# Patient Record
Sex: FEMALE | Race: BLACK OR AFRICAN AMERICAN | NOT HISPANIC OR LATINO | Employment: PART TIME | URBAN - METROPOLITAN AREA
[De-identification: names, ages, dates, MRNs, and addresses within clinical notes are randomized per-mention and may not be internally consistent; named-entity substitution may affect disease eponyms.]

---

## 2017-01-26 ENCOUNTER — ALLSCRIPTS OFFICE VISIT (OUTPATIENT)
Dept: OTHER | Facility: OTHER | Age: 27
End: 2017-01-26

## 2017-02-15 ENCOUNTER — ALLSCRIPTS OFFICE VISIT (OUTPATIENT)
Dept: OTHER | Facility: OTHER | Age: 27
End: 2017-02-15

## 2017-03-28 ENCOUNTER — LAB REQUISITION (OUTPATIENT)
Dept: LAB | Facility: HOSPITAL | Age: 27
End: 2017-03-28
Payer: COMMERCIAL

## 2017-03-28 ENCOUNTER — ALLSCRIPTS OFFICE VISIT (OUTPATIENT)
Dept: OTHER | Facility: OTHER | Age: 27
End: 2017-03-28

## 2017-03-28 DIAGNOSIS — Z11.3 ENCOUNTER FOR SCREENING FOR INFECTIONS WITH PREDOMINANTLY SEXUAL MODE OF TRANSMISSION: ICD-10-CM

## 2017-03-28 PROCEDURE — 87491 CHLMYD TRACH DNA AMP PROBE: CPT | Performed by: PHYSICIAN ASSISTANT

## 2017-03-28 PROCEDURE — 87591 N.GONORRHOEAE DNA AMP PROB: CPT | Performed by: PHYSICIAN ASSISTANT

## 2017-03-29 LAB
CHLAMYDIA DNA CVX QL NAA+PROBE: NORMAL
N GONORRHOEA DNA GENITAL QL NAA+PROBE: NORMAL

## 2017-04-07 ENCOUNTER — ALLSCRIPTS OFFICE VISIT (OUTPATIENT)
Dept: OTHER | Facility: OTHER | Age: 27
End: 2017-04-07

## 2017-04-11 ENCOUNTER — ALLSCRIPTS OFFICE VISIT (OUTPATIENT)
Dept: OTHER | Facility: OTHER | Age: 27
End: 2017-04-11

## 2017-06-07 ENCOUNTER — HOSPITAL ENCOUNTER (EMERGENCY)
Facility: HOSPITAL | Age: 27
Discharge: HOME | End: 2017-06-07
Admitting: EMERGENCY MEDICINE
Payer: COMMERCIAL

## 2017-06-07 VITALS
TEMPERATURE: 98.7 F | BODY MASS INDEX: 29.52 KG/M2 | SYSTOLIC BLOOD PRESSURE: 133 MMHG | RESPIRATION RATE: 16 BRPM | HEART RATE: 67 BPM | WEIGHT: 172 LBS | OXYGEN SATURATION: 98 % | DIASTOLIC BLOOD PRESSURE: 72 MMHG

## 2017-06-07 DIAGNOSIS — L73.2 HIDRADENITIS SUPPURATIVA: Primary | ICD-10-CM

## 2017-06-07 PROCEDURE — 99283 EMERGENCY DEPT VISIT LOW MDM: CPT

## 2017-06-07 RX ORDER — CEPHALEXIN 250 MG/1
250 CAPSULE ORAL 4 TIMES DAILY
Qty: 28 CAPSULE | Refills: 0 | Status: SHIPPED | OUTPATIENT
Start: 2017-06-07 | End: 2017-06-14

## 2017-06-07 RX ORDER — OXYCODONE HYDROCHLORIDE AND ACETAMINOPHEN 5; 325 MG/1; MG/1
1 TABLET ORAL EVERY 4 HOURS PRN
Qty: 8 TABLET | Refills: 0 | Status: SHIPPED | OUTPATIENT
Start: 2017-06-07 | End: 2017-07-25

## 2017-06-07 RX ORDER — OXYCODONE HYDROCHLORIDE AND ACETAMINOPHEN 5; 325 MG/1; MG/1
1 TABLET ORAL ONCE
Status: COMPLETED | OUTPATIENT
Start: 2017-06-07 | End: 2017-06-07

## 2017-06-07 RX ADMIN — OXYCODONE HYDROCHLORIDE AND ACETAMINOPHEN 1 TABLET: 5; 325 TABLET ORAL at 13:05

## 2017-07-25 ENCOUNTER — HOSPITAL ENCOUNTER (EMERGENCY)
Facility: HOSPITAL | Age: 27
Discharge: HOME | End: 2017-07-25
Attending: EMERGENCY MEDICINE | Admitting: EMERGENCY MEDICINE
Payer: COMMERCIAL

## 2017-07-25 ENCOUNTER — APPOINTMENT (EMERGENCY)
Dept: RADIOLOGY | Facility: HOSPITAL | Age: 27
End: 2017-07-25
Payer: COMMERCIAL

## 2017-07-25 VITALS
DIASTOLIC BLOOD PRESSURE: 82 MMHG | HEART RATE: 86 BPM | SYSTOLIC BLOOD PRESSURE: 129 MMHG | BODY MASS INDEX: 26.29 KG/M2 | TEMPERATURE: 98.2 F | HEIGHT: 64 IN | RESPIRATION RATE: 16 BRPM | OXYGEN SATURATION: 100 % | WEIGHT: 154 LBS

## 2017-07-25 DIAGNOSIS — R10.84 ABDOMINAL PAIN, DIFFUSE: Primary | ICD-10-CM

## 2017-07-25 LAB
ALBUMIN SERPL BCP-MCNC: 3.5 G/DL (ref 3.5–5)
ALP SERPL-CCNC: 83 U/L (ref 46–116)
ALT SERPL W P-5'-P-CCNC: 16 U/L (ref 12–78)
ANION GAP SERPL CALCULATED.3IONS-SCNC: 8 MMOL/L (ref 4–13)
AST SERPL W P-5'-P-CCNC: 12 U/L (ref 5–45)
BACTERIA UR QL AUTO: ABNORMAL /HPF
BASOPHILS # BLD AUTO: 0.03 THOUSANDS/ΜL (ref 0–0.1)
BASOPHILS NFR BLD AUTO: 1 % (ref 0–1)
BILIRUB SERPL-MCNC: 0.43 MG/DL (ref 0.2–1)
BILIRUB UR QL STRIP: NEGATIVE
BILIRUB UR QL STRIP: NEGATIVE
BUN SERPL-MCNC: 6 MG/DL (ref 5–25)
CALCIUM SERPL-MCNC: 9 MG/DL (ref 8.3–10.1)
CHLORIDE SERPL-SCNC: 108 MMOL/L (ref 100–108)
CLARITY UR: CLEAR
CLARITY UR: NORMAL
CO2 SERPL-SCNC: 26 MMOL/L (ref 21–32)
COLOR UR: YELLOW
COLOR UR: YELLOW
CREAT SERPL-MCNC: 0.65 MG/DL (ref 0.6–1.3)
EOSINOPHIL # BLD AUTO: 0.03 THOUSAND/ΜL (ref 0–0.61)
EOSINOPHIL NFR BLD AUTO: 1 % (ref 0–6)
ERYTHROCYTE [DISTWIDTH] IN BLOOD BY AUTOMATED COUNT: 12.8 % (ref 11.6–15.1)
GFR SERPL CREATININE-BSD FRML MDRD: 144 ML/MIN/1.73SQ M
GLUCOSE SERPL-MCNC: 98 MG/DL (ref 65–140)
GLUCOSE UR STRIP-MCNC: NEGATIVE MG/DL
GLUCOSE UR STRIP-MCNC: NEGATIVE MG/DL
HCG UR QL: NORMAL
HCT VFR BLD AUTO: 38.5 % (ref 34.8–46.1)
HGB BLD-MCNC: 13.3 G/DL (ref 11.5–15.4)
HGB UR QL STRIP.AUTO: NEGATIVE
HGB UR QL STRIP.AUTO: NEGATIVE
HYALINE CASTS #/AREA URNS LPF: ABNORMAL /LPF
KETONES UR STRIP-MCNC: NEGATIVE MG/DL
KETONES UR STRIP-MCNC: NEGATIVE MG/DL
LEUKOCYTE ESTERASE UR QL STRIP: NEGATIVE
LEUKOCYTE ESTERASE UR QL STRIP: NEGATIVE
LIPASE SERPL-CCNC: 173 U/L (ref 73–393)
LYMPHOCYTES # BLD AUTO: 1.62 THOUSANDS/ΜL (ref 0.6–4.47)
LYMPHOCYTES NFR BLD AUTO: 25 % (ref 14–44)
MCH RBC QN AUTO: 29.3 PG (ref 26.8–34.3)
MCHC RBC AUTO-ENTMCNC: 34.5 G/DL (ref 31.4–37.4)
MCV RBC AUTO: 85 FL (ref 82–98)
MONOCYTES # BLD AUTO: 0.36 THOUSAND/ΜL (ref 0.17–1.22)
MONOCYTES NFR BLD AUTO: 6 % (ref 4–12)
NEUTROPHILS # BLD AUTO: 4.33 THOUSANDS/ΜL (ref 1.85–7.62)
NEUTS SEG NFR BLD AUTO: 67 % (ref 43–75)
NITRITE UR QL STRIP: NEGATIVE
NITRITE UR QL STRIP: NEGATIVE
NON-SQ EPI CELLS URNS QL MICRO: ABNORMAL /HPF
NRBC BLD AUTO-RTO: 0 /100 WBCS
PH UR STRIP.AUTO: 6 [PH] (ref 4.5–8)
PH UR STRIP.AUTO: 6 [PH] (ref 4.5–8)
PLATELET # BLD AUTO: 207 THOUSANDS/UL (ref 149–390)
PMV BLD AUTO: 9.6 FL (ref 8.9–12.7)
POTASSIUM SERPL-SCNC: 3.5 MMOL/L (ref 3.5–5.3)
PROT SERPL-MCNC: 7.2 G/DL (ref 6.4–8.2)
PROT UR STRIP-MCNC: ABNORMAL MG/DL
PROT UR STRIP-MCNC: NEGATIVE MG/DL
RBC # BLD AUTO: 4.54 MILLION/UL (ref 3.81–5.12)
RBC #/AREA URNS AUTO: ABNORMAL /HPF
SODIUM SERPL-SCNC: 142 MMOL/L (ref 136–145)
SP GR UR STRIP.AUTO: 1.02 (ref 1–1.03)
SP GR UR STRIP.AUTO: 1.02 (ref 1–1.03)
UROBILINOGEN UR QL STRIP.AUTO: 0.2 E.U./DL
UROBILINOGEN UR QL STRIP.AUTO: 0.2 E.U./DL
WBC # BLD AUTO: 6.38 THOUSAND/UL (ref 4.31–10.16)
WBC #/AREA URNS AUTO: ABNORMAL /HPF

## 2017-07-25 PROCEDURE — 96374 THER/PROPH/DIAG INJ IV PUSH: CPT

## 2017-07-25 PROCEDURE — 80053 COMPREHEN METABOLIC PANEL: CPT | Performed by: STUDENT IN AN ORGANIZED HEALTH CARE EDUCATION/TRAINING PROGRAM

## 2017-07-25 PROCEDURE — 96375 TX/PRO/DX INJ NEW DRUG ADDON: CPT

## 2017-07-25 PROCEDURE — 83690 ASSAY OF LIPASE: CPT | Performed by: STUDENT IN AN ORGANIZED HEALTH CARE EDUCATION/TRAINING PROGRAM

## 2017-07-25 PROCEDURE — 74177 CT ABD & PELVIS W/CONTRAST: CPT

## 2017-07-25 PROCEDURE — 81003 URINALYSIS AUTO W/O SCOPE: CPT | Performed by: STUDENT IN AN ORGANIZED HEALTH CARE EDUCATION/TRAINING PROGRAM

## 2017-07-25 PROCEDURE — 87591 N.GONORRHOEAE DNA AMP PROB: CPT | Performed by: STUDENT IN AN ORGANIZED HEALTH CARE EDUCATION/TRAINING PROGRAM

## 2017-07-25 PROCEDURE — 99284 EMERGENCY DEPT VISIT MOD MDM: CPT

## 2017-07-25 PROCEDURE — 87491 CHLMYD TRACH DNA AMP PROBE: CPT | Performed by: STUDENT IN AN ORGANIZED HEALTH CARE EDUCATION/TRAINING PROGRAM

## 2017-07-25 PROCEDURE — 36415 COLL VENOUS BLD VENIPUNCTURE: CPT | Performed by: STUDENT IN AN ORGANIZED HEALTH CARE EDUCATION/TRAINING PROGRAM

## 2017-07-25 PROCEDURE — 96361 HYDRATE IV INFUSION ADD-ON: CPT

## 2017-07-25 PROCEDURE — 85025 COMPLETE CBC W/AUTO DIFF WBC: CPT | Performed by: STUDENT IN AN ORGANIZED HEALTH CARE EDUCATION/TRAINING PROGRAM

## 2017-07-25 PROCEDURE — 81001 URINALYSIS AUTO W/SCOPE: CPT

## 2017-07-25 PROCEDURE — 81025 URINE PREGNANCY TEST: CPT | Performed by: STUDENT IN AN ORGANIZED HEALTH CARE EDUCATION/TRAINING PROGRAM

## 2017-07-25 RX ORDER — DICYCLOMINE HYDROCHLORIDE 10 MG/1
10 CAPSULE ORAL
Qty: 20 CAPSULE | Refills: 0 | Status: SHIPPED | OUTPATIENT
Start: 2017-07-25 | End: 2017-12-26 | Stop reason: ALTCHOICE

## 2017-07-25 RX ORDER — MORPHINE SULFATE 4 MG/ML
4 INJECTION, SOLUTION INTRAMUSCULAR; INTRAVENOUS ONCE
Status: COMPLETED | OUTPATIENT
Start: 2017-07-25 | End: 2017-07-25

## 2017-07-25 RX ORDER — MORPHINE SULFATE 4 MG/ML
4 INJECTION, SOLUTION INTRAMUSCULAR; INTRAVENOUS EVERY 4 HOURS PRN
Status: DISCONTINUED | OUTPATIENT
Start: 2017-07-25 | End: 2017-07-25

## 2017-07-25 RX ORDER — KETOROLAC TROMETHAMINE 30 MG/ML
15 INJECTION, SOLUTION INTRAMUSCULAR; INTRAVENOUS ONCE
Status: COMPLETED | OUTPATIENT
Start: 2017-07-25 | End: 2017-07-25

## 2017-07-25 RX ADMIN — KETOROLAC TROMETHAMINE 15 MG: 30 INJECTION, SOLUTION INTRAMUSCULAR at 14:39

## 2017-07-25 RX ADMIN — HYDROMORPHONE HYDROCHLORIDE 0.5 MG: 1 INJECTION, SOLUTION INTRAMUSCULAR; INTRAVENOUS; SUBCUTANEOUS at 12:20

## 2017-07-25 RX ADMIN — MORPHINE SULFATE 4 MG: 4 INJECTION, SOLUTION INTRAMUSCULAR; INTRAVENOUS at 10:45

## 2017-07-25 RX ADMIN — SODIUM CHLORIDE 1000 ML: 0.9 INJECTION, SOLUTION INTRAVENOUS at 10:42

## 2017-07-25 RX ADMIN — IOHEXOL 100 ML: 350 INJECTION, SOLUTION INTRAVENOUS at 14:56

## 2017-07-26 LAB
CHLAMYDIA DNA CVX QL NAA+PROBE: NORMAL
N GONORRHOEA DNA GENITAL QL NAA+PROBE: NORMAL

## 2017-07-28 ENCOUNTER — TRANSCRIBE ORDERS (OUTPATIENT)
Dept: LAB | Facility: HOSPITAL | Age: 27
End: 2017-07-28

## 2017-08-26 ENCOUNTER — HOSPITAL ENCOUNTER (EMERGENCY)
Facility: HOSPITAL | Age: 27
Discharge: HOME | End: 2017-08-26
Attending: EMERGENCY MEDICINE
Payer: COMMERCIAL

## 2017-08-26 VITALS
WEIGHT: 154 LBS | OXYGEN SATURATION: 99 % | DIASTOLIC BLOOD PRESSURE: 73 MMHG | RESPIRATION RATE: 17 BRPM | BODY MASS INDEX: 26.43 KG/M2 | SYSTOLIC BLOOD PRESSURE: 111 MMHG | TEMPERATURE: 98.4 F | HEART RATE: 75 BPM

## 2017-08-26 DIAGNOSIS — L02.214 GROIN ABSCESS: ICD-10-CM

## 2017-08-26 DIAGNOSIS — L73.2 HIDRADENITIS SUPPURATIVA: Primary | ICD-10-CM

## 2017-08-26 PROCEDURE — 99282 EMERGENCY DEPT VISIT SF MDM: CPT

## 2017-08-26 RX ORDER — CEPHALEXIN 250 MG/1
500 CAPSULE ORAL 4 TIMES DAILY
Qty: 80 CAPSULE | Refills: 0 | Status: SHIPPED | OUTPATIENT
Start: 2017-08-26 | End: 2017-09-05

## 2017-08-26 RX ORDER — OXYCODONE HYDROCHLORIDE AND ACETAMINOPHEN 5; 325 MG/1; MG/1
1 TABLET ORAL EVERY 8 HOURS PRN
Qty: 15 TABLET | Refills: 0 | Status: SHIPPED | OUTPATIENT
Start: 2017-08-26 | End: 2017-08-31

## 2017-08-26 RX ORDER — HYDROCODONE BITARTRATE AND ACETAMINOPHEN 5; 325 MG/1; MG/1
1 TABLET ORAL ONCE
Status: COMPLETED | OUTPATIENT
Start: 2017-08-26 | End: 2017-08-26

## 2017-08-26 RX ADMIN — HYDROCODONE BITARTRATE AND ACETAMINOPHEN 1 TABLET: 5; 325 TABLET ORAL at 13:37

## 2017-09-27 ENCOUNTER — ALLSCRIPTS OFFICE VISIT (OUTPATIENT)
Dept: OTHER | Facility: OTHER | Age: 27
End: 2017-09-27

## 2017-09-27 DIAGNOSIS — R19.7 DIARRHEA: ICD-10-CM

## 2017-09-27 DIAGNOSIS — E05.90 THYROTOXICOSIS WITHOUT THYROID STORM: ICD-10-CM

## 2017-09-27 DIAGNOSIS — R63.4 ABNORMAL WEIGHT LOSS: ICD-10-CM

## 2017-10-09 ENCOUNTER — GENERIC CONVERSION - ENCOUNTER (OUTPATIENT)
Dept: OTHER | Facility: OTHER | Age: 27
End: 2017-10-09

## 2017-10-09 ENCOUNTER — APPOINTMENT (OUTPATIENT)
Dept: LAB | Facility: CLINIC | Age: 27
End: 2017-10-09
Payer: COMMERCIAL

## 2017-10-09 DIAGNOSIS — R19.7 DIARRHEA: ICD-10-CM

## 2017-10-09 DIAGNOSIS — R63.4 ABNORMAL WEIGHT LOSS: ICD-10-CM

## 2017-10-09 LAB
ALBUMIN SERPL BCP-MCNC: 3.7 G/DL (ref 3.5–5)
ALP SERPL-CCNC: 72 U/L (ref 46–116)
ALT SERPL W P-5'-P-CCNC: 16 U/L (ref 12–78)
ANION GAP SERPL CALCULATED.3IONS-SCNC: 5 MMOL/L (ref 4–13)
AST SERPL W P-5'-P-CCNC: 12 U/L (ref 5–45)
BASOPHILS # BLD AUTO: 0.01 THOUSANDS/ΜL (ref 0–0.1)
BASOPHILS NFR BLD AUTO: 0 % (ref 0–1)
BILIRUB SERPL-MCNC: 0.65 MG/DL (ref 0.2–1)
BUN SERPL-MCNC: 5 MG/DL (ref 5–25)
CALCIUM SERPL-MCNC: 9 MG/DL (ref 8.3–10.1)
CHLORIDE SERPL-SCNC: 107 MMOL/L (ref 100–108)
CO2 SERPL-SCNC: 28 MMOL/L (ref 21–32)
CREAT SERPL-MCNC: 0.74 MG/DL (ref 0.6–1.3)
EOSINOPHIL # BLD AUTO: 0.01 THOUSAND/ΜL (ref 0–0.61)
EOSINOPHIL NFR BLD AUTO: 0 % (ref 0–6)
ERYTHROCYTE [DISTWIDTH] IN BLOOD BY AUTOMATED COUNT: 13.8 % (ref 11.6–15.1)
GFR SERPL CREATININE-BSD FRML MDRD: 130 ML/MIN/1.73SQ M
GLUCOSE SERPL-MCNC: 87 MG/DL (ref 65–140)
HCT VFR BLD AUTO: 40.1 % (ref 34.8–46.1)
HGB BLD-MCNC: 13.5 G/DL (ref 11.5–15.4)
LYMPHOCYTES # BLD AUTO: 1.48 THOUSANDS/ΜL (ref 0.6–4.47)
LYMPHOCYTES NFR BLD AUTO: 36 % (ref 14–44)
MCH RBC QN AUTO: 29 PG (ref 26.8–34.3)
MCHC RBC AUTO-ENTMCNC: 33.7 G/DL (ref 31.4–37.4)
MCV RBC AUTO: 86 FL (ref 82–98)
MONOCYTES # BLD AUTO: 0.32 THOUSAND/ΜL (ref 0.17–1.22)
MONOCYTES NFR BLD AUTO: 8 % (ref 4–12)
NEUTROPHILS # BLD AUTO: 2.31 THOUSANDS/ΜL (ref 1.85–7.62)
NEUTS SEG NFR BLD AUTO: 56 % (ref 43–75)
NRBC BLD AUTO-RTO: 0 /100 WBCS
PLATELET # BLD AUTO: 197 THOUSANDS/UL (ref 149–390)
PMV BLD AUTO: 10 FL (ref 8.9–12.7)
POTASSIUM SERPL-SCNC: 4.2 MMOL/L (ref 3.5–5.3)
PROT SERPL-MCNC: 7.7 G/DL (ref 6.4–8.2)
RBC # BLD AUTO: 4.65 MILLION/UL (ref 3.81–5.12)
SODIUM SERPL-SCNC: 140 MMOL/L (ref 136–145)
T4 FREE SERPL-MCNC: 0.99 NG/DL (ref 0.76–1.46)
TSH SERPL DL<=0.05 MIU/L-ACNC: 0.33 UIU/ML (ref 0.36–3.74)
WBC # BLD AUTO: 4.14 THOUSAND/UL (ref 4.31–10.16)

## 2017-10-09 PROCEDURE — 36415 COLL VENOUS BLD VENIPUNCTURE: CPT

## 2017-10-09 PROCEDURE — 80053 COMPREHEN METABOLIC PANEL: CPT

## 2017-10-09 PROCEDURE — 85025 COMPLETE CBC W/AUTO DIFF WBC: CPT

## 2017-10-09 PROCEDURE — 84439 ASSAY OF FREE THYROXINE: CPT

## 2017-10-09 PROCEDURE — 84443 ASSAY THYROID STIM HORMONE: CPT

## 2017-10-30 ENCOUNTER — ALLSCRIPTS OFFICE VISIT (OUTPATIENT)
Dept: OTHER | Facility: OTHER | Age: 27
End: 2017-10-30

## 2017-10-30 DIAGNOSIS — R10.2 PELVIC AND PERINEAL PAIN: ICD-10-CM

## 2017-10-30 DIAGNOSIS — E05.90 THYROTOXICOSIS WITHOUT THYROID STORM: ICD-10-CM

## 2017-10-30 LAB — HCG, QUALITATIVE (HISTORICAL): NEGATIVE

## 2017-10-30 PROCEDURE — 87491 CHLMYD TRACH DNA AMP PROBE: CPT | Performed by: STUDENT IN AN ORGANIZED HEALTH CARE EDUCATION/TRAINING PROGRAM

## 2017-10-30 PROCEDURE — 87591 N.GONORRHOEAE DNA AMP PROB: CPT | Performed by: STUDENT IN AN ORGANIZED HEALTH CARE EDUCATION/TRAINING PROGRAM

## 2017-10-31 ENCOUNTER — LAB REQUISITION (OUTPATIENT)
Dept: LAB | Facility: HOSPITAL | Age: 27
End: 2017-10-31
Payer: COMMERCIAL

## 2017-10-31 ENCOUNTER — GENERIC CONVERSION - ENCOUNTER (OUTPATIENT)
Dept: OTHER | Facility: OTHER | Age: 27
End: 2017-10-31

## 2017-10-31 DIAGNOSIS — R10.2 PELVIC AND PERINEAL PAIN: ICD-10-CM

## 2017-10-31 LAB
BILIRUB UR QL STRIP: NEGATIVE
CLARITY UR: CLEAR
COLOR UR: YELLOW
GLUCOSE UR STRIP-MCNC: NEGATIVE MG/DL
HGB UR QL STRIP.AUTO: NEGATIVE
KETONES UR STRIP-MCNC: NEGATIVE MG/DL
LEUKOCYTE ESTERASE UR QL STRIP: NEGATIVE
NITRITE UR QL STRIP: NEGATIVE
PH UR STRIP.AUTO: 6 [PH] (ref 4.5–8)
PROT UR STRIP-MCNC: NEGATIVE MG/DL
SP GR UR STRIP.AUTO: 1.01 (ref 1–1.03)
UROBILINOGEN UR QL STRIP.AUTO: 0.2 E.U./DL

## 2017-10-31 PROCEDURE — 81003 URINALYSIS AUTO W/O SCOPE: CPT | Performed by: STUDENT IN AN ORGANIZED HEALTH CARE EDUCATION/TRAINING PROGRAM

## 2017-11-01 LAB
CHLAMYDIA DNA CVX QL NAA+PROBE: NORMAL
N GONORRHOEA DNA GENITAL QL NAA+PROBE: NORMAL

## 2017-11-13 ENCOUNTER — GENERIC CONVERSION - ENCOUNTER (OUTPATIENT)
Dept: OTHER | Facility: OTHER | Age: 27
End: 2017-11-13

## 2017-11-16 ENCOUNTER — APPOINTMENT (EMERGENCY)
Dept: RADIOLOGY | Facility: HOSPITAL | Age: 27
End: 2017-11-16
Payer: COMMERCIAL

## 2017-11-16 ENCOUNTER — HOSPITAL ENCOUNTER (EMERGENCY)
Facility: HOSPITAL | Age: 27
Discharge: HOME | End: 2017-11-16
Attending: EMERGENCY MEDICINE | Admitting: EMERGENCY MEDICINE
Payer: COMMERCIAL

## 2017-11-16 VITALS
DIASTOLIC BLOOD PRESSURE: 73 MMHG | BODY MASS INDEX: 24.75 KG/M2 | HEIGHT: 64 IN | HEART RATE: 53 BPM | WEIGHT: 145 LBS | TEMPERATURE: 97.5 F | RESPIRATION RATE: 16 BRPM | OXYGEN SATURATION: 98 % | SYSTOLIC BLOOD PRESSURE: 113 MMHG

## 2017-11-16 DIAGNOSIS — G43.909 MIGRAINE: Primary | ICD-10-CM

## 2017-11-16 PROCEDURE — 99284 EMERGENCY DEPT VISIT MOD MDM: CPT

## 2017-11-16 PROCEDURE — 96374 THER/PROPH/DIAG INJ IV PUSH: CPT

## 2017-11-16 PROCEDURE — 96361 HYDRATE IV INFUSION ADD-ON: CPT

## 2017-11-16 PROCEDURE — 70450 CT HEAD/BRAIN W/O DYE: CPT

## 2017-11-16 PROCEDURE — 96375 TX/PRO/DX INJ NEW DRUG ADDON: CPT

## 2017-11-16 RX ORDER — DEXAMETHASONE SODIUM PHOSPHATE 10 MG/ML
10 INJECTION, SOLUTION INTRAMUSCULAR; INTRAVENOUS ONCE
Status: DISCONTINUED | OUTPATIENT
Start: 2017-11-16 | End: 2017-11-16 | Stop reason: HOSPADM

## 2017-11-16 RX ORDER — KETOROLAC TROMETHAMINE 30 MG/ML
15 INJECTION, SOLUTION INTRAMUSCULAR; INTRAVENOUS ONCE
Status: COMPLETED | OUTPATIENT
Start: 2017-11-16 | End: 2017-11-16

## 2017-11-16 RX ORDER — ACETAMINOPHEN, ASPIRIN AND CAFFEINE 250; 250; 65 MG/1; MG/1; MG/1
2 TABLET, FILM COATED ORAL 2 TIMES DAILY
COMMUNITY
End: 2017-12-26 | Stop reason: ALTCHOICE

## 2017-11-16 RX ORDER — METOCLOPRAMIDE HYDROCHLORIDE 5 MG/ML
10 INJECTION INTRAMUSCULAR; INTRAVENOUS ONCE
Status: COMPLETED | OUTPATIENT
Start: 2017-11-16 | End: 2017-11-16

## 2017-11-16 RX ORDER — UREA 10 %
500 LOTION (ML) TOPICAL DAILY
COMMUNITY
End: 2017-12-26 | Stop reason: ALTCHOICE

## 2017-11-16 RX ORDER — ACETAMINOPHEN 500 MG
1000 TABLET ORAL 2 TIMES DAILY
COMMUNITY
End: 2017-12-26 | Stop reason: ALTCHOICE

## 2017-11-16 RX ORDER — TOPIRAMATE 100 MG/1
150 TABLET, FILM COATED ORAL
COMMUNITY
End: 2017-12-26 | Stop reason: ALTCHOICE

## 2017-11-16 RX ADMIN — KETOROLAC TROMETHAMINE 15 MG: 30 INJECTION, SOLUTION INTRAMUSCULAR at 15:27

## 2017-11-16 RX ADMIN — SODIUM CHLORIDE 1000 ML: 0.9 INJECTION, SOLUTION INTRAVENOUS at 15:31

## 2017-11-16 RX ADMIN — METOCLOPRAMIDE 10 MG: 5 INJECTION, SOLUTION INTRAMUSCULAR; INTRAVENOUS at 15:32

## 2017-11-16 NOTE — ED PROVIDER NOTES
History  Chief Complaint   Patient presents with    Headache - Recurrent or Known Dx Migraines     Pt reports migraines(every day for the past 6 months)- was put on tramadol with no relief- yesterday something dropped and hit her head and her migraine has been worse since     44-year-old female with a past medical history of chronic migraine since the birth of her child 6 months ago presents for evaluation of acute exacerbation of her headaches  Patient states that daily she has a headache which ranges from 6 to 8/10 however, yesterday she struck her head on a wheelchair foot rest which caused her to have a 10/10 headache  She typically takes Topamax at night in addition to magnesium  Despite these medications as well as addition of Percocet her headache continues  Patient states that the headache is pulsatile nature, generalized associated with photophobia and photophobia  Patient has nausea but denies vomiting  Patient denies history of fever, chills, abdominal pain, back pain chest pain, shortness of breath, history of arrhythmias  History provided by:  Patient      Prior to Admission Medications   Prescriptions Last Dose Informant Patient Reported?  Taking?   acetaminophen (TYLENOL) 500 mg tablet 11/16/2017 at Unknown time  Yes Yes   Sig: Take 1,000 mg by mouth 2 (two) times a day   aspirin-acetaminophen-caffeine (EXCEDRIN MIGRAINE) 250-250-65 MG per tablet 11/16/2017 at Unknown time  Yes Yes   Sig: Take 2 tablets by mouth 2 (two) times a day   dicyclomine (BENTYL) 10 mg capsule   No No   Sig: Take 1 capsule by mouth 4 (four) times a day (before meals and at bedtime)   magnesium gluconate (MAGONATE) 500 mg tablet 11/16/2017 at Unknown time  Yes Yes   Sig: Take 500 mg by mouth daily   topiramate (TOPAMAX) 100 mg tablet 11/15/2017 at Unknown time  Yes Yes   Sig: Take 150 mg by mouth daily at bedtime      Facility-Administered Medications: None       Past Medical History:   Diagnosis Date    Disease of thyroid gland        Past Surgical History:   Procedure Laterality Date    AR EXC SWEAT GLAND LESN INGUIN,SIMPL Right 2/23/2016    Procedure: EXCISION GROIN CYST HIDRADENITIS;  Surgeon: Toribio Curling, DO;  Location: [facility];  Service: General    VULVA BIOPSY N/A 5/13/2016    Procedure: INCISION AND DRAINAGE, EXCISION OF LABIAL CYST ;  Surgeon: Jhoana Jones DO;  Location[facility];  Service:        History reviewed  No pertinent family history  I have reviewed and agree with the history as documented  Social History   Substance Use Topics    Smoking status: Never Smoker    Smokeless tobacco: Never Used    Alcohol use No        Review of Systems   Constitutional: Negative for chills, diaphoresis, fatigue and fever  HENT: Negative for congestion, ear discharge, facial swelling, hearing loss, rhinorrhea, sinus pain, sinus pressure, sneezing, sore throat, tinnitus and trouble swallowing  Respiratory: Negative for cough, choking, chest tightness, shortness of breath, wheezing and stridor  Cardiovascular: Negative for chest pain, palpitations and leg swelling  Gastrointestinal: Negative for abdominal distention, abdominal pain, blood in stool, constipation, diarrhea, nausea and vomiting  Genitourinary: Negative for difficulty urinating, dysuria, enuresis, flank pain, frequency and hematuria  Musculoskeletal: Negative for arthralgias, back pain, gait problem and neck stiffness  Skin: Negative for rash and wound  Neurological: Positive for headaches  Negative for dizziness, seizures, syncope, weakness and numbness  Psychiatric/Behavioral: Negative for agitation, confusion, hallucinations, sleep disturbance and suicidal ideas         Physical Exam  ED Triage Vitals [11/16/17 1159]   Temperature Pulse Respirations Blood Pressure SpO2   97 5 °F (36 4 °C) 77 18 146/68 98 %      Temp Source Heart Rate Source Patient Position - Orthostatic VS BP Location FiO2 (%)   Tympanic Monitor Sitting Left arm --      Pain Score       Worst Possible Pain           Orthostatic Vital Signs  Vitals:    11/16/17 1159 11/16/17 1403 11/16/17 1526 11/16/17 1637   BP: 146/68 116/58 118/69 113/73   Pulse: 77 61 63 (!) 53   Patient Position - Orthostatic VS: Sitting Lying Lying Lying       Physical Exam   Constitutional: She is oriented to person, place, and time  She appears well-developed and well-nourished  HENT:   Head: Normocephalic and atraumatic  Nose: No sinus tenderness  No epistaxis  Eyes: Conjunctivae are normal    Cardiovascular: Normal rate, regular rhythm and normal heart sounds  Exam reveals no gallop and no friction rub  No murmur heard  Pulmonary/Chest: Effort normal and breath sounds normal  No respiratory distress  She has no wheezes  She has no rales  Abdominal: Soft  Bowel sounds are normal  She exhibits no distension  There is no tenderness  There is no rebound and no guarding  Neurological: She is alert and oriented to person, place, and time  She is not disoriented  GCS eye subscore is 4  GCS verbal subscore is 5  GCS motor subscore is 6  Skin: Skin is warm, dry and intact  Psychiatric: She has a normal mood and affect  Her speech is normal and behavior is normal  Judgment and thought content normal    Nursing note and vitals reviewed  ED Medications  Medications   dexamethasone (PF) (DECADRON) injection 10 mg (10 mg Intravenous Not Given 11/16/17 1616)   sodium chloride 0 9 % bolus 1,000 mL (0 mL Intravenous Stopped 11/16/17 1630)   ketorolac (TORADOL) 30 mg/mL injection 15 mg (15 mg Intravenous Given 11/16/17 1527)   metoclopramide (REGLAN) injection 10 mg (10 mg Intravenous Given 11/16/17 1532)       Diagnostic Studies  Results Reviewed     None                 CT head without contrast   ED Interpretation by Ayah Pollard DO (11/16 1658)      No acute intracranial abnormality           Workstation performed: [id number]         Final Result by Jarod Reed MD (24/11 1646)      No acute intracranial abnormality  Workstation performed: [id number]               Procedures  Procedures      Phone Consults  ED Phone Contact    ED Course  ED Course                                MDM  Number of Diagnoses or Management Options  Migraine: new and requires workup  Diagnosis management comments: 61-year-old female with a six-month history of migraine headaches following the birth of her child and has had no significant workup for her migraines presents for evaluation of an acute exacerbation  Patient given a CT head without contrast to rule out intracranial mass, which showed no no mass acute bleed  The patient's headache completely resolved following receiving Reglan and Toradol  Patient decided to leave the emergency department at this time      1  Migraine  -patient given 10 of Reglan, 15 Toradol, 1 L normal saline  -patient will follow with PCP within a week      CritCare Time    Disposition  Final diagnoses:   Migraine     Time reflects when diagnosis was documented in both MDM as applicable and the Disposition within this note     Time User Action Codes Description Comment    11/16/2017  4:59 PM Josie Estes Add [[id number]] Migraine       ED Disposition     ED Disposition Condition Comment    Discharge  Rosalia Velarde discharge to home/self care      Condition at discharge: Good        Follow-up Information     Follow up With Specialties Details Why Contact Info    Cesia Case MD Family Medicine Call in 1 day  [address]  [address] [place] [address]  [facility]          Discharge Medication List as of 11/16/2017  5:00 PM      CONTINUE these medications which have NOT CHANGED    Details   acetaminophen (TYLENOL) 500 mg tablet Take 1,000 mg by mouth 2 (two) times a day, Historical Med      aspirin-acetaminophen-caffeine (EXCEDRIN MIGRAINE) 250-250-65 MG per tablet Take 2 tablets by mouth 2 (two) times a day, Historical Med      magnesium gluconate (MAGONATE) 500 mg tablet Take 500 mg by mouth daily, Historical Med      topiramate (TOPAMAX) 100 mg tablet Take 150 mg by mouth daily at bedtime, Historical Med      dicyclomine (BENTYL) 10 mg capsule Take 1 capsule by mouth 4 (four) times a day (before meals and at bedtime), Starting Tue 7/25/2017, Print           No discharge procedures on file  ED Provider  Attending physically available and evaluated Durward Ards  I managed the patient along with the ED Attending      Electronically Signed by         Trudy Flannery DO  Resident  11/16/17 6015

## 2017-11-16 NOTE — DISCHARGE INSTRUCTIONS
Migraine Headache   WHAT YOU SHOULD KNOW:   A migraine is a severe headache  The pain can be so severe that it interferes with your daily activities  A migraine can last a few hours up to several days  The exact cause of migraines is not known  It may be caused by changes in your body chemicals and extra sensitive nerves in your brain  AFTER YOU LEAVE:   Medicines:  Take medicine as soon as you feel a migraine begin  · Pain medicine: You may need medicine to take away or decrease pain  You may need a doctor's order for this medicine  Do not wait until the pain is severe before you take your medicine  · Migraine medicines: These are used to help prevent a migraine or stop it once it starts  · Antinausea medicine: This medicine may be given to calm your stomach and to help prevent vomiting  They can also help relieve pain  · Take your medicine as directed  Call your healthcare provider if you think your medicine is not helping or if you have side effects  Tell him if you are allergic to any medicine  Keep a list of the medicines, vitamins, and herbs you take  Include the amounts, and when and why you take them  Bring the list or the pill bottles to follow-up visits  Carry your medicine list with you in case of an emergency  Manage your symptoms:   · Rest:  Rest in a dark, quiet room  This will help decrease your pain  · Ice:  Ice helps decrease pain  Use an ice pack or put crushed ice in a plastic bag  Cover the ice pack with a towel and place it on your head where it hurts for 15 to 20 minutes every hour  · Heat:  Heat helps decrease pain and muscle spasms  Use a small towel dampened with warm water or a heating pad, or sit in a warm bath  Apply heat on the area for 20 to 30 minutes every 2 hours  You may alternate heat and ice  Keep a headache diary:  Write down when your migraines start and stop  Include your symptoms and what you were doing when a migraine began   Record what you ate or drank for 24 hours before the migraine started  Describe the pain and where it hurts  Keep track of what you did to treat your migraine and whether it worked  Follow up with your primary healthcare provider or neurologist as directed:  Bring your headache diary with you when you see your primary healthcare provider  Write down your questions so you remember to ask them during your visits  Prevent another migraine:   · Do not smoke: If you smoke, it is never too late to quit  Tobacco smoke can trigger a migraine  It can also cause heart disease, lung disease, cancer, and other health problems  Quitting smoking will improve your health and the health of those around you  If you smoke, ask for information about how to stop  · Do not drink alcohol:  Alcohol can trigger a migraine  It can also interfere with the medicines used to treat your migraine  · Get regular exercise:  Exercise may help prevent migraines  Talk to your primary healthcare provider about the best exercise plan for you  · Manage stress:  Stress may trigger a migraine  Learn new ways to relax, such as deep breathing  · Stick to a sleep schedule:  Go to bed and get up at the same time each day  · Eat regular meals:  Include healthy foods such as include fruit, vegetables, whole-grain breads, low-fat dairy products, beans, lean meat, and fish  Avoid trigger foods like chocolate, hard cheese, and red wine  Foods that contain gluten, nitrates, MSG, or artificial sweeteners may also trigger migraines  Caffeine, which is often used to treat migraines, can also trigger them  Contact your primary healthcare provider or neurologist if:   · You have a fever  · Your migraines interfere with your daily activities  · Your medicines or treatments stop working  · You have questions about your condition or care    Seek care immediately or call 911 if:   · You have a headache that seems different or much worse than your usual migraine headache  · You have a severe headache with a fever or a stiff neck  · You have new problems with speech, vision, balance, or movement  · You feel like you are going to faint, you become confused, or you have a seizure  © 2014 [address] is for End User's use only and may not be sold, redistributed or otherwise used for commercial purposes  All illustrations and images included in CareNotes® are the copyrighted property of [employer] D [employer] M [employer]  or Jae Ortiz  The above information is an  only  It is not intended as medical advice for individual conditions or treatments  Talk to your doctor, nurse or pharmacist before following any medical regimen to see if it is safe and effective for you

## 2017-11-16 NOTE — ED NOTES
Patient requesting IV to be stopped due to resolution of migraine/pain  Dr Mendoza Feeling aware and in agreement  IV to be taken out at this time  Patient still awaiting CT scan        Claire Hodgkins, RN  11/16/17 3852

## 2017-11-16 NOTE — ED ATTENDING ATTESTATION
Lawrence Gutierrez MD, saw and evaluated the patient  I have discussed the patient with the resident/non-physician practitioner and agree with the resident's/non-physician practitioner's findings, Plan of Care, and MDM as documented in the resident's/non-physician practitioner's note, except where noted  All available labs and Radiology studies were reviewed  At this point I agree with the current assessment done in the Emergency Department  I have conducted an independent evaluation of this patient a history and physical is as follows:      Critical Care Time  CritCare Time    25 yo female with chronic headaches for the last six months presents with worsening headache after getting hit in head with wheelchair leg piece while at work yesterday  Pt with nausea, photophobia, phonophobia, no numbness, tingling, no weakness  Pt takes magnesium and topamax daily, trying percocet last few days  Vss, afebrile, lungs cta, rrr, abdomen soft nontender, no neuro deficits  Ct head, migraine meds

## 2017-11-17 ENCOUNTER — ALLSCRIPTS OFFICE VISIT (OUTPATIENT)
Dept: OTHER | Facility: OTHER | Age: 27
End: 2017-11-17

## 2017-11-18 NOTE — PROGRESS NOTES
Assessment    1  Common migraine without aura (346 10) (G43 009)    Plan  Common migraine without aura    · PredniSONE 20 MG Oral Tablet; take 2 tablet daily   · 1 - Robert Mcarthur MD, [facility]  *  23 yo female with frequentmigraines, worsening in recent months  Thanks  Dagoberto Mars, DO  Status: Active Requested for: [id number]  Care Summary provided  : Yes   · Follow-up PRN Evaluation and Treatment  Follow-up  Status: Complete  Done:17Nov2017    Discussion/Summary    Todd Matson is uncomfortable, but stable on exam  She is to f/u PRN no improvement  Precautions given - worsening headaches, slurred speech, fevers, etc, she is to be taken back to the ER this time, we will place Darcie on a burst of Prednisone  is to hydrate well and rest can still take her daily Topamax, Magnesium, and Tylenol or Percocet PRN  The patient was counseled regarding instructions for management,-- impressions,-- importance of compliance with treatment  Possible side effects of new medications were reviewed with the patient/guardian today  The treatment plan was reviewed with the patient/guardian  The patient/guardian understands and agrees with the treatment plan      Chief Complaint  PT is being seen today due to having a migraines for the last 3 days  PT has been taking her medication with Excedrin and Tylenol with no help in site  fevers  No runny stuffy nose; +Bitemporal headaches - +photophobia  No nausea  No CP/SOB  of the head at the ER was normal yesterday; records reviewed  She is not pregnant - pregnancy test was negative in the ER  Note: Pt also late for her acute appt today, but was still seen  Inquired about a Percocet refill as well - pt is seeing another physician (Dr Lilian Zambrano) who has prescribed regularly for her, and I told Todd Matson that she would need to leave a message for her regarding this  History of Present Illness  HPI: As above  Review of Systems   Constitutional: as noted in HPI  Cardiovascular: as noted in HPI  Respiratory: as noted in HPI  Gastrointestinal: as noted in HPI  Neurological: as noted in HPI  Active Problems  1  Abnormal weight loss (783 21) (R63 4)   2  Common migraine without aura (346 10) (G43 009)   3  Encounter for routine gynecological examination (V72 31) (Z01 419)   4  Epidermal inclusion cyst (706 2) (L72 0)   5  Female pelvic pain (625 9) (R10 2)   6  Hyperthyroidism (242 90) (E05 90)   7  Screen for STD (sexually transmitted disease) (V74 5) (Z11 3)   8  Suppurative hidradenitis (705 83) (L73 2)    Past Medical History  1  History of  delivery delivered (669 71) (O82)   2  History of acute bronchitis (V12 69) (Z87 09)   3  History of candidal vulvovaginitis (V13 29) (Z86 19)   4  History of candidiasis (V12 09) (Z86 19)   5  History of folliculitis (H74 1) (N64 2)   6  History of hidradenitis suppurativa (V13 3) (Z87 2)   7  History of painful menstruation (V13 29) (Z87 42)   8  History of Hyperemesis gravidarum (643 00) (O21 0)   9  History of Hypertension in pregnancy, preeclampsia (642 40) (O14 90)   10  History of Labial abscess (616 4) (N76 4)   11  History of Onset of menses (V21 8)   12  History of UTI in pregnancy (646 60,599 0) (O23 40)  Active Problems And Past Medical History Reviewed: The active problems and past medical history were reviewed and updated today  Family History  Mother    1  Family history of Pure Hypercholesterolemia  Brother    2  Family history of Family Health Status Of Brother - Good    Social History   · Denied: History of Alcohol Use (History)   · Denied: History of Drug Use   · Never A Smoker    Surgical History    1  History of Cholecystectomy   2  History of Incision And Drainage Of Skin Abscess   3  History of Tonsillectomy    Current Meds   1  Magnesium Oxide 400 MG Oral Tablet; TAKE 1 TABLET Every twelve hours PRN;  Therapy: [id number] to [phone])  Requested for: [phone]; Last [phone] Ordered   2  Oxycodone-Acetaminophen 5-325 MG Oral Tablet; TAKE 1 TABLET EVERY 12 HOURS AS NEEDED FOR PAIN; Therapy: [id number] to (Evaluate:10Nov2017); Last Rx:31Oct2017 Ordered   3  Topiramate 50 MG Oral Tablet; 1/2 tab every night for 1 week then 1 tab QHS; Therapy: 10ZOQ8005 to ([phone])  Requested for: 69CIW1897; Last Rx:09Oct2017 Ordered    The medication list was reviewed and updated today  Allergies  1  No Known Drug Allergies  2  No Known Environmental Allergies   3  No Known Food Allergies    Vitals   Recorded: 04SUZ0004 11:58AM   Heart Rate 84   Respiration 16   Systolic 672   Diastolic 58   Height 5 ft 4 in   Weight 149 lb 6 oz   BMI Calculated 25 64   BSA Calculated 1 73       Physical Exam   Constitutional  General appearance: Abnormal   well developed,-- uncomfortable,-- well nourished,-- appears tired,-- well hydrated-- and-- Pt is uncomfortable due to her migraine, but is NON-toxic appearing; VSS  Eyes  Conjunctiva and lids: No swelling, erythema or discharge  Pupils and irises: Equal, round and reactive to light  -- EOMI  Ears, Nose, Mouth, and Throat  External inspection of ears and nose: Normal    Otoscopic examination: Tympanic membranes translucent with normal light reflex  Canals patent without erythema  Oropharynx: Normal with no erythema, edema, exudate or lesions  Pulmonary  Respiratory effort: No increased work of breathing or signs of respiratory distress  Auscultation of lungs: Clear to auscultation  Cardiovascular  Auscultation of heart: Normal rate and rhythm, normal S1 and S2, without murmurs  Lymphatic  Palpation of lymph nodes in neck: No lymphadenopathy  Musculoskeletal  Gait and station: Normal    Neurologic  Cranial nerves: Cranial nerves 2-12 intact     Psychiatric  Orientation to person, place, and time: Normal    Mood and affect: Normal          Signatures   Electronically signed by : Abdirizak Garcia DO; Nov 17 2017 12:20PM EST (Author)

## 2017-12-19 ENCOUNTER — ALLSCRIPTS OFFICE VISIT (OUTPATIENT)
Dept: OTHER | Facility: OTHER | Age: 27
End: 2017-12-19

## 2017-12-19 DIAGNOSIS — E05.90 THYROTOXICOSIS WITHOUT THYROID STORM: ICD-10-CM

## 2017-12-19 DIAGNOSIS — G43.009 MIGRAINE WITHOUT AURA AND WITHOUT STATUS MIGRAINOSUS, NOT INTRACTABLE: ICD-10-CM

## 2017-12-20 NOTE — PROGRESS NOTES
Assessment  1  Common migraine without aura (346 10) (G43 009)   2  Hyperthyroidism (242 90) (E05 90)    Plan  Common migraine without aura    · Topiramate 50 MG Oral Tablet   · Propranolol HCl - 20 MG Oral Tablet; TAKE 1 TABLET TWICE DAILY   · Oxycodone-Acetaminophen 5-325 MG Oral Tablet (Percocet); TAKE 1 TABLETEVERY 12 HOURS AS NEEDED FOR PAIN   · Magnesium Oxide 400 MG Oral Tablet; TAKE 1 TABLET Every twelve hoursPRN   · * MRI BRAIN W WO CONTRAST; Status:Need Information - Financial Authorization; Requested for:[id number];   Common migraine without aura, Hyperthyroidism    · (1) ANTITHYROGLOBULIN ANTIBODY; Status:Active; Requested for:94Jpe2374;    · (1) CBC/PLT/DIFF; Status:Active; Requested for:08Zos7797;    · (1) COMPREHENSIVE METABOLIC PANEL; Status:Active; Requested for:81Orm4045;    · (1) FREE T3; Status:Active; Requested for:29Bpq6647;    · (1) T4, FREE; Status:Active; Requested for:88Ksw0677;    · (1) THYROGLOBULIN/QUANT W/ANTIBODY PANEL; Status:Active; Requestedfor:46Fnc2800;    · (1) THYROID STIMULATING IMMUNOGLOBULIN; Status:Active; Requestedfor:14Kra3934;    · (1) TSH WITH FT4 REFLEX; Status:Active; Requested for:26Crj1372;     Discussion/Summary  Possible side effects of new medications were reviewed with the patient/guardian today  The treatment plan was reviewed with the patient/guardian  The patient/guardian understands and agrees with the treatment plan      Chief Complaint  Patient presents to office today with ongoing migraines for the past 6 months  Patient is seeing Neurology in February  History of Present Illness  HPI: worsening headaches for the last few weeksgets them almost dailyhotflahes on and offstopped driving now and quit her job since she cant drive anymore   Hyperthyroidism (Brief): The patient is being seen for an initial evaluation of an existing diagnosis of hyperthyroidism   Symptoms:  weight loss,-- palpitations,-- anxiety-- and-- nervousness, but-- no heat intolerance,-- no increased perspiration,-- no tremor,-- no hyperactivity,-- no insomnia,-- no fatigue,-- no weakness,-- no exophthalmos,-- no vision changes,-- no menstrual irregularity,-- no hair changes,-- no skin changes,-- no nail changes,-- no pretibial edema-- and-- no goiter  The patient is currently experiencing symptoms  No associated symptoms are reported  The patient is not currently being treated for this problem  Headache, Migraine (Brief): The patient is being seen for worsening symptoms of migraine headaches  Symptoms:  aura,-- headache,-- nausea,-- photophobia,-- phonophobia-- and-- vertigo, but-- no vomiting  The patient is currently experiencing symptoms  Current treatment includes aspirin and nonsteroidal anti-inflammatory drugs  By report there is fair symptom control  No known opioid side effects  Review of Systems   Constitutional: No fever, no chills, feels well, no tiredness, no recent weight gain or loss  ENT: no ear ache, no loss of hearing, no nosebleeds or nasal discharge, no sore throat or hoarseness  Cardiovascular: no complaints of slow or fast heart rate, no chest pain, no palpitations, no leg claudication or lower extremity edema  Respiratory: no complaints of shortness of breath, no wheezing, no dyspnea on exertion, no orthopnea or PND  Gastrointestinal: no complaints of abdominal pain, no constipation, no nausea or diarrhea, no vomiting, no bloody stools  Genitourinary: no complaints of dysuria, no incontinence, no pelvic pain, no dysmenorrhea, no vaginal discharge or abnormal vaginal bleeding  Musculoskeletal: no complaints of arthralgia, no myalgia, no joint swelling or stiffness, no limb pain or swelling  Integumentary: no complaints of skin rash or lesion, no itching or dry skin, no skin wounds  Neurological: headache, but-- no numbness,-- no tingling,-- no confusion,-- no dizziness-- and-- no fainting  Active Problems  1  Abnormal weight loss (3 21) (R63 4)   2   Common migraine without aura (346 10) (G43 009)   3  Encounter for routine gynecological examination (V72 31) (Z01 419)   4  Epidermal inclusion cyst (706 2) (L72 0)   5  Hyperthyroidism (242 90) (E05 90)   6  Screen for STD (sexually transmitted disease) (V74 5) (Z11 3)   7  Suppurative hidradenitis (705 83) (L73 2)    Past Medical History  1  History of  delivery delivered (669 71) (O82)   2  History of acute bronchitis (V12 69) (Z87 09)   3  History of candidal vulvovaginitis (V13 29) (Z86 19)   4  History of candidiasis (V12 09) (Z86 19)   5  History of folliculitis (C43 8) (G32 2)   6  History of hidradenitis suppurativa (V13 3) (Z87 2)   7  History of painful menstruation (V13 29) (Z87 42)   8  History of Hyperemesis gravidarum (643 00) (O21 0)   9  History of Hypertension in pregnancy, preeclampsia (642 40) (O14 90)   10  History of Labial abscess (616 4) (N76 4)   11  History of Onset of menses (V21 8)   12  History of UTI in pregnancy (646 60,599 0) (O23 40)  Active Problems And Past Medical History Reviewed: The active problems and past medical history were reviewed and updated today  Family History  Mother    1  Family history of Pure Hypercholesterolemia  Brother    2  Family history of Family Health Status Of Brother - Good  Family History Reviewed: The family history was reviewed and updated today  Social History   · Denied: History of Alcohol Use (History)   · Denied: History of Drug Use   · Never A Smoker  The social history was reviewed and updated today  Surgical History  1  History of Cholecystectomy   2  History of Incision And Drainage Of Skin Abscess   3  History of Tonsillectomy  Surgical History Reviewed: The surgical history was reviewed and updated today  Current Meds   1  Magnesium Oxide 400 MG Oral Tablet; TAKE 1 TABLET Every twelve hours PRN; Therapy: [id number] to )  Requested for: 2017; Last Rx:2017 Ordered   2  Oxycodone-Acetaminophen 5-325 MG Oral Tablet; TAKE 1 TABLET EVERY 12 HOURS AS NEEDED FOR PAIN; Therapy: 89Ysq9059 to (Evaluate:27Nov2017); Last Rx:17Nov2017 Ordered   3  Topiramate 50 MG Oral Tablet; 1/2 tab every night for 1 week then 1 tab QHS; Therapy: 66LUV0559 to (Rosendo Mays)  Requested for: 62LJC7212; Last Rx:09Oct2017 Ordered    The medication list was reviewed and updated today  Allergies  1  No Known Drug Allergies  2  No Known Environmental Allergies   3  No Known Food Allergies    Vitals   Recorded: 63QYC2239 09:14AM Recorded: 72PCZ8946 09:11AM   Temperature 98 6 F, Temporal    Heart Rate  84   Systolic  570   Diastolic  62   Height  5 ft 4 in   Weight  149 lb    BMI Calculated  25 58   BSA Calculated  1 73   O2 Saturation  98     Physical Exam   Constitutional  General appearance: No acute distress, well appearing and well nourished  Eyes  Conjunctiva and lids: No swelling, erythema or discharge  Pupils and irises: Equal, round and reactive to light  Pulmonary  Respiratory effort: No increased work of breathing or signs of respiratory distress  Auscultation of lungs: Clear to auscultation  Cardiovascular  Palpation of heart: Normal PMI, no thrills  Auscultation of heart: Normal rate and rhythm, normal S1 and S2, without murmurs  Examination of extremities for edema and/or varicosities: Normal    Abdomen  Abdomen: Non-tender, no masses  Liver and spleen: No hepatomegaly or splenomegaly  Musculoskeletal  Gait and station: Normal    Neurologic  Cranial nerves: Cranial nerves 2-12 intact  Reflexes: 2+ and symmetric  Sensation: No sensory loss     Psychiatric  Orientation to person, place, and time: Normal    Mood and affect: Normal          Future Appointments    Date/Time Provider Specialty Site   02/06/2018 10:00 AM Brooke Rodriguez MD Neurology [address]     Signatures   Electronically signed by : Cesia Case MD; Dec 19 2017  9:32AM EST (Author)

## 2017-12-26 ENCOUNTER — APPOINTMENT (EMERGENCY)
Dept: RADIOLOGY | Facility: HOSPITAL | Age: 27
End: 2017-12-26
Payer: COMMERCIAL

## 2017-12-26 ENCOUNTER — HOSPITAL ENCOUNTER (EMERGENCY)
Facility: HOSPITAL | Age: 27
Discharge: HOME | End: 2017-12-26
Attending: EMERGENCY MEDICINE | Admitting: EMERGENCY MEDICINE
Payer: COMMERCIAL

## 2017-12-26 VITALS
HEART RATE: 80 BPM | RESPIRATION RATE: 18 BRPM | OXYGEN SATURATION: 99 % | BODY MASS INDEX: 25.27 KG/M2 | SYSTOLIC BLOOD PRESSURE: 118 MMHG | TEMPERATURE: 98.2 F | DIASTOLIC BLOOD PRESSURE: 57 MMHG | WEIGHT: 148 LBS | HEIGHT: 64 IN

## 2017-12-26 DIAGNOSIS — K61.0 PERIANAL ABSCESS: ICD-10-CM

## 2017-12-26 DIAGNOSIS — K61.1 PERI-RECTAL ABSCESS: Primary | ICD-10-CM

## 2017-12-26 LAB
ANION GAP SERPL CALCULATED.3IONS-SCNC: 5 MMOL/L (ref 4–13)
BASOPHILS # BLD AUTO: 0.01 THOUSANDS/ΜL (ref 0–0.1)
BASOPHILS NFR BLD AUTO: 0 % (ref 0–1)
BUN SERPL-MCNC: 5 MG/DL (ref 5–25)
CALCIUM SERPL-MCNC: 9.3 MG/DL (ref 8.3–10.1)
CHLORIDE SERPL-SCNC: 104 MMOL/L (ref 100–108)
CO2 SERPL-SCNC: 27 MMOL/L (ref 21–32)
CREAT SERPL-MCNC: 0.7 MG/DL (ref 0.6–1.3)
EOSINOPHIL # BLD AUTO: 0.03 THOUSAND/ΜL (ref 0–0.61)
EOSINOPHIL NFR BLD AUTO: 1 % (ref 0–6)
ERYTHROCYTE [DISTWIDTH] IN BLOOD BY AUTOMATED COUNT: 13.9 % (ref 11.6–15.1)
EXT PREG TEST URINE: NEGATIVE
GFR SERPL CREATININE-BSD FRML MDRD: 139 ML/MIN/1.73SQ M
GLUCOSE SERPL-MCNC: 102 MG/DL (ref 65–140)
HCT VFR BLD AUTO: 40 % (ref 34.8–46.1)
HGB BLD-MCNC: 14.3 G/DL (ref 11.5–15.4)
LYMPHOCYTES # BLD AUTO: 1.37 THOUSANDS/ΜL (ref 0.6–4.47)
LYMPHOCYTES NFR BLD AUTO: 29 % (ref 14–44)
MCH RBC QN AUTO: 30.5 PG (ref 26.8–34.3)
MCHC RBC AUTO-ENTMCNC: 35.8 G/DL (ref 31.4–37.4)
MCV RBC AUTO: 85 FL (ref 82–98)
MONOCYTES # BLD AUTO: 0.29 THOUSAND/ΜL (ref 0.17–1.22)
MONOCYTES NFR BLD AUTO: 6 % (ref 4–12)
NEUTROPHILS # BLD AUTO: 2.97 THOUSANDS/ΜL (ref 1.85–7.62)
NEUTS SEG NFR BLD AUTO: 64 % (ref 43–75)
NRBC BLD AUTO-RTO: 0 /100 WBCS
PLATELET # BLD AUTO: 192 THOUSANDS/UL (ref 149–390)
PMV BLD AUTO: 9.7 FL (ref 8.9–12.7)
POTASSIUM SERPL-SCNC: 4.5 MMOL/L (ref 3.5–5.3)
RBC # BLD AUTO: 4.69 MILLION/UL (ref 3.81–5.12)
SODIUM SERPL-SCNC: 136 MMOL/L (ref 136–145)
WBC # BLD AUTO: 4.68 THOUSAND/UL (ref 4.31–10.16)

## 2017-12-26 PROCEDURE — 99284 EMERGENCY DEPT VISIT MOD MDM: CPT

## 2017-12-26 PROCEDURE — 81025 URINE PREGNANCY TEST: CPT | Performed by: PHYSICIAN ASSISTANT

## 2017-12-26 PROCEDURE — 96376 TX/PRO/DX INJ SAME DRUG ADON: CPT

## 2017-12-26 PROCEDURE — 72193 CT PELVIS W/DYE: CPT

## 2017-12-26 PROCEDURE — 85025 COMPLETE CBC W/AUTO DIFF WBC: CPT | Performed by: PHYSICIAN ASSISTANT

## 2017-12-26 PROCEDURE — 80048 BASIC METABOLIC PNL TOTAL CA: CPT | Performed by: PHYSICIAN ASSISTANT

## 2017-12-26 PROCEDURE — 96374 THER/PROPH/DIAG INJ IV PUSH: CPT

## 2017-12-26 PROCEDURE — 96375 TX/PRO/DX INJ NEW DRUG ADDON: CPT

## 2017-12-26 PROCEDURE — 36415 COLL VENOUS BLD VENIPUNCTURE: CPT | Performed by: PHYSICIAN ASSISTANT

## 2017-12-26 RX ORDER — MORPHINE SULFATE 2 MG/ML
2 INJECTION, SOLUTION INTRAMUSCULAR; INTRAVENOUS ONCE
Status: COMPLETED | OUTPATIENT
Start: 2017-12-26 | End: 2017-12-26

## 2017-12-26 RX ORDER — OXYCODONE HYDROCHLORIDE AND ACETAMINOPHEN 5; 325 MG/1; MG/1
1 TABLET ORAL EVERY 6 HOURS PRN
Qty: 4 TABLET | Refills: 0 | Status: SHIPPED | OUTPATIENT
Start: 2017-12-26 | End: 2017-12-27

## 2017-12-26 RX ORDER — LIDOCAINE HYDROCHLORIDE AND EPINEPHRINE 10; 10 MG/ML; UG/ML
10 INJECTION, SOLUTION INFILTRATION; PERINEURAL ONCE
Status: COMPLETED | OUTPATIENT
Start: 2017-12-26 | End: 2017-12-26

## 2017-12-26 RX ORDER — CIPROFLOXACIN 500 MG/1
500 TABLET, FILM COATED ORAL 2 TIMES DAILY
Qty: 20 TABLET | Refills: 0 | Status: SHIPPED | OUTPATIENT
Start: 2017-12-26 | End: 2018-01-05

## 2017-12-26 RX ORDER — KETOROLAC TROMETHAMINE 30 MG/ML
15 INJECTION, SOLUTION INTRAMUSCULAR; INTRAVENOUS ONCE
Status: COMPLETED | OUTPATIENT
Start: 2017-12-26 | End: 2017-12-26

## 2017-12-26 RX ORDER — ONDANSETRON 2 MG/ML
4 INJECTION INTRAMUSCULAR; INTRAVENOUS ONCE
Status: COMPLETED | OUTPATIENT
Start: 2017-12-26 | End: 2017-12-26

## 2017-12-26 RX ADMIN — IOHEXOL 100 ML: 350 INJECTION, SOLUTION INTRAVENOUS at 16:10

## 2017-12-26 RX ADMIN — HYDROMORPHONE HYDROCHLORIDE 1 MG: 1 INJECTION, SOLUTION INTRAMUSCULAR; INTRAVENOUS; SUBCUTANEOUS at 17:57

## 2017-12-26 RX ADMIN — KETOROLAC TROMETHAMINE 15 MG: 30 INJECTION, SOLUTION INTRAMUSCULAR at 15:11

## 2017-12-26 RX ADMIN — LIDOCAINE HYDROCHLORIDE,EPINEPHRINE BITARTRATE 10 ML: 10; .01 INJECTION, SOLUTION INFILTRATION; PERINEURAL at 17:57

## 2017-12-26 RX ADMIN — MORPHINE SULFATE 2 MG: 2 INJECTION, SOLUTION INTRAMUSCULAR; INTRAVENOUS at 16:28

## 2017-12-26 RX ADMIN — ONDANSETRON 4 MG: 2 INJECTION INTRAMUSCULAR; INTRAVENOUS at 15:11

## 2017-12-26 RX ADMIN — MORPHINE SULFATE 2 MG: 2 INJECTION, SOLUTION INTRAMUSCULAR; INTRAVENOUS at 15:12

## 2017-12-26 NOTE — DISCHARGE INSTRUCTIONS
Abscess   WHAT YOU NEED TO KNOW:   A warm compress may help your abscess drain  Your healthcare provider may make a cut in the abscess so it can drain  You may need surgery to remove an abscess that is on your hands or buttocks  DISCHARGE INSTRUCTIONS:   Return to the emergency department if:   · The area around your abscess becomes very painful, warm, or has red streaks  · You have a fever and chills  · Your heart is beating faster than usual      · You feel faint or confused  Contact your healthcare provider if:   · Your abscess gets bigger or does not get better  · Your abscess returns  · You have questions or concerns about your condition or care  Medicines: You may  need any of the following:  · Antibiotics  help treat a bacterial infection  · Acetaminophen  decreases pain and fever  It is available without a doctor's order  Ask how much to take and how often to take it  Follow directions  Acetaminophen can cause liver damage if not taken correctly  · NSAIDs , such as ibuprofen, help decrease swelling, pain, and fever  This medicine is available with or without a doctor's order  NSAIDs can cause stomach bleeding or kidney problems in certain people  If you take blood thinner medicine, always ask your healthcare provider if NSAIDs are safe for you  Always read the medicine label and follow directions  · Take your medicine as directed  Contact your healthcare provider if you think your medicine is not helping or if you have side effects  Tell him or her if you are allergic to any medicine  Keep a list of the medicines, vitamins, and herbs you take  Include the amounts, and when and why you take them  Bring the list or the pill bottles to follow-up visits  Carry your medicine list with you in case of an emergency  Self-care:   · Apply a warm compress to your abscess  This will help it open and drain  Wet a washcloth in warm, but not hot, water  Apply the compress for 10 minutes  Repeat this 4 times each day  Do not  press on an abscess or try to open it with a needle  You may push the bacteria deeper or into your blood  · Do not share your clothes, towels, or sheets with anyone  This can spread the infection to others  · Wash your hands often  This can help prevent the spread of germs  Use soap and water or an alcohol-based hand rub  Care for your wound after it is drained:   · Care for your wound as directed  If your healthcare provider says it is okay, carefully remove the bandage and gauze packing  You may need to soak the gauze to get it out of your wound  Clean your wound and the area around it as directed  Dry the area and put on new, clean bandages  Change your bandages when they get wet or dirty  · Ask your healthcare provider how to change the gauze in your wound  Keep track of how many pieces of gauze are placed inside the wound  Do not put too much packing in the wound  Do not pack the gauze too tightly in your wound  Follow up with your healthcare provider in 1 to 3 days: You may need to have your packing removed or your bandage changed  Write down your questions so you remember to ask them during your visits  © 2017 [employer] [employer]  Information is for End User's use only and may not be sold, redistributed or otherwise used for commercial purposes  All illustrations and images included in CareNotes® are the copyrighted property of [employer] , [employer]  or Jae Ortiz  The above information is an  only  It is not intended as medical advice for individual conditions or treatments  Talk to your doctor, nurse or pharmacist before following any medical regimen to see if it is safe and effective for you

## 2017-12-26 NOTE — CONSULTS
Consultation - General Surgery   Stevan Yin 24 y o  female MRN: [id number]  Unit/Bed#: [facility] Encounter: [id number]    Assessment/Plan     Assessment:  25 yo F with perianal abscess    Plan:  - underwent bedside incision and drainage of left perianal abscess in ED today  - patient advised to d/c packing tomorrow in shower  - sitz baths  - PO cipro x7 days  - PO analgesia per ED  - f/u with Dr Jeanne Lentz in office next week for education regarding recurrent abscess, worsening abscess, abscesses associated with IBD    History of Present Illness     HPI:  Stevan Yin is a 24 y o  female who presents with perianal pain x3 days  Consults    Review of Systems   Constitutional: Negative  HENT: Negative  Eyes: Negative  Respiratory: Negative  Cardiovascular: Negative  Gastrointestinal: Positive for constipation  Endocrine: Negative  Genitourinary: Negative  Musculoskeletal: Negative  Allergic/Immunologic: Negative  Neurological: Negative  Hematological: Negative  Psychiatric/Behavioral: Negative  Historical Information   Past Medical History:   Diagnosis Date    Disease of thyroid gland      Past Surgical History:   Procedure Laterality Date    NM EXC SWEAT GLAND LESN INGUIN,SIMPL Right 2/23/2016    Procedure: EXCISION GROIN CYST HIDRADENITIS;  Surgeon: Iftikhar Donovan DO;  Location: [facility];  Service: General    VULVA BIOPSY N/A 5/13/2016    Procedure: INCISION AND DRAINAGE, EXCISION OF LABIAL CYST ;  Surgeon: Leamon Barthel, DO;  Location: [facility];  Service:      Social History   History   Alcohol Use No     History   Drug Use No     History   Smoking Status    Never Smoker   Smokeless Tobacco    Never Used     Family History: History reviewed  No pertinent family history  Meds/Allergies   all current active meds have been reviewed, current meds:   No current facility-administered medications for this encounter       and PTA meds:   None     No Known Allergies    Objective   First Vitals:   Blood Pressure: 118/57 (12/26/17 1425)  Pulse: 80 (12/26/17 1425)  Temperature: 98 2 °F (36 8 °C) (12/26/17 1425)  Temp Source: Tympanic (12/26/17 1425)  Respirations: 18 (12/26/17 1425)  Height: 5' 4" (162 6 cm) (12/26/17 1425)  Weight - Scale: 67 1 kg (148 lb) (12/26/17 1425)  SpO2: 99 % (12/26/17 1425)    Current Vitals:   Blood Pressure: 118/57 (12/26/17 1425)  Pulse: 80 (12/26/17 1425)  Temperature: 98 2 °F (36 8 °C) (12/26/17 1425)  Temp Source: Tympanic (12/26/17 1425)  Respirations: 18 (12/26/17 1425)  Height: 5' 4" (162 6 cm) (12/26/17 1425)  Weight - Scale: 67 1 kg (148 lb) (12/26/17 1425)  SpO2: 99 % (12/26/17 1425)    No intake or output data in the 24 hours ending 12/26/17 1833    Invasive Devices     Peripheral Intravenous Line            Peripheral IV 12/26/17 Right Antecubital less than 1 day                Physical Exam   Constitutional: She is oriented to person, place, and time  She appears well-developed and well-nourished  HENT:   Head: Normocephalic and atraumatic  Eyes: Pupils are equal, round, and reactive to light  Neck: Normal range of motion  Cardiovascular: Normal rate and regular rhythm  Pulmonary/Chest: Effort normal    Abdominal: Soft  She exhibits no distension  There is no tenderness  There is no rebound  Genitourinary:         Musculoskeletal: Normal range of motion  Neurological: She is alert and oriented to person, place, and time  Skin: Skin is warm and dry         Lab Results:   CBC:   Lab Results   Component Value Date    WBC 4 68 12/26/2017    HGB 14 3 12/26/2017    HCT 40 0 12/26/2017    MCV 85 12/26/2017     12/26/2017    MCH 30 5 12/26/2017    MCHC 35 8 12/26/2017    RDW 13 9 12/26/2017    MPV 9 7 12/26/2017    NRBC 0 12/26/2017   , CMP:   Lab Results   Component Value Date     12/26/2017    K 4 5 12/26/2017     12/26/2017    CO2 27 12/26/2017    ANIONGAP 5 12/26/2017    BUN 5 12/26/2017 CREATININE 0 70 12/26/2017    GLUCOSE 102 12/26/2017    CALCIUM 9 3 12/26/2017    EGFR 139 12/26/2017   , Coagulation: No results found for: PT, INR, APTT  Imaging: I have personally reviewed pertinent films in PACS   Ct Pelvis W Contrast    Result Date: 12/26/2017  Impression: 1  Small left perianal abscess measuring 2 4 cm  Total volume of approximately 2 mL  2   5 cm left ovarian cystic structure almost certainly represents a benign hemorrhagic cyst   Based on size criteria from [employer] of Radiology consensus guidelines, follow-up ultrasound in 6-12 weeks is recommended to document resolution  Workstation performed: [id number]     EKG, Pathology, and Other Studies: I have personally reviewed pertinent reports  and I have personally reviewed pertinent films in PACS    Counseling / Coordination of Care  Total floor / unit time spent today 30 minutes  Greater than 50% of total time was spent with the patient and / or family counseling and / or coordination of care

## 2017-12-26 NOTE — ED PROVIDER NOTES
History  Chief Complaint   Patient presents with    Cyst     Patient states she was diagnosed with Hidradenitis Suppurativa and has a cyst on buttocks for the past 3 days; pt  reports pain sitting and walking; pt  denies drainage      19-year-old female presents emergency room for evaluation of worsening left buttock cyst   Patient states it is very painful and she cannot sit on her butt  She has pain with attempting a bowel movement  She denies any bleeding or drainage  She has been doing warm soaks without relief  She has a history of abscesses in her groin not usually on this area of the buttock  In the past she was diagnosed with hidradenitis suppurativa then had to have surgery  Patient denies fever, nausea, vomiting, abdominal pain  She denies anal trauma or penetration  She denies history of constipation, hemorrhoids, straining  She has tried taking Tylenol and ibuprofen without relief  History provided by:  Patient      None       Past Medical History:   Diagnosis Date    Disease of thyroid gland        Past Surgical History:   Procedure Laterality Date    ID EXC SWEAT GLAND LESN INGUIN,SIMPL Right 2/23/2016    Procedure: EXCISION GROIN CYST HIDRADENITIS;  Surgeon: Nidhi Conley DO;  Location: [facility];  Service: General    VULVA BIOPSY N/A 5/13/2016    Procedure: INCISION AND DRAINAGE, EXCISION OF LABIAL CYST ;  Surgeon: Nakul Lala DO;  Location[facility];  Service:        History reviewed  No pertinent family history  I have reviewed and agree with the history as documented  Social History   Substance Use Topics    Smoking status: Never Smoker    Smokeless tobacco: Never Used    Alcohol use No        Review of Systems   Constitutional: Negative for chills and fever  Gastrointestinal: Negative for abdominal pain, constipation, diarrhea, nausea and vomiting  Genitourinary: Negative for dysuria, menstrual problem, vaginal discharge and vaginal pain     Musculoskeletal: Negative for back pain  Skin: Positive for rash  Negative for wound  Physical Exam  ED Triage Vitals [12/26/17 1425]   Temperature Pulse Respirations Blood Pressure SpO2   98 2 °F (36 8 °C) 80 18 118/57 99 %      Temp Source Heart Rate Source Patient Position - Orthostatic VS BP Location FiO2 (%)   Tympanic Monitor Sitting Right arm --      Pain Score       8           Orthostatic Vital Signs  Vitals:    12/26/17 1425   BP: 118/57   Pulse: 80   Patient Position - Orthostatic VS: Sitting       Physical Exam   Constitutional: She appears well-developed and well-nourished  Skin: Skin is warm and dry  Capillary refill takes less than 2 seconds  Local Left medial buttock inflammation with severe tenderness adjacent to anus and possibly connecting to  No induration or fluctuance, no drainage  Psychiatric: She has a normal mood and affect  Nursing note and vitals reviewed        ED Medications  Medications   HYDROmorphone (DILAUDID) 1 mg/mL injection 1 mg (1 mg Intravenous Given 12/26/17 1757)   HYDROmorphone (DILAUDID) 1 mg/mL injection 1 mg (not administered)   morphine injection 2 mg (2 mg Intravenous Given 12/26/17 1512)   ondansetron (ZOFRAN) injection 4 mg (4 mg Intravenous Given 12/26/17 1511)   ketorolac (TORADOL) injection 15 mg (15 mg Intravenous Given 12/26/17 1511)   morphine injection 2 mg (2 mg Intravenous Given 12/26/17 1628)   iohexol (OMNIPAQUE) 350 MG/ML injection (MULTI-DOSE) 100 mL (100 mL Intravenous Given 12/26/17 1610)   lidocaine-epinephrine (XYLOCAINE/EPINEPHRINE) 1 %-1:100,000 injection 10 mL (10 mL Infiltration Given 12/26/17 1757)       Diagnostic Studies  Results Reviewed     Procedure Component Value Units Date/Time    Basic metabolic panel [[id number]] Collected:  12/26/17 1451    Lab Status:  Final result Specimen:  Blood from Arm, Right Updated:  12/26/17 1514     Sodium 136 mmol/L      Potassium 4 5 mmol/L      Chloride 104 mmol/L      CO2 27 mmol/L      Anion Gap 5 mmol/L BUN 5 mg/dL      Creatinine 0 70 mg/dL      Glucose 102 mg/dL      Calcium 9 3 mg/dL      eGFR 139 ml/min/1 73sq m     Narrative:         National Kidney Disease Education Program recommendations are as follows:  GFR calculation is accurate only with a steady state creatinine  Chronic Kidney disease less than 60 ml/min/1 73 sq  meters  Kidney failure less than 15 ml/min/1 73 sq  meters  POCT pregnancy, urine [[id number]]  (Normal) Resulted:  12/26/17 1502    Lab Status:  Final result Updated:  12/26/17 1502     EXT PREG TEST UR (Ref: Negative) negative    CBC and differential [[id number]]  (Normal) Collected:  12/26/17 1451    Lab Status:  Final result Specimen:  Blood from Arm, Right Updated:  12/26/17 1500     WBC 4 68 Thousand/uL      RBC 4 69 Million/uL      Hemoglobin 14 3 g/dL      Hematocrit 40 0 %      MCV 85 fL      MCH 30 5 pg      MCHC 35 8 g/dL      RDW 13 9 %      MPV 9 7 fL      Platelets 761 Thousands/uL      nRBC 0 /100 WBCs      Neutrophils Relative 64 %      Lymphocytes Relative 29 %      Monocytes Relative 6 %      Eosinophils Relative 1 %      Basophils Relative 0 %      Neutrophils Absolute 2 97 Thousands/µL      Lymphocytes Absolute 1 37 Thousands/µL      Monocytes Absolute 0 29 Thousand/µL      Eosinophils Absolute 0 03 Thousand/µL      Basophils Absolute 0 01 Thousands/µL                  CT pelvis w contrast   Final Result by Lm Yung MD (12/26 1634)      1  Small left perianal abscess measuring 2 4 cm  Total volume of approximately 2 mL  2   5 cm left ovarian cystic structure almost certainly represents a benign hemorrhagic cyst   Based on size criteria from [employer] of Radiology consensus guidelines, follow-up ultrasound in 6-12 weeks is recommended to document resolution           Workstation performed: [id number]                    Procedures  Procedures       Phone Contacts  ED Phone Contact    ED Course  ED Course as of Dec 26 1819   Tue Dec 26, 2017   [phone] General surgery paged    [phone] Spoke with Surgical Resident Viviane Mcmahan and discussed patients exam, labs, and CT findings  She will be down to see patient in ER    [address] [facility] surgical resident has evaluated patient and will talk with her previous general surgeon Radu/Naveen for plan on treatment  MDM  Number of Diagnoses or Management Options  Maren-rectal abscess: new and requires workup     Amount and/or Complexity of Data Reviewed  Clinical lab tests: ordered and reviewed  Tests in the radiology section of CPT®: ordered and reviewed    Risk of Complications, Morbidity, and/or Mortality  General comments: Differential diagnosis includes but is not limited to: buttock abscess, perirectal abscess, rectal abscess    I or my delegate reviewed this patient through the [facility] portal and did not find any evidence of doctor shopping however does have percocet filled regularly by her PCP, given acute situation and procedure being performed by surgery today in ER will give 1 day supply of percocet  I discussed this with patient, she is understanding  Please refer to surgical consult note by surgical resident Isai Loving, she had successful drainage of perirectal abscess bedside in emergency department and patient had instant relief of pain  Patient Progress  Patient progress: stable    CritCare Time    Disposition  Final diagnoses:   Maren-rectal abscess     Time reflects when diagnosis was documented in both MDM as applicable and the Disposition within this note     Time User Action Codes Description Comment    12/26/2017  6:04 PM Maxwell Cardona Add [K61 1] Maren-rectal abscess       ED Disposition     ED Disposition Condition Comment    Discharge  Anuj Morgan discharge to home/self care      Condition at discharge: Good        Follow-up Information     Follow up With Specialties Details Why Contact Info Additional [address] Emergency Department Emergency Medicine  If symptoms worsen [address] [place] [address]  [phone]  ED, [address] [place] [address], [phone]    Rosana Negron MD General Surgery In 2 days  [address] [id number] [phone]           Patient's Medications   Discharge Prescriptions    CIPROFLOXACIN (CIPRO) 500 MG TABLET    Take 1 tablet by mouth 2 (two) times a day for 10 days       Start Date: 12/26/2017End Date: 1/5/2018       Order Dose: 500 mg       Quantity: 20 tablet    Refills: 0    OXYCODONE-ACETAMINOPHEN (PERCOCET) 5-325 MG PER TABLET    Take 1 tablet by mouth every 6 (six) hours as needed for moderate pain for up to 1 day Max Daily Amount: 4 tablets       Start Date: 12/26/2017End Date: 12/27/2017       Order Dose: 1 tablet       Quantity: 4 tablet    Refills: 0     No discharge procedures on file      ED Provider  Electronically Signed by           David Ramirez PA-C  12/26/17 6605

## 2017-12-27 NOTE — PROGRESS NOTES
Incision and drain  Date/Time: 12/26/2017 10:18 PM  Performed by: Danisha Paez by: Leanna Belcher     Patient location:  ED  Consent:     Consent obtained:  Verbal and written    Consent given by:  Patient    Risks discussed:  Bleeding, incomplete drainage and pain    Alternatives discussed:  No treatment and delayed treatment  Universal protocol:     Procedure explained and questions answered to patient or proxy's satisfaction: yes      Relevant documents present and verified: yes      Test results available and properly labeled: yes      Imaging studies available: yes      Site/side marked: yes      Immediately prior to procedure a time out was called: yes      Patient identity confirmed:  Verbally with patient and hospital-assigned identification number  Location:     Type:  Abscess    Size:  2x2 cm    Location:  Anogenital    Anogenital location:  Perianal  Pre-procedure details:     Skin preparation:  Betadine  Anesthesia (see MAR for exact dosages): Anesthesia method:  Local infiltration    Local anesthetic:  Lidocaine 1% WITH epi  Procedure details:     Complexity:  Simple    Needle aspiration: no      Incision type: 4 cm punch biopsy, extended with 11 blade medially  Scalpel blade:  11    Approach:  Open    Incision depth:  Skin    Wound management:  Probed and deloculated    Drainage:  Bloody and purulent    Drainage amount: Moderate    Wound treatment:  Packing placed    Packing materials:  1/4 in gauze  Post-procedure details:     Patient tolerance of procedure:   Tolerated well, no immediate complications

## 2018-01-05 ENCOUNTER — GENERIC CONVERSION - ENCOUNTER (OUTPATIENT)
Dept: OTHER | Facility: OTHER | Age: 28
End: 2018-01-05

## 2018-01-08 ENCOUNTER — APPOINTMENT (OUTPATIENT)
Dept: LAB | Facility: HOSPITAL | Age: 28
End: 2018-01-08
Payer: COMMERCIAL

## 2018-01-08 ENCOUNTER — TRANSCRIBE ORDERS (OUTPATIENT)
Dept: LAB | Facility: HOSPITAL | Age: 28
End: 2018-01-08

## 2018-01-08 ENCOUNTER — HOSPITAL ENCOUNTER (OUTPATIENT)
Dept: RADIOLOGY | Facility: HOSPITAL | Age: 28
Discharge: HOME | End: 2018-01-08
Payer: COMMERCIAL

## 2018-01-08 DIAGNOSIS — E05.90 THYROTOXICOSIS WITHOUT THYROID STORM: ICD-10-CM

## 2018-01-08 DIAGNOSIS — G43.009 MIGRAINE WITHOUT AURA AND WITHOUT STATUS MIGRAINOSUS, NOT INTRACTABLE: ICD-10-CM

## 2018-01-08 LAB
ALBUMIN SERPL BCP-MCNC: 3.5 G/DL (ref 3.5–5)
ALP SERPL-CCNC: 69 U/L (ref 46–116)
ALT SERPL W P-5'-P-CCNC: 20 U/L (ref 12–78)
ANION GAP SERPL CALCULATED.3IONS-SCNC: 6 MMOL/L (ref 4–13)
AST SERPL W P-5'-P-CCNC: 8 U/L (ref 5–45)
BASOPHILS # BLD AUTO: 0.02 THOUSANDS/ΜL (ref 0–0.1)
BASOPHILS NFR BLD AUTO: 0 % (ref 0–1)
BILIRUB SERPL-MCNC: 0.33 MG/DL (ref 0.2–1)
BUN SERPL-MCNC: 7 MG/DL (ref 5–25)
CALCIUM SERPL-MCNC: 8.9 MG/DL (ref 8.3–10.1)
CHLORIDE SERPL-SCNC: 104 MMOL/L (ref 100–108)
CO2 SERPL-SCNC: 27 MMOL/L (ref 21–32)
CREAT SERPL-MCNC: 0.58 MG/DL (ref 0.6–1.3)
EOSINOPHIL # BLD AUTO: 0.02 THOUSAND/ΜL (ref 0–0.61)
EOSINOPHIL NFR BLD AUTO: 0 % (ref 0–6)
ERYTHROCYTE [DISTWIDTH] IN BLOOD BY AUTOMATED COUNT: 13.1 % (ref 11.6–15.1)
GFR SERPL CREATININE-BSD FRML MDRD: 148 ML/MIN/1.73SQ M
GLUCOSE SERPL-MCNC: 85 MG/DL (ref 65–140)
HCT VFR BLD AUTO: 37 % (ref 34.8–46.1)
HGB BLD-MCNC: 13 G/DL (ref 11.5–15.4)
LYMPHOCYTES # BLD AUTO: 2.35 THOUSANDS/ΜL (ref 0.6–4.47)
LYMPHOCYTES NFR BLD AUTO: 32 % (ref 14–44)
MCH RBC QN AUTO: 29.9 PG (ref 26.8–34.3)
MCHC RBC AUTO-ENTMCNC: 35.1 G/DL (ref 31.4–37.4)
MCV RBC AUTO: 85 FL (ref 82–98)
MONOCYTES # BLD AUTO: 0.52 THOUSAND/ΜL (ref 0.17–1.22)
MONOCYTES NFR BLD AUTO: 7 % (ref 4–12)
NEUTROPHILS # BLD AUTO: 4.45 THOUSANDS/ΜL (ref 1.85–7.62)
NEUTS SEG NFR BLD AUTO: 61 % (ref 43–75)
NRBC BLD AUTO-RTO: 0 /100 WBCS
PLATELET # BLD AUTO: 267 THOUSANDS/UL (ref 149–390)
PMV BLD AUTO: 9 FL (ref 8.9–12.7)
POTASSIUM SERPL-SCNC: 3.8 MMOL/L (ref 3.5–5.3)
PROT SERPL-MCNC: 7.6 G/DL (ref 6.4–8.2)
RBC # BLD AUTO: 4.35 MILLION/UL (ref 3.81–5.12)
SODIUM SERPL-SCNC: 137 MMOL/L (ref 136–145)
T3FREE SERPL-MCNC: 2.77 PG/ML (ref 2.3–4.2)
T4 FREE SERPL-MCNC: 1.12 NG/DL (ref 0.76–1.46)
TSH SERPL DL<=0.05 MIU/L-ACNC: 0.18 UIU/ML (ref 0.36–3.74)
WBC # BLD AUTO: 7.38 THOUSAND/UL (ref 4.31–10.16)

## 2018-01-08 PROCEDURE — 36415 COLL VENOUS BLD VENIPUNCTURE: CPT

## 2018-01-08 PROCEDURE — 84443 ASSAY THYROID STIM HORMONE: CPT

## 2018-01-08 PROCEDURE — 85025 COMPLETE CBC W/AUTO DIFF WBC: CPT

## 2018-01-08 PROCEDURE — 80053 COMPREHEN METABOLIC PANEL: CPT

## 2018-01-08 PROCEDURE — 76536 US EXAM OF HEAD AND NECK: CPT

## 2018-01-08 PROCEDURE — 84445 ASSAY OF TSI GLOBULIN: CPT

## 2018-01-08 PROCEDURE — 86800 THYROGLOBULIN ANTIBODY: CPT

## 2018-01-08 PROCEDURE — 84432 ASSAY OF THYROGLOBULIN: CPT

## 2018-01-08 PROCEDURE — 84439 ASSAY OF FREE THYROXINE: CPT

## 2018-01-08 PROCEDURE — 84481 FREE ASSAY (FT-3): CPT

## 2018-01-09 ENCOUNTER — GENERIC CONVERSION - ENCOUNTER (OUTPATIENT)
Dept: OTHER | Facility: OTHER | Age: 28
End: 2018-01-09

## 2018-01-09 LAB
THYROGLOB AB SERPL-ACNC: <1 IU/ML (ref 0–0.9)
THYROGLOB AB SERPL-ACNC: <1 IU/ML (ref 0–0.9)
THYROGLOB SERPL-MCNC: 12.7 NG/ML (ref 1.5–38.5)

## 2018-01-10 ENCOUNTER — GENERIC CONVERSION - ENCOUNTER (OUTPATIENT)
Dept: FAMILY MEDICINE CLINIC | Facility: CLINIC | Age: 28
End: 2018-01-10

## 2018-01-10 NOTE — MISCELLANEOUS
Provider Comments  Provider Comments:   CALLED PATIENT TO RESCHEDULE MISSED APPT  UNABLE TO SPEAK TOPATIENT LEFT MESSAGE ON VOICEMAIL        Signatures   Electronically signed by : Earnestine Brewer, ; Nov 13 2017 11:47AM EST                       (Author)

## 2018-01-11 ENCOUNTER — GENERIC CONVERSION - ENCOUNTER (OUTPATIENT)
Dept: OTHER | Facility: OTHER | Age: 28
End: 2018-01-11

## 2018-01-11 LAB — TSI SER-ACNC: 47 % (ref 0–139)

## 2018-01-13 VITALS
HEIGHT: 64 IN | SYSTOLIC BLOOD PRESSURE: 120 MMHG | BODY MASS INDEX: 25.5 KG/M2 | HEART RATE: 84 BPM | RESPIRATION RATE: 16 BRPM | DIASTOLIC BLOOD PRESSURE: 58 MMHG | WEIGHT: 149.38 LBS

## 2018-01-13 VITALS
DIASTOLIC BLOOD PRESSURE: 62 MMHG | SYSTOLIC BLOOD PRESSURE: 112 MMHG | BODY MASS INDEX: 28.92 KG/M2 | WEIGHT: 171.5 LBS | HEART RATE: 76 BPM | RESPIRATION RATE: 18 BRPM

## 2018-01-13 VITALS
BODY MASS INDEX: 25.23 KG/M2 | HEART RATE: 78 BPM | WEIGHT: 147.8 LBS | DIASTOLIC BLOOD PRESSURE: 77 MMHG | TEMPERATURE: 99.1 F | HEIGHT: 64 IN | SYSTOLIC BLOOD PRESSURE: 115 MMHG

## 2018-01-13 VITALS
HEIGHT: 65 IN | WEIGHT: 176 LBS | DIASTOLIC BLOOD PRESSURE: 73 MMHG | BODY MASS INDEX: 29.32 KG/M2 | HEART RATE: 63 BPM | SYSTOLIC BLOOD PRESSURE: 111 MMHG

## 2018-01-13 NOTE — RESULT NOTES
Discussion/Summary   her thyroid level came back high, I want her to see an endocrinologist and get a U/S of her thyroid  referral and orders in chart    - Dr Elroy Causey     Verified Results  (1) TSH WITH FT4 REFLEX 09Oct2017 11:54AM Ariadna Sherman    Order Number: [id number]     Test Name Result Flag Reference   TSH 0 326 uIU/mL L 0 358-3 740   Patients undergoing fluorescein dye angiography may retain small amounts of fluorescein in the body for 48-72 hours post procedure  Samples containing fluorescein can produce falsely depressed TSH values  If the patient had this procedure,a specimen should be resubmitted post fluorescein clearance  The recommended reference ranges for TSH during pregnancy are as follows:  First trimester 0 1 to 2 5 uIU/mL  Second trimester  0 2 to 3 0 uIU/mL  Third trimester 0 3 to 3 0 uIU/m     (1) CBC/PLT/DIFF [id number] 11:54AM Ariadna Sherman    Order Number: [id number]     Test Name Result Flag Reference   WBC COUNT 4 14 Thousand/uL L 4 31-10 16   RBC COUNT 4 65 Million/uL  3 81-5 12   HEMOGLOBIN 13 5 g/dL  11 5-15 4   HEMATOCRIT 40 1 %  34 8-46  1   MCV 86 fL  82-98   MCH 29 0 pg  26 8-34 3   MCHC 33 7 g/dL  31 4-37 4   RDW 13 8 %  11 6-15 1   MPV 10 0 fL  8 9-12 7   PLATELET COUNT 043 Thousands/uL  149-390   nRBC AUTOMATED 0 /100 WBCs     NEUTROPHILS RELATIVE PERCENT 56 %  43-75   LYMPHOCYTES RELATIVE PERCENT 36 %  14-44   MONOCYTES RELATIVE PERCENT 8 %  4-12   EOSINOPHILS RELATIVE PERCENT 0 %  0-6   BASOPHILS RELATIVE PERCENT 0 %  0-1   NEUTROPHILS ABSOLUTE COUNT 2 31 Thousands/? ??L  1 85-7 62   LYMPHOCYTES ABSOLUTE COUNT 1 48 Thousands/? ??L  0 60-4 47   MONOCYTES ABSOLUTE COUNT 0 32 Thousand/? ??L  0 17-1 22   EOSINOPHILS ABSOLUTE COUNT 0 01 Thousand/? ??L  0 00-0 61   BASOPHILS ABSOLUTE COUNT 0 01 Thousands/? ??L  0 00-0 10     (1) COMPREHENSIVE METABOLIC PANEL [id number] [address], [place] Order Number: [id number]     Test Name Result Flag Reference   GLUCOSE,[facility] 87 mg/dL     If the patient is fasting, the ADA then defines impaired fasting glucose as > 100 mg/dL and diabetes as > or equal to 123 mg/dL  Specimen collection should occur prior to Sulfasalazine administration due to the potential for falsely depressed results  Specimen collection should occur prior to Sulfapyridine administration due to the potential for falsely elevated results  SODIUM 140 mmol/L  136-145   POTASSIUM 4 2 mmol/L  3 5-5 3   CHLORIDE 107 mmol/L  100-108   CARBON DIOXIDE 28 mmol/L  21-32   ANION GAP (CALC) 5 mmol/L  4-13   BLOOD UREA NITROGEN 5 mg/dL  5-25   CREATININE 0 74 mg/dL  0 60-1 30   Standardized to IDMS reference method   CALCIUM 9 0 mg/dL  8 3-10 1   BILI, TOTAL 0 65 mg/dL  0 20-1 00   ALK PHOSPHATAS 72 U/L     ALT (SGPT) 16 U/L  12-78   Specimen collection should occur prior to Sulfasalazine and/or Sulfapyridine administration due to the potential for falsely depressed results  AST(SGOT) 12 U/L  5-45   Specimen collection should occur prior to Sulfasalazine administration due to the potential for falsely depressed results  ALBUMIN 3 7 g/dL  3 5-5 0   TOTAL PROTEIN 7 7 g/dL  6 4-8 2   eGFR 130 ml/min/1 73sq m     [facility] Disease Education Program recommendations are as follows:  GFR calculation is accurate only with a steady state creatinine  Chronic Kidney disease less than 60 ml/min/1 73 sq  meters  Kidney failure less than 15 ml/min/1 73 sq  meters         Plan  Hyperthyroidism    · *1 - [facility]  *  Status: Active  Requested for: [id number]  Care Summary provided  : Yes   · US THYROID; Status:Hold For - Scheduling; Requested [id number];     Signatures   Electronically signed by : Wade Saavedra MD; Oct  9 2017  5:08PM EST                       (Author)

## 2018-01-13 NOTE — MISCELLANEOUS
Provider Comments  Provider Comments:   pt no show for her depo appt        Signatures   Electronically signed by : Bebe Malloy, ; Apr 5 2017 12:53PM EST                       (Author)

## 2018-01-14 VITALS
HEART RATE: 80 BPM | RESPIRATION RATE: 16 BRPM | WEIGHT: 145 LBS | BODY MASS INDEX: 24.89 KG/M2 | SYSTOLIC BLOOD PRESSURE: 120 MMHG | DIASTOLIC BLOOD PRESSURE: 82 MMHG

## 2018-01-14 VITALS
SYSTOLIC BLOOD PRESSURE: 129 MMHG | HEIGHT: 64 IN | BODY MASS INDEX: 29.37 KG/M2 | WEIGHT: 172 LBS | DIASTOLIC BLOOD PRESSURE: 80 MMHG

## 2018-01-15 NOTE — RESULT NOTES
Verified Results  (Q) CULTURE, THROAT, SPECIAL W/GRP A STREP SUSCEPT  [id number] 12:00AM Gilberto Boyer     Test Name Result Flag Reference   CULTURE, THROAT, SPECIAL$W/GRP A STREP SUSCEPT  CULTURE, THROAT, SPECIAL W/GRP A STREP SUSCEPT  MICRO NUMBER:      [id number]    TEST STATUS:       FINAL    SPECIMEN SOURCE:   NOT GIVEN    SPECIMEN QUALITY:  ADEQUATE    RESULT:            No oropharyngeal pathogens recovered

## 2018-01-15 NOTE — MISCELLANEOUS
Message   Recorded as Task   Date: 05/19/2016 01:31 PM, Created By: Abigail Rangel   Task Name: Medical Complaint Callback   Assigned To: Dina White   Regarding Patient: Afshan Diaz, Status: Active   Comment:    Ruben Coleman - 19 May 2016 1:31 PM     TASK CREATED  Caller: Self; Medical Complaint; [phone] (Home); [phone] (Work)  FYI pt called stating there was something white on incision site that wont come off,denies fever, chills,redness, swelling discharge  Told to let this fall off on its own and continue instructions from surgery  Call if any problems  agree with MA note        Signatures   Electronically signed by : Adalid Neri DO; May 19 5640  2:03PM EST                       (Author)

## 2018-01-17 NOTE — PROGRESS NOTES
Assessment    1  Encounter for preventive health examination (V70 0) (Z00 00)    Plan  Health Maintenance    · *VB-Depression Screening; Status:Complete;   Done: [id number] 01:22PM    Discussion/Summary  health maintenance visit Currently, she eats an adequate diet and has an adequate exercise regimen  the risks and benefits of cervical cancer screening were discussed cervical cancer screening is current cervical cancer screening is needed every three years Breast cancer screening: the risks and benefits of breast cancer screening were discussed and breast cancer screening is not indicated  Colorectal cancer screening: the risks and benefits of colorectal cancer screening were discussed and colorectal cancer screening is not indicated  Osteoporosis screening: the risks and benefits of osteoporosis screening were discussed and bone mineral density testing is not indicated  The immunizations are up to date  She was advised to be evaluated by an ophthalmologist and a dentist  Advice and education were given regarding calcium supplements, vitamin D supplements, reproductive health, sunscreen use, self skin examination, helmet use and fall risk reduction  Patient discussion: discussed with the patient  Chief Complaint  Patient here for annual wellness exam      History of Present Illness  HM, Adult Female: The patient is being seen for a health maintenance evaluation  The last health maintenance visit was 2 year(s) ago  General Health: The patient's health since the last visit is described as good  She has regular dental visits  She denies vision problems  She denies hearing loss  Immunizations status: up to date  Lifestyle:  She consumes a diverse and healthy diet  She does not have any weight concerns  She does not exercise regularly  She does not use tobacco  She denies alcohol use  She denies drug use  Reproductive health:  she reports normal menses  she uses no contraception  she is sexually active  pregnancy history: G 1P 1, 1  Screening: cancer screening reviewed and updated  metabolic screening reviewed and updated  risk screening reviewed and updated  Review of Systems    Constitutional: No fever, no chills, feels well, no tiredness, no recent weight gain or weight loss  Eyes: No complaints of eye pain, no red eyes, no eyesight problems, no discharge, no dry eyes, no itching of eyes  ENT: no complaints of earache, no loss of hearing, no nose bleeds, no nasal discharge, no sore throat, no hoarseness  Cardiovascular: No complaints of slow heart rate, no fast heart rate, no chest pain, no palpitations, no leg claudication, no lower extremity edema  Respiratory: No complaints of shortness of breath, no wheezing, no cough, no SOB on exertion, no orthopnea, no PND  Gastrointestinal: No complaints of abdominal pain, no constipation, no nausea or vomiting, no diarrhea, no bloody stools  Genitourinary: No complaints of dysuria, no incontinence, no pelvic pain, no dysmenorrhea, no vaginal discharge or bleeding  Musculoskeletal: No complaints of arthralgias, no myalgias, no joint swelling or stiffness, no limb pain or swelling  Integumentary: No complaints of skin rash or lesions, no itching, no skin wounds, no breast pain or lump  Neurological: No complaints of headache, no confusion, no convulsions, no numbness, no dizziness or fainting, no tingling, no limb weakness, no difficulty walking  Psychiatric: Not suicidal, no sleep disturbance, no anxiety or depression, no change in personality, no emotional problems  Endocrine: No complaints of proptosis, no hot flashes, no muscle weakness, no deepening of the voice, no feelings of weakness  Hematologic/Lymphatic: No complaints of swollen glands, no swollen glands in the neck, does not bleed easily, does not bruise easily  Active Problems    1  Contraception (V25 9) (Z30 9)   2   Encounter for Depo-Provera contraception (V25 49) (Z30 42)   3  Encounter for routine gynecological examination (V72 31) (Z01 419)   4  Epidermal inclusion cyst (706 2) (L72 0)   5  Influenza vaccine needed (V04 81) (Z23)   6  Low back pain (724 2) (M54 5)   7  Postoperative visit (V58 49) (Z48 89)   8  Screen for STD (sexually transmitted disease) (V74 5) (Z11 3)    Past Medical History    · History of  delivery delivered (669 71) (O82)   · History of acute bronchitis (V12 69) (Z87 09)   · History of candidal vulvovaginitis (V13 29) (Z86 19)   · History of candidiasis (V12 09) (Z86 19)   · History of folliculitis (G78 1) (L67 8)   · History of hidradenitis suppurativa (V13 3) (Z87 2)   · History of painful menstruation (V13 29) (Z87 42)   · History of Hyperemesis gravidarum (643 00) (O21 0)   · History of Hypertension in pregnancy, preeclampsia (642 40) (O14 90)   · History of Labial abscess (616 4) (N76 4)   · History of Onset of menses (V21 8)   · History of UTI in pregnancy (646 60,599 0) (O23 40)    Surgical History    · History of Cholecystectomy   · History of Incision And Drainage Of Skin Abscess   · History of Tonsillectomy    Family History  Mother    · Family history of Pure Hypercholesterolemia  Brother    · Family history of Family Health Status Of Brother - Good    Social History    · Denied: History of Alcohol Use (History)   · Denied: History of Drug Use   · Never A Smoker    Current Meds   1  Acetaminophen-Codeine #3 300-30 MG Oral Tablet; TAKE 1 TABLET Daily PRN; Therapy: 80YMH1980 to (Evaluate:2016); Last Rx:2016 Ordered   2  Doxycycline Hyclate 100 MG Oral Tablet; TAKE 1 TABLET TWICE DAILY UNTIL GONE;   Therapy: 32ARX7066 to (Evaluate:2016)  Requested for: 50PEJ5907; Last   Rx:2016 Ordered   3  MedroxyPROGESTERone Acetate 150 MG/ML Intramuscular Suspension; INJECT 150    MG Intramuscular every 11 weeks; Therapy: 33MYX9825 to (Last Rx:60Hnd0961)  Requested for: 27RRN1812 Ordered    Allergies    1   No Known Drug Allergies    2  No Known Environmental Allergies   3  No Known Food Allergies    Vitals   Recorded: 43ZIJ6030 63:82CE   Systolic 990 mm Hg   Diastolic 62 mm Hg   Heart Rate 80   Respiration 16   Height 5 ft 4 57 in   Weight 168 lb 4 oz   BMI Calculated 28 38 kg/m2   BSA Calculated 1 83 m2     Physical Exam    Constitutional   General appearance: No acute distress, well appearing and well nourished  Head and Face   Head and face: Normal     Palpation of the face and sinuses: No sinus tenderness  Eyes   Conjunctiva and lids: No swelling, erythema or discharge  Pupils and irises: Equal, round, reactive to light  Ophthalmoscopic examination: Normal fundi and optic discs  Ears, Nose, Mouth, and Throat   External inspection of ears and nose: Normal     Otoscopic examination: Tympanic membranes translucent with normal light reflex  Canals patent without erythema  Hearing: Normal     Nasal mucosa, septum, and turbinates: Normal without edema or erythema  Lips, teeth, and gums: Normal, good dentition  Oropharynx: Normal with no erythema, edema, exudate or lesions  Neck   Neck: Supple, symmetric, trachea midline, no masses  Thyroid: Normal, no thyromegaly  Pulmonary   Respiratory effort: No increased work of breathing or signs of respiratory distress  Percussion of chest: Normal     Auscultation of lungs: Clear to auscultation  Cardiovascular   Palpation of heart: Normal PMI, no thrills  Auscultation of heart: Normal rate and rhythm, normal S1 and S2, no murmurs  Examination of extremities for edema and/or varicosities: Normal     Chest   Chest: Normal     Abdomen   Abdomen: Non-tender, no masses  Liver and spleen: No hepatomegaly or splenomegaly  Examination for hernias: No hernia appreciated  Lymphatic   Palpation of lymph nodes in neck: No lymphadenopathy  Palpation of lymph nodes in axillae: No lymphadenopathy      Palpation of lymph nodes in groin: No lymphadenopathy  Musculoskeletal   Gait and station: Normal     Digits and nails: Normal without clubbing or cyanosis  Joints, bones, and muscles: Normal     Range of motion: Normal     Stability: Normal     Muscle strength/tone: Normal     Skin   Skin and subcutaneous tissue: Normal without rashes or lesions  Palpation of skin and subcutaneous tissue: Normal turgor  Neurologic   Cranial nerves: Cranial nerves II-XII intact  Cortical function: Normal mental status  Reflexes: 2+ and symmetric  Sensation: No sensory loss  Coordination: Normal finger to nose and heel to shin  Psychiatric   Judgment and insight: Normal     Orientation to person, place, and time: Normal     Recent and remote memory: Intact      Mood and affect: Normal        Results/Data  *VB-Depression Screening [id number] 01:22PM LaneAriadna     Test Name Result Flag Reference   Depression Scale Result      Depression Screen - Negative For Symptoms       Future Appointments    Date/Time Provider Specialty Site   11/30/2016 10:55 AM [address] [facility], Injection Schedule  [facility] [place]   12/27/2016 01:30 PM ABEBA Dunn [facility] Office Chief, Schedule   [facility]     Signatures   Electronically signed by : Nicolas Castillo MD; Oct 21 2016  1:23PM EST                       (Author)

## 2018-01-17 NOTE — MISCELLANEOUS
Message  Return to work or school:   Isabel Chung is under my professional care  She was seen in my office on 08/08/2016   She is able to return to work on  08/10/2016       Dr Dniah Sherman/LENCHO        Signatures   Electronically signed by : Nicolas Castillo MD; Aug  8 2016 10:32AM EST                       (Author)

## 2018-01-18 NOTE — MISCELLANEOUS
Provider Comments  Provider Comments:   Left message for patient to reschedule missed appointment today        Signatures   Electronically signed by : Kaleb Vitale, ; Apr 7 2017 10:17AM EST                       (Author)

## 2018-01-22 VITALS
HEART RATE: 72 BPM | BODY MASS INDEX: 25.47 KG/M2 | DIASTOLIC BLOOD PRESSURE: 82 MMHG | WEIGHT: 148.38 LBS | SYSTOLIC BLOOD PRESSURE: 132 MMHG | RESPIRATION RATE: 16 BRPM

## 2018-01-22 VITALS
HEART RATE: 72 BPM | SYSTOLIC BLOOD PRESSURE: 98 MMHG | WEIGHT: 148.13 LBS | BODY MASS INDEX: 25.43 KG/M2 | RESPIRATION RATE: 16 BRPM | DIASTOLIC BLOOD PRESSURE: 56 MMHG

## 2018-01-23 VITALS
HEART RATE: 84 BPM | SYSTOLIC BLOOD PRESSURE: 122 MMHG | OXYGEN SATURATION: 98 % | HEIGHT: 64 IN | TEMPERATURE: 98.6 F | BODY MASS INDEX: 25.44 KG/M2 | DIASTOLIC BLOOD PRESSURE: 62 MMHG | WEIGHT: 149 LBS

## 2018-01-24 VITALS
DIASTOLIC BLOOD PRESSURE: 66 MMHG | HEIGHT: 64 IN | SYSTOLIC BLOOD PRESSURE: 142 MMHG | HEART RATE: 100 BPM | WEIGHT: 143 LBS | RESPIRATION RATE: 16 BRPM | BODY MASS INDEX: 24.41 KG/M2

## 2018-01-24 VITALS
HEART RATE: 80 BPM | TEMPERATURE: 98 F | RESPIRATION RATE: 16 BRPM | BODY MASS INDEX: 24.99 KG/M2 | HEIGHT: 64 IN | DIASTOLIC BLOOD PRESSURE: 70 MMHG | SYSTOLIC BLOOD PRESSURE: 128 MMHG | WEIGHT: 146.38 LBS

## 2018-02-22 ENCOUNTER — INITIAL PRENATAL (OUTPATIENT)
Dept: OBGYN CLINIC | Facility: HOSPITAL | Age: 28
End: 2018-02-22
Payer: COMMERCIAL

## 2018-02-22 VITALS
RESPIRATION RATE: 18 BRPM | DIASTOLIC BLOOD PRESSURE: 80 MMHG | WEIGHT: 145.6 LBS | HEIGHT: 64 IN | SYSTOLIC BLOOD PRESSURE: 130 MMHG | HEART RATE: 66 BPM | BODY MASS INDEX: 24.86 KG/M2

## 2018-02-22 DIAGNOSIS — Z34.91 PRENATAL CARE IN FIRST TRIMESTER: Primary | ICD-10-CM

## 2018-02-22 PROCEDURE — T1001 NURSING ASSESSMENT/EVALUATN: HCPCS

## 2018-02-22 NOTE — PATIENT INSTRUCTIONS
Pregnancy   WHAT YOU NEED TO KNOW:   A normal pregnancy lasts about 40 weeks  The first trimester lasts from your last period through the 12th week of pregnancy  The second trimester lasts from the 13th week of your pregnancy through the 23rd week  The third trimester lasts from your 24th week of pregnancy until your baby is born  If you know the date of your last period, your healthcare provider can estimate your due date  You may give birth to your baby any time from 37 weeks to 2 weeks after your due date  DISCHARGE INSTRUCTIONS:   Return to the emergency department if:   · You develop a severe headache that does not go away  · You have new or increased vision changes, such as blurred or spotted vision  · You have new or increased swelling in your face or hands  · You have pain or cramping in your abdomen or low back  · You have vaginal bleeding  Contact your healthcare provider or obstetrician if:   · You have abdominal cramps, pressure, or tightening  · You have a change in vaginal discharge  · You cannot keep food or drinks down, and you are losing weight  · You have chills or a fever  · You have vaginal itching, burning, or pain  · You have yellow, green, white, or foul-smelling vaginal discharge  · You have pain or burning when you urinate, less urine than usual, or pink or bloody urine  · You have questions or concerns about your condition or care  Medicines:   · Prenatal vitamins  provide some of the extra vitamins and minerals you need during pregnancy  Prenatal vitamins may also help to decrease the risk of certain birth defects  · Take your medicine as directed  Contact your healthcare provider if you think your medicine is not helping or if you have side effects  Tell him or her if you are allergic to any medicine  Keep a list of the medicines, vitamins, and herbs you take  Include the amounts, and when and why you take them   Bring the list or the pill bottles to follow-up visits  Carry your medicine list with you in case of an emergency  Follow up with your healthcare provider or obstetrician as directed:  Go to all of your prenatal visits during your pregnancy  Write down your questions so you remember to ask them during your visits  Body changes that may occur during your pregnancy:   · Breast changes  you will experience include tenderness and tingling during the early part of your pregnancy  Your breasts will become larger  You may need to use a support bra  You may see a thin, yellow fluid, called colostrum, leak from your nipples during the second trimester  Colostrum is a liquid that changes to milk about 3 days after you give birth  · Skin changes and stretch marks  may occur during your pregnancy  You may have red marks, called stretch marks, on your skin  Stretch marks will usually fade after pregnancy  Use lotion if your skin is dry and itchy  The skin on your face, around your nipples, and below your belly button may darken  Most of the time, your skin will return to its normal color after your baby is born  · Morning sickness  is nausea and vomiting that can happen at any time of day  Avoid fatty and spicy foods  Eat small meals throughout the day instead of large meals  Violeta may help to decrease nausea  Ask your healthcare provider about other ways of decreasing nausea and vomiting  · Heartburn  may be caused by changes in your hormones during pregnancy  Your growing uterus may also push your stomach upward and force stomach acid to back up into your esophagus  Eat 4 or 5 small meals each day instead of large meals  Avoid spicy foods  Avoid eating right before bedtime  · Constipation  may develop during your pregnancy  To treat constipation, eat foods high in fiber such as fiber cereals, beans, fruits, vegetables, whole-grain breads, and prune juice  Get regular exercise and drink plenty of water   Your healthcare provider may also suggest a fiber supplement to soften your bowel movements  Talk to your healthcare provider before you use any medicines to decrease constipation  · Hemorrhoids  are enlarged veins in the rectal area  They may cause pain, itching, and bright red bleeding from your rectum  To decrease your risk of hemorrhoids, prevent constipation and do not strain to have a bowel movement  If you have hemorrhoids, soak in a tub of warm water to ease discomfort  Ask your healthcare provider how you can treat hemorrhoids  · Leg cramps and swelling  may be caused by low calcium levels or the added weight of pregnancy  Raise your legs above the level of your heart to decrease swelling  During a leg cramp, stretch or massage the muscle that has the cramp  Heat may help decrease pain and muscle spasms  Apply heat on your muscle for 20 to 30 minutes every 2 hours for as many days as directed  · Back pain  may occur as your baby grows  Do not stand for long periods of time or lift heavy items  Use good posture while you stand, squat, or bend  Wear low-heeled shoes with good support  Rest may also help to relieve back pain  Ask your healthcare provider about exercises you can do to strengthen your back muscles  Stay healthy during your pregnancy:   · Eat a variety of healthy foods  Healthy foods include fruits, vegetables, whole-grain breads, low-fat dairy foods, beans, lean meats, and fish  Drink liquids as directed  Ask how much liquid to drink each day and which liquids are best for you  Limit caffeine to less than 200 milligrams each day  Limit your intake of fish to 2 servings each week  Choose fish low in mercury such as canned light tuna, shrimp, crab, salmon, cod, or tilapia  Do not  eat fish high in mercury such as swordfish, tilefish, douglas mackerel, and shark  · Take prenatal vitamins as directed  Your need for certain vitamins and minerals, such as folic acid, increases during pregnancy   Prenatal vitamins provide some of the extra vitamins and minerals you need  Prenatal vitamins may also help to decrease the risk of certain birth defects  · Ask how much weight you should gain during your pregnancy  Too much or too little weight gain can be unhealthy for you and your baby  · Talk to your healthcare provider about exercise  Moderate exercise can help you stay fit  Your healthcare provider will help you plan an exercise program that is safe for you during pregnancy  · Do not smoke  If you smoke, it is never too late to quit  Smoking increases your risk of a miscarriage and other health problems during your pregnancy  Smoking can cause your baby to be born too early or weigh less at birth  Ask your healthcare provider for information if you need help quitting  · Do not drink alcohol  Alcohol passes from your body to your baby through the placenta  It can affect your baby's brain development and cause fetal alcohol syndrome (FAS)  FAS is a group of conditions that causes mental, behavior, and growth problems  · Talk to your healthcare provider before you take any medicines  Many medicines may harm your baby if you take them when you are pregnant  Do not take any medicines, vitamins, herbs, or supplements without first talking to your healthcare provider  Never use illegal or street drugs (such as marijuana or cocaine) while you are pregnant  Safety tips:   · Avoid hot tubs and saunas  Do not use a hot tub or sauna while you are pregnant, especially during your first trimester  Hot tubs and saunas may raise your baby's temperature and increase the risk of birth defects  · Avoid toxoplasmosis  This is an infection caused by eating raw meat or being around infected cat feces  It can cause birth defects, miscarriages, and other problems  Wash your hands after you touch raw meat  Make sure any meat is well-cooked before you eat it  Avoid raw eggs and unpasteurized milk   Use gloves or ask someone else to clean your cat's litter box while you are pregnant  · Ask your healthcare provider about travel  The most comfortable time to travel is during the second trimester  Ask your healthcare provider if you can travel after 36 weeks  You may not be able to travel in an airplane after 36 weeks  He may also recommend that you avoid long road trips  © 2017 [employer] Information is for End User's use only and may not be sold, redistributed or otherwise used for commercial purposes  All illustrations and images included in CareNotes® are the copyrighted property of [employer]  or [employer]  The above information is an  only  It is not intended as medical advice for individual conditions or treatments  Talk to your doctor, nurse or pharmacist before following any medical regimen to see if it is safe and effective for you

## 2018-02-23 ENCOUNTER — APPOINTMENT (OUTPATIENT)
Dept: LAB | Facility: HOSPITAL | Age: 28
End: 2018-02-23
Payer: COMMERCIAL

## 2018-02-23 DIAGNOSIS — Z34.91 PRENATAL CARE IN FIRST TRIMESTER: ICD-10-CM

## 2018-02-23 LAB
ABO GROUP BLD: NORMAL
BASOPHILS # BLD AUTO: 0.01 THOUSANDS/ΜL (ref 0–0.1)
BASOPHILS NFR BLD AUTO: 0 % (ref 0–1)
BILIRUB UR QL STRIP: NEGATIVE
BLD GP AB SCN SERPL QL: NEGATIVE
CLARITY UR: CLEAR
COLOR UR: YELLOW
EOSINOPHIL # BLD AUTO: 0.03 THOUSAND/ΜL (ref 0–0.61)
EOSINOPHIL NFR BLD AUTO: 0 % (ref 0–6)
ERYTHROCYTE [DISTWIDTH] IN BLOOD BY AUTOMATED COUNT: 13.8 % (ref 11.6–15.1)
GLUCOSE UR STRIP-MCNC: NEGATIVE MG/DL
HBV SURFACE AG SER QL: NORMAL
HCT VFR BLD AUTO: 34 % (ref 34.8–46.1)
HGB BLD-MCNC: 11.9 G/DL (ref 11.5–15.4)
HGB UR QL STRIP.AUTO: NEGATIVE
KETONES UR STRIP-MCNC: NEGATIVE MG/DL
LEUKOCYTE ESTERASE UR QL STRIP: NEGATIVE
LYMPHOCYTES # BLD AUTO: 1.83 THOUSANDS/ΜL (ref 0.6–4.47)
LYMPHOCYTES NFR BLD AUTO: 25 % (ref 14–44)
MCH RBC QN AUTO: 30.1 PG (ref 26.8–34.3)
MCHC RBC AUTO-ENTMCNC: 35 G/DL (ref 31.4–37.4)
MCV RBC AUTO: 86 FL (ref 82–98)
MONOCYTES # BLD AUTO: 0.47 THOUSAND/ΜL (ref 0.17–1.22)
MONOCYTES NFR BLD AUTO: 6 % (ref 4–12)
NEUTROPHILS # BLD AUTO: 4.97 THOUSANDS/ΜL (ref 1.85–7.62)
NEUTS SEG NFR BLD AUTO: 69 % (ref 43–75)
NITRITE UR QL STRIP: NEGATIVE
NRBC BLD AUTO-RTO: 0 /100 WBCS
PH UR STRIP.AUTO: 7.5 [PH] (ref 4.5–8)
PLATELET # BLD AUTO: 214 THOUSANDS/UL (ref 149–390)
PMV BLD AUTO: 9.5 FL (ref 8.9–12.7)
PROT UR STRIP-MCNC: NEGATIVE MG/DL
RBC # BLD AUTO: 3.96 MILLION/UL (ref 3.81–5.12)
RH BLD: POSITIVE
RUBV IGG SERPL IA-ACNC: >175 IU/ML
SP GR UR STRIP.AUTO: 1.01 (ref 1–1.03)
SPECIMEN EXPIRATION DATE: NORMAL
UROBILINOGEN UR QL STRIP.AUTO: 0.2 E.U./DL
WBC # BLD AUTO: 7.33 THOUSAND/UL (ref 4.31–10.16)

## 2018-02-23 PROCEDURE — 81003 URINALYSIS AUTO W/O SCOPE: CPT

## 2018-02-23 PROCEDURE — 36415 COLL VENOUS BLD VENIPUNCTURE: CPT

## 2018-02-23 PROCEDURE — 87086 URINE CULTURE/COLONY COUNT: CPT

## 2018-02-23 PROCEDURE — 80081 OBSTETRIC PANEL INC HIV TSTG: CPT

## 2018-02-24 ENCOUNTER — HOSPITAL ENCOUNTER (EMERGENCY)
Facility: HOSPITAL | Age: 28
Discharge: HOME | End: 2018-02-24
Attending: EMERGENCY MEDICINE | Admitting: EMERGENCY MEDICINE
Payer: COMMERCIAL

## 2018-02-24 VITALS
HEART RATE: 90 BPM | SYSTOLIC BLOOD PRESSURE: 131 MMHG | RESPIRATION RATE: 22 BRPM | DIASTOLIC BLOOD PRESSURE: 62 MMHG | WEIGHT: 145 LBS | BODY MASS INDEX: 24.89 KG/M2 | OXYGEN SATURATION: 100 % | TEMPERATURE: 97.6 F

## 2018-02-24 DIAGNOSIS — Z34.91 FIRST TRIMESTER PREGNANCY: Primary | ICD-10-CM

## 2018-02-24 DIAGNOSIS — N93.9 VAGINAL SPOTTING: ICD-10-CM

## 2018-02-24 LAB
BACTERIA UR CULT: NORMAL
BACTERIA UR QL AUTO: ABNORMAL /HPF
BILIRUB UR QL STRIP: NEGATIVE
CLARITY UR: CLEAR
COLOR UR: YELLOW
COLOR, POC: NORMAL
GLUCOSE UR STRIP-MCNC: NEGATIVE MG/DL
HGB UR QL STRIP.AUTO: ABNORMAL
HYALINE CASTS #/AREA URNS LPF: ABNORMAL /LPF
KETONES UR STRIP-MCNC: NEGATIVE MG/DL
LEUKOCYTE ESTERASE UR QL STRIP: NEGATIVE
NITRITE UR QL STRIP: NEGATIVE
NON-SQ EPI CELLS URNS QL MICRO: ABNORMAL /HPF
PH UR STRIP.AUTO: 7 [PH] (ref 4.5–8)
PROT UR STRIP-MCNC: NEGATIVE MG/DL
RBC #/AREA URNS AUTO: ABNORMAL /HPF
SP GR UR STRIP.AUTO: 1.02 (ref 1–1.03)
UROBILINOGEN UR QL STRIP.AUTO: 0.2 E.U./DL
WBC #/AREA URNS AUTO: ABNORMAL /HPF

## 2018-02-24 PROCEDURE — 81001 URINALYSIS AUTO W/SCOPE: CPT

## 2018-02-24 PROCEDURE — 81002 URINALYSIS NONAUTO W/O SCOPE: CPT | Performed by: EMERGENCY MEDICINE

## 2018-02-24 PROCEDURE — 99284 EMERGENCY DEPT VISIT MOD MDM: CPT

## 2018-02-24 NOTE — ED ATTENDING ATTESTATION
Batsheva Gabriel MD, saw and evaluated the patient  I have discussed the patient with the resident/non-physician practitioner and agree with the resident's/non-physician practitioner's findings, Plan of Care, and MDM as documented in the resident's/non-physician practitioner's note, except where noted  All available labs and Radiology studies were reviewed  At this point I agree with the current assessment done in the Emergency Department  I have conducted an independent evaluation of this patient a history and physical is as follows:      Critical Care Time  CritCare Time    Procedures     23 yo female , about 8 weeks pregnant, had vaginal spotting after sex  No abdominal pain  No ultrasound done this pregnancy  No n/v, no fever  No problems with prior pregnancy  Vss, afebrile, lungs cta, rrr, abdomen soft nontender  Bedside u/s shows fhr 160, iup  Urine, abo

## 2018-02-24 NOTE — DISCHARGE INSTRUCTIONS
Return if he develops any worsening bleeding, any return of bleeding any clotting any abdominal pain or any fevers or any pain with urination  If you bleed through more than 2 pads per hour return for further evaluation  Continue with pelvic rest for the next week  Follow-up with your OBGYN for your prenatal care, continue to take a prenatal vitamin  First Trimester Pregnancy   WHAT YOU NEED TO KNOW:   The first trimester of pregnancy lasts from your last period through the 13th week of pregnancy  Follow up with your healthcare provider for prenatal care  Regular prenatal care can help to keep you and your baby healthy  DISCHARGE INSTRUCTIONS:   Return to the emergency department if:   · You have pain or cramping in your abdomen or low back      · You have severe vaginal bleeding or clotting  Contact your healthcare provider or obstetrician if:   · You have light bleeding       · You have chills or a fever      · You have vaginal itching, burning, or pain       · You have yellow, green, white, or foul-smelling vaginal discharge      · You have pain or burning when you urinate, less urine than usual, or pink or bloody urine      · You have questions or concerns about your condition or care  Follow up with your healthcare provider or obstetrician as directed: Go to all of your prenatal visits during your pregnancy  Write down your questions so you remember to ask them during your visits  Stay healthy during pregnancy:   · Take prenatal vitamins as directed  Prenatal vitamins can help you get the amount of vitamins and minerals you need during pregnancy  Prenatal vitamins may also decrease the risk of certain birth defects       · Eat a variety of healthy foods  Healthy foods include fruits, vegetables, whole-grain breads, low-fat dairy products, beans, turkey and chicken, and lean red meat   Ask your healthcare provider for more information about foods that are healthy and safe to eat during pregnancy      · Drink liquids as directed  Ask how much liquid to drink each day and which liquids are best for you  Some healthy liquids include milk, water, and juice  It is not clear how caffeine affects pregnancy  Limit your intake of caffeine to less than 200 mg each day to avoid possible health problems  Caffeine may be found in coffee, tea, cola, sports drinks, and chocolate  Do not drink alcohol       · Talk to your healthcare provider before you take medicines  Many medicines can harm your baby, especially during early pregnancy  Ask your healthcare provider before you take any medicines, including over-the-counter medicines, vitamins, herbs, or food supplements  Never use illegal or street drugs while you are pregnant  Talk to your healthcare provider if you are having trouble quitting street drugs      · Exercise  Ask your healthcare provider about the best exercise plan for you  Exercise may help you feel better and make your labor and delivery easier       · Do not smoke  Smoking can cause problems during pregnancy  It can also cause your baby to weigh less at birth  If you smoke, it is never too late to quit  Ask for information if you need help quitting  Safety tips:   · Do not use a hot tub or sauna while you are pregnant, especially during your first trimester  Hot tubs and saunas may raise your baby's temperature and increase the risk of birth defects  It also increases your risk of miscarriage      · Protect yourself from illness  Toxoplasmosis is a disease that can cause birth defects  To protect yourself from this disease, do not clean your cat's litter box yourself  Ask someone else to clean your cat's litter box while you are pregnant  Also, do not eat raw meat or unwashed fruits and vegetables  Wash your hands after you touch raw meat, and eat only well-cooked meat  Wash fruits and vegetables well before you eat them    © 2017 [employer] Information is for End User's use only and may not be sold, redistributed or otherwise used for commercial purposes  All illustrations and images included in CareNotes® are the copyrighted property of [employer] Inc  or [employer]ae [employer]iz  The above information is an  only  It is not intended as medical advice for individual conditions or treatments   Talk to your doctor, nurse or pharmacist before following any medical regimen to see if it is safe and effective for you

## 2018-02-24 NOTE — ED PROVIDER NOTES
History  Chief Complaint   Patient presents with    Pregnancy Problem     pt is 8 weeks pregnant, started with back pain and spotting approx 1/2 hr ago     61-year-old  8 weeks by LMP pregnant female presents for vaginal spotting  Patient states 30 minutes prior to arrival she had intercourse with her boyfriend and subsequently developed spotting when she went to the bathroom on the tissue paper  She had no clots, no persistent blood and no abdominal pain  She had no symptoms prior to having intercourse  She had an uncomplicated 1st pregnancy  She has not had a documented intrauterine pregnancy to this point  She currently denies any continued spotting or vaginal bleeding  She put on a pad prior to leaving that has no blood on it  On exam she has no abdominal tenderness, bedside ultrasound reveals an intrauterine pregnancy multiple views with a heart rate of 160  None       Past Medical History:   Diagnosis Date    Disease of thyroid gland     Hidradenitis     Migraine     Sickle cell trait ([address] )        Past Surgical History:   Procedure Laterality Date     SECTION      CHOLECYSTECTOMY      [place] EXC SWEAT GLAND LESN INGUIN,SIMPL Right 2016    Procedure: EXCISION GROIN CYST HIDRADENITIS;  Surgeon: Suzi Riley DO;  [facility];  Service: General    TONSILLECTOMY      VULVA BIOPSY N/A 2016    Procedure: INCISION AND DRAINAGE, EXCISION OF LABIAL CYST ;  Surgeon: Johnnie Sen DO;  [facility];  Service:        Family History   Problem Relation Age of Onset    Hyperlipidemia Mother     Lung cancer Father     Diabetes Maternal Grandmother      I have reviewed and agree with the history as documented  Social History   Substance Use Topics    Smoking status: Never Smoker    Smokeless tobacco: Never Used    Alcohol use No        Review of Systems   Constitutional: Negative for chills, fatigue and fever  HENT: Negative for congestion  Eyes: Negative for visual disturbance  Respiratory: Negative for chest tightness and shortness of breath  Cardiovascular: Negative for chest pain and palpitations  Gastrointestinal: Negative for abdominal pain, anal bleeding, blood in stool, nausea and vomiting  Genitourinary: Positive for vaginal bleeding  Negative for decreased urine volume, dysuria, flank pain, frequency, urgency and vaginal discharge  Musculoskeletal: Negative for arthralgias, back pain and gait problem  Skin: Negative for rash  Neurological: Negative for dizziness, syncope, weakness, light-headedness, numbness and headaches  Physical Exam  ED Triage Vitals [02/24/18 1710]   Temperature Pulse Respirations Blood Pressure SpO2   97 6 °F (36 4 °C) 90 22 131/62 100 %      Temp Source Heart Rate Source Patient Position - Orthostatic VS BP Location FiO2 (%)   Tympanic Monitor Sitting Left arm --      Pain Score       3           Orthostatic Vital Signs  Vitals:    02/24/18 1710   BP: 131/62   Pulse: 90   Patient Position - Orthostatic VS: Sitting       Physical Exam   Constitutional: She is oriented to person, place, and time  Vital signs are normal  She appears well-developed and well-nourished  She does not appear ill  No distress  HENT:   Head: Normocephalic and atraumatic  Head is without abrasion and without contusion  Right Ear: Tympanic membrane normal    Left Ear: Tympanic membrane normal    Nose: Nose normal    Mouth/Throat: Uvula is midline, oropharynx is clear and moist and mucous membranes are normal    Eyes: Conjunctivae and EOM are normal  Pupils are equal, round, and reactive to light  Neck: Trachea normal, normal range of motion, full passive range of motion without pain and phonation normal  Neck supple  No JVD present  No spinous process tenderness and no muscular tenderness present  Carotid bruit is not present  Normal range of motion present  No thyromegaly present     Cardiovascular: Normal rate, regular rhythm and intact distal pulses  Exam reveals no friction rub  No murmur heard  Pulmonary/Chest: Effort normal and breath sounds normal  No accessory muscle usage or stridor  No tachypnea  No respiratory distress  She has no decreased breath sounds  She has no wheezes  She has no rhonchi  She has no rales  She exhibits no tenderness, no crepitus, no edema and no retraction  Abdominal: Soft  Normal appearance and bowel sounds are normal  She exhibits no distension  There is no tenderness  There is no rigidity, no rebound, no guarding and no CVA tenderness  Musculoskeletal: Normal range of motion  Lymphadenopathy:     She has no cervical adenopathy  Neurological: She is alert and oriented to person, place, and time  She has normal strength  No sensory deficit  GCS eye subscore is 4  GCS verbal subscore is 5  GCS motor subscore is 6  Skin: Skin is warm, dry and intact  No rash noted  She is not diaphoretic  Psychiatric: She has a normal mood and affect  Nursing note and vitals reviewed        ED Medications  Medications - No data to display    Diagnostic Studies  Results Reviewed     Procedure Component Value Units Date/Time    Urine Microscopic [[id number]]  (Abnormal) Collected:  02/24/18 1819    Lab Status:  Final result Specimen:  Urine from Urine, Clean Catch Updated:  02/24/18 1834     RBC, UA 2-4 (A) /hpf      WBC, UA None Seen /hpf      Epithelial Cells None Seen /hpf      Bacteria, UA None Seen /hpf      Hyaline Casts, UA None Seen /lpf     POCT urinalysis dipstick [[id number]]  (Normal) Resulted:  02/24/18 1816    Lab Status:  Final result Updated:  02/24/18 1816     Color, UA see chart    ED Urine Macroscopic [[id number]]  (Abnormal) Collected:  02/24/18 1819    Lab Status:  Final result Specimen:  Urine Updated:  02/24/18 1813     Color, UA Yellow     Clarity, UA Clear     pH, UA 7 0     Leukocytes, UA Negative     Nitrite, UA Negative     Protein, UA Negative mg/dl      Glucose, UA Negative mg/dl      Ketones, UA Negative mg/dl      Urobilinogen, UA 0 2 E U /dl      Bilirubin, UA Negative     Blood, UA Moderate (A)     Specific [place], UA 1 020    Narrative:       CLINITEK RESULT                 No orders to display         Procedures  Procedures      Phone Consults  ED Phone Contact    ED Course  ED Course as of Feb 24 2120   Sat Feb 24, 2018   1751 Bedside ultrasound reveals intrauterine pregnancy in two views with heart rate of 160  Positive fetal motion identified  1758 Results for Ricky Page (MRN [id number]) as of 2/24/2018 17:55    2/23/2018 12:10  ABO Grouping: O  Rh Factor: Positive  Antibody Screen: Negative  Specimen Expiration Date: [id number] Discussed pelvic rest, return precautions and follow-up with OBGYN     1823 Leukocytes, UA: Negative   1823 Protein, UA: Negative   1823 Nitrite, UA: Negative                               MDM  CritCare Time    Disposition  Final diagnoses:   First trimester pregnancy   Vaginal spotting     Time reflects when diagnosis was documented in both MDM as applicable and the Disposition within this note     Time User Action Codes Description Comment    2/24/2018  6:24 PM Dennis Davison Add [Z34 90] First trimester pregnancy     2/24/2018  6:24 PM Yumiko Cee Add [N92 0] Vaginal spotting       ED Disposition     ED Disposition Condition Comment    Discharge  Brigido Radha discharge to home/self care      Condition at discharge: Good        Follow-up Information     Follow up With Specialties Details Why Contact Info Additional [address] Emergency Department Emergency Medicine Go to If symptoms worsen [address]  [phone]  ED, [address] [place] [address], MD [facility] Schedule an appointment as soon as possible for a visit in 2 days As needed [address] [facility] Dionte Tatianna  [address] There are no discharge medications for this patient  No discharge procedures on file  ED Provider  Attending physically available and evaluated Kenneth Fraga I managed the patient along with the ED Attending      Electronically Signed by         Dory Stout DO  02/24/18 2120

## 2018-02-26 LAB
HIV 1+2 AB+HIV1 P24 AG SERPL QL IA: NORMAL
RPR SER QL: NORMAL

## 2018-02-27 ENCOUNTER — ROUTINE PRENATAL (OUTPATIENT)
Dept: OBGYN CLINIC | Facility: HOSPITAL | Age: 28
End: 2018-02-27
Payer: COMMERCIAL

## 2018-02-27 ENCOUNTER — PATIENT OUTREACH (OUTPATIENT)
Dept: OBGYN CLINIC | Facility: HOSPITAL | Age: 28
End: 2018-02-27

## 2018-02-27 VITALS
HEIGHT: 64 IN | HEART RATE: 80 BPM | SYSTOLIC BLOOD PRESSURE: 126 MMHG | WEIGHT: 145.8 LBS | DIASTOLIC BLOOD PRESSURE: 69 MMHG | BODY MASS INDEX: 24.89 KG/M2

## 2018-02-27 DIAGNOSIS — O21.9 NAUSEA AND VOMITING DURING PREGNANCY: ICD-10-CM

## 2018-02-27 DIAGNOSIS — Z34.90 PRENATAL CARE, ANTEPARTUM: Primary | ICD-10-CM

## 2018-02-27 LAB
PROT UR QL STRIP: NEGATIVE
SL AMB  POCT GLUCOSE, UA: NEGATIVE

## 2018-02-27 PROCEDURE — G0145 SCR C/V CYTO,THINLAYER,RESCR: HCPCS | Performed by: OBSTETRICS & GYNECOLOGY

## 2018-02-27 PROCEDURE — 81003 URINALYSIS AUTO W/O SCOPE: CPT | Performed by: OBSTETRICS & GYNECOLOGY

## 2018-02-27 PROCEDURE — 99213 OFFICE O/P EST LOW 20 MIN: CPT | Performed by: OBSTETRICS & GYNECOLOGY

## 2018-02-27 PROCEDURE — 87491 CHLMYD TRACH DNA AMP PROBE: CPT | Performed by: FAMILY MEDICINE

## 2018-02-27 PROCEDURE — 87591 N.GONORRHOEAE DNA AMP PROB: CPT | Performed by: FAMILY MEDICINE

## 2018-02-27 PROCEDURE — 81002 URINALYSIS NONAUTO W/O SCOPE: CPT | Performed by: OBSTETRICS & GYNECOLOGY

## 2018-02-27 RX ORDER — SWAB
1 SWAB, NON-MEDICATED MISCELLANEOUS DAILY
Qty: 30 EACH | Refills: 0 | Status: SHIPPED | OUTPATIENT
Start: 2018-02-27 | End: 2018-09-20

## 2018-02-27 RX ORDER — DIPHENHYDRAMINE HYDROCHLORIDE 25 MG/1
25 CAPSULE ORAL 3 TIMES DAILY PRN
Qty: 90 TABLET | Refills: 0 | Status: SHIPPED | OUTPATIENT
Start: 2018-02-27 | End: 2018-03-27

## 2018-02-27 NOTE — PATIENT INSTRUCTIONS
Pregnancy at 11 to 14 Weeks   AMBULATORY CARE:   What changes are happening to your body: You are now at the end of your first trimester and entering your second trimester  Morning sickness usually goes away by this time  You may have other symptoms such as fatigue, frequent urination, and headaches  You may have gained between 2 to 4 pounds by now  Seek care immediately if:   · You have pain or cramping in your abdomen or low back  · You have heavy vaginal bleeding or clotting  · You pass material that looks like tissue or large clots  Collect the material and bring it with you  Contact your healthcare provider if:   · You cannot keep food or drinks down, and you are losing weight  · You have light bleeding  · You have chills or a fever  · You have vaginal itching, burning, or pain  · You have yellow, green, white, or foul-smelling vaginal discharge  · You have pain or burning when you urinate, less urine than usual, or pink or bloody urine  · You have questions or concerns about your condition or care  How to care for yourself at this stage of your pregnancy:   · Get plenty of rest   You may feel more tired than normal  You may need to take naps or go to bed earlier  · Manage nausea and vomiting  Avoid fatty and spicy foods  Eat small meals throughout the day instead of large meals  Violeta may help to decrease nausea  Ask your healthcare provider about other ways of decreasing nausea and vomiting  · Eat a variety of healthy foods  Healthy foods include fruits, vegetables, whole-grain breads, low-fat dairy foods, beans, lean meats, and fish  Drink liquids as directed  Ask how much liquid to drink each day and which liquids are best for you  Limit caffeine to less than 200 milligrams each day  Limit your intake of fish to 2 servings each week  Choose fish low in mercury such as canned light tuna, shrimp, salmon, cod, or tilapia   Do not  eat fish high in mercury such as swordfish, tilefish, douglas mackerel, and shark  · Take prenatal vitamins as directed  Your need for certain vitamins and minerals, such as folic acid, increases during pregnancy  Prenatal vitamins provide some of the extra vitamins and minerals you need  Prenatal vitamins may also help to decrease the risk of certain birth defects  · Do not smoke  If you smoke, it is never too late to quit  Smoking increases your risk of a miscarriage and other health problems during your pregnancy  Smoking can cause your baby to be born too early or weigh less at birth  Ask your healthcare provider for information if you need help quitting  · Do not drink alcohol  Alcohol passes from your body to your baby through the placenta  It can affect your baby's brain development and cause fetal alcohol syndrome (FAS)  FAS is a group of conditions that causes mental, behavior, and growth problems  · Talk to your healthcare provider before you take any medicines  Many medicines may harm your baby if you take them when you are pregnant  Do not take any medicines, vitamins, herbs, or supplements without first talking to your healthcare provider  Never use illegal or street drugs (such as marijuana or cocaine) while you are pregnant  Safety tips during pregnancy:   · Avoid hot tubs and saunas  Do not use a hot tub or sauna while you are pregnant, especially during your first trimester  Hot tubs and saunas may raise your baby's temperature and increase the risk of birth defects  · Avoid toxoplasmosis  This is an infection caused by eating raw meat or being around infected cat feces  It can cause birth defects, miscarriages, and other problems  Wash your hands after you touch raw meat  Make sure any meat is well-cooked before you eat it  Avoid raw eggs and unpasteurized milk  Use gloves or ask someone else to clean your cat's litter box while you are pregnant  Changes that are happening with your baby:   Your baby has fully formed fingernails and toenails  Your baby's heartbeat can now be heard  Ask your healthcare provider if you can listen to your baby's heartbeat  By week 14, your baby is over 4 inches long from the top of the head to the rump (baby's bottom)  Your baby weighs over 3 ounces  What you need to know about prenatal care:  During the first 28 weeks of your pregnancy, you will see your healthcare provider once a month  Prenatal care can help prevent problems during pregnancy and childbirth  Your healthcare provider will check your blood pressure and weight  You may also need any of the following:  · A urine test  may also be done to check for sugar and protein  These can be signs of gestational diabetes or infection  · Genetic disorders screening tests  may be offered to you  This screening test checks your baby's risk of genetic disorders such as Down syndrome  The screening test includes a blood test and ultrasound  · Your baby's heart rate  will be checked  © 2017 [employer] Information is for End User's use only and may not be sold, redistributed or otherwise used for commercial purposes  All illustrations and images included in CareNotes® are the copyrighted property of [employer]  or [employer]  The above information is an  only  It is not intended as medical advice for individual conditions or treatments  Talk to your doctor, nurse or pharmacist before following any medical regimen to see if it is safe and effective for you

## 2018-02-27 NOTE — PROGRESS NOTES
OB/GYN  PRENATAL H&P VISIT  Otilia Harris  2018  9:03 AM  Dr Ernestina Ritchie  Patient is here for initial prenatal H&P  This is an unintended pregnancy but happy about it  Patient reports that her LMP was 17  Not on prenatal vitamins yet  Patient is currently doing well with some nausea with vomiting about 5 times per day mostly in morning  She is here with father of baby, boyfriend Mark Graham  Her previous pregnancies have been  due to fetal distress @ 41 weeks 9lb 5 ounces  She currently unemployed  She has a great support system at home  She hx of STD/STI, denies a hx of TB or close contacts with persons with TB  Patient has sickle cell trait  She has no other blood disorders or family history of inheritable conditions such as physical or intellectual disabilities, birth defects,, heart or neural tube defects  Father of baby with uncle with down's syndrome  She never had MRSA  She no recent travel or travel planned in the near future  Patient has history of hydradenitis which has been drained 3 times prior, last time was in 2018          She denies vaginal bleeding, cramping, leakage, abnormal discharge  Review of Systems   Constitutional: Negative for chills and fever  HENT: Negative for sneezing and sore throat  Eyes: Negative for visual disturbance  Respiratory: Negative for shortness of breath  Cardiovascular: Negative for chest pain  Gastrointestinal: Positive for nausea and vomiting  Negative for abdominal pain and diarrhea  Genitourinary: Negative for vaginal bleeding, vaginal discharge and vaginal pain  Skin:        Painful hydradenitis of right inner thigh   Neurological: Negative for dizziness and light-headedness     All other systems reviewed and are negative         Medical History        Past Medical History:   Diagnosis Date    Disease of thyroid gland      Hidradenitis      Migraine      Sickle cell trait (HCC)         Surgical History         Past Surgical History:   Procedure Laterality Date     SECTION        CHOLECYSTECTOMY   2009    [place] EXC SWEAT GLAND LESN INGUIN,SIMPL Right 2016     Procedure: EXCISION GROIN CYST HIDRADENITIS;  Surgeon: Carmel Parra DO;  [facility];  Service: General    TONSILLECTOMY        VULVA BIOPSY N/A 2016     Procedure: INCISION AND DRAINAGE, EXCISION OF LABIAL CYST ;  Surgeon: Sandhya Vyas DO;  [facility];  Service:             Social History   Social History            Social History    Marital status: Single       Spouse name: N/A    Number of children: N/A    Years of education: 15           Occupational History    unemployed              Social History Main Topics    Smoking status: Never Smoker    Smokeless tobacco: Never Used    Alcohol use No    Drug use: No    Sexual activity: Yes       Partners: Male       Birth control/ protection: None           Other Topics Concern    Not on file          Social History Narrative    No narrative on file            OBJECTIVE      Vitals:     18 0851   BP: 126/69   Pulse: 80      Physical Exam   Constitutional: She is oriented to person, place, and time  She appears well-developed and well-nourished  Genitourinary: Vagina normal and uterus normal  No vaginal discharge found  HENT:   Head: Normocephalic and atraumatic  Mouth/Throat: Oropharynx is clear and moist    Eyes: Conjunctivae and EOM are normal  Right eye exhibits no discharge  No scleral icterus  Neck: Normal range of motion  Neck supple  Cardiovascular: Normal rate and regular rhythm  Pulmonary/Chest: Effort normal and breath sounds normal    Abdominal: Soft  Bowel sounds are normal  There is no tenderness  There is no guarding  gravid   Lymphadenopathy:     She has no cervical adenopathy  Neurological: She is alert and oriented to person, place, and time     Skin: There is erythema (  right inner thigh hydradenitis [id number] painful)  Psychiatric: She has a normal mood and affect       Vulva: normal, single 1cm x 1 cm tender hydradenitis of right side of inner thigh  Vagina: normal mucosa, normal discharge  Cervix: no bleeding following Pap, no cervical motion tenderness, no lesions and normal appearing  Adnexa: normal adnexa and no mass, fullness, tenderness  Uterus: normal size     IMAGING:              TVUS (18): betancourt IUP, CRL correlating to gestational age (per estimated LMP) of 12 weeks 2-4 days              FHR: 167                   LABS:  Prenatal Labs (18)              Blood Type: O+              Ab: negative              HIV: nonreactive              Urine Cx: negative              Hep B sAg: nonreactive              RPR: nonreactive                            CF (last pregnancy): negative        ASSESSMENT AND PLAN     24 y o , , with /69 (BP Location: Left arm)   Pulse 80   Ht 5' 4" (1 626 m)   Wt 66 1 kg (145 lb 12 8 oz)   LMP 2017 (Approximate) , at Gestational Age estimated approximately 12 weeks and 2 days, here for her prenatal H&P         1  Pregnancy: H&P completed today  PN Labs reviewed today  As patient is unsure of LMP and CRL on U/S not completely clear CRL  Requisition given for [facility] for final dating  TVUS showed betancourt IUP,  with estimated CRL correlating to GA from estimated LMP of 12 weeks 2-4 days  Labor expectations discussed with patient, including appointment schedule, nutrition, weight gain, exercise, medications, sexual intercourse, and nausea/vomiting  2  Screening: pap smear collected today  GC/CT collected  Sequential screening given to patient during ED visit, will review again at next visit   referral given  3  Consents: Delivery process including potential TOLAC and repeat  reviewed  Patient has intention of scheduled repeat  section    Patient had prior  of daughter at 36 weeks due to fetal distress  4  Postpartum: patient plans on breastfeeding  Started discussion of contraception and will continue discussion in subsequent visit with patient, including progesterone only oral pills, depo provera, nexplanon, mirena, and paragard  5  Follow up: RTC in 4 weeks  Precautions regarding labor, leakage, bleeding, and fetal movement reviewed  6  Nausea/Vomiting of Pregnancy: Prescribed vit B6 and Doxylamine TID PRN  7  Right inner thigh hydradenitis: Currently not fluctuant, mildly indurated; Recommended sitz bath, warm compress  Return to office if increases in size more indurated or has consitutional symptoms of fevers, chills  Consider repeat incision and drainage if becomes larger and fluctuant      D/w Dr Vincent Ayala and under the supervision of Dr Matheus Young  2/27/2018  9:03 AM      Review of Systems   Constitutional: Negative  All other systems reviewed and are negative  OBJECTIVE  There were no vitals filed for this visit  Physical Exam   Constitutional: She appears well-developed and well-nourished  Nursing note and vitals reviewed  Vivi Cornejo

## 2018-02-27 NOTE — PROGRESS NOTES
OB/GYN  PRENATAL H&P VISIT  Colette Correa  2018  9:03 AM  Dr Nadege Burgess  Patient is here for initial prenatal H&P  This is an unintended pregnancy but happy about it  Patient reports that her LMP was 17  Not on prenatal vitamins yet  Patient is currently doing well with some nausea with vomiting about 5 times per day mostly in morning  She is here with father of baby, boyfriend Tiesha Pinzon  Her previous pregnancies have been  due to fetal distress @ 41 weeks 9lb 5 ounces  She currently unemployed  She has a great support system at home  She hx of STD/STI, denies a hx of TB or close contacts with persons with TB  Patient has sickle cell trait  She has no other blood disorders or family history of inheritable conditions such as physical or intellectual disabilities, birth defects,, heart or neural tube defects  Father of baby with uncle with down's syndrome  She never had MRSA  She no recent travel or travel planned in the near future  Patient has history of hydradenitis which has been drained 3 times prior, last time was in 2018  She denies vaginal bleeding, cramping, leakage, abnormal discharge  Review of Systems   Constitutional: Negative for chills and fever  HENT: Negative for sneezing and sore throat  Eyes: Negative for visual disturbance  Respiratory: Negative for shortness of breath  Cardiovascular: Negative for chest pain  Gastrointestinal: Positive for nausea and vomiting  Negative for abdominal pain and diarrhea  Genitourinary: Negative for vaginal bleeding, vaginal discharge and vaginal pain  Skin:        Painful hydradenitis of right inner thigh   Neurological: Negative for dizziness and light-headedness  All other systems reviewed and are negative        Past Medical History:   Diagnosis Date    Disease of thyroid gland     Hidradenitis     Migraine     Sickle cell trait ([facility] [address] )        Past Surgical History:   Procedure Laterality Date     SECTION      CHOLECYSTECTOMY  2009    NM EXC SWEAT GLAND LESN INGUIN,SIMPL Right 2016    Procedure: EXCISION GROIN CYST HIDRADENITIS;  Surgeon: Davin Heaton DO;  [facility];  Service: General    TONSILLECTOMY      VULVA BIOPSY N/A 2016    Procedure: INCISION AND DRAINAGE, EXCISION OF LABIAL CYST ;  Surgeon: David Martinez DO;  [facility];  Service:        Social History     Social History    Marital status: Single     Spouse name: N/A    Number of children: N/A    Years of education: 15     Occupational History    unemployed      Social History Main Topics    Smoking status: Never Smoker    Smokeless tobacco: Never Used    Alcohol use No    Drug use: No    Sexual activity: Yes     Partners: Male     Birth control/ protection: None     Other Topics Concern    Not on file     Social History Narrative    No narrative on file       OBJECTIVE  Vitals:    18 0851   BP: 126/69   Pulse: 80     Physical Exam   Constitutional: She is oriented to person, place, and time  She appears well-developed and well-nourished  Genitourinary: Vagina normal and uterus normal  No vaginal discharge found  HENT:   Head: Normocephalic and atraumatic  Mouth/Throat: Oropharynx is clear and moist    Eyes: Conjunctivae and EOM are normal  Right eye exhibits no discharge  No scleral icterus  Neck: Normal range of motion  Neck supple  Cardiovascular: Normal rate and regular rhythm  Pulmonary/Chest: Effort normal and breath sounds normal    Abdominal: Soft  Bowel sounds are normal  There is no tenderness  There is no guarding  gravid   Lymphadenopathy:     She has no cervical adenopathy  Neurological: She is alert and oriented to person, place, and time  Skin: There is erythema (  right inner thigh hydradenitis [id number] painful)  Psychiatric: She has a normal mood and affect       Vulva: normal, single 1cm x 1 cm tender hydradenitis of right side of inner thigh  Vagina: normal mucosa, normal discharge  Cervix: no bleeding following Pap, no cervical motion tenderness, no lesions and normal appearing  Adnexa: normal adnexa and no mass, fullness, tenderness  Uterus: normal size    IMAGING:   TVUS (18): betancourt IUP, CRL correlating to gestational age (per estimated LMP) of 12 weeks 2-4 days   FHR: 167       LABS: Prenatal Labs (18)   Blood Type: O+   Ab: negative   HIV: nonreactive   Urine Cx: negative   Hep B sAg: nonreactive   RPR: nonreactive      CF (last pregnancy): negative      ASSESSMENT AND PLAN    24 y o , , with /69 (BP Location: Left arm)   Pulse 80   Ht 5' 4" (1 626 m)   Wt 66 1 kg (145 lb 12 8 oz)   LMP 2017 (Approximate) , at Gestational Age estimated approximately 12 weeks and 2 days, here for her prenatal H&P       1  Pregnancy: H&P completed today  PN Labs reviewed today  As patient is unsure of LMP and CRL on U/S not completely clear CRL  Requisition given for [facility] for final dating  TVUS showed betancourt IUP,  with estimated CRL correlating to GA from estimated LMP of 12 weeks 2-4 days  Labor expectations discussed with patient, including appointment schedule, nutrition, weight gain, exercise, medications, sexual intercourse, and nausea/vomiting  2  Screening: pap smear collected today  GC/CT collected  Sequential screening given to patient during ED visit, will review again at next visit   referral given  3  Consents: Delivery process including potential TOLAC and repeat  reviewed  Patient has intention of scheduled repeat  section  Patient had prior  of daughter at 41 weeks due to fetal distress  4  Postpartum: patient plans on breastfeeding  Started discussion of contraception and will continue discussion in subsequent visit with patient, including progesterone only oral pills, depo provera, nexplanon, mirena, and paragard  5  Follow up: RTC in 4 weeks   Precautions regarding labor, leakage, bleeding, and fetal movement reviewed  6  Nausea/Vomiting of Pregnancy: Prescribed vit B6 and Doxylamine TID PRN  7  Right inner thigh hydradenitis: Currently not fluctuant, mildly indurated; Recommended sitz bath, warm compress  Return to office if increases in size more indurated or has consitutional symptoms of fevers, chills  Consider repeat incision and drainage if becomes larger and fluctuant      D/w Dr Mamadou Tong and under the supervision of Dr Eugenio Morris  2/27/2018  9:03 AM

## 2018-03-01 LAB
CHLAMYDIA DNA CVX QL NAA+PROBE: NORMAL
N GONORRHOEA DNA GENITAL QL NAA+PROBE: NORMAL

## 2018-03-05 LAB
LAB AP GYN PRIMARY INTERPRETATION: NORMAL
Lab: NORMAL

## 2018-03-08 ENCOUNTER — TRANSCRIBE ORDERS (OUTPATIENT)
Dept: INTERNAL MEDICINE CLINIC | Facility: CLINIC | Age: 28
End: 2018-03-08

## 2018-03-08 ENCOUNTER — HOSPITAL ENCOUNTER (EMERGENCY)
Facility: HOSPITAL | Age: 28
Discharge: HOME | End: 2018-03-08
Admitting: EMERGENCY MEDICINE
Payer: COMMERCIAL

## 2018-03-08 VITALS
HEIGHT: 64 IN | RESPIRATION RATE: 20 BRPM | DIASTOLIC BLOOD PRESSURE: 77 MMHG | SYSTOLIC BLOOD PRESSURE: 129 MMHG | OXYGEN SATURATION: 99 % | WEIGHT: 145 LBS | HEART RATE: 89 BPM | TEMPERATURE: 98.2 F | BODY MASS INDEX: 24.75 KG/M2

## 2018-03-08 DIAGNOSIS — E05.90 PRETIBIAL MYXEDEMA: Primary | ICD-10-CM

## 2018-03-08 DIAGNOSIS — L73.2 HIDRADENITIS SUPPURATIVA: Primary | ICD-10-CM

## 2018-03-08 DIAGNOSIS — L02.214 ABSCESS OF RIGHT GROIN: ICD-10-CM

## 2018-03-08 PROCEDURE — 99282 EMERGENCY DEPT VISIT SF MDM: CPT

## 2018-03-08 RX ORDER — CEPHALEXIN 500 MG/1
500 CAPSULE ORAL EVERY 6 HOURS SCHEDULED
Qty: 28 CAPSULE | Refills: 0 | Status: SHIPPED | OUTPATIENT
Start: 2018-03-08 | End: 2018-03-15

## 2018-03-08 NOTE — ED PROVIDER NOTES
History  Chief Complaint   Patient presents with    Abscess     hAS HYDROADENITIS AD HAS ABCESSES ON UPPER THIGHS THAT ARE PAINFUL       23 yo F  currently 13 weeks pregnant with history of hidradenitis suppurativa who presents today for evaluation of abscess in right groin  She states lesion has been there for approximately 6 months  She denies any purulent drainage from the area, any redness or increase in size  She states recently has become more tender and irritated to the touch  Pain is 9/10  She has not attempted any alleviating factors  She is unsure what to do because she is currently pregnant  She has had a cyst in the groin in the past and has had multiple ED visits for which she has refused I&D  She does have a prior history of excision of sweat glands by Dr Nanci Cazares but has not followed up with him recently  She has no pregnancy complaints at this time  No abd pain, vaginal spotting or bleeding  Has appt with  center next week  JOY: 10/4/18  Prior to Admission Medications   Prescriptions Last Dose Informant Patient Reported? Taking?    Prenatal Vit-Fe Fumarate-FA (PRENATAL MULTIVITAMIN) 28-0 8 MG TABS   No No   Sig: Take 1 tablet by mouth daily   Pyridoxine HCl (VITAMIN B-6) 25 MG tablet   No No   Sig: Take 1 tablet (25 mg total) by mouth 3 (three) times a day as needed (nausea)   doxylamine (UNISON) 25 MG tablet   No No   Sig: Take 0 5 tablets (12 5 mg total) by mouth 3 (three) times a day as needed for sleep      Facility-Administered Medications: None       Past Medical History:   Diagnosis Date    Candidal vulvovaginitis     LAST ASSESSED: [id number]    Candidiasis     LAST ASSESSED: [id number]     delivery delivered     RESOLVED: [id number]    Disease of thyroid gland     Folliculitis     RESOLVED: [id number]    Hidradenitis     Hidradenitis suppurativa     LAST ASSESSED: [id number]    History of early menarche     FIRST PERIOD AT 8YEARS OLD    Hyperemesis gravidarum     RESOLVED: [id number]    Hypertension in pregnancy, pre-eclampsia     Labial abscess     LAST ASSESSED: [id number]    Migraine     Painful menstruation     Sickle cell trait [facility])        Past Surgical History:   Procedure Laterality Date    ABSCESS DRAINAGE  2016    INCISION AND DRAINAGE OF SKIN ABSCESS; LABIAL CYST REMOVAL - [address]     SECTION      CHOLECYSTECTOMY  2009    MI EXC SWEAT GLAND LESN INGUIN,SIMPL Right 2016    Procedure: EXCISION GROIN CYST HIDRADENITIS;  Surgeon: Charlene Arnett DO;  [facility];  Service: General    TONSILLECTOMY      VULVA BIOPSY N/A 2016    Procedure: INCISION AND DRAINAGE, EXCISION OF LABIAL CYST ;  Surgeon: Bacilio Phoenix DO;  [facility];  Service:        Family History   Problem Relation Age of Onset    Hyperlipidemia Mother      PURE    Lung cancer Father     Diabetes Maternal Grandmother     No Known Problems Brother      I have reviewed and agree with the history as documented  Social History   Substance Use Topics    Smoking status: Never Smoker    Smokeless tobacco: Never Used    Alcohol use No      Comment: (HISTORY)        Review of Systems   Constitutional: Negative for chills and fever  Respiratory: Negative for shortness of breath  Cardiovascular: Negative for chest pain  Gastrointestinal: Negative for abdominal pain  Genitourinary: Negative for pelvic pain, vaginal bleeding, vaginal discharge and vaginal pain  Skin: Positive for wound         Physical Exam  ED Triage Vitals [18 0910]   Temperature Pulse Respirations Blood Pressure SpO2   98 2 °F (36 8 °C) 89 20 129/77 99 %      Temp Source Heart Rate Source Patient Position - Orthostatic VS BP Location FiO2 (%)   Oral Monitor Sitting Right arm --      Pain Score       9           Orthostatic Vital Signs  Vitals:    18 0910   BP: 129/77   Pulse: 89   Patient Position - Orthostatic VS: Sitting Physical Exam   Constitutional: She is oriented to person, place, and time  She appears well-developed and well-nourished  No distress  HENT:   Head: Normocephalic and atraumatic  Right Ear: External ear normal    Left Ear: External ear normal    Nose: Nose normal    Mouth/Throat: Oropharynx is clear and moist    Eyes: Conjunctivae and EOM are normal  Pupils are equal, round, and reactive to light  Neck: Normal range of motion  Neck supple  Cardiovascular: Normal rate, regular rhythm and normal heart sounds  Exam reveals no gallop and no friction rub  No murmur heard  Pulmonary/Chest: Effort normal and breath sounds normal  No respiratory distress  She has no decreased breath sounds  She has no wheezes  She has no rhonchi  She has no rales  Genitourinary:         Musculoskeletal: Normal range of motion  Neurological: She is alert and oriented to person, place, and time  Skin: Skin is warm and dry  Capillary refill takes less than 2 seconds  She is not diaphoretic  Psychiatric: She has a normal mood and affect  Nursing note and vitals reviewed  ED Medications  Medications - No data to display    Diagnostic Studies  Results Reviewed     None                 No orders to display              Procedures  Procedures       Phone Contacts  ED Phone Contact    ED Course  ED Course                                MDM  Number of Diagnoses or Management Options  Abscess of right groin: established and worsening  Hidradenitis suppurativa: established and worsening  Diagnosis management comments:   25 yo F with history of HS, abscess in right groin for 6 months, currently pregnant but has no pregnancy complaints  Patient is refusing I&D  She continues to say phrases such as - 'I don't know why I came here  I don't want it opened ' Patient has had multiple visits in the past for the same complaint in the same affected area and has repeatedly refused I&D   Will tx with keflex, encouraged warm compresses and tylenol for pain  She was instructed to follow up with Dr Milton Homans, surgical clinic, or RT[facility] for worsening or if she is agreeable to I&D  Patient verbalizes understanding and agrees with plan  Amount and/or Complexity of Data Reviewed  Decide to obtain previous medical records or to obtain history from someone other than the patient: yes  Review and summarize past medical records: yes    Patient Progress  Patient progress: stable    CritCare Time    Disposition  Final diagnoses:   Hidradenitis suppurativa   Abscess of right groin     Time reflects when diagnosis was documented in both MDM as applicable and the Disposition within this note     Time User Action Codes Description Comment    3/8/2018  9:53 AM Xavi Dobson Add [L73 2] Hidradenitis suppurativa     3/8/2018  9:53 AM Xavi Dobson Add [L02 214] Abscess of right groin       ED Disposition     ED Disposition Condition Comment    Discharge  Enrique Fischer discharge to home/self care      Condition at discharge: Good        Follow-up Information     Follow up With Specialties Details Why Contact Info Additional [address],  General Surgery Schedule an appointment as soon as possible for a visit HIDRADENITIS FOLLOW UP [address] [place] [address] Emergency Department Emergency Medicine  If symptoms worsen - REDNESS, FEVERS, PREGNANCY COMPLAINTS [address] [place] [address], [address] [place] [address], [facility]        Discharge Medication List as of 3/8/2018  9:56 AM      START taking these medications    Details   cephalexin (KEFLEX) 500 mg capsule Take 1 capsule (500 mg total) by mouth every 6 (six) hours for 7 days, Starting Thu 3/8/2018, Until Thu 3/15/2018, Normal         CONTINUE these medications which have NOT CHANGED    Details   doxylamine (UNISON) 25 MG tablet Take 0 5 tablets (12 5 mg total) by mouth 3 (three) times a day as needed for sleep, Starting Tue 2/27/2018, Normal      Prenatal Vit-Fe Fumarate-FA (PRENATAL MULTIVITAMIN) 28-0 8 MG TABS Take 1 tablet by mouth daily, Starting Tue 2/27/2018, Normal      Pyridoxine HCl (VITAMIN B-6) 25 MG tablet Take 1 tablet (25 mg total) by mouth 3 (three) times a day as needed (nausea), Starting Tue 2/27/2018, Normal           No discharge procedures on file      ED Provider  Electronically Signed by           Michael Gautam PA-C  03/08/18 [id number]

## 2018-03-08 NOTE — DISCHARGE INSTRUCTIONS
Hidradenitis Suppurativa   WHAT YOU NEED TO KNOW:   Hidradenitis suppurativa (HS) is a chronic (long-term) skin disease that causes your sweat glands or hair follicles to get clogged and inflamed  HS causes red bumps that look like pimples or small boils to develop on your skin  The cause of HS is unknown  It may run in families  Being overweight and smoking worsens signs and symptoms of HS  DISCHARGE INSTRUCTIONS:   Contact your healthcare provider if:   · Your symptoms get worse, even with treatment  · You have questions or concerns about your condition or care  Medicines: You may need any of the following:  · Antibiotics  are used to treat or prevent a bacterial infection  Antibiotics may be used long-term  Antibiotics may be given as a cream or pill  · NSAIDs , such as ibuprofen, help decrease swelling, pain, and fever  This medicine is available with or without a doctor's order  NSAIDs can cause stomach bleeding or kidney problems in certain people  If you take blood thinner medicine, always ask your healthcare provider if NSAIDs are safe for you  Always read the medicine label and follow directions  · Acetaminophen  decreases pain and fever  It is available without a doctor's order  Ask how much to take and how often to take it  Follow directions  Acetaminophen can cause liver damage if not taken correctly  · Other medicines  may be used to treat HS  These may include hormones, acne medicines, steroids, biologic therapy, and medicines that slow your immune system  · Take your medicine as directed  Contact your healthcare provider if you think your medicine is not helping or if you have side effects  Tell him of her if you are allergic to any medicine  Keep a list of the medicines, vitamins, and herbs you take  Include the amounts, and when and why you take them  Bring the list or the pill bottles to follow-up visits  Carry your medicine list with you in case of an emergency    Manage HS symptoms and decrease flare-ups:   · Apply warm, moist compresses  This may help to decrease pain  Keep the compress on your skin for 10 minutes  Sitz baths may be recommended if your genital or anal area is affected by HS  To do a sitz bath, fill a bathtub with 4 to 6 inches of warm water  You may also use a sitz bath pan that fits inside a toilet bowl  Sit in the sitz bath for 15 minutes  Do this 3 times a day, and after each bowel movement  The warm water can help decrease pain and swelling  · Wash your skin gently  Use cleansers recommended by your healthcare provider  Antibacterial soap may be helpful  · Lose weight if you are overweight  Weight loss may help to improve signs and symptoms of HS  · Do not smoke  Smoking can make it more difficult to treat HS and worsen symptoms  Ask your healthcare provider for information if you currently smoke and need help to quit  E-cigarettes or smokeless tobacco still contain nicotine  Talk to your healthcare provider before you use these products  · Do not wear tight clothing  Tight clothing rubs against your skin and causes irritation that can worsen HS  · Do not shave or use deodorant in areas of skin affected by HS  Ask your healthcare provider about safe deodorant or hair removal options  · Keep your skin cool  Overheating and sweating can cause an HS flare-up  · Ask your healthcare provider if you should make any changes to the foods you eat  Your healthcare provider may recommend that you avoid dairy foods  A dairy-free diet may help decrease your symptoms  Dairy foods include milk, cheese, yogurt, and ice cream      · Tell your healthcare provider if HS is causing you to feel depressed  Your healthcare provider may recommend counseling to help you cope with HS  Follow up with your healthcare provider as directed:  Write down your questions so you remember to ask them during your visits     © 2017 [employer] Information is for End User's use only and may not be sold, redistributed or otherwise used for commercial purposes  All illustrations and images included in CareNotes® are the copyrighted property of [employer]  or [employer]  The above information is an  only  It is not intended as medical advice for individual conditions or treatments  Talk to your doctor, nurse or pharmacist before following any medical regimen to see if it is safe and effective for you

## 2018-03-12 ENCOUNTER — ULTRASOUND (OUTPATIENT)
Dept: PERINATAL CARE | Facility: CLINIC | Age: 28
End: 2018-03-12
Payer: COMMERCIAL

## 2018-03-12 VITALS
DIASTOLIC BLOOD PRESSURE: 62 MMHG | SYSTOLIC BLOOD PRESSURE: 116 MMHG | BODY MASS INDEX: 24.86 KG/M2 | WEIGHT: 149.2 LBS | HEIGHT: 65 IN | HEART RATE: 90 BPM

## 2018-03-12 DIAGNOSIS — Z3A.10 10 WEEKS GESTATION OF PREGNANCY: ICD-10-CM

## 2018-03-12 DIAGNOSIS — O09.299 HX OF PREECLAMPSIA, PRIOR PREGNANCY, CURRENTLY PREGNANT: ICD-10-CM

## 2018-03-12 PROBLEM — E05.90 SUBCLINICAL HYPERTHYROIDISM: Status: ACTIVE | Noted: 2018-03-12

## 2018-03-12 PROCEDURE — 76805 OB US >/= 14 WKS SNGL FETUS: CPT | Performed by: OBSTETRICS & GYNECOLOGY

## 2018-03-13 PROBLEM — Z86.2 HX OF SICKLE CELL TRAIT: Status: ACTIVE | Noted: 2018-03-13

## 2018-03-27 ENCOUNTER — APPOINTMENT (OUTPATIENT)
Dept: LAB | Facility: HOSPITAL | Age: 28
End: 2018-03-27
Payer: COMMERCIAL

## 2018-03-27 ENCOUNTER — TRANSCRIBE ORDERS (OUTPATIENT)
Dept: LAB | Facility: HOSPITAL | Age: 28
End: 2018-03-27

## 2018-03-27 ENCOUNTER — ROUTINE PRENATAL (OUTPATIENT)
Dept: OBGYN CLINIC | Facility: HOSPITAL | Age: 28
End: 2018-03-27
Payer: COMMERCIAL

## 2018-03-27 VITALS — SYSTOLIC BLOOD PRESSURE: 119 MMHG | BODY MASS INDEX: 25.79 KG/M2 | WEIGHT: 154 LBS | DIASTOLIC BLOOD PRESSURE: 76 MMHG

## 2018-03-27 DIAGNOSIS — Z13.89 ENCOUNTER FOR ROUTINE SCREENING FOR MALFORMATION USING ULTRASOUND: Primary | ICD-10-CM

## 2018-03-27 DIAGNOSIS — Z86.2 HX OF SICKLE CELL TRAIT: ICD-10-CM

## 2018-03-27 DIAGNOSIS — O09.299 HX OF PREECLAMPSIA, PRIOR PREGNANCY, CURRENTLY PREGNANT: ICD-10-CM

## 2018-03-27 DIAGNOSIS — Z3A.16 16 WEEKS GESTATION OF PREGNANCY: Primary | ICD-10-CM

## 2018-03-27 DIAGNOSIS — E05.90 SUBCLINICAL HYPERTHYROIDISM: ICD-10-CM

## 2018-03-27 LAB
PROT UR QL STRIP: NORMAL
SL AMB  POCT GLUCOSE, UA: NORMAL
SL AMB LEUKOCYTE ESTERASE,UA: NORMAL
SL AMB POCT BLOOD,UA: NORMAL
SL AMB POCT KETONES,UA: NORMAL
SL AMB POCT NITRITE,UA: NORMAL

## 2018-03-27 PROCEDURE — 84702 CHORIONIC GONADOTROPIN TEST: CPT

## 2018-03-27 PROCEDURE — 81000 URINALYSIS NONAUTO W/SCOPE: CPT | Performed by: OBSTETRICS & GYNECOLOGY

## 2018-03-27 PROCEDURE — 82105 ALPHA-FETOPROTEIN SERUM: CPT

## 2018-03-27 PROCEDURE — 82677 ASSAY OF ESTRIOL: CPT

## 2018-03-27 PROCEDURE — 86336 INHIBIN A: CPT

## 2018-03-27 PROCEDURE — 99213 OFFICE O/P EST LOW 20 MIN: CPT | Performed by: OBSTETRICS & GYNECOLOGY

## 2018-03-27 RX ORDER — ASPIRIN 81 MG/1
81 TABLET ORAL DAILY
Qty: 30 TABLET | Refills: 4 | Status: SHIPPED | OUTPATIENT
Start: 2018-03-27 | End: 2018-04-24

## 2018-03-27 NOTE — PATIENT INSTRUCTIONS
Pregnancy at 12 to 24 [address]:   Now that you are in your second trimester, you have more energy  You may also be feeling hungrier than usual  You may be gaining about ½ to 1 pound a week, and your pregnancy is beginning to show  You may need to start wearing maternity clothes  As your baby gets larger, you may have other symptoms  These may include body aches or stretch marks on your abdomen, breasts, thighs, or buttocks  DISCHARGE INSTRUCTIONS:   Seek care immediately if:   · You develop a severe headache that does not go away  · You have new or increased vision changes, such as blurred or spotted vision  · You have new or increased swelling in your face or hands  · You have vaginal spotting or bleeding  · Your water broke or you feel warm water gushing or trickling from your vagina  Contact your healthcare provider if:   · You have abdominal cramps, pressure, or tightening  · You have a change in vaginal discharge  · You cannot keep food or drinks down, and you are losing weight  · You have chills or a fever  · You have vaginal itching, burning, or pain  · You have yellow, green, white, or foul-smelling vaginal discharge  · You have pain or burning when you urinate, less urine than usual, or pink or bloody urine  · You have questions or concerns about your condition or care  How to care for yourself at this stage of your pregnancy:   · Eat a variety of healthy foods  Healthy foods include fruits, vegetables, whole-grain breads, low-fat dairy foods, beans, lean meats, and fish  Drink liquids as directed  Ask how much liquid to drink each day and which liquids are best for you  Limit caffeine to less than 200 milligrams each day  Limit your intake of fish to 2 servings each week  Choose fish low in mercury such as canned light tuna, shrimp, salmon, cod, or tilapia  Do not  eat fish high in mercury such as swordfish, tilefish, douglas mackerel, and shark  · Take prenatal vitamins as directed  Your need for certain vitamins and minerals, such as folic acid, increases during pregnancy  Prenatal vitamins provide some of the extra vitamins and minerals you need  Prenatal vitamins may also help to decrease the risk of certain birth defects  · Talk to your healthcare provider about exercise  Moderate exercise can help you stay fit  Your healthcare provider will help you plan an exercise program that is safe for you during pregnancy  · Do not smoke  If you smoke, it is never too late to quit  Smoking increases your risk of a miscarriage and other health problems during your pregnancy  Smoking can cause your baby to be born too early or weigh less at birth  Ask your healthcare provider for information if you need help quitting  · Do not drink alcohol  Alcohol passes from your body to your baby through the placenta  It can affect your baby's brain development and cause fetal alcohol syndrome (FAS)  FAS is a group of conditions that causes mental, behavior, and growth problems  · Talk to your healthcare provider before you take any medicines  Many medicines may harm your baby if you take them when you are pregnant  Do not take any medicines, vitamins, herbs, or supplements without first talking to your healthcare provider  Never use illegal or street drugs (such as marijuana or cocaine) while you are pregnant  Safety tips during pregnancy:   · Avoid hot tubs and saunas  Do not use a hot tub or sauna while you are pregnant, especially during your first trimester  Hot tubs and saunas may raise your baby's temperature and increase the risk of birth defects  · Avoid toxoplasmosis  This is an infection caused by eating raw meat or being around infected cat feces  It can cause birth defects, miscarriages, and other problems  Wash your hands after you touch raw meat  Make sure any meat is well-cooked before you eat it   Avoid raw eggs and unpasteurized milk  Use gloves or ask someone else to clean your cat's litter box while you are pregnant  Changes that are happening with your baby:  By 22 weeks, your baby is about 8 inches long from the top of the head to the rump (baby's bottom)  Your baby also weighs about 1 pound  Your baby is becoming much more active  You may be able to feel the baby move inside you now  The first movements may not be that noticeable  They may feel like a fluttering sensation  As time goes on, your baby's movements will become stronger and more noticeable  What you need to know about prenatal care:  During the first 28 weeks of your pregnancy, you will see your healthcare provider once a month  Your healthcare provider will check your blood pressure and weight  You may also need the following:  · A urine test  may also be done to check for sugar and protein  These can be signs of gestational diabetes or infection  Protein in your urine may also be a sign of preeclampsia  Preeclampsia is a condition that can develop during week 20 or later of your pregnancy  It causes high blood pressure, and it can cause problems with your kidneys and other organs  · Fundal height  is a measurement of your uterus to check your baby's growth  This number is usually the same as the number of weeks that you have been pregnant  · A fetal ultrasound  shows pictures of your baby inside your uterus  It shows your baby's development  The movement and position of your baby can also be seen  Your healthcare provider may be able to tell you what your baby's gender is during the ultrasound  · Your baby's heart rate  will be checked  © 2017 [address] [employer] [employer] Information is for End User's use only and may not be sold, redistributed or otherwise used for commercial purposes  All illustrations and images included in CareNotes® are the copyrighted property of [employer]  or Jae Ortiz    The above information is an  only  It is not intended as medical advice for individual conditions or treatments  Talk to your doctor, nurse or pharmacist before following any medical regimen to see if it is safe and effective for you

## 2018-03-27 NOTE — PROGRESS NOTES
Ronal Sutherland is a 24 y o  Dre Garcia at 16w5d     Chief complaint today: none    ROS: VB: denies LOF: denies CXN: denies FM: present    1) Sequential screen was not completed  Quad screen provided to patient    - FOB has uncle with down's syndrome  2) Level II US set up, pt has appt in April  3) Hx of pre-eclampsia without severe features   - Pt provided with 81mg ASA    - BP normotensive 119/76  4) Nausea; improved but present sporadically throughout the day  No vomiting    - Pt states she is not taking her B6 or Unisom   - The Osker Brunner gives her GRANADO and she feels that both may be unsafe in this pregnancy   - Reassured patient and explained these medications are safe in pregnancy   - Recommended she take the B6 TID to help her nausea  5) Contraception   - Rediscussed birth control methods post-partum   - Pt may be interested in [address], reviewed vs  ParaGard, Nexplanon, [address] and ParaGard handout provided to patient  6) Hx of C/S for NRFHT 2 years ago ( was 9lb 5oz at 41w)   - Reviewed risks vs  Benefits of TOLAC and the <1% risk of uterine rupture   - Reviewed increased risks of morbidity with C/S  7) Left thigh hydradenitis   - Stable, pt reports it has not worsened, pt encouraged to take Sitz baths, use warm compress as needed   - Last incised 2017  8) Sickle cell trait  9) RTC in 4 weeks  D/w Dr Dayanara Webster      Future Appointments  Date Time Provider Chey Abbott   2018 9:30 AM   [facility]   2018 7:45 AM [facility]

## 2018-03-29 LAB
2ND TRIMESTER 4 SCREEN SERPL-IMP: NORMAL
2ND TRIMESTER 4 SCREEN SERPL-IMP: NORMAL
AFP ADJ MOM SERPL: 1.19
AFP SERPL-MCNC: 45.9 NG/ML
AGE AT DELIVERY: 26 YEARS
FET TS 18 RISK FROM MAT AGE: NORMAL
FET TS 21 RISK FROM MAT AGE: 982
GA METHOD: NORMAL
GA: 16.6 WEEKS
HCG ADJ MOM SERPL: 0.72
HCG SERPL-ACNC: NORMAL MIU/ML
IDDM PATIENT QL: NO
INHIBIN A ADJ MOM SERPL: 0.73
INHIBIN A SERPL-MCNC: 119.04 PG/ML
KARYOTYP BLD/T: NORMAL
MULTIPLE PREGNANCY: NO
NEURAL TUBE DEFECT RISK FETUS: NORMAL %
SERVICE CMNT-IMP: NORMAL
TS 18 RISK FETUS: NORMAL
TS 21 RISK FETUS: NORMAL
U ESTRIOL ADJ MOM SERPL: 1.12
U ESTRIOL SERPL-MCNC: 1 NG/ML

## 2018-04-04 ENCOUNTER — OFFICE VISIT (OUTPATIENT)
Dept: FAMILY MEDICINE CLINIC | Facility: CLINIC | Age: 28
End: 2018-04-04
Payer: COMMERCIAL

## 2018-04-04 VITALS
DIASTOLIC BLOOD PRESSURE: 78 MMHG | RESPIRATION RATE: 18 BRPM | TEMPERATURE: 97.7 F | BODY MASS INDEX: 25.48 KG/M2 | SYSTOLIC BLOOD PRESSURE: 128 MMHG | WEIGHT: 152.2 LBS

## 2018-04-04 DIAGNOSIS — J01.00 ACUTE MAXILLARY SINUSITIS, RECURRENCE NOT SPECIFIED: Primary | ICD-10-CM

## 2018-04-04 PROCEDURE — 3725F SCREEN DEPRESSION PERFORMED: CPT | Performed by: FAMILY MEDICINE

## 2018-04-04 PROCEDURE — 99213 OFFICE O/P EST LOW 20 MIN: CPT | Performed by: FAMILY MEDICINE

## 2018-04-04 RX ORDER — AMOXICILLIN 500 MG/1
500 TABLET, FILM COATED ORAL 3 TIMES DAILY
Qty: 21 TABLET | Refills: 0 | Status: SHIPPED | OUTPATIENT
Start: 2018-04-04 | End: 2018-04-11

## 2018-04-04 NOTE — PROGRESS NOTES
Assessment/Plan:       Diagnoses and all orders for this visit:    Acute maxillary sinusitis, recurrence not specified  -     amoxicillin (AMOXIL) 500 MG tablet; Take 1 tablet (500 mg total) by mouth 3 (three) times a day for 7 days  -     Cancel: Influenza A/B and RSV by PCR; Future  -     Influenza A/B and RSV by PCR        May take Delsym    Subjective:      Patient ID: Renetta Sepulveda is a 24 y o  female  URI    This is a new problem  The current episode started in the past 7 days  Associated symptoms include congestion, coughing, diarrhea, rhinorrhea, sinus pain, sneezing and a sore throat  Pertinent negatives include no abdominal pain, chest pain, dysuria, ear pain, headaches, joint pain, joint swelling, nausea, neck pain, plugged ear sensation, rash, swollen glands, vomiting or wheezing  The treatment provided mild relief  The following portions of the patient's history were reviewed and updated as appropriate: allergies, current medications, past family history, past medical history, past social history, past surgical history and problem list     Review of Systems   HENT: Positive for congestion, rhinorrhea, sinus pain, sneezing and sore throat  Negative for ear pain  Respiratory: Positive for cough  Negative for wheezing  Cardiovascular: Negative for chest pain  Gastrointestinal: Positive for diarrhea  Negative for abdominal pain, nausea and vomiting  Genitourinary: Negative for dysuria  Musculoskeletal: Negative for joint pain and neck pain  Skin: Negative for rash  Neurological: Negative for headaches               Past Medical History:   Diagnosis Date     delivery delivered     RESOLVED: [id number]    Disease of thyroid gland     Hidradenitis suppurativa     LAST ASSESSED: [id number]    History of early menarche     [de-identified] PERIOD AT 8YEARS OLD    Hyperemesis gravidarum     RESOLVED: [id number]    Hypertension in pregnancy, pre-eclampsia     Migraine     Sickle cell trait [facility])      Past Surgical History:   Procedure Laterality Date    ABSCESS DRAINAGE  2016    INCISION AND DRAINAGE OF SKIN ABSCESS; LABIAL CYST REMOVAL - [address]     SECTION      CHOLECYSTECTOMY      W[place] EXC SWEAT GLAND LESN INGUIN,SIMPL Right 2016    Procedure: EXCISION GROIN CYST HIDRADENITIS;  Surgeon: Mehrdad Morgan DO;  Location: [facility];  Service: General    TONSILLECTOMY      VULVA BIOPSY N/A 2016    Procedure: INCISION AND DRAINAGE, EXCISION OF LABIAL CYST ;  Surgeon: Sherri Kirkpatrick DO;  Location[facility];  Service:      Social History     Social History    Marital status: Single     Spouse name: N/A    Number of children: N/A    Years of education: 15     Occupational History    unemployed      Social History Main Topics    Smoking status: Never Smoker    Smokeless tobacco: Never Used    Alcohol use No      Comment: (HISTORY)    Drug use: No    Sexual activity: Yes     Partners: Male     Birth control/ protection: None     Other Topics Concern    Not on file     Social History Narrative    No narrative on file     No Known Allergies      Family History   Problem Relation Age of Onset    Hyperlipidemia Mother      PURE    Lung cancer Father     Diabetes Maternal Grandmother     No Known Problems Brother            Current Outpatient Prescriptions:     amoxicillin (AMOXIL) 500 MG tablet, Take 1 tablet (500 mg total) by mouth 3 (three) times a day for 7 days, Disp: 21 tablet, Rfl: 0    aspirin (ECOTRIN LOW STRENGTH) 81 mg EC tablet, Take 1 tablet (81 mg total) by mouth daily, Disp: 30 tablet, Rfl: 4    Prenatal Vit-Fe Fumarate-FA (PRENATAL MULTIVITAMIN) 28-0 8 MG TABS, Take 1 tablet by mouth daily, Disp: 30 each, Rfl: 0      Objective:      /78   Temp 97 7 °F (36 5 °C)   Resp 18   Wt 69 kg (152 lb 3 2 oz)   LMP 2017 (Approximate)   BMI 25 48 kg/m²        Physical Exam   Constitutional: She appears well-developed and well-nourished  HENT:   Head: Normocephalic and atraumatic  Mouth/Throat: No oropharyngeal exudate  Eyes: Conjunctivae are normal  Pupils are equal, round, and reactive to light  Neck: Normal range of motion  Neck supple  Cardiovascular: Normal rate and regular rhythm      Pulmonary/Chest: Effort normal and breath sounds normal

## 2018-04-06 LAB
FLUAV RNA SPEC QL NAA+PROBE: NOT DETECTED
FLUBV RNA SPEC QL NAA+PROBE: NOT DETECTED
RSV RNA SPEC QL NAA+PROBE: NOT DETECTED

## 2018-04-22 ENCOUNTER — HOSPITAL ENCOUNTER (OUTPATIENT)
Facility: HOSPITAL | Age: 28
Discharge: HOME | End: 2018-04-22
Attending: OBSTETRICS & GYNECOLOGY | Admitting: OBSTETRICS & GYNECOLOGY
Payer: COMMERCIAL

## 2018-04-22 VITALS
DIASTOLIC BLOOD PRESSURE: 67 MMHG | OXYGEN SATURATION: 100 % | RESPIRATION RATE: 16 BRPM | SYSTOLIC BLOOD PRESSURE: 127 MMHG | TEMPERATURE: 97 F | HEART RATE: 74 BPM

## 2018-04-22 DIAGNOSIS — R10.13 EPIGASTRIC PAIN: Primary | ICD-10-CM

## 2018-04-22 PROBLEM — Z3A.20 20 WEEKS GESTATION OF PREGNANCY: Status: ACTIVE | Noted: 2018-03-12

## 2018-04-22 PROBLEM — R10.9 ABDOMINAL PAIN: Status: ACTIVE | Noted: 2018-04-22

## 2018-04-22 LAB
ALBUMIN SERPL BCP-MCNC: 2.9 G/DL (ref 3.5–5)
ALP SERPL-CCNC: 63 U/L (ref 46–116)
ALT SERPL W P-5'-P-CCNC: 11 U/L (ref 12–78)
ANION GAP SERPL CALCULATED.3IONS-SCNC: 7 MMOL/L (ref 4–13)
AST SERPL W P-5'-P-CCNC: 8 U/L (ref 5–45)
BASOPHILS # BLD AUTO: 0.01 THOUSANDS/ΜL (ref 0–0.1)
BASOPHILS NFR BLD AUTO: 0 % (ref 0–1)
BILIRUB SERPL-MCNC: 0.57 MG/DL (ref 0.2–1)
BUN SERPL-MCNC: 4 MG/DL (ref 5–25)
CALCIUM SERPL-MCNC: 8.5 MG/DL (ref 8.3–10.1)
CHLORIDE SERPL-SCNC: 105 MMOL/L (ref 100–108)
CO2 SERPL-SCNC: 24 MMOL/L (ref 21–32)
CREAT SERPL-MCNC: 0.53 MG/DL (ref 0.6–1.3)
EOSINOPHIL # BLD AUTO: 0.02 THOUSAND/ΜL (ref 0–0.61)
EOSINOPHIL NFR BLD AUTO: 0 % (ref 0–6)
ERYTHROCYTE [DISTWIDTH] IN BLOOD BY AUTOMATED COUNT: 13.4 % (ref 11.6–15.1)
GFR SERPL CREATININE-BSD FRML MDRD: 153 ML/MIN/1.73SQ M
GLUCOSE SERPL-MCNC: 86 MG/DL (ref 65–140)
HCT VFR BLD AUTO: 33.5 % (ref 34.8–46.1)
HGB BLD-MCNC: 12 G/DL (ref 11.5–15.4)
LIPASE SERPL-CCNC: 88 U/L (ref 73–393)
LYMPHOCYTES # BLD AUTO: 1.49 THOUSANDS/ΜL (ref 0.6–4.47)
LYMPHOCYTES NFR BLD AUTO: 13 % (ref 14–44)
MCH RBC QN AUTO: 31.6 PG (ref 26.8–34.3)
MCHC RBC AUTO-ENTMCNC: 35.8 G/DL (ref 31.4–37.4)
MCV RBC AUTO: 88 FL (ref 82–98)
MONOCYTES # BLD AUTO: 0.55 THOUSAND/ΜL (ref 0.17–1.22)
MONOCYTES NFR BLD AUTO: 5 % (ref 4–12)
NEUTROPHILS # BLD AUTO: 9.13 THOUSANDS/ΜL (ref 1.85–7.62)
NEUTS SEG NFR BLD AUTO: 82 % (ref 43–75)
NRBC BLD AUTO-RTO: 0 /100 WBCS
PLATELET # BLD AUTO: 221 THOUSANDS/UL (ref 149–390)
PMV BLD AUTO: 9.4 FL (ref 8.9–12.7)
POTASSIUM SERPL-SCNC: 3.8 MMOL/L (ref 3.5–5.3)
PROT SERPL-MCNC: 7 G/DL (ref 6.4–8.2)
RBC # BLD AUTO: 3.8 MILLION/UL (ref 3.81–5.12)
SODIUM SERPL-SCNC: 136 MMOL/L (ref 136–145)
WBC # BLD AUTO: 11.29 THOUSAND/UL (ref 4.31–10.16)

## 2018-04-22 PROCEDURE — 80053 COMPREHEN METABOLIC PANEL: CPT | Performed by: STUDENT IN AN ORGANIZED HEALTH CARE EDUCATION/TRAINING PROGRAM

## 2018-04-22 PROCEDURE — C9113 INJ PANTOPRAZOLE SODIUM, VIA: HCPCS | Performed by: STUDENT IN AN ORGANIZED HEALTH CARE EDUCATION/TRAINING PROGRAM

## 2018-04-22 PROCEDURE — 99214 OFFICE O/P EST MOD 30 MIN: CPT

## 2018-04-22 PROCEDURE — 83690 ASSAY OF LIPASE: CPT | Performed by: STUDENT IN AN ORGANIZED HEALTH CARE EDUCATION/TRAINING PROGRAM

## 2018-04-22 PROCEDURE — 85025 COMPLETE CBC W/AUTO DIFF WBC: CPT | Performed by: STUDENT IN AN ORGANIZED HEALTH CARE EDUCATION/TRAINING PROGRAM

## 2018-04-22 RX ORDER — ONDANSETRON 2 MG/ML
4 INJECTION INTRAMUSCULAR; INTRAVENOUS ONCE
Status: COMPLETED | OUTPATIENT
Start: 2018-04-22 | End: 2018-04-22

## 2018-04-22 RX ORDER — PANTOPRAZOLE SODIUM 40 MG/1
40 INJECTION, POWDER, FOR SOLUTION INTRAVENOUS ONCE
Status: COMPLETED | OUTPATIENT
Start: 2018-04-22 | End: 2018-04-22

## 2018-04-22 RX ORDER — RANITIDINE HCL 75 MG
75 TABLET ORAL DAILY PRN
COMMUNITY
End: 2018-04-24

## 2018-04-22 RX ORDER — PANTOPRAZOLE SODIUM 40 MG/1
40 TABLET, DELAYED RELEASE ORAL DAILY
Qty: 30 TABLET | Refills: 0 | Status: SHIPPED | OUTPATIENT
Start: 2018-04-22 | End: 2018-05-08

## 2018-04-22 RX ADMIN — PANTOPRAZOLE SODIUM 40 MG: 40 INJECTION, POWDER, FOR SOLUTION INTRAVENOUS at 15:42

## 2018-04-22 RX ADMIN — ONDANSETRON 4 MG: 2 INJECTION INTRAMUSCULAR; INTRAVENOUS at 14:24

## 2018-04-22 RX ADMIN — SODIUM CHLORIDE, SODIUM LACTATE, POTASSIUM CHLORIDE, AND CALCIUM CHLORIDE 1000 ML: .6; .31; .03; .02 INJECTION, SOLUTION INTRAVENOUS at 14:20

## 2018-04-22 NOTE — PROGRESS NOTES
Triage Note - [facility]  [facility] 24 [facility]  [facility] MRN: [id number]  Unit/Bed#: LD Triage 2 Encounter: [id number]    Chief Complaint: Abdominal pain and N/V  JOY: Estimated Date of Delivery: 18    HPI: Patient is a  at [address]  presenting with complaints of abdominal pain  Patient states this morning around 9AM she began to have abdominal pain she localizes to the epigastric area that is sharp in quality and intermittently "comes in waves"  Nothing has made it better and laying down makes it feel slightly worse  Patient called with these complaints and was directed to try an H2 inhibitor (zantac) and to proceed to ED with persistent or worsening pain  She says following trying zantac she began to have vomiting and diarrhea  She had had 4-5 bouts of non-bloody bilious emesis and non-bloody diarrhea twice  She denies any fevers or chills, sick contacts or eating new foods  Patient is on a daily ASA for history of preeclampsia in previous pregnancy  She denies any contractions, loss of fluid or vaginal bleeding  She reports fetal movement  This pregnancy is complicated by h/o  section for failure to progress, h/o preeclampsia and subclinical hyperthyroidism  She has a history of laparoscopic cholecystectomy  Vitals:   /67   Pulse 74   Temp (!) 97 °F (36 1 °C) (Tympanic)   Resp 16   LMP 2017 (Approximate)   SpO2 100%   There is no height or weight on file to calculate BMI      Physical Exam  GEN: Appears mildly uncomfortable, no acute distress  ABD: Soft, mildly tender to epigastric area, non tender throughout other areas of abdomen, no rebound, no guarding, gravid uterus  SSE: Cervix appears closed, no blood, minimal white discharge  Wet mount: Negative for clue cells or trichomonads  KOH: Negative for hyphae     FHT:  Baseline Rate: 155 bpm  Variability: Minimal < 6 bpm  TOCO:   Contraction Frequency (minutes): none    Labs:   Recent Results (from the past 24 hour(s))   CBC and differential    Collection Time: 18  2:11 PM   Result Value Ref Range    WBC 11 29 (H) 4 31 - 10 16 Thousand/uL    RBC 3 80 (L) 3 81 - 5 12 Million/uL    Hemoglobin 12 0 11 5 - 15 4 g/dL    Hematocrit 33 5 (L) 34 8 - 46 1 %    MCV 88 82 - 98 fL    MCH 31 6 26 8 - 34 3 pg    MCHC 35 8 31 4 - 37 4 g/dL    RDW 13 4 11 6 - 15 1 %    MPV 9 4 8 9 - 12 7 fL    Platelets 236 984 - 894 Thousands/uL    nRBC 0 /100 WBCs    Neutrophils Relative 82 (H) 43 - 75 %    Lymphocytes Relative 13 (L) 14 - 44 %    Monocytes Relative 5 4 - 12 %    Eosinophils Relative 0 0 - 6 %    Basophils Relative 0 0 - 1 %    Neutrophils Absolute 9 13 (H) 1 85 - 7 62 Thousands/µL    Lymphocytes Absolute 1 49 0 60 - 4 47 Thousands/µL    Monocytes Absolute 0 55 0 17 - 1 22 Thousand/µL    Eosinophils Absolute 0 02 0 00 - 0 61 Thousand/µL    Basophils Absolute 0 01 0 00 - 0 10 Thousands/µL   Comprehensive metabolic panel    Collection Time: 18  2:11 PM   Result Value Ref Range    Sodium 136 136 - 145 mmol/L    Potassium 3 8 3 5 - 5 3 mmol/L    Chloride 105 100 - 108 mmol/L    CO2 24 21 - 32 mmol/L    Anion Gap 7 4 - 13 mmol/L    BUN 4 (L) 5 - 25 mg/dL    Creatinine 0 53 (L) 0 60 - 1 30 mg/dL    Glucose 86 65 - 140 mg/dL    Calcium 8 5 8 3 - 10 1 mg/dL    AST 8 5 - 45 U/L    ALT 11 (L) 12 - 78 U/L    Alkaline Phosphatase 63 46 - 116 U/L    Total Protein 7 0 6 4 - 8 2 g/dL    Albumin 2 9 (L) 3 5 - 5 0 g/dL    Total Bilirubin 0 57 0 20 - 1 00 mg/dL    eGFR 153 ml/min/1 73sq m   Lipase    Collection Time: 18  2:11 PM   Result Value Ref Range    Lipase 88 73 - 393 u/L       Imaging:  TVUS  Vertex presentation  CL 3 5-3 54 cm  No funnel or dynamic change  No placenta previa noted    Lab, Imaging and other studies: I have personally reviewed pertinent reports      A/P:  51-year-old  001 at 20 weeks 3 days presenting with epigastric pain, N/V and diarrhea  Vital signs stable, patient is afebrile  Presenting symptoms most likely consistent with gastritis or gastroenteritis    1) Rule out  labor:  Ruled out with normal cervical length and negative infectious workup  2) CBC, CMP, lipase  3) 1 L LR IV fluid bolus  4) Protonix 40 mg IV  5) Zofran 4 mg IV    D/w Dr Jazlyn Sandoval and Dr Mcarthur Began  Reassessment at 1630:  Patient feeling much better now and is no longer in pain or having nausea  No contractions witnessed on tocometer during stay here  She is stating she is hungry  Labs wnl and blood pressures wnl during stay  Will start on Protonix 40mg PO daily for 2 weeks  Will DC home with  labor precautions and to follow up in clinic on Tuesday for her routine appointment      D/W Dr Natalya Sarabia MD  2018  4:37 PM

## 2018-04-23 ENCOUNTER — ROUTINE PRENATAL (OUTPATIENT)
Dept: PERINATAL CARE | Facility: CLINIC | Age: 28
End: 2018-04-23
Payer: COMMERCIAL

## 2018-04-23 VITALS
DIASTOLIC BLOOD PRESSURE: 71 MMHG | WEIGHT: 158.4 LBS | BODY MASS INDEX: 27.04 KG/M2 | HEART RATE: 81 BPM | HEIGHT: 64 IN | SYSTOLIC BLOOD PRESSURE: 101 MMHG

## 2018-04-23 DIAGNOSIS — Z36.86 ENCOUNTER FOR ANTENATAL SCREENING FOR CERVICAL LENGTH: ICD-10-CM

## 2018-04-23 DIAGNOSIS — E05.90 SUBCLINICAL HYPERTHYROIDISM: ICD-10-CM

## 2018-04-23 DIAGNOSIS — Z86.2 HX OF SICKLE CELL TRAIT: ICD-10-CM

## 2018-04-23 DIAGNOSIS — R10.13 DYSPEPSIA: ICD-10-CM

## 2018-04-23 DIAGNOSIS — O09.299 HX OF PREECLAMPSIA, PRIOR PREGNANCY, CURRENTLY PREGNANT: ICD-10-CM

## 2018-04-23 DIAGNOSIS — Z36.3 ENCOUNTER FOR ANTENATAL SCREENING FOR MALFORMATIONS: Primary | ICD-10-CM

## 2018-04-23 DIAGNOSIS — Z3A.20 20 WEEKS GESTATION OF PREGNANCY: ICD-10-CM

## 2018-04-23 PROCEDURE — 76811 OB US DETAILED SNGL FETUS: CPT | Performed by: OBSTETRICS & GYNECOLOGY

## 2018-04-23 PROCEDURE — 76817 TRANSVAGINAL US OBSTETRIC: CPT | Performed by: OBSTETRICS & GYNECOLOGY

## 2018-04-23 PROCEDURE — 99212 OFFICE O/P EST SF 10 MIN: CPT | Performed by: OBSTETRICS & GYNECOLOGY

## 2018-04-23 NOTE — PROGRESS NOTES
[address]: Ms Hyacinth Castro was seen today at 20w4d for anatomic survey and cervical length screening ultrasound  See ultrasound report under "OB Procedures" tab  Please don't hesitate to contact our office with any concerns or questions    Brenda Marie MD

## 2018-04-23 NOTE — PATIENT INSTRUCTIONS
Thank you for choosing [facility] for your  care today  If you have any questions about your ultrasound or care, please do not hesitate to contact us or your primary obstetrician  Please return in 8 weeks for your next ultrasound to check on the fetal growth  Please start taking the proton pump inhibitor that was prescribed to you yesterday  Please also do not resume taking aspirin until we have a better assessment of what's going on with your dyspepsia and burning  I have given you a referral for Gastroenterology for you to schedule in the event that your discomfort gets worse or does not resolve with our conservative methods

## 2018-04-24 ENCOUNTER — ROUTINE PRENATAL (OUTPATIENT)
Dept: OBGYN CLINIC | Facility: HOSPITAL | Age: 28
End: 2018-04-24
Payer: COMMERCIAL

## 2018-04-24 VITALS
HEIGHT: 65 IN | BODY MASS INDEX: 26.46 KG/M2 | WEIGHT: 158.8 LBS | DIASTOLIC BLOOD PRESSURE: 58 MMHG | HEART RATE: 90 BPM | SYSTOLIC BLOOD PRESSURE: 103 MMHG

## 2018-04-24 DIAGNOSIS — O09.299 HX OF PREECLAMPSIA, PRIOR PREGNANCY, CURRENTLY PREGNANT: ICD-10-CM

## 2018-04-24 DIAGNOSIS — Z86.2 HX OF SICKLE CELL TRAIT: ICD-10-CM

## 2018-04-24 DIAGNOSIS — Z3A.20 20 WEEKS GESTATION OF PREGNANCY: ICD-10-CM

## 2018-04-24 DIAGNOSIS — E05.90 SUBCLINICAL HYPERTHYROIDISM: ICD-10-CM

## 2018-04-24 DIAGNOSIS — Z34.92 PRENATAL CARE IN SECOND TRIMESTER: Primary | ICD-10-CM

## 2018-04-24 LAB
SL AMB  POCT GLUCOSE, UA: NEGATIVE
SL AMB POCT URINE PROTEIN: NEGATIVE

## 2018-04-24 PROCEDURE — 99213 OFFICE O/P EST LOW 20 MIN: CPT | Performed by: OBSTETRICS & GYNECOLOGY

## 2018-04-24 PROCEDURE — 81002 URINALYSIS NONAUTO W/O SCOPE: CPT | Performed by: OBSTETRICS & GYNECOLOGY

## 2018-04-24 NOTE — PROGRESS NOTES
Pt familiar to me from office yesterday, symptoms improved on PPI just started yesterday, for return in 2 weeks to reevaluate symtoms    Devang Castro MD

## 2018-04-24 NOTE — PROGRESS NOTES
Pt reports mild epigastric burning kind of pain for which she was seen in OB Triage 2 days ago and prescribed protonix which she has started  No LOF, no vb, no contractions, positive fetal movement    FHR: 148 bpm  FH: 21cm    25 yo  here for routine 20w5d PN care  Hx of c/s, wants to go for repeat c/s  - pt to continue with protonix for epigastric pain, also stopped aspirin - symptoms improving, re-eval in 2 weeks  - Hx of sublinical hyperthyroidism: to get TSH with 28 week labs  - was seen at [facility] yesterday, next apt is 5/2 for growth re-eval 2/2 risk factors of preeclampsia in prior pregnancy  - next PN visit in 2 weeks

## 2018-05-08 ENCOUNTER — ROUTINE PRENATAL (OUTPATIENT)
Dept: OBGYN CLINIC | Facility: HOSPITAL | Age: 28
End: 2018-05-08
Payer: COMMERCIAL

## 2018-05-08 VITALS
SYSTOLIC BLOOD PRESSURE: 114 MMHG | WEIGHT: 164.6 LBS | BODY MASS INDEX: 28.1 KG/M2 | HEART RATE: 82 BPM | HEIGHT: 64 IN | DIASTOLIC BLOOD PRESSURE: 76 MMHG

## 2018-05-08 DIAGNOSIS — Z34.92 PRENATAL CARE IN SECOND TRIMESTER: ICD-10-CM

## 2018-05-08 DIAGNOSIS — Z86.2 HX OF SICKLE CELL TRAIT: ICD-10-CM

## 2018-05-08 DIAGNOSIS — E05.90 SUBCLINICAL HYPERTHYROIDISM: ICD-10-CM

## 2018-05-08 DIAGNOSIS — Z3A.22 22 WEEKS GESTATION OF PREGNANCY: Primary | ICD-10-CM

## 2018-05-08 DIAGNOSIS — O09.299 HX OF PREECLAMPSIA, PRIOR PREGNANCY, CURRENTLY PREGNANT: ICD-10-CM

## 2018-05-08 PROBLEM — R10.9 ABDOMINAL PAIN: Status: RESOLVED | Noted: 2018-04-22 | Resolved: 2018-05-08

## 2018-05-08 LAB
SL AMB  POCT GLUCOSE, UA: NEGATIVE
SL AMB POCT URINE PROTEIN: NEGATIVE

## 2018-05-08 PROCEDURE — 81002 URINALYSIS NONAUTO W/O SCOPE: CPT | Performed by: OBSTETRICS & GYNECOLOGY

## 2018-05-08 PROCEDURE — 99213 OFFICE O/P EST LOW 20 MIN: CPT | Performed by: OBSTETRICS & GYNECOLOGY

## 2018-05-08 NOTE — PROGRESS NOTES
Assessment & Plan  24 y o  Lonia Henry at 22w5d presenting for routine prenatal visit  1  IUP at 22w5d   -continue prenatal vitamins   -continue routine prenatal care   -will provided 28 week lab slip at next visit  2  Acid reflux   -epigastric discomfort resolved   -no longer taking protonix  3  Subclinical hyperthyroidism   -Will check TSH at next visit with 28 week lab  4  Hx of prior Csection   -for nonreassuring fetal heart tones   -prior baby was macrosomic   -desires repeat csection  5  History of Preeclampsia   -Bp is 114/76   -asymptomatic  6  History of sickle cell trait   -stable  7  RTO in four week     Problem List Items Addressed This Visit        Endocrine    Subclinical hyperthyroidism       Other     delivery delivered    Hx of preeclampsia, prior pregnancy, currently pregnant    22 weeks gestation of pregnancy    Hx of sickle cell trait      Other Visit Diagnoses     Prenatal care in second trimester    -  Primary    Relevant Orders    POCT urine dip (Completed)        ____________________________________________________________  Subjective  She is without complaint  She denies contractions, loss of fluid, or vaginal bleeding  She feels regular fetal movements       Objective  /76 (BP Location: Right arm, Patient Position: Sitting, Cuff Size: Standard)   Pulse 82   Ht 5' 4" (1 626 m)   Wt 74 7 kg (164 lb 9 6 oz)   LMP 2017 (Approximate)   BMI 28 25 kg/m²   FHR: 150bpm    Patient's Active Problem List  Patient Active Problem List   Diagnosis    Labial cyst     delivery delivered    Hx of preeclampsia, prior pregnancy, currently pregnant    Subclinical hyperthyroidism    22 weeks gestation of pregnancy    Hx of sickle cell trait    Abdominal pain

## 2018-06-05 ENCOUNTER — ROUTINE PRENATAL (OUTPATIENT)
Dept: OBGYN CLINIC | Facility: HOSPITAL | Age: 28
End: 2018-06-05
Payer: COMMERCIAL

## 2018-06-05 VITALS
BODY MASS INDEX: 30.05 KG/M2 | SYSTOLIC BLOOD PRESSURE: 112 MMHG | WEIGHT: 176 LBS | HEIGHT: 64 IN | HEART RATE: 82 BPM | DIASTOLIC BLOOD PRESSURE: 69 MMHG

## 2018-06-05 DIAGNOSIS — Z3A.26 26 WEEKS GESTATION OF PREGNANCY: ICD-10-CM

## 2018-06-05 DIAGNOSIS — Z34.92 PRENATAL CARE IN SECOND TRIMESTER: Primary | ICD-10-CM

## 2018-06-05 PROCEDURE — 99213 OFFICE O/P EST LOW 20 MIN: CPT | Performed by: NURSE PRACTITIONER

## 2018-06-05 NOTE — PATIENT INSTRUCTIONS
Fetal Movement   WHAT YOU NEED TO KNOW:   Fetal movements are the kicks, rolls, and hiccups of your unborn baby  You may start to feel these movements when you are 20 weeks pregnant  The movements grow stronger and more frequent as your baby grows  Fetal movements show that your unborn baby is getting the oxygen and nutrients he needs before birth  Fewer fetal movements may signal a problem with your baby's health  DISCHARGE INSTRUCTIONS:   Follow up with your healthcare provider or obstetrician as directed:  Write down your questions so you remember to ask them during your visits  Normal fetal movement:  Fetal activity can be described by 4 states, from least to most active  During quiet sleep, your unborn baby may be still for up to 2 hours  During active sleep, he kicks, rolls, and moves often  During the quiet awake state, he may only move his eyes  The active awake state includes strong kicks and rolls  What affects fetal movement:  You may feel your baby move more after you eat, or after you drink caffeine  You may feel your baby move less while you are more active, such as when you exercise  You may also feel fewer movements if you are obese  Certain medicines can change your baby's movements  Tell your healthcare provider about the medicines you are taking  Track fetal movements at home:  Fetal movement is most often felt when you lie quietly on your side  Your healthcare provider may ask you to count movements for 2 hours  He may ask you to track how long it takes for your baby to move 10 times  Keep a log of your baby's movements  Contact your healthcare provider or obstetrician if:   · It takes longer than usual to feel 10 of your unborn baby's movements  · You do not feel your unborn baby move at least 10 times in 2 hours  · The skin on your hands, feet, and around your eyes is more swollen than usual      · You have a headache for at least 24 hours  · Tiny red dots appear on your skin  · Your belly is tender when you press on it  · You have questions or concerns about your condition or care  Return to the emergency department if:   · You do not feel your unborn baby move for 12 hours  · You feel cramping or constant pain in your abdomen  · You have heavy bleeding from your vagina  · You have a severe headache and cannot see clearly  · You are having trouble breathing or are vomiting  · You have a seizure  © 2017 [employer]  Information is for End User's use only and may not be sold, redistributed or otherwise used for commercial purposes  All illustrations and images included in CareNotes® are the copyrighted property of A D [employer] M [employer]  or Jae Ortiz  The above information is an  only  It is not intended as medical advice for individual conditions or treatments  Talk to your doctor, nurse or pharmacist before following any medical regimen to see if it is safe and effective for you  Pregnancy at 32 to 30 [address]:   What changes are happening in my body? You may notice new symptoms such as shortness of breath, heartburn, or swelling of your ankles and feet  You may also have trouble sleeping or contractions  How do I care for myself at this stage of my pregnancy? · Eat a variety of healthy foods  Healthy foods include fruits, vegetables, whole-grain breads, low-fat dairy foods, beans, lean meats, and fish  Drink liquids as directed  Ask how much liquid to drink each day and which liquids are best for you  Limit caffeine to less than 200 milligrams each day  Limit your intake of fish to 2 servings each week  Choose fish low in mercury such as canned light tuna, shrimp, salmon, cod, or tilapia  Do not  eat fish high in mercury such as swordfish, tilefish, douglas mackerel, and shark  · Manage heartburn  by eating 4 or 5 small meals each day instead of large meals  Avoid spicy food       · Manage swelling  by lying down and putting your feet up  · Take prenatal vitamins as directed  Your need for certain vitamins and minerals, such as folic acid, increases during pregnancy  Prenatal vitamins provide some of the extra vitamins and minerals you need  Prenatal vitamins may also help to decrease the risk of certain birth defects  · Talk to your healthcare provider about exercise  Moderate exercise can help you stay fit  Your healthcare provider will help you plan an exercise program that is safe for you during pregnancy  · Do not smoke  If you smoke, it is never too late to quit  Smoking increases your risk of a miscarriage and other health problems during your pregnancy  Smoking can cause your baby to be born too early or weigh less at birth  Ask your healthcare provider for information if you need help quitting  · Do not drink alcohol  Alcohol passes from your body to your baby through the placenta  It can affect your baby's brain development and cause fetal alcohol syndrome (FAS)  FAS is a group of conditions that causes mental, behavior, and growth problems  · Talk to your healthcare provider before you take any medicines  Many medicines may harm your baby if you take them when you are pregnant  Do not take any medicines, vitamins, herbs, or supplements without first talking to your healthcare provider  Never use illegal or street drugs (such as marijuana or cocaine) while you are pregnant  What are some safety tips during pregnancy? · Avoid hot tubs and saunas  Do not use a hot tub or sauna while you are pregnant, especially during your first trimester  Hot tubs and saunas may raise your baby's temperature and increase the risk of birth defects  · Avoid toxoplasmosis  This is an infection caused by eating raw meat or being around infected cat feces  It can cause birth defects, miscarriages, and other problems  Wash your hands after you touch raw meat   Make sure any meat is well-cooked before you eat it  Avoid raw eggs and unpasteurized milk  Use gloves or ask someone else to clean your cat's litter box while you are pregnant  What changes are happening with my baby? By 30 weeks, your baby may weigh more than 3 pounds  Your baby may be about 11 inches long from the top of the head to the rump (baby's bottom)  Your baby's eyes open and close now  Your baby's kicks and movements are more forceful at this time  What do I need to know about prenatal care? Your healthcare provider will check your blood pressure and weight  You may also need the following:  · Blood tests  may be done to check for anemia or blood type  · A urine test  may also be done to check for sugar and protein  These can be signs of gestational diabetes or infection  Protein in your urine may also be a sign of preeclampsia  Preeclampsia is a condition that can develop during week 20 or later of your pregnancy  It causes high blood pressure, and it can cause problems with your kidneys and other organs  · A Tdap vaccine and flu vaccine  may be recommended by your healthcare provider  · A gestational diabetes screen  will be done using an oral glucose tolerance test (OGTT)  An OGTT starts with a blood sugar level check after you have not eaten for 8 hours  You are then given a glucose drink  Your blood sugar level is checked after 1 hour, 2 hours, and sometimes 3 hours  Healthcare providers look at how much your blood sugar level increases from the first check  · Fundal height  is a measurement of your uterus to check your baby's growth  This number is usually the same as the number of weeks that you have been pregnant  Your healthcare provider may also check your baby's position  · Your baby's heart rate  will be checked  When should I seek immediate care? · You develop a severe headache that does not go away      · You have new or increased vision changes, such as blurred or spotted vision  · You have new or increased swelling in your face or hands  · You have vaginal spotting or bleeding  · Your water broke or you feel warm water gushing or trickling from your vagina  When should I contact my healthcare provider? · You have more than 5 contractions in 1 hour  · You notice any changes in your baby's movements  · You have abdominal cramps, pressure, or tightening  · You have a change in vaginal discharge  · You have chills or a fever  · You have vaginal itching, burning, or pain  · You have yellow, green, white, or foul-smelling vaginal discharge  · You have pain or burning when you urinate, less urine than usual, or pink or bloody urine  · You have questions or concerns about your condition or care  CARE AGREEMENT:   You have the right to help plan your care  Learn about your health condition and how it may be treated  Discuss treatment options with your caregivers to decide what care you want to receive  You always have the right to refuse treatment  The above information is an  only  It is not intended as medical advice for individual conditions or treatments  Talk to your doctor, nurse or pharmacist before following any medical regimen to see if it is safe and effective for you  © 2017 [employer] Information is for End User's use only and may not be sold, redistributed or otherwise used for commercial purposes  All illustrations and images included in CareNotes® are the copyrighted property of A D A M , Inc  or Jae Ortiz

## 2018-06-05 NOTE — PROGRESS NOTES
Denies loss of fluid, vaginal bleeding and abdominal pain  Confirms frequent fetal movement  Tolerating prenatal vitamin well  Has heartburn today  States she believes this is related to orange juice intake last night and this morning  Patient with a history of preeclampsia will order baseline labs today  Patient with a history of hyper thyroid will add TSH with reflex T4 today  Patient sickle cell trait carrier:  Reviewed with patient partner should be tested offered lab screening  Patient believes partner has been tested and will review with him prior to having test ordered  Will follow up at next visit  No concerns today   patient plans include repeat , circumcision for infant, formula feeding and oral  Contraceptive pills  Reviewed benefits of breast feeding with patient  plan  1  Continue  Prenatal vitamin daily  2  28 week labs provided with addition of TSH, CMP and protein creatinine ratio  Patient blood type is O-positive, does not need RhoGAM  3  Acid reflux- reviewed with patient to avoid trigger foods Tums as needed  4  Follow-up regarding  FOB sickle cell status  5  Has  Center growth scan scheduled for 30-32 weeks  6   Common discomforts of pregnancy and precautions including  labor reviewed

## 2018-06-14 ENCOUNTER — APPOINTMENT (OUTPATIENT)
Dept: LAB | Facility: HOSPITAL | Age: 28
End: 2018-06-14
Payer: COMMERCIAL

## 2018-06-14 DIAGNOSIS — R73.09 ABNORMAL GTT (GLUCOSE TOLERANCE TEST): Primary | ICD-10-CM

## 2018-06-14 DIAGNOSIS — Z34.92 PRENATAL CARE IN SECOND TRIMESTER: ICD-10-CM

## 2018-06-14 LAB
ALBUMIN SERPL BCP-MCNC: 2.7 G/DL (ref 3.5–5)
ALP SERPL-CCNC: 87 U/L (ref 46–116)
ALT SERPL W P-5'-P-CCNC: 12 U/L (ref 12–78)
ANION GAP SERPL CALCULATED.3IONS-SCNC: 12 MMOL/L (ref 4–13)
AST SERPL W P-5'-P-CCNC: 7 U/L (ref 5–45)
BASOPHILS # BLD AUTO: 0.01 THOUSANDS/ΜL (ref 0–0.1)
BASOPHILS NFR BLD AUTO: 0 % (ref 0–1)
BILIRUB SERPL-MCNC: 0.4 MG/DL (ref 0.2–1)
BUN SERPL-MCNC: 5 MG/DL (ref 5–25)
CALCIUM SERPL-MCNC: 8.2 MG/DL (ref 8.3–10.1)
CHLORIDE SERPL-SCNC: 103 MMOL/L (ref 100–108)
CO2 SERPL-SCNC: 22 MMOL/L (ref 21–32)
CREAT SERPL-MCNC: 0.63 MG/DL (ref 0.6–1.3)
CREAT UR-MCNC: 79.2 MG/DL
EOSINOPHIL # BLD AUTO: 0.04 THOUSAND/ΜL (ref 0–0.61)
EOSINOPHIL NFR BLD AUTO: 0 % (ref 0–6)
ERYTHROCYTE [DISTWIDTH] IN BLOOD BY AUTOMATED COUNT: 11.8 % (ref 11.6–15.1)
GFR SERPL CREATININE-BSD FRML MDRD: 144 ML/MIN/1.73SQ M
GLUCOSE 1H P 50 G GLC PO SERPL-MCNC: 162 MG/DL
GLUCOSE SERPL-MCNC: 156 MG/DL (ref 65–140)
HCT VFR BLD AUTO: 35.3 % (ref 34.8–46.1)
HGB BLD-MCNC: 12.4 G/DL (ref 11.5–15.4)
IMM GRANULOCYTES # BLD AUTO: 0.12 THOUSAND/UL (ref 0–0.2)
IMM GRANULOCYTES NFR BLD AUTO: 1 % (ref 0–2)
LYMPHOCYTES # BLD AUTO: 1.66 THOUSANDS/ΜL (ref 0.6–4.47)
LYMPHOCYTES NFR BLD AUTO: 17 % (ref 14–44)
MCH RBC QN AUTO: 31.2 PG (ref 26.8–34.3)
MCHC RBC AUTO-ENTMCNC: 35.1 G/DL (ref 31.4–37.4)
MCV RBC AUTO: 89 FL (ref 82–98)
MONOCYTES # BLD AUTO: 0.59 THOUSAND/ΜL (ref 0.17–1.22)
MONOCYTES NFR BLD AUTO: 6 % (ref 4–12)
NEUTROPHILS # BLD AUTO: 7.34 THOUSANDS/ΜL (ref 1.85–7.62)
NEUTS SEG NFR BLD AUTO: 76 % (ref 43–75)
NRBC BLD AUTO-RTO: 0 /100 WBCS
PLATELET # BLD AUTO: 221 THOUSANDS/UL (ref 149–390)
PMV BLD AUTO: 9.8 FL (ref 8.9–12.7)
POTASSIUM SERPL-SCNC: 3.5 MMOL/L (ref 3.5–5.3)
PROT SERPL-MCNC: 7 G/DL (ref 6.4–8.2)
PROT UR-MCNC: 9 MG/DL
PROT/CREAT UR: 0.11 MG/G{CREAT} (ref 0–0.1)
RBC # BLD AUTO: 3.97 MILLION/UL (ref 3.81–5.12)
SODIUM SERPL-SCNC: 137 MMOL/L (ref 136–145)
TSH SERPL DL<=0.05 MIU/L-ACNC: 0.87 UIU/ML (ref 0.36–3.74)
WBC # BLD AUTO: 9.76 THOUSAND/UL (ref 4.31–10.16)

## 2018-06-14 PROCEDURE — 80053 COMPREHEN METABOLIC PANEL: CPT

## 2018-06-14 PROCEDURE — 82950 GLUCOSE TEST: CPT

## 2018-06-14 PROCEDURE — 84443 ASSAY THYROID STIM HORMONE: CPT

## 2018-06-14 PROCEDURE — 86592 SYPHILIS TEST NON-TREP QUAL: CPT

## 2018-06-14 PROCEDURE — 82570 ASSAY OF URINE CREATININE: CPT

## 2018-06-14 PROCEDURE — 85025 COMPLETE CBC W/AUTO DIFF WBC: CPT

## 2018-06-14 PROCEDURE — 84156 ASSAY OF PROTEIN URINE: CPT

## 2018-06-15 ENCOUNTER — TELEPHONE (OUTPATIENT)
Dept: OBGYN CLINIC | Facility: HOSPITAL | Age: 28
End: 2018-06-15

## 2018-06-15 LAB — RPR SER QL: NORMAL

## 2018-06-15 NOTE — TELEPHONE ENCOUNTER
One hour glucose results reviewed with patient  Patient instructed on need for 3 hour glucose test   Patient verbalizes understanding

## 2018-06-18 ENCOUNTER — ULTRASOUND (OUTPATIENT)
Dept: PERINATAL CARE | Facility: CLINIC | Age: 28
End: 2018-06-18
Payer: COMMERCIAL

## 2018-06-18 ENCOUNTER — APPOINTMENT (OUTPATIENT)
Dept: LAB | Facility: HOSPITAL | Age: 28
End: 2018-06-18
Payer: COMMERCIAL

## 2018-06-18 VITALS
HEART RATE: 86 BPM | WEIGHT: 180.4 LBS | HEIGHT: 64 IN | DIASTOLIC BLOOD PRESSURE: 71 MMHG | SYSTOLIC BLOOD PRESSURE: 112 MMHG | BODY MASS INDEX: 30.8 KG/M2

## 2018-06-18 DIAGNOSIS — O09.299 HX OF PREECLAMPSIA, PRIOR PREGNANCY, CURRENTLY PREGNANT: Primary | ICD-10-CM

## 2018-06-18 DIAGNOSIS — Z3A.28 28 WEEKS GESTATION OF PREGNANCY: ICD-10-CM

## 2018-06-18 DIAGNOSIS — R73.09 ABNORMAL GTT (GLUCOSE TOLERANCE TEST): ICD-10-CM

## 2018-06-18 LAB
GLUCOSE 1H P 100 G GLC PO SERPL-MCNC: 106 MG/DL (ref 65–179)
GLUCOSE 2H P 100 G GLC PO SERPL-MCNC: 87 MG/DL (ref 65–154)
GLUCOSE 3H P 100 G GLC PO SERPL-MCNC: 56 MG/DL (ref 40–500)
GLUCOSE P FAST SERPL-MCNC: 91 MG/DL (ref 65–99)

## 2018-06-18 PROCEDURE — 82951 GLUCOSE TOLERANCE TEST (GTT): CPT

## 2018-06-18 PROCEDURE — 76816 OB US FOLLOW-UP PER FETUS: CPT | Performed by: OBSTETRICS & GYNECOLOGY

## 2018-06-18 PROCEDURE — 82952 GTT-ADDED SAMPLES: CPT

## 2018-06-18 PROCEDURE — 99212 OFFICE O/P EST SF 10 MIN: CPT | Performed by: OBSTETRICS & GYNECOLOGY

## 2018-06-18 PROCEDURE — 36415 COLL VENOUS BLD VENIPUNCTURE: CPT

## 2018-06-18 NOTE — PROGRESS NOTES
Please refer to the [facility] ultrasound report in Ob Procedures for additional information regarding the visit to the [facility]  today

## 2018-06-18 NOTE — LETTER
June 18, 2018     Renan Velazquez PROVIDER    Patient: Olu Silverio   YOB: 1990   Date of Visit: 6/18/2018       Dear   Provider: Thank you for referring Olu Silverio to me for evaluation  Below are my notes for this consultation  If you have questions, please do not hesitate to call me  I look forward to following your patient along with you  Sincerely,        Tasia Case MD        CC: No Recipients  Tasia Case MD  6/18/2018 10:15 AM  Sign at close encounter  Please refer to the [facility] ultrasound report in Ob Procedures for additional information regarding the [facility] the [facility]  today

## 2018-06-27 ENCOUNTER — ROUTINE PRENATAL (OUTPATIENT)
Dept: OBGYN CLINIC | Facility: HOSPITAL | Age: 28
End: 2018-06-27
Payer: COMMERCIAL

## 2018-06-27 VITALS
BODY MASS INDEX: 30.97 KG/M2 | HEART RATE: 96 BPM | SYSTOLIC BLOOD PRESSURE: 120 MMHG | HEIGHT: 64 IN | WEIGHT: 181.4 LBS | DIASTOLIC BLOOD PRESSURE: 88 MMHG

## 2018-06-27 DIAGNOSIS — Z3A.29 29 WEEKS GESTATION OF PREGNANCY: ICD-10-CM

## 2018-06-27 DIAGNOSIS — Z23 NEED FOR TDAP VACCINATION: Primary | ICD-10-CM

## 2018-06-27 PROBLEM — L73.2 SUPPURATIVE HIDRADENITIS: Status: ACTIVE | Noted: 2017-10-31

## 2018-06-27 PROBLEM — G43.009 COMMON MIGRAINE WITHOUT AURA: Status: ACTIVE | Noted: 2017-09-27

## 2018-06-27 PROBLEM — G43.009 COMMON MIGRAINE WITHOUT AURA: Status: RESOLVED | Noted: 2017-09-27 | Resolved: 2018-06-27

## 2018-06-27 PROCEDURE — 90471 IMMUNIZATION ADMIN: CPT | Performed by: OBSTETRICS & GYNECOLOGY

## 2018-06-27 PROCEDURE — 90715 TDAP VACCINE 7 YRS/> IM: CPT | Performed by: OBSTETRICS & GYNECOLOGY

## 2018-06-27 PROCEDURE — 99213 OFFICE O/P EST LOW 20 MIN: CPT | Performed by: OBSTETRICS & GYNECOLOGY

## 2018-06-27 NOTE — PATIENT INSTRUCTIONS
If you are experiencing painful contractions, loss of fluids, vaginal bleeding, or decreased fetal movement, please call our office at [phone] during business hours  If our office is closed, call [phone] and ask to speak to [facility] doctor on call  Pregnancy at 32 to 30 Weeks   AMBULATORY CARE:   What changes are happening to your body: You may notice new symptoms such as shortness of breath, heartburn, or swelling of your ankles and feet  You may also have trouble sleeping or contractions  Seek care immediately if:   · You develop a severe headache that does not go away  · You have new or increased vision changes, such as blurred or spotted vision  · You have new or increased swelling in your face or hands  · You have vaginal spotting or bleeding  · Your water broke or you feel warm water gushing or trickling from your vagina  Contact your healthcare provider if:   · You have more than 5 contractions in 1 hour  · You notice any changes in your baby's movements  · You have abdominal cramps, pressure, or tightening  · You have a change in vaginal discharge  · You have chills or a fever  · You have vaginal itching, burning, or pain  · You have yellow, green, white, or foul-smelling vaginal discharge  · You have pain or burning when you urinate, less urine than usual, or pink or bloody urine  · You have questions or concerns about your condition or care  How to care for yourself at this stage of your pregnancy:   · Eat a variety of healthy foods  Healthy foods include fruits, vegetables, whole-grain breads, low-fat dairy foods, beans, lean meats, and fish  Drink liquids as directed  Ask how much liquid to drink each day and which liquids are best for you  Limit caffeine to less than 200 milligrams each day  Limit your intake of fish to 2 servings each week  Choose fish low in mercury such as canned light tuna, shrimp, salmon, cod, or tilapia   Do not  eat fish high in mercury such as swordfish, tilefish, douglas mackerel, and shark  · Manage heartburn  by eating 4 or 5 small meals each day instead of large meals  Avoid spicy food  · Manage swelling  by lying down and putting your feet up  · Take prenatal vitamins as directed  Your need for certain vitamins and minerals, such as folic acid, increases during pregnancy  Prenatal vitamins provide some of the extra vitamins and minerals you need  Prenatal vitamins may also help to decrease the risk of certain birth defects  · Talk to your healthcare provider about exercise  Moderate exercise can help you stay fit  Your healthcare provider will help you plan an exercise program that is safe for you during pregnancy  · Do not smoke  If you smoke, it is never too late to quit  Smoking increases your risk of a miscarriage and other health problems during your pregnancy  Smoking can cause your baby to be born too early or weigh less at birth  Ask your healthcare provider for information if you need help quitting  · Do not drink alcohol  Alcohol passes from your body to your baby through the placenta  It can affect your baby's brain development and cause fetal alcohol syndrome (FAS)  FAS is a group of conditions that causes mental, behavior, and growth problems  · Talk to your healthcare provider before you take any medicines  Many medicines may harm your baby if you take them when you are pregnant  Do not take any medicines, vitamins, herbs, or supplements without first talking to your healthcare provider  Never use illegal or street drugs (such as marijuana or cocaine) while you are pregnant  Safety tips during pregnancy:   · Avoid hot tubs and saunas  Do not use a hot tub or sauna while you are pregnant, especially during your first trimester  Hot tubs and saunas may raise your baby's temperature and increase the risk of birth defects  · Avoid toxoplasmosis    This is an infection caused by eating raw meat or being around infected cat feces  It can cause birth defects, miscarriages, and other problems  Wash your hands after you touch raw meat  Make sure any meat is well-cooked before you eat it  Avoid raw eggs and unpasteurized milk  Use gloves or ask someone else to clean your cat's litter box while you are pregnant  Changes that are happening with your baby:  By 30 weeks, your baby may weigh more than 3 pounds  Your baby may be about 11 inches long from the top of the head to the rump (baby's bottom)  Your baby's eyes open and close now  Your baby's kicks and movements are more forceful at this time  What you need to know about prenatal care: Your healthcare provider will check your blood pressure and weight  You may also need the following:  · Blood tests  may be done to check for anemia or blood type  · A urine test  may also be done to check for sugar and protein  These can be signs of gestational diabetes or infection  Protein in your urine may also be a sign of preeclampsia  Preeclampsia is a condition that can develop during week 20 or later of your pregnancy  It causes high blood pressure, and it can cause problems with your kidneys and other organs  · A Tdap vaccine and flu vaccine  may be recommended by your healthcare provider  · A gestational diabetes screen  will be done using an oral glucose tolerance test (OGTT)  An OGTT starts with a blood sugar level check after you have not eaten for 8 hours  You are then given a glucose drink  Your blood sugar level is checked after 1 hour, 2 hours, and sometimes 3 hours  Healthcare providers look at how much your blood sugar level increases from the first check  · Fundal height  is a measurement of your uterus to check your baby's growth  This number is usually the same as the number of weeks that you have been pregnant  Your healthcare provider may also check your baby's position  · Your baby's heart rate  will be checked    © 2017 [address] [facility] Information is for End User's use only and may not be sold, redistributed or otherwise used for commercial purposes  All illustrations and images included in CareNotes® are the copyrighted property of A D A M , Inc  or Jae Ortiz  The above information is an  only  It is not intended as medical advice for individual conditions or treatments  Talk to your doctor, nurse or pharmacist before following any medical regimen to see if it is safe and effective for you

## 2018-06-27 NOTE — PROGRESS NOTES
OB/GYN  PN Visit  Gaurav Travis  [id number]  6/27/2018  4:31 PM  Dr Russ Peguero MD    S: 24 y o  [id number] 29w6d here for PN visit  OB complaints:  Ctxns: Absent  Leakage: Absent  Bleeding: Absent  Fetal movement: Present    She is doing well, no complaints  O:  Vitals:    06/27/18 1602   BP: 120/88   Pulse: 96       Gen: no acute distress, nonlabored breathing  OB exam completed: fundal height 29cm, FHTs 150s    A/P:  #1  29w6d GESTATION  Tdap given today  Blood type O+, no need for rhogam  CBC, CMP, Pr/Cr, TSH WNL  3hr GTT WNL  Next [facility] at 7/30  Labor precautions reviewed  Fetal kick counts reviewed  RTC in 2 weeks    #2  Birth plan  Would like repeat section  Schedule for 39 weeks  Plans on bottlefeeding  Would like micronor for contraception, followed by [facility]  OK with circumcision      Russ Peguero MD  6/27/2018  4:31 PM

## 2018-06-27 NOTE — PROGRESS NOTES
1  Prenatal routine visit questions  a  N/V/D: no  b  Constipation: no  c  Headaches: no  d  Vaginal bleeding: no  e  Vaginal discharge: no  f  Leaking of fluid: no  g  Contractions: yes, irregular  h  Fetal movement: Kick counts reviewed     i  Edema: no

## 2018-06-27 NOTE — LETTER
Linette Ibarra is a patient and under our care in our office  Brynn Maciel Estimated Date of Delivery: 9/6/18  Any questions or concerns feel free to contact our office  Thank you,    [facility]  [address] [facility], [address]  [phone]      [address] [place] [address]/[address]  [place]  [phone]     [facility]   [phone]      [facility]  [phone]     [place]   [address] [facility]

## 2018-07-11 ENCOUNTER — ROUTINE PRENATAL (OUTPATIENT)
Dept: OBGYN CLINIC | Facility: HOSPITAL | Age: 28
End: 2018-07-11
Payer: COMMERCIAL

## 2018-07-11 VITALS — DIASTOLIC BLOOD PRESSURE: 80 MMHG | SYSTOLIC BLOOD PRESSURE: 108 MMHG | WEIGHT: 185 LBS | BODY MASS INDEX: 31.76 KG/M2

## 2018-07-11 DIAGNOSIS — O09.299 HX OF PREECLAMPSIA, PRIOR PREGNANCY, CURRENTLY PREGNANT: ICD-10-CM

## 2018-07-11 DIAGNOSIS — E05.90 SUBCLINICAL HYPERTHYROIDISM: ICD-10-CM

## 2018-07-11 DIAGNOSIS — Z3A.31 31 WEEKS GESTATION OF PREGNANCY: ICD-10-CM

## 2018-07-11 DIAGNOSIS — Z86.2 HX OF SICKLE CELL TRAIT: ICD-10-CM

## 2018-07-11 DIAGNOSIS — Z34.92 PRENATAL CARE IN SECOND TRIMESTER: ICD-10-CM

## 2018-07-11 PROCEDURE — 99213 OFFICE O/P EST LOW 20 MIN: CPT | Performed by: OBSTETRICS & GYNECOLOGY

## 2018-07-11 PROCEDURE — 81002 URINALYSIS NONAUTO W/O SCOPE: CPT | Performed by: OBSTETRICS & GYNECOLOGY

## 2018-07-11 NOTE — PATIENT INSTRUCTIONS
Pregnancy at 31 to 34 Weeks   AMBULATORY CARE:   What changes are happening to your body: You may continue to have symptoms such as shortness of breath, heartburn, contractions, or swelling of your ankles and feet  You may be gaining about 1 pound a week now  Seek care immediately if:   · You develop a severe headache that does not go away  · You have new or increased vision changes, such as blurred or spotted vision  · You have new or increased swelling in your face or hands  · You have vaginal spotting or bleeding  · Your water broke or you feel warm water gushing or trickling from your vagina  Contact your healthcare provider if:   · You have more than 5 contractions in 1 hour  · You notice any changes in your baby's movements  · You have abdominal cramps, pressure, or tightening  · You have a change in vaginal discharge  · You have chills or a fever  · You have vaginal itching, burning, or pain  · You have yellow, green, white, or foul-smelling vaginal discharge  · You have pain or burning when you urinate, less urine than usual, or pink or bloody urine  · You have questions or concerns about your condition or care  How to care for yourself at this stage of your pregnancy:   · Eat a variety of healthy foods  Healthy foods include fruits, vegetables, whole-grain breads, low-fat dairy foods, beans, lean meats, and fish  Drink liquids as directed  Ask how much liquid to drink each day and which liquids are best for you  Limit caffeine to less than 200 milligrams each day  Limit your intake of fish to 2 servings each week  Choose fish low in mercury such as canned light tuna, shrimp, salmon, cod, or tilapia  Do not  eat fish high in mercury such as swordfish, tilefish, douglas mackerel, and shark  · Manage heartburn  by eating 4 or 5 small meals each day instead of large meals  Avoid spicy food  · Manage swelling  by lying down and putting your feet up       · Take prenatal vitamins as directed  Your need for certain vitamins and minerals, such as folic acid, increases during pregnancy  Prenatal vitamins provide some of the extra vitamins and minerals you need  Prenatal vitamins may also help to decrease the risk of certain birth defects  · Talk to your healthcare provider about exercise  Moderate exercise can help you stay fit  Your healthcare provider will help you plan an exercise program that is safe for you during pregnancy  · Do not smoke  If you smoke, it is never too late to quit  Smoking increases your risk of a miscarriage and other health problems during your pregnancy  Smoking can cause your baby to be born too early or weigh less at birth  Ask your healthcare provider for information if you need help quitting  · Do not drink alcohol  Alcohol passes from your body to your baby through the placenta  It can affect your baby's brain development and cause fetal alcohol syndrome (FAS)  FAS is a group of conditions that causes mental, behavior, and growth problems  · Talk to your healthcare provider before you take any medicines  Many medicines may harm your baby if you take them when you are pregnant  Do not take any medicines, vitamins, herbs, or supplements without first talking to your healthcare provider  Never use illegal or street drugs (such as marijuana or cocaine) while you are pregnant  Safety tips during pregnancy:   · Avoid hot tubs and saunas  Do not use a hot tub or sauna while you are pregnant, especially during your first trimester  Hot tubs and saunas may raise your baby's temperature and increase the risk of birth defects  · Avoid toxoplasmosis  This is an infection caused by eating raw meat or being around infected cat feces  It can cause birth defects, miscarriages, and other problems  Wash your hands after you touch raw meat  Make sure any meat is well-cooked before you eat it  Avoid raw eggs and unpasteurized milk   Use gloves or ask someone else to clean your cat's litter box while you are pregnant  Changes that are happening with your baby:  By 34 weeks, your baby may weigh more than 5 pounds  Your baby will be about 12 ½ inches long from the top of the head to the rump (baby's bottom)  Your baby is gaining about ½ pound a week  Your baby's eyes open and close now  Your baby's kicks and movements are more forceful at this time  What you need to know about prenatal care: Your healthcare provider will check your blood pressure and weight  You may also need the following:  · A urine test  may also be done to check for sugar and protein  These can be signs of gestational diabetes or infection  Protein in your urine may also be a sign of preeclampsia  Preeclampsia is a condition that can develop during week 20 or later of your pregnancy  It causes high blood pressure, and it can cause problems with your kidneys and other organs  · A Tdap vaccine  may be recommended by your healthcare provider  · Fundal height  is a measurement of your uterus to check your baby's growth  This number is usually the same as the number of weeks that you have been pregnant  Your healthcare provider may also check your baby's position  · Your baby's heart rate  will be checked  © 2017 [employer]  Information is for End User's use only and may not be sold, redistributed or otherwise used for commercial purposes  All illustrations and images included in CareNotes® are the copyrighted property of [employer]  or Jae Ortiz  The above information is an  only  It is not intended as medical advice for individual conditions or treatments  Talk to your doctor, nurse or pharmacist before following any medical regimen to see if it is safe and effective for you

## 2018-07-11 NOTE — PROGRESS NOTES
OB/GYN  PN Visit  Ayaka Ag  [id number]  7/11/2018  5:26 PM  Dr Jai Nix MD    S: 24 y o  Ina Danielle here for PN visit  OB complaints:  Ctxns: Experiences intermittent pelvic cramping and [place] Turner contractions, especially at night  Leakage: Absent  Bleeding: Absent  Fetal movement: Present    She reports that she is doing well       O:  Vitals:    07/11/18 1701   BP: 108/80       Gen: no acute distress, nonlabored breathing  OB exam completed: fundal height 32cm, FHTs 150s    A/P:  #1  31w6d GESTATION  Next PNC growth scan 7/30 - h/o 9lb5oz baby  No changes to birth plan  Labor precautions reviewed  Fetal kick counts reviewed  RTC in 2 weeks    Jai Nix MD  7/11/2018  5:26 PM

## 2018-07-16 ENCOUNTER — DOCUMENTATION (OUTPATIENT)
Dept: OBGYN CLINIC | Facility: HOSPITAL | Age: 28
End: 2018-07-16

## 2018-07-16 NOTE — PROGRESS NOTES
Pt requesting Tdap vaccine records to be faxed to her work  I called pt back, left VM saying that she needs to come to the office to fill out a records release form for us to fax the records

## 2018-07-25 ENCOUNTER — ROUTINE PRENATAL (OUTPATIENT)
Dept: OBGYN CLINIC | Facility: HOSPITAL | Age: 28
End: 2018-07-25
Payer: COMMERCIAL

## 2018-07-25 ENCOUNTER — APPOINTMENT (OUTPATIENT)
Dept: LAB | Facility: HOSPITAL | Age: 28
End: 2018-07-25
Payer: COMMERCIAL

## 2018-07-25 VITALS — BODY MASS INDEX: 32.27 KG/M2 | DIASTOLIC BLOOD PRESSURE: 75 MMHG | SYSTOLIC BLOOD PRESSURE: 114 MMHG | WEIGHT: 188 LBS

## 2018-07-25 DIAGNOSIS — Z13.29 SCREENING FOR HYPOTHYROIDISM: ICD-10-CM

## 2018-07-25 DIAGNOSIS — Z13.29 SCREENING FOR HYPOTHYROIDISM: Primary | ICD-10-CM

## 2018-07-25 LAB — TSH SERPL DL<=0.05 MIU/L-ACNC: 0.47 UIU/ML (ref 0.36–3.74)

## 2018-07-25 PROCEDURE — 99213 OFFICE O/P EST LOW 20 MIN: CPT | Performed by: OBSTETRICS & GYNECOLOGY

## 2018-07-25 PROCEDURE — 84443 ASSAY THYROID STIM HORMONE: CPT

## 2018-07-25 PROCEDURE — 36415 COLL VENOUS BLD VENIPUNCTURE: CPT

## 2018-07-25 NOTE — PROGRESS NOTES
MHR= 96  Wants to know if can get  and when date will be set  1  Prenatal routine visit questions  a  N/V/D: no  b  Constipation: no  c  Headaches: no  d  Vaginal bleeding: no  e  Vaginal discharge: no  f  Leaking of fluid: no  g  Contractions: yes- occasional  h  Fetal movement: Reports good fetal movement, already instructed on fetal kick counts    i  Edema: no

## 2018-07-25 NOTE — PROGRESS NOTES
ASSESSMENT  Danuta Luna is a 24 y o  [place] at 33w6d presenting for routine prenatal visit  PLAN  H/o 1LTCS for spontaneous labor w/arrest of dilation at 7 5 cm w/intrapartum diagnosis of pre-eclampsia w/o SF: schedule RLTCS at next visit, provide pre-op showering + NPO instructions   vaccines: TDaP: administered 6/27/18  Check-in card: previously provided  H/o subclinical hypothyroidism: 0 874 on 6/14/18; lab slip provided for repeat TSH  Migraines: has not had any this pregnancy    ____________________________________________________________    SUBJECTIVE  No complaints ;   Denies domestic violence; She denies contractions, loss of fluid, or vaginal bleeding  She feels regular fetal movements  OBJECTIVE      Vitals:    07/25/18 1419   BP: 114/75   Weight: 85 3 kg (188 lb)       most recent [address]  [facility] ultrasound reviewed  ;   most recent PNV reviewed; Body mass index is 32 27 kg/m²      Prepregnancy Wt BMI Gain Gain/wk   Underweight <18 5 28-40 1   Normal <25 25-31 1   Overweight <30 15-25 0 6   Obese 30+ 11-20 0 5

## 2018-08-02 ENCOUNTER — ULTRASOUND (OUTPATIENT)
Dept: PERINATAL CARE | Facility: CLINIC | Age: 28
End: 2018-08-02
Payer: COMMERCIAL

## 2018-08-02 VITALS
BODY MASS INDEX: 33.36 KG/M2 | SYSTOLIC BLOOD PRESSURE: 115 MMHG | HEART RATE: 90 BPM | DIASTOLIC BLOOD PRESSURE: 75 MMHG | WEIGHT: 195.4 LBS | HEIGHT: 64 IN

## 2018-08-02 DIAGNOSIS — E05.90 SUBCLINICAL HYPERTHYROIDISM: ICD-10-CM

## 2018-08-02 DIAGNOSIS — O99.210 OBESITY AFFECTING PREGNANCY, ANTEPARTUM: Primary | ICD-10-CM

## 2018-08-02 DIAGNOSIS — Z86.2 HX OF SICKLE CELL TRAIT: ICD-10-CM

## 2018-08-02 DIAGNOSIS — Z3A.35 35 WEEKS GESTATION OF PREGNANCY: ICD-10-CM

## 2018-08-02 DIAGNOSIS — O09.299 HX OF PREECLAMPSIA, PRIOR PREGNANCY, CURRENTLY PREGNANT: ICD-10-CM

## 2018-08-02 DIAGNOSIS — Z34.92 PRENATAL CARE IN SECOND TRIMESTER: ICD-10-CM

## 2018-08-02 DIAGNOSIS — Z3A.31 31 WEEKS GESTATION OF PREGNANCY: ICD-10-CM

## 2018-08-02 PROCEDURE — 76816 OB US FOLLOW-UP PER FETUS: CPT | Performed by: OBSTETRICS & GYNECOLOGY

## 2018-08-09 ENCOUNTER — ROUTINE PRENATAL (OUTPATIENT)
Dept: OBGYN CLINIC | Facility: HOSPITAL | Age: 28
End: 2018-08-09
Payer: COMMERCIAL

## 2018-08-09 VITALS
DIASTOLIC BLOOD PRESSURE: 82 MMHG | BODY MASS INDEX: 33.29 KG/M2 | WEIGHT: 195 LBS | HEART RATE: 92 BPM | SYSTOLIC BLOOD PRESSURE: 125 MMHG | HEIGHT: 64 IN

## 2018-08-09 DIAGNOSIS — Z3A.36 36 WEEKS GESTATION OF PREGNANCY: Primary | ICD-10-CM

## 2018-08-09 DIAGNOSIS — O09.299 HX OF PREECLAMPSIA, PRIOR PREGNANCY, CURRENTLY PREGNANT: ICD-10-CM

## 2018-08-09 DIAGNOSIS — Z34.93 PRENATAL CARE IN THIRD TRIMESTER: ICD-10-CM

## 2018-08-09 PROCEDURE — 87491 CHLMYD TRACH DNA AMP PROBE: CPT | Performed by: NURSE PRACTITIONER

## 2018-08-09 PROCEDURE — 87591 N.GONORRHOEAE DNA AMP PROB: CPT | Performed by: NURSE PRACTITIONER

## 2018-08-09 PROCEDURE — 99213 OFFICE O/P EST LOW 20 MIN: CPT | Performed by: NURSE PRACTITIONER

## 2018-08-09 PROCEDURE — 87653 STREP B DNA AMP PROBE: CPT | Performed by: NURSE PRACTITIONER

## 2018-08-09 NOTE — PATIENT INSTRUCTIONS
Group B Streptococcus screen, rapid   GENERAL INFORMATION:  What is this test?  This test rapidly detects the presence of bacteria called group B streptococcus (GBS)  It is used when GBS carrier status (to be infected with this bacteria and not show any symptoms) is suspected in women  What are other names for this test?  · Streptococcus agalactiae latex screen  · Streptococcus Group B latex screen  Why do I need this test?  Laboratory tests may be done for many reasons  Tests are performed for routine health screenings or if a disease or toxicity is suspected  Lab tests may be used to determine if a medical condition is improving or worsening  Lab tests may also be used to measure the success or failure of a medication or treatment plan  Lab tests may be ordered for professional or legal reasons  You may need this test if you have:   · Group B Streptococcus carrier  When and how often should I have this test?  When and how often laboratory tests are done may depend on many factors  The timing of laboratory tests may rely on the results or completion of other tests, procedures, or treatments  Lab tests may be performed immediately in an emergency, or tests may be delayed as a condition is treated or monitored  A test may be suggested or become necessary when certain signs or symptoms appear  Due to changes in the way your body naturally functions through the course of a day, lab tests may need to be performed at a certain time of day  If you have prepared for a test by changing your food or fluid intake, lab tests may be timed in accordance with those changes  Timing of tests may be based on increased and decreased levels of medications, drugs or other substances in the body  The age or gender of the person being tested may affect when and how often a lab test is required  Chronic or progressive conditions may need ongoing monitoring through the use of lab tests   Conditions that worsen and improve may also need frequent monitoring  Certain tests may be repeated to obtain a series of results, or tests may need to be repeated to confirm or disprove results  Timing and frequency of lab tests may vary if they are performed for professional or legal reasons  How should I get ready for the test?  Ask the healthcare worker for information about how to prepare for this test    How is the test done? A sample of vaginal cells and discharge, or anal cells may be collected for this test  Both of these samples may be needed for this test   Vaginal cells/discharge:   A vaginal swab is done to collect a sample from the lower part of your vagina  You will be asked to lie on your back with your legs spread and feet placed on stirrups  A special kind of swab will be inserted into your lower vagina or just near the entrance of the vagina  The swab will be rotated gently and then remain still for a few seconds before it is removed  This is done make sure enough secretions have been collected for the test  The sample is then sent for testing  Anal cells: An anal swab is done to collect a sample from your rear end  You may be asked to lie on your back with your legs spread and feet placed in stirrups  A special kind of swab will be inserted an inch into your rear end  The swab will be rotated gently before it is removed  The sample is then sent for testing  How will the test feel? The amount of discomfort you feel will depend on many factors, including your sensitivity to pain  Communicate how you are feeling with the person doing the test  Inform the person doing the test if you feel that you cannot continue with the test   Vaginal cells/discharge:   During a vaginal swab, you may feel discomfort when the swab moves in your vagina  Anal cells:   During an anal swab, you may feel discomfort when the swab moves in your rear end    What should I do after the test?  There are no special instructions to follow after this test    What are the risks? Vaginal cells/discharge: Ask the healthcare worker to explain any risks of this test to you before it is performed  Anal cells: Ask the healthcare worker to explain any risks of this test to you before it is performed  What are normal results for this test?  Laboratory test results may vary depending on your age, gender, health history, the method used for the test, and many other factors  If your results are different from the results suggested below, this may not mean that you have a disease  Contact your healthcare worker if you have any questions  The following is considered to be a normal result for this test:  What follow up should I do after this test?  Ask your healthcare worker how you will be informed of the test results  You may be asked to call for results, schedule an appointment to discuss results, or notified of results by mail  Follow up care varies depending on many factors related to your test  Sometimes there is no follow up after you have been notified of test results  At other times follow up may be suggested or necessary  Some examples of follow up care include changes to medication or treatment plans, referral to a specialist, more or less frequent monitoring, and additional tests or procedures  Talk with your healthcare worker about any concerns or questions you have regarding follow up care or instructions  Where can I get more information? Related [employer] for Disease Control and Prevention (CDC) - Devon Yung [employer] - http://www  aafp org    CARE AGREEMENT:   You have the right to help plan your care  To help with this plan, you must learn about your health condition and how it may be treated  You can then discuss treatment options with your caregivers  Work with them to decide what care may be used to treat you  You always have the right to refuse treatment       © 2018 [employer]  Information is for End User's use only and may not be sold, redistributed or otherwise used for commercial purposes  The above information is an  only  It is not intended as a substitute for medical advice for your individual conditions or treatments  Talk to your doctor, nurse or pharmacist before following any medical regimen to see if it is safe and effective for you  Vaginal Delivery   WHAT YOU NEED TO KNOW:   What do I need to know about vaginal delivery? A vaginal delivery occurs when your baby is born through your vagina (birth canal)  How do I prepare for vaginal delivery? You will not be able to eat while you are in active labor  Your healthcare provider can give you medicines for pain relief if you chose to have them  You may need medicine to induce (start) your labor if your labor is not moving forward  You may need to move in bed, stand, or walk to help your baby move into position for birth  What will happen during vaginal delivery? · You can move into several positions during delivery  You can lie on your back, have your feet up in stirrups, or squat  You may feel pressure on your rectum  This pressure is caused by the movement of your baby's head down the birth canal  You may feel the urge to push  Your healthcare provider will tell you when to push, and guide your baby out of the birth canal  Healthcare providers may use forceps or suction to help deliver your baby  You may also need an episiotomy (incision) to make the vaginal opening larger  This will make more room for your baby  · After your baby is born, your healthcare provider will put clamps on the cord that connects your baby to the placenta  The cord is then cut  Your uterus continues to contract after delivery to push out the placenta  Your healthcare provider may close your episiotomy incision or any tears with stitches  What will happen after vaginal delivery? · Healthcare providers will examine your baby    You may be able to hold your baby soon after he or she is born  After healthcare providers have checked that you and your baby are okay, you may be taken to another room  · A healthcare provider may massage your abdomen several times to make your uterus firm  This can be uncomfortable  You may have abdominal pains for up to 3 days after you give birth because your uterus is still carlita  The contractions help release blood from inside your uterus so it shrinks back to its normal size  These contractions may hurt more while you breastfeed your baby  · Your healthcare provider may suggest you get out of bed to sit in a chair or walk  Activity can help prevent blood clots  · You may be able to go home within 24 to 48 hours after delivery  If you need support at home, ask your healthcare provider about home visits by another healthcare provider  This healthcare provider can help you learn about breastfeeding, bottle feeding, baby care, and perineum care  CARE AGREEMENT:   You have the right to help plan your care  Learn about your health condition and how it may be treated  Discuss treatment options with your caregivers to decide what care you want to receive  You always have the right to refuse treatment  The above information is an  only  It is not intended as medical advice for individual conditions or treatments  Talk to your doctor, nurse or pharmacist before following any medical regimen to see if it is safe and effective for you  © 2017 [address] [facility] Information is for End User's use only and may not be sold, redistributed or otherwise used for commercial purposes  All illustrations and images included in CareNotes® are the copyrighted property of [employer]  or Jae Ortiz  Kick Counts in Pregnancy   AMBULATORY CARE:   Kick counts  measure how much your baby is moving in your womb  A kick from your baby can be felt as a twist, turn, swish, roll, or jab   It is common to feel your baby kicking at 26 to 28 weeks of pregnancy  You may feel your baby kick as early as 20 weeks of pregnancy  Seek care immediately if:   · You feel your baby kick less as the day goes on      · You do not feel any kicks in a day  Contact your healthcare provider if:   · You feel a change in the number of kicks or movements of your baby  · You feel fewer than 10 kicks within 2 hours after counting twice  · You have questions or concerns about your baby's movements  Why measure kick counts:  Your baby's movement may provide information about your baby's health  He may move less, or not at all, if there are problems  He may move less if he does not have enough room to grow in your uterus (womb)  He may also move less if he is not getting enough oxygen or nutrition from the placenta  Tell your healthcare provider as soon as you feel a change in your baby's movements  Problems that are found earlier are easier to treat  When to measure kick counts:   · Measure kick counts at the same time every day  · Measure kick counts when your baby is awake and most active  Your baby may be most active in the evening  · Measure kick counts after a meal or snack  Your baby may be more active after you eat  Wait 2 hours after you drink liquids that contain caffeine  Caffeine can make your baby more active than usual     · You should not smoke while you are pregnant  Smoking increases the risk of health problems for you and for your baby during your pregnancy  If you do smoke, wait 2 hours to measure kick counts  Nicotine can make your baby more active than usual   How to measure kick counts:  Check that your baby is awake before you measure kick counts  You can wake up your baby by lightly pushing on your belly, walking, or drinking something cold  Your healthcare provider may tell you different ways to measure kick counts   He may tell you to do the following:  · Use a chart or clock to keep track of the time you start and finish counting  · Sit in a chair or lie on your left side  · Place your hands on the largest part of your belly  · Count until you reach 10 kicks  Write down how much time it takes to count 10 kicks  · It may take 30 minutes to 2 hours to count 10 kicks  It should not take more than 2 hours to count 10 kicks  · If you do not feel 10 kicks within 2 hours, wait 1 hour and count again  Your baby can sleep for up to 40 minutes at one time  Follow up with your healthcare provider as directed:  Write down your questions so you remember to ask them during your visits  © 2017 [address] Information is for End User's use only and may not be sold, redistributed or otherwise used for commercial purposes  All illustrations and images included in CareNotes® are the copyrighted property of [employer]  or Jae Ortiz  The above information is an  only  It is not intended as medical advice for individual conditions or treatments  Talk to your doctor, nurse or pharmacist before following any medical regimen to see if it is safe and effective for you  Pregnancy at [address] to [address]:   What changes are happening in my body? You are considered full term at the beginning of 37 weeks  Your breathing may be easier if your baby has moved down into a head-down position  You may need to urinate more often because the baby may be pressing on your bladder  You may also feel more discomfort and get tired easily  How do I care for myself at this stage of my pregnancy? · Eat a variety of healthy foods  Healthy foods include fruits, vegetables, whole-grain breads, low-fat dairy foods, beans, lean meats, and fish  Drink liquids as directed  Ask how much liquid to drink each day and which liquids are best for you  Limit caffeine to less than 200 milligrams each day  Limit your intake of fish to 2 servings each week   Choose fish low in mercury such as canned light tuna, shrimp, salmon, cod, or tilapia  Do not  eat fish high in mercury such as swordfish, tilefish, douglas mackerel, and shark  · Take prenatal vitamins as directed  Your need for certain vitamins and minerals, such as folic acid, increases during pregnancy  Prenatal vitamins provide some of the extra vitamins and minerals you need  Prenatal vitamins may also help to decrease the risk of certain birth defects  · Rest as needed  Put your feet up if you have swelling in your ankles and feet  · Do not smoke  If you smoke, it is never too late to quit  Smoking increases your risk of a miscarriage and other health problems during your pregnancy  Smoking can cause your baby to be born too early or weigh less at birth  Ask your healthcare provider for information if you need help quitting  · Do not drink alcohol  Alcohol passes from your body to your baby through the placenta  It can affect your baby's brain development and cause fetal alcohol syndrome (FAS)  FAS is a group of conditions that causes mental, behavior, and growth problems  · Talk to your healthcare provider before you take any medicines  Many medicines may harm your baby if you take them when you are pregnant  Do not take any medicines, vitamins, herbs, or supplements without first talking to your healthcare provider  Never use illegal or street drugs (such as marijuana or cocaine) while you are pregnant  · Talk to your healthcare provider before you travel  You may not be able to travel in an airplane after 36 weeks  He may also recommend that you avoid long road trips  What are some safety tips during pregnancy? · Avoid hot tubs and saunas  Do not use a hot tub or sauna while you are pregnant, especially during your first trimester  Hot tubs and saunas may raise your baby's temperature and increase the risk of birth defects  · Avoid toxoplasmosis    This is an infection caused by eating raw meat or being around infected cat feces  It can cause birth defects, miscarriages, and other problems  Wash your hands after you touch raw meat  Make sure any meat is well-cooked before you eat it  Avoid raw eggs and unpasteurized milk  Use gloves or ask someone else to clean your cat's litter box while you are pregnant  · Ask your healthcare provider about travel  The most comfortable time to travel is during the second trimester  Ask your healthcare provider if you can travel after 36 weeks  You may not be able to travel in an airplane after 36 weeks  He may also recommend that you avoid long road trips  What changes are happening with my baby? By 38 weeks, your baby may weigh between 6 and 9 pounds  Your baby may be about 14 inches long from the top of the head to the rump (baby's bottom)  Your baby hears well enough to know your voice  As your baby gets larger, you may feel fewer kicks and more stretching and rolling  Your baby may move into a head-down position  Your baby will also rest lower in your abdomen  What do I need to know about prenatal care? Your healthcare provider will check your blood pressure and weight  You may also need the following:  · A urine test  may also be done to check for sugar and protein  These can be signs of gestational diabetes or infection  Protein in your urine may also be a sign of preeclampsia  Preeclampsia is a condition that can develop during week 20 or later of your pregnancy  It causes high blood pressure, and it can cause problems with your kidneys and other organs  · A blood test  may be done to check for anemia (low iron level)  · A Tdap vaccine  may be recommended by your healthcare provider  · A group B strep test  is a test that is done to check for group B strep infection  Group B strep is a type of bacteria that may be found in the vagina or rectum  It can be passed to your baby during delivery if you have it   Your healthcare provider will take swab your vagina or rectum and send the sample to the lab for tests  · Fundal height  is a measurement of your uterus to check your baby's growth  This number is usually the same as the number of weeks that you have been pregnant  Your healthcare provider may also check your baby's position  · Your baby's heart rate  will be checked  When should I seek immediate care? · You develop a severe headache that does not go away  · You have new or increased vision changes, such as blurred or spotted vision  · You have new or increased swelling in your face or hands  · You have vaginal spotting or bleeding  · Your water broke or you feel warm water gushing or trickling from your vagina  When should I contact my healthcare provider? · You have more than 5 contractions in 1 hour  · You notice any changes in your baby's movements  · You have abdominal cramps, pressure, or tightening  · You have a change in vaginal discharge  · You have chills or a fever  · You have vaginal itching, burning, or pain  · You have yellow, green, white, or foul-smelling vaginal discharge  · You have pain or burning when you urinate, less urine than usual, or pink or bloody urine  · You have questions or concerns about your condition or care  CARE AGREEMENT:   You have the right to help plan your care  Learn about your health condition and how it may be treated  Discuss treatment options with your caregivers to decide what care you want to receive  You always have the right to refuse treatment  The above information is an  only  It is not intended as medical advice for individual conditions or treatments  Talk to your doctor, nurse or pharmacist before following any medical regimen to see if it is safe and effective for you  © 2017 [employer] Information is for End User's use only and may not be sold, redistributed or otherwise used for commercial purposes   All illustrations and images included in CareNotes® are the copyrighted property of [employer]  or Jae Ortiz

## 2018-08-09 NOTE — PROGRESS NOTES
Denies LOF, VB and pain  Confirms Frequent fetal movement, doing daily fetal kick counts  Tolerating prenatal vitamin well  TSH WNL for this trimester pregnancy  No concerns today  Ultrasound reviewed vertex presentation, normal appearing fetal growth, anterior placenta, no placenta previa, SURJIT-WNL and EFW 2855g/ 6 lb 5 oz (68%)  Recommendations for follow-up as clinically indicated  Plan:  1  Continue prenatal vitamin daily  2  Continue fetal kick counts daily  3  GBS collected -no penicillin allergy/  GC collected  4  Repeat  section     -  Scheduled 18 @ 10:00 a m      -  Preoperative  soap and showering instructions provided  5  Common discomforts of pregnancy and labor precautions reviewed  Patient verbalizes understanding to reach [facility] on-call after office hours    RTO 1 week

## 2018-08-11 LAB — GP B STREP DNA SPEC QL NAA+PROBE: NORMAL

## 2018-08-12 LAB
CHLAMYDIA DNA CVX QL NAA+PROBE: NORMAL
N GONORRHOEA DNA GENITAL QL NAA+PROBE: NORMAL

## 2018-08-13 ENCOUNTER — TELEPHONE (OUTPATIENT)
Dept: LABOR AND DELIVERY | Facility: HOSPITAL | Age: 28
End: 2018-08-13

## 2018-08-13 ENCOUNTER — HOSPITAL ENCOUNTER (OUTPATIENT)
Facility: HOSPITAL | Age: 28
Discharge: HOME | End: 2018-08-13
Attending: OBSTETRICS & GYNECOLOGY | Admitting: OBSTETRICS & GYNECOLOGY
Payer: COMMERCIAL

## 2018-08-13 VITALS
BODY MASS INDEX: 33.29 KG/M2 | TEMPERATURE: 98.4 F | DIASTOLIC BLOOD PRESSURE: 70 MMHG | SYSTOLIC BLOOD PRESSURE: 117 MMHG | WEIGHT: 195 LBS | HEART RATE: 114 BPM | RESPIRATION RATE: 18 BRPM | HEIGHT: 64 IN

## 2018-08-13 PROCEDURE — 99214 OFFICE O/P EST MOD 30 MIN: CPT

## 2018-08-13 RX ORDER — BUTALBITAL, ACETAMINOPHEN AND CAFFEINE 50; 325; 40 MG/1; MG/1; MG/1
1 TABLET ORAL ONCE
Status: COMPLETED | OUTPATIENT
Start: 2018-08-13 | End: 2018-08-13

## 2018-08-13 RX ADMIN — BUTALBITAL, ACETAMINOPHEN, AND CAFFEINE 1 TABLET: 50; 325; 40 TABLET ORAL at 11:57

## 2018-08-13 NOTE — PROGRESS NOTES
Triage Note - OB  Lizzette Tipton 24 y o  female MRN: [id number]  Unit/Bed#: LD Triage 3-01 Encounter: [id number]    Chief Complaint: Headaches and vaginal pressure JOY: Estimated Date of Delivery: 18    HPI: Patient is a  at 36w4d here complaining of headaches that started 3 days ago  Pressure and throbbing like  8/10  Mildly controlled with tylenol 500 mg  She took 2 today the last at 7:00 am with no relief  She has previous hx of migraines  Patient also complains of vaginal pressure and cramping  That started today  PMH of LTCS for arrested dilation and preeclampsia on previous pregnancy  Vitals: Blood pressure 117/70, pulse (!) 114, temperature 98 4 °F (36 9 °C), temperature source Oral, resp  rate 18, height 5' 4" (1 626 m), weight 88 5 kg (195 lb), last menstrual period 2017, not currently breastfeeding  ,Body mass index is 33 47 kg/m²  Physical Exam  GEN: alert and oriented x 3, some signs of photophobia with room lights off on arrival to triage  CV: RRR, no murmurs, rubs or gallops  Abdomen: , non tender to palpation  Extremities: No LE edema, no tenderness  SVE: Dilation: 1cm, Effacement:  20%, Station:  -3, Consistency: hard and Position:  posterior    FHR: Baseline: 160 bpm and Variability: Moderate 6 - 25 bpm  [place]: irregular, 1-2 every 10 minutes      Imaging: none    Lab, Imaging and other studies: I have personally reviewed pertinent reports  A/P: This is a 24 y o  Aidee Done at 36w4d who presents with headaches and r/o labor    Plan:    HA in the setting of hx of preeclampsia on prior pregnancy: Pt is currently normotensive with BP in the 110s over 70s, no labs indicated at the moment  Patient has hx of migraines, s/p fioricet dose in triage with resolution of her symptoms  Patient encouraged to call and report any change in her status such as recurrent and/or more severe HA w/o relief with pain medication, visual disturbances or abdominal pain       She has RLTCS scheduled on Aug 30th at 1000 and new PN visit in 3 days  Contractions: patient comfortable on bed, cervical exam with 1 cm of dilation  Labor precautions reviewed  Dr Brooks Hamel aware Valente Sandifer, MD  Family Practice Resident PGY1  8/13/2018 11:52 AM

## 2018-08-16 ENCOUNTER — ROUTINE PRENATAL (OUTPATIENT)
Dept: OBGYN CLINIC | Facility: HOSPITAL | Age: 28
End: 2018-08-16
Payer: COMMERCIAL

## 2018-08-16 VITALS
BODY MASS INDEX: 33.39 KG/M2 | HEIGHT: 64 IN | WEIGHT: 195.6 LBS | HEART RATE: 101 BPM | DIASTOLIC BLOOD PRESSURE: 75 MMHG | SYSTOLIC BLOOD PRESSURE: 121 MMHG

## 2018-08-16 DIAGNOSIS — Z34.93 PRENATAL CARE IN THIRD TRIMESTER: ICD-10-CM

## 2018-08-16 DIAGNOSIS — Z3A.37 37 WEEKS GESTATION OF PREGNANCY: Primary | ICD-10-CM

## 2018-08-16 PROCEDURE — 99213 OFFICE O/P EST LOW 20 MIN: CPT | Performed by: NURSE PRACTITIONER

## 2018-08-16 NOTE — PATIENT INSTRUCTIONS
Kick Counts in Pregnancy   AMBULATORY CARE:   Kick counts  measure how much your baby is moving in your womb  A kick from your baby can be felt as a twist, turn, swish, roll, or jab  It is common to feel your baby kicking at 26 to 28 weeks of pregnancy  You may feel your baby kick as early as 20 weeks of pregnancy  Seek care immediately if:   · You feel your baby kick less as the day goes on      · You do not feel any kicks in a day  Contact your healthcare provider if:   · You feel a change in the number of kicks or movements of your baby  · You feel fewer than 10 kicks within 2 hours after counting twice  · You have questions or concerns about your baby's movements  Why measure kick counts:  Your baby's movement may provide information about your baby's health  He may move less, or not at all, if there are problems  He may move less if he does not have enough room to grow in your uterus (womb)  He may also move less if he is not getting enough oxygen or nutrition from the placenta  Tell your healthcare provider as soon as you feel a change in your baby's movements  Problems that are found earlier are easier to treat  When to measure kick counts:   · Measure kick counts at the same time every day  · Measure kick counts when your baby is awake and most active  Your baby may be most active in the evening  · Measure kick counts after a meal or snack  Your baby may be more active after you eat  Wait 2 hours after you drink liquids that contain caffeine  Caffeine can make your baby more active than usual     · You should not smoke while you are pregnant  Smoking increases the risk of health problems for you and for your baby during your pregnancy  If you do smoke, wait 2 hours to measure kick counts  Nicotine can make your baby more active than usual   How to measure kick counts:  Check that your baby is awake before you measure kick counts   You can wake up your baby by lightly pushing on your belly, walking, or drinking something cold  Your healthcare provider may tell you different ways to measure kick counts  He may tell you to do the following:  · Use a chart or clock to keep track of the time you start and finish counting  · Sit in a chair or lie on your left side  · Place your hands on the largest part of your belly  · Count until you reach 10 kicks  Write down how much time it takes to count 10 kicks  · It may take 30 minutes to 2 hours to count 10 kicks  It should not take more than 2 hours to count 10 kicks  · If you do not feel 10 kicks within 2 hours, wait 1 hour and count again  Your baby can sleep for up to 40 minutes at one time  Follow up with your healthcare provider as directed:  Write down your questions so you remember to ask them during your visits  © 2017 [address] Information is for End User's use only and may not be sold, redistributed or otherwise used for commercial purposes  All illustrations and images included in CareNotes® are the copyrighted property of [employer]  or Jae [employer]  The above information is an  only  It is not intended as medical advice for individual conditions or treatments  Talk to your doctor, nurse or pharmacist before following any medical regimen to see if it is safe and effective for you  Pregnancy at 28 to [address]:   What changes are happening in my body? You are considered full term at the beginning of 37 weeks  Your breathing may be easier if your baby has moved down into a head-down position  You may need to urinate more often because the baby may be pressing on your bladder  You may also feel more discomfort and get tired easily  How do I care for myself at this stage of my pregnancy? · Eat a variety of healthy foods  Healthy foods include fruits, vegetables, whole-grain breads, low-fat dairy foods, beans, lean meats, and fish   Drink liquids as directed  Ask how much liquid to drink each day and which liquids are best for you  Limit caffeine to less than 200 milligrams each day  Limit your intake of fish to 2 servings each week  Choose fish low in mercury such as canned light tuna, shrimp, salmon, cod, or tilapia  Do not  eat fish high in mercury such as swordfish, tilefish, douglas mackerel, and shark  · Take prenatal vitamins as directed  Your need for certain vitamins and minerals, such as folic acid, increases during pregnancy  Prenatal vitamins provide some of the extra vitamins and minerals you need  Prenatal vitamins may also help to decrease the risk of certain birth defects  · Rest as needed  Put your feet up if you have swelling in your ankles and feet  · Do not smoke  If you smoke, it is never too late to quit  Smoking increases your risk of a miscarriage and other health problems during your pregnancy  Smoking can cause your baby to be born too early or weigh less at birth  Ask your healthcare provider for information if you need help quitting  · Do not drink alcohol  Alcohol passes from your body to your baby through the placenta  It can affect your baby's brain development and cause fetal alcohol syndrome (FAS)  FAS is a group of conditions that causes mental, behavior, and growth problems  · Talk to your healthcare provider before you take any medicines  Many medicines may harm your baby if you take them when you are pregnant  Do not take any medicines, vitamins, herbs, or supplements without first talking to your healthcare provider  Never use illegal or street drugs (such as marijuana or cocaine) while you are pregnant  · Talk to your healthcare provider before you travel  You may not be able to travel in an airplane after 36 weeks  He may also recommend that you avoid long road trips  What are some safety tips during pregnancy? · Avoid hot tubs and saunas    Do not use a hot tub or sauna while you are pregnant, especially during your first trimester  Hot tubs and saunas may raise your baby's temperature and increase the risk of birth defects  · Avoid toxoplasmosis  This is an infection caused by eating raw meat or being around infected cat feces  It can cause birth defects, miscarriages, and other problems  Wash your hands after you touch raw meat  Make sure any meat is well-cooked before you eat it  Avoid raw eggs and unpasteurized milk  Use gloves or ask someone else to clean your cat's litter box while you are pregnant  · Ask your healthcare provider about travel  The most comfortable time to travel is during the second trimester  Ask your healthcare provider if you can travel after 36 weeks  You may not be able to travel in an airplane after 36 weeks  He may also recommend that you avoid long road trips  What changes are happening with my baby? By 38 weeks, your baby may weigh between 6 and 9 pounds  Your baby may be about 14 inches long from the top of the head to the rump (baby's bottom)  Your baby hears well enough to know your voice  As your baby gets larger, you may feel fewer kicks and more stretching and rolling  Your baby may move into a head-down position  Your baby will also rest lower in your abdomen  What do I need to know about prenatal care? Your healthcare provider will check your blood pressure and weight  You may also need the following:  · A urine test  may also be done to check for sugar and protein  These can be signs of gestational diabetes or infection  Protein in your urine may also be a sign of preeclampsia  Preeclampsia is a condition that can develop during week 20 or later of your pregnancy  It causes high blood pressure, and it can cause problems with your kidneys and other organs  · A blood test  may be done to check for anemia (low iron level)  · A Tdap vaccine  may be recommended by your healthcare provider       · A group B strep test  is a test that is done to check for group B strep infection  Group B strep is a type of bacteria that may be found in the vagina or rectum  It can be passed to your baby during delivery if you have it  Your healthcare provider will take swab your vagina or rectum and send the sample to the lab for tests  · Fundal height  is a measurement of your uterus to check your baby's growth  This number is usually the same as the number of weeks that you have been pregnant  Your healthcare provider may also check your baby's position  · Your baby's heart rate  will be checked  When should I seek immediate care? · You develop a severe headache that does not go away  · You have new or increased vision changes, such as blurred or spotted vision  · You have new or increased swelling in your face or hands  · You have vaginal spotting or bleeding  · Your water broke or you feel warm water gushing or trickling from your vagina  When should I contact my healthcare provider? · You have more than 5 contractions in 1 hour  · You notice any changes in your baby's movements  · You have abdominal cramps, pressure, or tightening  · You have a change in vaginal discharge  · You have chills or a fever  · You have vaginal itching, burning, or pain  · You have yellow, green, white, or foul-smelling vaginal discharge  · You have pain or burning when you urinate, less urine than usual, or pink or bloody urine  · You have questions or concerns about your condition or care  CARE AGREEMENT:   You have the right to help plan your care  Learn about your health condition and how it may be treated  Discuss treatment options with your caregivers to decide what care you want to receive  You always have the right to refuse treatment  The above information is an  only  It is not intended as medical advice for individual conditions or treatments   Talk to your doctor, nurse or pharmacist before following any medical regimen to see if it is safe and effective for you  © 2017 [address] Information is for End User's use only and may not be sold, redistributed or otherwise used for commercial purposes  All illustrations and images included in CareNotes® are the copyrighted property of [employer]  or Jae Ortiz

## 2018-08-16 NOTE — PROGRESS NOTES
Denies loss of fluid, vaginal bleeding and regular contractions  Confirms frequent fetal movement  Tolerating prenatal vitamin well   Currently being treated for pink  Eye   And URI ( viral)  States she was seen in triage 2-3 days ago  Continues with sore throat, headache, fatigue  Denies fevers, chills, productive cough  GBS -negative; gonorrhea /chlamydia -negative reviewed with patient  Plan:  1  Continue prenatal vitamin daily  2  Continue fetal kick counts daily  3  Pinkeye/upper respiratory infection     -  Supportive measures encouraged including stay well hydrated, rest, plain Robitussin,  Cepacol lozenges and plain Sudafed as needed     -  Continue eyedrops as prescribed  4  Repeat  scheduled  5  Common discomforts of pregnancy and labor precautions reviewed    RTO 1 week

## 2018-08-23 ENCOUNTER — ROUTINE PRENATAL (OUTPATIENT)
Dept: OBGYN CLINIC | Facility: HOSPITAL | Age: 28
End: 2018-08-23
Payer: COMMERCIAL

## 2018-08-23 VITALS
SYSTOLIC BLOOD PRESSURE: 131 MMHG | HEART RATE: 92 BPM | WEIGHT: 195.8 LBS | HEIGHT: 64 IN | BODY MASS INDEX: 33.43 KG/M2 | DIASTOLIC BLOOD PRESSURE: 74 MMHG

## 2018-08-23 DIAGNOSIS — Z3A.38 38 WEEKS GESTATION OF PREGNANCY: ICD-10-CM

## 2018-08-23 DIAGNOSIS — Z34.93 PRENATAL CARE IN THIRD TRIMESTER: Primary | ICD-10-CM

## 2018-08-23 PROCEDURE — 99213 OFFICE O/P EST LOW 20 MIN: CPT | Performed by: NURSE PRACTITIONER

## 2018-08-23 NOTE — PATIENT INSTRUCTIONS
Kick Counts in Pregnancy   AMBULATORY CARE:   Kick counts  measure how much your baby is moving in your womb  A kick from your baby can be felt as a twist, turn, swish, roll, or jab  It is common to feel your baby kicking at 26 to 28 weeks of pregnancy  You may feel your baby kick as early as 20 weeks of pregnancy  Seek care immediately if:   · You feel your baby kick less as the day goes on      · You do not feel any kicks in a day  Contact your healthcare provider if:   · You feel a change in the number of kicks or movements of your baby  · You feel fewer than 10 kicks within 2 hours after counting twice  · You have questions or concerns about your baby's movements  Why measure kick counts:  Your baby's movement may provide information about your baby's health  He may move less, or not at all, if there are problems  He may move less if he does not have enough room to grow in your uterus (womb)  He may also move less if he is not getting enough oxygen or nutrition from the placenta  Tell your healthcare provider as soon as you feel a change in your baby's movements  Problems that are found earlier are easier to treat  When to measure kick counts:   · Measure kick counts at the same time every day  · Measure kick counts when your baby is awake and most active  Your baby may be most active in the evening  · Measure kick counts after a meal or snack  Your baby may be more active after you eat  Wait 2 hours after you drink liquids that contain caffeine  Caffeine can make your baby more active than usual     · You should not smoke while you are pregnant  Smoking increases the risk of health problems for you and for your baby during your pregnancy  If you do smoke, wait 2 hours to measure kick counts  Nicotine can make your baby more active than usual   How to measure kick counts:  Check that your baby is awake before you measure kick counts   You can wake up your baby by lightly pushing on your belly, walking, or drinking something cold  Your healthcare provider may tell you different ways to measure kick counts  He may tell you to do the following:  · Use a chart or clock to keep track of the time you start and finish counting  · Sit in a chair or lie on your left side  · Place your hands on the largest part of your belly  · Count until you reach 10 kicks  Write down how much time it takes to count 10 kicks  · It may take 30 minutes to 2 hours to count 10 kicks  It should not take more than 2 hours to count 10 kicks  · If you do not feel 10 kicks within 2 hours, wait 1 hour and count again  Your baby can sleep for up to 40 minutes at one time  Follow up with your healthcare provider as directed:  Write down your questions so you remember to ask them during your visits  © 2017 [address] Information is for End User's use only and may not be sold, redistributed or otherwise used for commercial purposes  All illustrations and images included in CareNotes® are the copyrighted property of [employer]  or Jae Ortiz  The above information is an  only  It is not intended as medical advice for individual conditions or treatments  Talk to your doctor, nurse or pharmacist before following any medical regimen to see if it is safe and effective for you  Pregnancy at 44 to 40 Weeks   [address]:   What changes are happening in my body? You are now getting close to your due date  Your due date is just an estimate of when your baby will be born  Your baby may be born before or after your due date  Your breathing may be easier if your baby has moved down into a head-down position  You may need to urinate more often because the baby may be pressing on your bladder  You may also feel more discomfort and tire easily  You may also be having trouble sleeping  How do I care for myself at this stage of my pregnancy?    · Eat a variety of healthy foods   Healthy foods include fruits, vegetables, whole-grain breads, low-fat dairy foods, beans, lean meats, and fish  Drink liquids as directed  Ask how much liquid to drink each day and which liquids are best for you  Limit caffeine to less than 200 milligrams each day  Limit your intake of fish to 2 servings each week  Choose fish low in mercury such as canned light tuna, shrimp, crab, salmon, cod, or tilapia  Do not  eat fish high in mercury such as swordfish, tilefish, douglas mackerel, and shark  · Take prenatal vitamins as directed  Your need for certain vitamins and minerals, such as folic acid, increases during pregnancy  Prenatal vitamins provide some of the extra vitamins and minerals you need  Prenatal vitamins may also help to decrease the risk of certain birth defects  · Rest as needed  Put your feet up if you have swelling in your ankles and feet  · Do not smoke  If you smoke, it is never too late to quit  Smoking increases your risk of a miscarriage and other health problems during your pregnancy  Smoking can cause your baby to be born too early or weigh less at birth  Ask your healthcare provider for information if you need help quitting  · Do not drink alcohol  Alcohol passes from your body to your baby through the placenta  It can affect your baby's brain development and cause fetal alcohol syndrome (FAS)  FAS is a group of conditions that causes mental, behavior, and growth problems  · Talk to your healthcare provider before you take any medicines  Many medicines may harm your baby if you take them when you are pregnant  Do not take any medicines, vitamins, herbs, or supplements without first talking to your healthcare provider  Never use illegal or street drugs (such as marijuana or cocaine) while you are pregnant  · Talk to your healthcare provider before you travel  You may not be able to travel in an airplane after 36 weeks   He may also recommend that you avoid long road trips  What are some safety tips during pregnancy? · Avoid hot tubs and saunas  Do not use a hot tub or sauna while you are pregnant, especially during your first trimester  Hot tubs and saunas may raise your baby's temperature and increase the risk of birth defects  · Avoid toxoplasmosis  This is an infection caused by eating raw meat or being around infected cat feces  It can cause birth defects, miscarriages, and other problems  Wash your hands after you touch raw meat  Make sure any meat is well-cooked before you eat it  Avoid raw eggs and unpasteurized milk  Use gloves or ask someone else to clean your cat's litter box while you are pregnant  · Ask your healthcare provider about travel  The most comfortable time to travel is during the second trimester  Ask your healthcare provider if you can travel after 36 weeks  You may not be able to travel in an airplane after 36 weeks  He may also recommend that you avoid long road trips  What changes are happening with my baby? Your baby is ready to be born  At birth, your baby may weigh about 6 to 9 pounds and be about 19 to 21 inches long  Your baby may be in a head-down position  Your baby will also rest lower in your abdomen  What do I need to know about prenatal care? Your healthcare provider will check your blood pressure and weight  You may also need the following:  · A urine test  may also be done to check for sugar and protein  These can be signs of gestational diabetes or infection  Protein in your urine may also be a sign of preeclampsia  Preeclampsia is a condition that can develop during week 20 or later of your pregnancy  It causes high blood pressure, and it can cause problems with your kidneys and other organs  · Your baby's heart rate  will be checked  When should I seek immediate care? · You develop a severe headache that does not go away      · You have new or increased vision changes, such as blurred or spotted vision  · You have new or increased swelling in your face or hands  · You have vaginal spotting or bleeding  · Your water broke or you feel warm water gushing or trickling from your vagina  When should I contact my healthcare provider? · You have more than 5 contractions in 1 hour  · You notice any changes in your baby's movements  · You have abdominal cramps, pressure, or tightening  · You have a change in vaginal discharge  · You have chills or a fever  · You have vaginal itching, burning, or pain  · You have yellow, green, white, or foul-smelling vaginal discharge  · You have pain or burning when you urinate, less urine than usual, or pink or bloody urine  · You have questions or concerns about your condition or care  CARE AGREEMENT:   You have the right to help plan your care  Learn about your health condition and how it may be treated  Discuss treatment options with your caregivers to decide what care you want to receive  You always have the right to refuse treatment  The above information is an  only  It is not intended as medical advice for individual conditions or treatments  Talk to your doctor, nurse or pharmacist before following any medical regimen to see if it is safe and effective for you  © 2017 [address] [facility] Information is for End User's use only and may not be sold, redistributed or otherwise used for commercial purposes  All illustrations and images included in CareNotes® are the copyrighted property of [employer]  or Jae Ortiz  Pregnancy at [address] to [address]:   What changes are happening in my body? You are considered full term at the beginning of 37 weeks  Your breathing may be easier if your baby has moved down into a head-down position  You may need to urinate more often because the baby may be pressing on your bladder  You may also feel more discomfort and get tired easily     How do I care for myself at this stage of my pregnancy? · Eat a variety of healthy foods  Healthy foods include fruits, vegetables, whole-grain breads, low-fat dairy foods, beans, lean meats, and fish  Drink liquids as directed  Ask how much liquid to drink each day and which liquids are best for you  Limit caffeine to less than 200 milligrams each day  Limit your intake of fish to 2 servings each week  Choose fish low in mercury such as canned light tuna, shrimp, salmon, cod, or tilapia  Do not  eat fish high in mercury such as swordfish, tilefish, douglas mackerel, and shark  · Take prenatal vitamins as directed  Your need for certain vitamins and minerals, such as folic acid, increases during pregnancy  Prenatal vitamins provide some of the extra vitamins and minerals you need  Prenatal vitamins may also help to decrease the risk of certain birth defects  · Rest as needed  Put your feet up if you have swelling in your ankles and feet  · Do not smoke  If you smoke, it is never too late to quit  Smoking increases your risk of a miscarriage and other health problems during your pregnancy  Smoking can cause your baby to be born too early or weigh less at birth  Ask your healthcare provider for information if you need help quitting  · Do not drink alcohol  Alcohol passes from your body to your baby through the placenta  It can affect your baby's brain development and cause fetal alcohol syndrome (FAS)  FAS is a group of conditions that causes mental, behavior, and growth problems  · Talk to your healthcare provider before you take any medicines  Many medicines may harm your baby if you take them when you are pregnant  Do not take any medicines, vitamins, herbs, or supplements without first talking to your healthcare provider  Never use illegal or street drugs (such as marijuana or cocaine) while you are pregnant  · Talk to your healthcare provider before you travel    You may not be able to travel in an airplane after 36 weeks  He may also recommend that you avoid long road trips  What are some safety tips during pregnancy? · Avoid hot tubs and saunas  Do not use a hot tub or sauna while you are pregnant, especially during your first trimester  Hot tubs and saunas may raise your baby's temperature and increase the risk of birth defects  · Avoid toxoplasmosis  This is an infection caused by eating raw meat or being around infected cat feces  It can cause birth defects, miscarriages, and other problems  Wash your hands after you touch raw meat  Make sure any meat is well-cooked before you eat it  Avoid raw eggs and unpasteurized milk  Use gloves or ask someone else to clean your cat's litter box while you are pregnant  · Ask your healthcare provider about travel  The most comfortable time to travel is during the second trimester  Ask your healthcare provider if you can travel after 36 weeks  You may not be able to travel in an airplane after 36 weeks  He may also recommend that you avoid long road trips  What changes are happening with my baby? By 38 weeks, your baby may weigh between 6 and 9 pounds  Your baby may be about 14 inches long from the top of the head to the rump (baby's bottom)  Your baby hears well enough to know your voice  As your baby gets larger, you may feel fewer kicks and more stretching and rolling  Your baby may move into a head-down position  Your baby will also rest lower in your abdomen  What do I need to know about prenatal care? Your healthcare provider will check your blood pressure and weight  You may also need the following:  · A urine test  may also be done to check for sugar and protein  These can be signs of gestational diabetes or infection  Protein in your urine may also be a sign of preeclampsia  Preeclampsia is a condition that can develop during week 20 or later of your pregnancy   It causes high blood pressure, and it can cause problems with your kidneys and other organs  · A blood test  may be done to check for anemia (low iron level)  · A Tdap vaccine  may be recommended by your healthcare provider  · A group B strep test  is a test that is done to check for group B strep infection  Group B strep is a type of bacteria that may be found in the vagina or rectum  It can be passed to your baby during delivery if you have it  Your healthcare provider will take swab your vagina or rectum and send the sample to the lab for tests  · Fundal height  is a measurement of your uterus to check your baby's growth  This number is usually the same as the number of weeks that you have been pregnant  Your healthcare provider may also check your baby's position  · Your baby's heart rate  will be checked  When should I seek immediate care? · You develop a severe headache that does not go away  · You have new or increased vision changes, such as blurred or spotted vision  · You have new or increased swelling in your face or hands  · You have vaginal spotting or bleeding  · Your water broke or you feel warm water gushing or trickling from your vagina  When should I contact my healthcare provider? · You have more than 5 contractions in 1 hour  · You notice any changes in your baby's movements  · You have abdominal cramps, pressure, or tightening  · You have a change in vaginal discharge  · You have chills or a fever  · You have vaginal itching, burning, or pain  · You have yellow, green, white, or foul-smelling vaginal discharge  · You have pain or burning when you urinate, less urine than usual, or pink or bloody urine  · You have questions or concerns about your condition or care  CARE AGREEMENT:   You have the right to help plan your care  Learn about your health condition and how it may be treated  Discuss treatment options with your caregivers to decide what care you want to receive   You always have the right to refuse treatment  The above information is an  only  It is not intended as medical advice for individual conditions or treatments  Talk to your doctor, nurse or pharmacist before following any medical regimen to see if it is safe and effective for you  © 2017 [address] Information is for End User's use only and may not be sold, redistributed or otherwise used for commercial purposes  All illustrations and images included in CareNotes® are the copyrighted property of [employer]  or Jae Ortiz

## 2018-08-23 NOTE — PROGRESS NOTES
Denies loss of fluid, vaginal bleeding and regular contractions  Confirms frequent fetal movement  Tolerating prenatal vitamin well  Taking Tums occasionally with good relief of heartburn  Upper respiratory infection resolved  No concerns today  Plan:  1  Continue prenatal vitamin daily  2  Continue fetal kick counts daily  3  Repeat  scheduled 18 @ 10am;  Reviewed  preop showing instructions -patient has soap  4   Common discomforts of pregnancy and precautions reviewed  RTO 2  Weeks for incision check

## 2018-08-26 ENCOUNTER — HOSPITAL ENCOUNTER (EMERGENCY)
Facility: HOSPITAL | Age: 28
Discharge: HOME | End: 2018-08-26
Attending: EMERGENCY MEDICINE
Payer: COMMERCIAL

## 2018-08-26 VITALS
WEIGHT: 195 LBS | HEART RATE: 99 BPM | DIASTOLIC BLOOD PRESSURE: 70 MMHG | HEIGHT: 64 IN | BODY MASS INDEX: 33.29 KG/M2 | SYSTOLIC BLOOD PRESSURE: 142 MMHG | TEMPERATURE: 97.6 F | OXYGEN SATURATION: 97 % | RESPIRATION RATE: 18 BRPM

## 2018-08-26 DIAGNOSIS — K61.2 ABSCESS OF ANAL AND RECTAL REGIONS: ICD-10-CM

## 2018-08-26 DIAGNOSIS — K61.0 ANAL ABSCESS: Primary | ICD-10-CM

## 2018-08-26 PROCEDURE — 99282 EMERGENCY DEPT VISIT SF MDM: CPT

## 2018-08-26 RX ORDER — LIDOCAINE HYDROCHLORIDE 10 MG/ML
5 INJECTION, SOLUTION EPIDURAL; INFILTRATION; INTRACAUDAL; PERINEURAL ONCE
Status: COMPLETED | OUTPATIENT
Start: 2018-08-26 | End: 2018-08-26

## 2018-08-26 RX ORDER — LIDOCAINE HYDROCHLORIDE AND EPINEPHRINE 10; 10 MG/ML; UG/ML
10 INJECTION, SOLUTION INFILTRATION; PERINEURAL ONCE
Status: COMPLETED | OUTPATIENT
Start: 2018-08-26 | End: 2018-08-26

## 2018-08-26 RX ORDER — LIDOCAINE 40 MG/G
CREAM TOPICAL ONCE
Status: COMPLETED | OUTPATIENT
Start: 2018-08-26 | End: 2018-08-26

## 2018-08-26 RX ADMIN — LIDOCAINE 4% 1 APPLICATION: 4 CREAM TOPICAL at 03:11

## 2018-08-26 RX ADMIN — LIDOCAINE HYDROCHLORIDE 5 ML: 10 INJECTION, SOLUTION EPIDURAL; INFILTRATION; INTRACAUDAL; PERINEURAL at 03:11

## 2018-08-26 RX ADMIN — LIDOCAINE HYDROCHLORIDE,EPINEPHRINE BITARTRATE 10 ML: 10; .01 INJECTION, SOLUTION INFILTRATION; PERINEURAL at 04:54

## 2018-08-26 NOTE — DISCHARGE INSTRUCTIONS
Abscess   WHAT YOU NEED TO KNOW:   A warm compress may help your abscess drain  Your healthcare provider may make a cut in the abscess so it can drain  You may need surgery to remove an abscess that is on your hands or buttocks  DISCHARGE INSTRUCTIONS:   Return to the emergency department if:   · The area around your abscess becomes very painful, warm, or has red streaks  · You have a fever and chills  · Your heart is beating faster than usual      · You feel faint or confused  Contact your healthcare provider if:   · Your abscess gets bigger or does not get better  · Your abscess returns  · You have questions or concerns about your condition or care  Medicines: You may  need any of the following:  · Antibiotics  help treat a bacterial infection  · Acetaminophen  decreases pain and fever  It is available without a doctor's order  Ask how much to take and how often to take it  Follow directions  Acetaminophen can cause liver damage if not taken correctly  · NSAIDs , such as ibuprofen, help decrease swelling, pain, and fever  This medicine is available with or without a doctor's order  NSAIDs can cause stomach bleeding or kidney problems in certain people  If you take blood thinner medicine, always ask your healthcare provider if NSAIDs are safe for you  Always read the medicine label and follow directions  · Take your medicine as directed  Contact your healthcare provider if you think your medicine is not helping or if you have side effects  Tell him or her if you are allergic to any medicine  Keep a list of the medicines, vitamins, and herbs you take  Include the amounts, and when and why you take them  Bring the list or the pill bottles to follow-up visits  Carry your medicine list with you in case of an emergency  Self-care:   · Apply a warm compress to your abscess  This will help it open and drain  Wet a washcloth in warm, but not hot, water  Apply the compress for 10 minutes  Repeat this 4 times each day  Do not  press on an abscess or try to open it with a needle  You may push the bacteria deeper or into your blood  · Do not share your clothes, towels, or sheets with anyone  This can spread the infection to others  · Wash your hands often  This can help prevent the spread of germs  Use soap and water or an alcohol-based hand rub  Care for your wound after it is drained:   · Care for your wound as directed  If your healthcare provider says it is okay, carefully remove the bandage and gauze packing  You may need to soak the gauze to get it out of your wound  Clean your wound and the area around it as directed  Dry the area and put on new, clean bandages  Change your bandages when they get wet or dirty  · Ask your healthcare provider how to change the gauze in your wound  Keep track of how many pieces of gauze are placed inside the wound  Do not put too much packing in the wound  Do not pack the gauze too tightly in your wound  Follow up with your healthcare provider in 1 to 3 days: You may need to have your packing removed or your bandage changed  Write down your questions so you remember to ask them during your visits  © 2017 [address] [facility] Information is for End User's use only and may not be sold, redistributed or otherwise used for commercial purposes  All illustrations and images included in CareNotes® are the copyrighted property of [employer]  or Jae Ortiz  The above information is an  only  It is not intended as medical advice for individual conditions or treatments  Talk to your doctor, nurse or pharmacist before following any medical regimen to see if it is safe and effective for you  Anorectal Abscess and Anal Fistula   WHAT YOU NEED TO KNOW:   Anorectal abscess and anal fistula are conditions that often occur together   An anal fistula is an abnormal tunnel from the anus or rectum to the skin or another organ  It usually forms when there is an anorectal abscess  An anorectal abscess is a collection of pus from an infection in the anus or rectum  DISCHARGE INSTRUCTIONS:   Medicines:   · Antibiotics: This medicine is given to fight or prevent an infection caused by bacteria  Always take your antibiotics exactly as ordered by your healthcare provider  Do not stop taking your medicine unless directed by your healthcare provider  Never save antibiotics or take leftover antibiotics that were given to you for another illness  · Pain medicine: You may need medicine to take away or decrease pain  ¨ Learn how to take your medicine  Ask what medicine and how much you should take  Be sure you know how, when, and how often to take it  ¨ Do not wait until the pain is severe before you take your medicine  Tell caregivers if your pain does not decrease  ¨ Pain medicine can make you dizzy or sleepy  Prevent falls by calling someone when you get out of bed or if you need help  · Stool softeners: This medicine makes it easier for you to have a bowel movement  You may need this medicine to treat or prevent constipation  · Take your medicine as directed  Contact your healthcare provider if you think your medicine is not helping or if you have side effects  Tell him or her if you are allergic to any medicine  Keep a list of the medicines, vitamins, and herbs you take  Include the amounts, and when and why you take them  Bring the list or the pill bottles to follow-up visits  Carry your medicine list with you in case of an emergency  Follow up with your healthcare provider as directed: Your healthcare provider will need to make sure that your anorectal abscess or anal fistula heals  Write down your questions so you remember to ask them during your visits  Sitz bath: You may need to soak in a warm tub or take a sitz bath  A sitz bath may decrease your pain and relax your anal muscle   You may need to do this more than once a day  Ask your healthcare provider for information on how to use a sitz bath and how often you should use it  Prevent constipation:  High-fiber foods, extra liquids, and regular exercise can help you prevent constipation  Examples of high-fiber foods are fruit and bran  Prune juice and water are good liquids to drink  Regular exercise helps your digestive system work  You may also be told to take over-the-counter fiber and stool softener medicines  Take these items as directed  Contact your healthcare provider if:   · You have a fever  · You have increased pain, redness, swelling, drainage, or bleeding in the area  · You have questions or concerns about your condition or care  Seek care immediately or call 911 if:   · You have blood, pus, or a bad smelling discharge coming from your anus or vagina  · You have a very bad pain in your rectum or vagina that does not go away  · You have trouble breathing all of a sudden  · Your bowel movements are black or have blood on them  © 2017 [address] [facility] Information is for End User's use only and may not be sold, redistributed or otherwise used for commercial purposes  All illustrations and images included in CareNotes® are the copyrighted property of [employer]  or Jae Ortiz  The above information is an  only  It is not intended as medical advice for individual conditions or treatments  Talk to your doctor, nurse or pharmacist before following any medical regimen to see if it is safe and effective for you

## 2018-08-26 NOTE — ED PROVIDER NOTES
History  Chief Complaint   Patient presents with    Abscess     Pt hs abscess on her right buttock, first noticed yesterday, but worse now  Pt is also 38 weeks pregnant,  Pt scheduled to have a c section on thursday     HPI    This is a 70-year-old female  , at 37 weeks who is presenting to the emergency department for evaluation of  Left hip pain  Patient states that she has a history of perianal abscesses, which she has had to have drained in the past   She has felt this left buttock pain since yesterday, has been worsening for the past 24 hours  Pain is localized to her left buttock, She tried taking Tylenol with no relief  She denies any fevers or chills, but states that she does have  Discomfort when she is walking  She is able to pass stool without difficulty, urinates without difficulty  Otherwise on ROS, she denies any other symptoms  She states she feels normal fetal movement denies any abdominal pain  Prior to Admission Medications   Prescriptions Last Dose Informant Patient Reported? Taking?    Prenatal Vit-Fe Fumarate-FA (PRENATAL MULTIVITAMIN) 28-0 8 MG TABS 2018 at 0900 Self No Yes   Sig: Take 1 tablet by mouth daily      Facility-Administered Medications: None       Past Medical History:   Diagnosis Date     delivery delivered     RESOLVED: [id number]    Disease of thyroid gland     hyperthyroid    Hidradenitis suppurativa     LAST ASSESSED: [id number]    History of early menarche     [de-identified] PERIOD AT 8YEARS OLD    Hyperemesis gravidarum     RESOLVED: [id number]    Hypertension in pregnancy, pre-eclampsia     prior preg    Migraine     Sickle cell trait ([facility] [address] )        Past Surgical History:   Procedure Laterality Date    ABSCESS DRAINAGE  2016    INCISION AND DRAINAGE OF SKIN ABSCESS; LABIAL CYST REMOVAL - [facility]     SECTION      CHOLECYSTECTOMY  2009    WI EXC SWEAT GLAND LESN INGUIN,SIMPL Right 2016    Procedure: EXCISION GROIN CYST HIDRADENITIS;  Surgeon: Lydia Lacey DO;  [facility];  Service: General    TONSILLECTOMY      VULVA BIOPSY N/A 5/13/2016    Procedure: INCISION AND DRAINAGE, EXCISION OF LABIAL CYST ;  Surgeon: Sherri Martinez DO;  [facility];  Service:        Family History   Problem Relation Age of Onset    Hyperlipidemia Mother         PURE    Cancer Father         lung    Diabetes Maternal Grandmother     No Known Problems Brother      I have reviewed and agree with the history as documented  Social History   Substance Use Topics    Smoking status: Never Smoker    Smokeless tobacco: Never Used    Alcohol use No      Comment: (HISTORY)        Review of Systems   Constitutional: Negative for chills, fatigue and fever  HENT: Negative for sore throat  Eyes: Negative for redness and visual disturbance  Respiratory: Negative for shortness of breath  Cardiovascular: Negative for chest pain  Gastrointestinal: Positive for rectal pain  Negative for abdominal pain, diarrhea and nausea  Genitourinary: Negative for difficulty urinating, dysuria and pelvic pain  Musculoskeletal: Negative for back pain  Skin: Negative for rash  Neurological: Negative for syncope, weakness and headaches  All other systems reviewed and are negative        Physical Exam  ED Triage Vitals   Temperature Pulse Respirations Blood Pressure SpO2   08/26/18 0225 08/26/18 0225 08/26/18 0225 08/26/18 0225 08/26/18 0225   97 6 °F (36 4 °C) 87 20 159/76 100 %      Temp src Heart Rate Source Patient Position - Orthostatic VS BP Location FiO2 (%)   -- 08/26/18 0245 08/26/18 0245 08/26/18 0245 --    Monitor Lying Right arm       Pain Score       08/26/18 0225       Worst Possible Pain           Orthostatic Vital Signs  Vitals:    08/26/18 0225 08/26/18 0245 08/26/18 0415   BP: 159/76 133/69 142/70   Pulse: 87 86 99   Patient Position - Orthostatic VS:  Lying        Physical Exam   Constitutional: She is oriented to person, place, and time  She appears well-developed and well-nourished  No distress  HENT:   Head: Normocephalic and atraumatic  Eyes: Conjunctivae are normal  Pupils are equal, round, and reactive to light  Cardiovascular: Normal rate, regular rhythm and normal heart sounds  No murmur heard  Pulmonary/Chest: No respiratory distress  Abdominal: Soft  Bowel sounds are normal  There is no tenderness  Genitourinary:   Genitourinary Comments: When evaluating the anorectal region, there is no overlying erythema, however there is some fluctuance when feeling the left lateral perianal region, with clear fluid collection on ultrasound  Normal Anal rectal tone, with no pain of rectum  Musculoskeletal: Normal range of motion  Neurological: She is alert and oriented to person, place, and time  No cranial nerve deficit or sensory deficit  She exhibits normal muscle tone  Coordination normal    Skin: Skin is warm and dry  No rash noted  Psychiatric: She has a normal mood and affect  Nursing note and vitals reviewed  ED Medications  Medications   lidocaine (PF) (XYLOCAINE-MPF) 1 % injection 5 mL (5 mL Infiltration Given 8/26/18 0311)   lidocaine (LMX) 4 % cream (1 application Topical Given 8/26/18 0311)   lidocaine-epinephrine (XYLOCAINE/EPINEPHRINE) 1 %-1:100,000 injection 10 mL (10 mL Infiltration Given 8/26/18 4714)       Diagnostic Studies  Results Reviewed     None                 No orders to display         Procedures  Incision/Drainage  Date/Time: 8/26/2018 6:44 AM  Performed by: Ivis Luther by: Be Jang     Patient location:  Bedside  Procedure performed by consultant: Tricia Dykes MD     Consent:     Consent obtained:  Verbal    Consent given by:  Patient    Procedural risks discussed: yes  Alternatives discussed: yes    Universal protocol:     Procedure explained and questions answered to patient or proxy's satisfaction: yes      Patient identity confirmed: Verbally with patient  Location:     Type:  Abscess and fluid collection    Size:  2 cm    Location:  Anogenital    Anogenital location:  Perianal  Pre-procedure details:     Skin preparation:  Betadine  Anesthesia (see MAR for exact dosages): Anesthesia method:  Local infiltration    Local anesthetic:  Lidocaine 1% WITH epi  Procedure details:     Complexity:  Complex    Needle aspiration: yes      Needle size:  18 G    Incision types:  Stab incision    Scalpel blade:  11    Approach:  Puncture    Incision depth:  Subcutaneous    Wound management:  Probed and deloculated    Drainage:  Purulent, serosanguinous and bloody    Drainage amount: Moderate    Wound treatment:  Wound left open    Packing materials:  None  Post-procedure details:     Patient tolerance of procedure: Tolerated well, no immediate complications            Phone Consults  ED Phone Contact    ED Course           MDM  CritCare Time    72-year-old female who is been to the emergency department for evaluation of left buttock pain, likely secondary to abscess formation  Clear fluid collection on ultrasound  Will drain abscess, see procedure note above  Patient had abscess drained, wound was left open, patient informed to do Sitz baths, twice a day, 20 minutes  Patient was informed to let her OBGYN know about abscess drainage, prior to   Patient was also given follow-up as well as strict return precautions, to return if she develops fevers, increased drainage or increasing pain  Patient after procedure feels significantly better, states that there is less pressure  The patient was instructed to follow up as documented  Strict return precautions were discussed with the patient and the patient was instructed to return to the emergency department immediately if symptoms worsen  The patient/patient family member acknowledged and were in agreement with plan         Disposition  Final diagnoses:   Anal abscess   Abscess of anal and rectal regions     Time reflects when diagnosis was documented in both MDM as applicable and the Disposition within this note     Time User Action Codes Description Comment    8/26/2018  5:32 AM Daril Loretta Add [K61 2] Abscess of anal and rectal regions     8/26/2018  5:32 AM Daril Loretta Remove [K61 2] Abscess of anal and rectal regions     8/26/2018  5:32 AM Daril Loretta Add [K61 0] Anal abscess     8/26/2018  5:33 AM Daril Loretta Add [K61 2] Abscess of anal and rectal regions       ED Disposition     ED Disposition Condition Comment    Discharge  Estefanía Balderas discharge to home/self care  Condition at discharge: Good        Follow-up Information     Follow up With Specialties Details Why Contact Info    Ana Paula Giles MD Family Medicine Schedule an appointment as soon as possible for a visit in 2 days For follow up regarding your symptoms [address]  [id number]      [facility] Obstetrics and Gynecology Schedule an appointment as soon as possible for a visit in 2 days For follow up regarding your symptoms  Please let OBGYN know about your robyn anal abscess  [address] Dorian Luz MD General Surgery, Critical Care Medicine Schedule an appointment as soon as possible for a visit in 3 days For follow up regarding your anal abscess  [address] [facility]  [phone]            Discharge Medication List as of 8/26/2018  5:35 AM      CONTINUE these medications which have NOT CHANGED    Details   Prenatal Vit-Fe Fumarate-FA (PRENATAL MULTIVITAMIN) 28-0 8 MG TABS Take 1 tablet by mouth daily, Starting Tue 2/27/2018, Normal           No discharge procedures on file  ED Provider  Attending physically available and evaluated Estefanía Balderas  PAYAL managed the patient along with the ED Attending      Electronically Signed by         Cozette Nageotte, MD  08/26/18 2710

## 2018-08-28 NOTE — H&P
History & Physical - OB/GYN   Tyron Almeida 34 y o  female MRN: [id number]  Unit/Bed#: LD PACU-03 Encounter: [id number]    Tyron Almeida is a patient of [facility]    Chief complaint:  "I am here for my scheduled "    HPI:  Tyron Almeida is a 34 y o   female with an JOY of 2018, by Ultrasound at 38w5d weeks gestation who is being admitted for Elective  Section, Repeat  She has a prior  section x1 for NRFHT and she did not desire TOLAC  She also has a history of sickle cell trait, and pre-eclampsia in her prior pregnancy  Of note, she also has hidradenitis suppurativa and had an incision and drainage of the R gluteal buttock on 18 without any complications      Contractions:  none  Fetal movement:  present  Vaginal bleeding:   none  Leaking of fluid:  none    Pregnancy Complications:  Sickle cell trait  Hx of pre-eclampsia  Hidradenitis suppurativa    PMH:  Past Medical History:   Diagnosis Date     delivery delivered     RESOLVED: [id number]    Disease of thyroid gland     hyperthyroid    Hidradenitis suppurativa     LAST ASSESSED: [id number]    History of early menarche     FIRST PERIOD AT 8YEARS OLD    Hyperemesis gravidarum     RESOLVED: [id number]    Hypertension in pregnancy, pre-eclampsia     prior preg    Migraine     Sick[facility] cell trait ([facility] [address] )        PSH:  Past Surgical History:   Procedure Laterality Date    ABSCESS DRAINAGE  2016    INCISION AND DRAINAGE OF SKIN ABSCESS; LABIAL CYST REMOVAL - [facility]     SECTION      CHOLECYSTECTOMY      DC EXC SWEAT GLAND LESN INGUIN,SIMPL Right 2016    Procedure: EXCISION GROIN CYST HIDRADENITIS;  Surgeon: Mehrdad Morgan DO;  [facility];  Service: General    TONSILLECTOMY      VULVA BIOPSY N/A 2016    Procedure: INCISION AND DRAINAGE, EXCISION OF LABIAL CYST ;  Surgeon: Sherri Kirkpatrick DO;  [facility];  Service:        Social Hx:  Denies EtOH, cigarette, and recreational drug use in pregnancy    OB Hx:  Obstetric History       T1      L1     SAB0   TAB0   Ectopic0   Multiple0   Live Births1       # Outcome Date GA Lbr Brandon/2nd Weight Sex Delivery Anes PTL Lv   2 Current            1 Term 04/25/15   4309 g (9 lb 8 oz) F CS-LTranv EPI  SONNY      Name: Gabi Loaiza      Complications: Failure to Progress in Second Stage      Obstetric Comments   PRE-ECLAMPSIA       Meds:  No current facility-administered medications on file prior to encounter  Current Outpatient Prescriptions on File Prior to Encounter   Medication Sig Dispense Refill    Prenatal Vit-Fe Fumarate-FA (PRENATAL MULTIVITAMIN) 28-0 8 MG TABS Take 1 tablet by mouth daily 30 each 0       Allergies:  No Known Allergies    Review of Systems   Constitutional: Negative for fatigue and fever  Respiratory: Negative for cough, shortness of breath and wheezing  Gastrointestinal: Negative for diarrhea, nausea and vomiting  Genitourinary: Negative for flank pain, genital sores, hematuria, vaginal bleeding and vaginal discharge  Musculoskeletal: Positive for back pain  Left gluteal pain       Physical Exam   Constitutional: She is oriented to person, place, and time  She appears well-developed and well-nourished  HENT:   Head: Normocephalic and atraumatic  Nose: Nose normal    Eyes: EOM are normal  Pupils are equal, round, and reactive to light  Neck: Normal range of motion  No JVD present  Cardiovascular: Normal rate, regular rhythm and normal heart sounds  Exam reveals no gallop and no friction rub  No murmur heard  Pulmonary/Chest: Effort normal and breath sounds normal  No stridor  No respiratory distress  She has no wheezes  She has no rales  Abdominal: Soft  Bowel sounds are normal  She exhibits no distension  There is no tenderness  There is no rebound and no guarding  gravid   Musculoskeletal: Normal range of motion     Left gluteal abscess, acutely tender to palpation and draining purulent fluid, non edematous   Neurological: She is alert and oriented to person, place, and time  Skin: Skin is warm and dry  No rash noted  No pallor  Psychiatric: She has a normal mood and affect  Her behavior is normal  Judgment and thought content normal    Vitals reviewed  Cervix:   Deferred    Fetal heart rate:   Baseline Rate: 135 bpm  Variability: Moderate 6-25 bpm  Accelerations: 15 x 15 or greater  Decelerations: None  FHR Category: Category I    [place]:   Contraction Frequency (minutes): irregular  Contraction Quality: Mild    EFW: 8 lb    GBS: negative    Membranes: intact    Labs:  Hemoglobin: admit pending  Blood type: O+  Antibody: negative  Group B strep: negative  HIV: negative  Hepatitis B: negative  RPR: non-reactive   Rubella: Immune  Varicella Unknown  1 hour Glucose: normal    Assessment:   34 y o   at 38w5d admit for scheduled  section    Plan:   1  IUP    - FHR and tocometry  2     - Maternal request for repeat    - Risks and benefits reviewed with patient, pt agrees to proceed  3  Routine labs   - CBC, RPR, T&S  4  FEN   - NPO, IV LR for hydration  5  Analgesia   - Spinal per anesthesia  6   Sickle cell trait   - IV hydration    D/w Dr Scott Gillis, DO  PGY-2 OB/GYN   2018 8:52 AM

## 2018-08-29 ENCOUNTER — ANESTHESIA EVENT (INPATIENT)
Dept: LABOR AND DELIVERY | Facility: HOSPITAL | Age: 28
DRG: 540 | End: 2018-08-29
Payer: COMMERCIAL

## 2018-08-30 ENCOUNTER — HOSPITAL ENCOUNTER (INPATIENT)
Facility: HOSPITAL | Age: 28
LOS: 3 days | Discharge: HOME | DRG: 540 | End: 2018-09-02
Attending: OBSTETRICS & GYNECOLOGY | Admitting: OBSTETRICS & GYNECOLOGY
Payer: COMMERCIAL

## 2018-08-30 ENCOUNTER — ANESTHESIA (INPATIENT)
Dept: LABOR AND DELIVERY | Facility: HOSPITAL | Age: 28
DRG: 540 | End: 2018-08-30
Payer: COMMERCIAL

## 2018-08-30 DIAGNOSIS — Z3A.38 38 WEEKS GESTATION OF PREGNANCY: ICD-10-CM

## 2018-08-30 DIAGNOSIS — O34.219 PREVIOUS CESAREAN SECTION COMPLICATING PREGNANCY: ICD-10-CM

## 2018-08-30 DIAGNOSIS — Z98.891 S/P REPEAT LOW TRANSVERSE C-SECTION: Primary | ICD-10-CM

## 2018-08-30 LAB
ABO GROUP BLD: NORMAL
BASE EXCESS BLDCOA CALC-SCNC: -1.9 MMOL/L (ref 3–11)
BASE EXCESS BLDCOV CALC-SCNC: -1.6 MMOL/L (ref 1–9)
BLD GP AB SCN SERPL QL: NEGATIVE
ERYTHROCYTE [DISTWIDTH] IN BLOOD BY AUTOMATED COUNT: 13.2 % (ref 11.6–15.1)
HCO3 BLDCOA-SCNC: 25.7 MMOL/L (ref 17.3–27.3)
HCO3 BLDCOV-SCNC: 26.2 MMOL/L (ref 12.2–28.6)
HCT VFR BLD AUTO: 34.6 % (ref 34.8–46.1)
HGB BLD-MCNC: 11.2 G/DL (ref 11.5–15.4)
MCH RBC QN AUTO: 26.9 PG (ref 26.8–34.3)
MCHC RBC AUTO-ENTMCNC: 32.4 G/DL (ref 31.4–37.4)
MCV RBC AUTO: 83 FL (ref 82–98)
O2 CT VFR BLDCOA CALC: 9.8 ML/DL
OXYHGB MFR BLDCOA: 39.9 %
OXYHGB MFR BLDCOV: 39.9 %
PCO2 BLDCOA: 54.2 MM[HG] (ref 30–60)
PCO2 BLDCOV: 55.3 MM HG (ref 27–43)
PH BLDCOA: 7.29 [PH] (ref 7.23–7.43)
PH BLDCOV: 7.29 [PH] (ref 7.19–7.49)
PLATELET # BLD AUTO: 252 THOUSANDS/UL (ref 149–390)
PMV BLD AUTO: 9.3 FL (ref 8.9–12.7)
PO2 BLDCOA: 19.5 MM HG (ref 5–25)
PO2 BLDCOV: 19.5 MM HG (ref 15–45)
RBC # BLD AUTO: 4.17 MILLION/UL (ref 3.81–5.12)
RH BLD: POSITIVE
RPR SER QL: NORMAL
SAO2 % BLDCOV: 9.8 ML/DL
SPECIMEN EXPIRATION DATE: NORMAL
WBC # BLD AUTO: 11.24 THOUSAND/UL (ref 4.31–10.16)

## 2018-08-30 PROCEDURE — 86900 BLOOD TYPING SEROLOGIC ABO: CPT | Performed by: OBSTETRICS & GYNECOLOGY

## 2018-08-30 PROCEDURE — 82805 BLOOD GASES W/O2 SATURATION: CPT | Performed by: OBSTETRICS & GYNECOLOGY

## 2018-08-30 PROCEDURE — 86901 BLOOD TYPING SEROLOGIC RH(D): CPT | Performed by: OBSTETRICS & GYNECOLOGY

## 2018-08-30 PROCEDURE — 85027 COMPLETE CBC AUTOMATED: CPT | Performed by: OBSTETRICS & GYNECOLOGY

## 2018-08-30 PROCEDURE — 59514 CESAREAN DELIVERY ONLY: CPT | Performed by: OBSTETRICS & GYNECOLOGY

## 2018-08-30 PROCEDURE — 86592 SYPHILIS TEST NON-TREP QUAL: CPT | Performed by: OBSTETRICS & GYNECOLOGY

## 2018-08-30 PROCEDURE — 86850 RBC ANTIBODY SCREEN: CPT | Performed by: OBSTETRICS & GYNECOLOGY

## 2018-08-30 RX ORDER — FENTANYL CITRATE 50 UG/ML
INJECTION, SOLUTION INTRAMUSCULAR; INTRAVENOUS AS NEEDED
Status: DISCONTINUED | OUTPATIENT
Start: 2018-08-30 | End: 2018-08-30 | Stop reason: SURG

## 2018-08-30 RX ORDER — KETOROLAC TROMETHAMINE 30 MG/ML
30 INJECTION, SOLUTION INTRAMUSCULAR; INTRAVENOUS EVERY 6 HOURS PRN
Status: DISPENSED | OUTPATIENT
Start: 2018-08-30 | End: 2018-08-31

## 2018-08-30 RX ORDER — FENTANYL CITRATE/PF 50 MCG/ML
25 SYRINGE (ML) INJECTION
Status: COMPLETED | OUTPATIENT
Start: 2018-08-30 | End: 2018-08-30

## 2018-08-30 RX ORDER — DIPHENHYDRAMINE HYDROCHLORIDE 50 MG/ML
25 INJECTION INTRAMUSCULAR; INTRAVENOUS EVERY 6 HOURS PRN
Status: DISCONTINUED | OUTPATIENT
Start: 2018-08-30 | End: 2018-09-02 | Stop reason: HOSPADM

## 2018-08-30 RX ORDER — ONDANSETRON 2 MG/ML
4 INJECTION INTRAMUSCULAR; INTRAVENOUS EVERY 8 HOURS PRN
Status: DISCONTINUED | OUTPATIENT
Start: 2018-08-30 | End: 2018-09-02 | Stop reason: HOSPADM

## 2018-08-30 RX ORDER — DOCUSATE SODIUM 100 MG/1
100 CAPSULE, LIQUID FILLED ORAL DAILY PRN
Status: DISCONTINUED | OUTPATIENT
Start: 2018-08-30 | End: 2018-09-02 | Stop reason: HOSPADM

## 2018-08-30 RX ORDER — SODIUM CHLORIDE, SODIUM LACTATE, POTASSIUM CHLORIDE, CALCIUM CHLORIDE 600; 310; 30; 20 MG/100ML; MG/100ML; MG/100ML; MG/100ML
125 INJECTION, SOLUTION INTRAVENOUS CONTINUOUS
Status: DISCONTINUED | OUTPATIENT
Start: 2018-08-30 | End: 2018-09-02 | Stop reason: HOSPADM

## 2018-08-30 RX ORDER — OXYTOCIN/RINGER'S LACTATE 30/500 ML
62.5 PLASTIC BAG, INJECTION (ML) INTRAVENOUS ONCE
Status: COMPLETED | OUTPATIENT
Start: 2018-08-30 | End: 2018-08-31

## 2018-08-30 RX ORDER — MEPERIDINE HYDROCHLORIDE 25 MG/ML
25 INJECTION INTRAMUSCULAR; INTRAVENOUS; SUBCUTANEOUS AS NEEDED
Status: DISCONTINUED | OUTPATIENT
Start: 2018-08-30 | End: 2018-08-30

## 2018-08-30 RX ORDER — KETOROLAC TROMETHAMINE 30 MG/ML
INJECTION, SOLUTION INTRAMUSCULAR; INTRAVENOUS AS NEEDED
Status: DISCONTINUED | OUTPATIENT
Start: 2018-08-30 | End: 2018-08-30 | Stop reason: SURG

## 2018-08-30 RX ORDER — OXYCODONE HYDROCHLORIDE AND ACETAMINOPHEN 5; 325 MG/1; MG/1
1 TABLET ORAL ONCE
Status: COMPLETED | OUTPATIENT
Start: 2018-08-30 | End: 2018-08-30

## 2018-08-30 RX ORDER — ONDANSETRON 2 MG/ML
4 INJECTION INTRAMUSCULAR; INTRAVENOUS EVERY 4 HOURS PRN
Status: ACTIVE | OUTPATIENT
Start: 2018-08-30 | End: 2018-08-31

## 2018-08-30 RX ORDER — OXYTOCIN/RINGER'S LACTATE 30/500 ML
PLASTIC BAG, INJECTION (ML) INTRAVENOUS
Status: COMPLETED
Start: 2018-08-30 | End: 2018-08-30

## 2018-08-30 RX ORDER — OXYTOCIN 10 [USP'U]/ML
INJECTION, SOLUTION INTRAMUSCULAR; INTRAVENOUS AS NEEDED
Status: DISCONTINUED | OUTPATIENT
Start: 2018-08-30 | End: 2018-08-30 | Stop reason: SURG

## 2018-08-30 RX ORDER — ONDANSETRON 2 MG/ML
4 INJECTION INTRAMUSCULAR; INTRAVENOUS EVERY 8 HOURS PRN
Status: DISCONTINUED | OUTPATIENT
Start: 2018-08-30 | End: 2018-08-30

## 2018-08-30 RX ORDER — SIMETHICONE 80 MG
80 TABLET,CHEWABLE ORAL 4 TIMES DAILY PRN
Status: DISCONTINUED | OUTPATIENT
Start: 2018-08-30 | End: 2018-09-02 | Stop reason: HOSPADM

## 2018-08-30 RX ORDER — ACETAMINOPHEN 325 MG/1
650 TABLET ORAL EVERY 4 HOURS PRN
Status: DISCONTINUED | OUTPATIENT
Start: 2018-08-30 | End: 2018-09-02 | Stop reason: HOSPADM

## 2018-08-30 RX ORDER — CALCIUM CARBONATE 200(500)MG
1000 TABLET,CHEWABLE ORAL DAILY PRN
Status: DISCONTINUED | OUTPATIENT
Start: 2018-08-30 | End: 2018-09-02 | Stop reason: HOSPADM

## 2018-08-30 RX ORDER — BUPIVACAINE HYDROCHLORIDE 7.5 MG/ML
INJECTION, SOLUTION INTRASPINAL AS NEEDED
Status: DISCONTINUED | OUTPATIENT
Start: 2018-08-30 | End: 2018-08-30 | Stop reason: SURG

## 2018-08-30 RX ORDER — DEXAMETHASONE SODIUM PHOSPHATE 4 MG/ML
8 INJECTION, SOLUTION INTRA-ARTICULAR; INTRALESIONAL; INTRAMUSCULAR; INTRAVENOUS; SOFT TISSUE ONCE AS NEEDED
Status: ACTIVE | OUTPATIENT
Start: 2018-08-30 | End: 2018-08-31

## 2018-08-30 RX ORDER — NALBUPHINE HCL 10 MG/ML
5 AMPUL (ML) INJECTION
Status: DISPENSED | OUTPATIENT
Start: 2018-08-30 | End: 2018-08-31

## 2018-08-30 RX ORDER — MORPHINE SULFATE 0.5 MG/ML
INJECTION, SOLUTION EPIDURAL; INTRATHECAL; INTRAVENOUS AS NEEDED
Status: DISCONTINUED | OUTPATIENT
Start: 2018-08-30 | End: 2018-08-30 | Stop reason: SURG

## 2018-08-30 RX ORDER — OXYCODONE HYDROCHLORIDE AND ACETAMINOPHEN 5; 325 MG/1; MG/1
1 TABLET ORAL EVERY 4 HOURS PRN
Status: DISCONTINUED | OUTPATIENT
Start: 2018-08-31 | End: 2018-09-02 | Stop reason: HOSPADM

## 2018-08-30 RX ORDER — OXYCODONE HYDROCHLORIDE AND ACETAMINOPHEN 5; 325 MG/1; MG/1
2 TABLET ORAL EVERY 4 HOURS PRN
Status: DISCONTINUED | OUTPATIENT
Start: 2018-08-31 | End: 2018-09-02 | Stop reason: HOSPADM

## 2018-08-30 RX ORDER — NALOXONE HYDROCHLORIDE 0.4 MG/ML
0.1 INJECTION, SOLUTION INTRAMUSCULAR; INTRAVENOUS; SUBCUTANEOUS
Status: ACTIVE | OUTPATIENT
Start: 2018-08-30 | End: 2018-08-31

## 2018-08-30 RX ORDER — IBUPROFEN 600 MG/1
600 TABLET ORAL EVERY 4 HOURS PRN
Status: DISCONTINUED | OUTPATIENT
Start: 2018-08-31 | End: 2018-09-02 | Stop reason: HOSPADM

## 2018-08-30 RX ADMIN — NALBUPHINE HYDROCHLORIDE 5 MG: 10 INJECTION, SOLUTION INTRAMUSCULAR; INTRAVENOUS; SUBCUTANEOUS at 16:36

## 2018-08-30 RX ADMIN — FENTANYL CITRATE 25 MCG: 50 INJECTION, SOLUTION INTRAMUSCULAR; INTRAVENOUS at 16:19

## 2018-08-30 RX ADMIN — FENTANYL CITRATE 25 MCG: 50 INJECTION, SOLUTION INTRAMUSCULAR; INTRAVENOUS at 16:34

## 2018-08-30 RX ADMIN — FENTANYL CITRATE 25 MCG: 50 INJECTION, SOLUTION INTRAMUSCULAR; INTRAVENOUS at 16:44

## 2018-08-30 RX ADMIN — FENTANYL CITRATE 25 MCG: 50 INJECTION, SOLUTION INTRAMUSCULAR; INTRAVENOUS at 16:24

## 2018-08-30 RX ADMIN — HYDROMORPHONE HYDROCHLORIDE 0.5 MG: 1 INJECTION, SOLUTION INTRAMUSCULAR; INTRAVENOUS; SUBCUTANEOUS at 17:19

## 2018-08-30 RX ADMIN — SODIUM CHLORIDE, SODIUM LACTATE, POTASSIUM CHLORIDE, AND CALCIUM CHLORIDE 125 ML/HR: .6; .31; .03; .02 INJECTION, SOLUTION INTRAVENOUS at 17:39

## 2018-08-30 RX ADMIN — SODIUM CHLORIDE, SODIUM LACTATE, POTASSIUM CHLORIDE, AND CALCIUM CHLORIDE 125 ML/HR: .6; .31; .03; .02 INJECTION, SOLUTION INTRAVENOUS at 18:11

## 2018-08-30 RX ADMIN — MORPHINE SULFATE 0.2 MG: 0.5 INJECTION, SOLUTION EPIDURAL; INTRATHECAL; INTRAVENOUS at 14:31

## 2018-08-30 RX ADMIN — SODIUM CHLORIDE, SODIUM LACTATE, POTASSIUM CHLORIDE, AND CALCIUM CHLORIDE: .6; .31; .03; .02 INJECTION, SOLUTION INTRAVENOUS at 15:54

## 2018-08-30 RX ADMIN — Medication 62.5 MILLI-UNITS/MIN: at 16:40

## 2018-08-30 RX ADMIN — HYDROMORPHONE HYDROCHLORIDE 0.5 MG: 1 INJECTION, SOLUTION INTRAMUSCULAR; INTRAVENOUS; SUBCUTANEOUS at 17:39

## 2018-08-30 RX ADMIN — OXYCODONE HYDROCHLORIDE AND ACETAMINOPHEN 1 TABLET: 5; 325 TABLET ORAL at 18:14

## 2018-08-30 RX ADMIN — FENTANYL CITRATE 100 MCG: 50 INJECTION, SOLUTION INTRAMUSCULAR; INTRAVENOUS at 15:30

## 2018-08-30 RX ADMIN — HYDROMORPHONE HYDROCHLORIDE 0.5 MG: 1 INJECTION, SOLUTION INTRAMUSCULAR; INTRAVENOUS; SUBCUTANEOUS at 17:06

## 2018-08-30 RX ADMIN — BUPIVACAINE HYDROCHLORIDE IN DEXTROSE 1.6 ML: 7.5 INJECTION, SOLUTION SUBARACHNOID at 14:31

## 2018-08-30 RX ADMIN — KETOROLAC TROMETHAMINE 30 MG: 30 INJECTION, SOLUTION INTRAMUSCULAR at 22:38

## 2018-08-30 RX ADMIN — BENZOCAINE AND LEVOMENTHOL: 200; 5 SPRAY TOPICAL at 14:09

## 2018-08-30 RX ADMIN — OXYTOCIN 10 UNITS: 10 INJECTION, SOLUTION INTRAMUSCULAR; INTRAVENOUS at 14:59

## 2018-08-30 RX ADMIN — HYDROMORPHONE HYDROCHLORIDE 0.5 MG: 1 INJECTION, SOLUTION INTRAMUSCULAR; INTRAVENOUS; SUBCUTANEOUS at 18:14

## 2018-08-30 RX ADMIN — CEFAZOLIN SODIUM 2000 MG: 2 SOLUTION INTRAVENOUS at 14:33

## 2018-08-30 RX ADMIN — SODIUM CHLORIDE, SODIUM LACTATE, POTASSIUM CHLORIDE, AND CALCIUM CHLORIDE: .6; .31; .03; .02 INJECTION, SOLUTION INTRAVENOUS at 14:50

## 2018-08-30 RX ADMIN — MORPHINE SULFATE 4.8 MG: 0.5 INJECTION, SOLUTION EPIDURAL; INTRATHECAL; INTRAVENOUS at 15:13

## 2018-08-30 RX ADMIN — KETOROLAC TROMETHAMINE 30 MG: 30 INJECTION, SOLUTION INTRAMUSCULAR at 15:58

## 2018-08-30 RX ADMIN — SODIUM CHLORIDE, SODIUM LACTATE, POTASSIUM CHLORIDE, AND CALCIUM CHLORIDE 125 ML/HR: .6; .31; .03; .02 INJECTION, SOLUTION INTRAVENOUS at 08:51

## 2018-08-30 NOTE — ANESTHESIA PROCEDURE NOTES
Spinal Block    Patient location during procedure: OB  Start time: 8/30/2018 2:24 PM  End time: 8/30/2018 2:31 PM  Staffing  Anesthesiologist: Padmini Lozano  Preanesthetic Checklist  Completed: patient identified, surgical consent, pre-op evaluation, timeout performed, IV checked, risks and benefits discussed and monitors and equipment checked  Spinal Block  Patient position: sitting  Prep: Betadine  Patient monitoring: continuous pulse ox and frequent blood pressure checks  Approach: midline  Location: L3-4  Injection technique: single-shot  Needle  Needle type: pencil-tip   Needle gauge: 25 G  Assessment  Injection Assessment:  negative aspiration for heme, no paresthesia on injection and positive aspiration for clear CSF    Post-procedure:  site cleaned

## 2018-08-30 NOTE — OP NOTE
Section Procedure Note    Indications: Hx of prior C/S x1, maternal request for repeat  section    Pre-operative Diagnosis: 39w0d pregnancy, hidradenitis suppurativa, sickle cell trait     Post-operative Diagnosis: same, delivered via RLTCS    Resident: Onita Collet, DO     Attending: Blas Rain MD    Anesthesia: Spinal anesthesia    ASA Class: 2    Findings:  Viable male weighing 9lbs 13oz; Apgar scores of 9 at one minute and 9 at five minutes  Clear amniotic fluid  Normal uterus, tubes, and ovaries  Normal placenta with 3-vessel cord  Arterial and venous cord gases were collected but they were not sent within an adequate time frame to be considered accurate    Specimens: Arterial and venous cord gases, cord blood, segment of umbilical cord, placenta to storage     Estimated Blood Loss: 700 mL    Drains: Gomez catheter           Complications:  None; patient tolerated the procedure well  Disposition: PACU            Condition: stable    Procedure Details   The patient was seen in the preoperative area for preop evaluation  The risks, benefits, and alternatives were explained in detail  Patient expressed her desire to proceed with a repeat  section and gave verbal consent  The patient was taken to [facility] Operating Room  She received appropriate spinal anesthesia  Fetal heart tones had been assessed and a Gomez catheter and SCDs were in place  The abdomen was prepped with Chloraprep, the vagina was prepped with Betadine, and following appropriate drying time, the patient was draped in the usual sterile manner  A Time Out was held and the above information confirmed  The patient was identified as Shannon Turner and the procedure verified as  Delivery  A Pfannenstiel incision was made and carried down through the underlying subcutaneous tissue to the fascia using a scalpel   The rectus fascia was then nicked in the midline and dissected laterally using Fritz scissors  The superior edge of the  fascial incision was grasped with Kocher clamps bilaterally, tented upward and the underlying rectus muscles were dissected off sharply with Hu scissors  This was repeated on the inferior edge of the fascia and dissected down to the pubic rami  The rectus muscles were  and the peritoneum was identified, entered, and extended longitudinally with blunt dissection  A low transverse uterine incision was made with the scalpel and extended laterally with blunt dissection  The amnion was entered bluntly  The fetal head and body was delivered through the hysterotomy with fundal pressure  The umbilical cord was clamped and cut  The infant was noted to have spontaneous cry and good tone and was handed off to the  providers  Arterial and venous cord gases, cord blood, and a segment of umbilical cord were obtained for evaluation  The placenta delivered spontaneously with uterine fundal massage and appeared normal  The uterus was exteriorized and cleaned out with a moist lap sponge  The uterine incision was closed with a running locked suture of 0 Monocryl  A second layer of the same suture was used to imbricate the first   Hemostasis was noted to be excellent  Normal saline solution was used to irrigate the posterior culdesac  The paracolic gutters were inspected and cleared of all clots and debris with irrigation and moist lap sponges  The fascia was closed with a running suture of 0 Vicryl  The skin was closed with a subcuticular running suture of 4-0 Vicryl on a Eusebio needle  Benzoin and steri-strips were applied  The patient appeared to tolerate the procedure very well  Lap sponge, needle, and instrument counts were correct x2  The patient was transferred to her postpartum recovery room in stable condition and her infant went to the  nursery      Attending Attestation: Dr Gonzalo Woody MD was present for the entire procedure      Myer Spatz, DO  PGY-2 OB/GYN   8/30/2018 4:21 PM    Specimen(s):  ID Type Source Tests Collected by Time Destination   A :  Cord Blood Cord BLOOD GAS, VENOUS, CORD, BLOOD GAS, ARTERIAL, CORD Roman Turner MD 8/30/2018 1456    B :  Tissue (Placenta on Hold) OB Only Placenta PLACENTA IN STORAGE Roman Turner MD 8/30/2018 0362

## 2018-08-30 NOTE — PROGRESS NOTES
Pt  Complains of continuous severe pain in her abdomen ranging from an 8 to a 9 out of 10 on the pain scale  Pt  Was given all four doses of Fentanyl per anesthesia's order  Dr Cailin Corona called and stated the patient can have Diluadid 0 5mg [id number] PRN up to 4 doses, telephone order with readback entered  Will reassess patients pain after she receives medication  Will continue to monitor

## 2018-08-30 NOTE — PLAN OF CARE
BIRTH - VAGINAL/ SECTION     Fetal and maternal status remain reassuring during the birth process [de-identified]     Emotionally satisfying birthing experience for mother/fetus [address] Discharge to home or other facility with appropriate resources Progressing        INFECTION - ADULT     Absence or prevention of progression during hospitalization Progressing     Absence of fever/infection during neutropenic period Progressing        Knowledge Deficit     Patient/family/caregiver demonstrates understanding of disease process, treatment plan, medications, and discharge instructions Progressing        PAIN - ADULT     Verbalizes/displays adequate comfort level or baseline comfort level Progressing        SAFETY ADULT     Patient will remain free of falls Progressing     Maintain or return to baseline ADL function Progressing     Maintain or return mobility status to optimal level Progressing

## 2018-08-30 NOTE — DISCHARGE SUMMARY
CS Discharge Summary - Ayaka Ag 34 y o  female MRN: [id number]    Unit/Bed#: LD PACU-03 Encounter: [id number]    Admission Date: 2018     Discharge Date: 18    Admitting Diagnosis:   1  IUP at 39w0d  2  Hx of prior C/S x1  3  Maternal request for repeat  section  4  Hx of sickle cell trait  5  Hx of pre-eclampsia in prior pregnancy  6  Hidradenitis suppurativa    Discharge Diagnosis:   Same, delivered      Procedures:   repeat  section, low transverse incision    Admitting Attending: Dr Kiran Barber  Delivery Attending: Dr Kiran Barber  Discharge Attending: Dr Qiana Coleman service: none  Consult attending: none    Hospital Course:     Ayaka Ag is a 34 y o  [id number] who was admitted at 39w0d for repeat  section for maternal request  She had a prior primary  section for failure to progress and did not desire TOLAC in this pregnancy  She underwent an uncomplicated  section delivery on 2018 and delivered a viable male  at 12  APGARS were 8, 9 at 1 and 5 minutes, respectively   weighed 9lb 13oz  Placenta was delivered at 1500   was then transferred to  nursery  Patient tolerated the procedure well and was transferred to recovery in stable condition  The patient's post partum course was unremarkable    Preoperative hemaglobin was 11 2, postoperative was 9 5  Her postoperative pain was well controlled with oral analgesics  On day of discharge, she was ambulating and able to reasonably perform all ADLs  She was voiding and had appropriate bowel function  Pain was well controlled  She was discharged home on post-operative day #3 without complications  Patient was instructed to follow up with her OB as an outpatient and was given appropriate warnings to call provider if she develops signs of infection or uncontrolled pain      Complications:   None    Condition at discharge:   good     Provisions for Follow-Up Care:  See after visit summary for information related to follow-up care and any pertinent home health orders  Disposition:   Home    Planned Readmission:   No    Discharge Medications:   Prenatal vitamin daily for 6 months or the duration of nursing whichever is longer  Motrin 600 mg orally every 6 hours as needed for pain  Tylenol (over the counter) per bottle directions as needed for pain  Hydrocortisone cream 1% (over the counter) applied 1-2x daily to hemorrhoids as needed  Witch hazel pads for hemorrhoidal discomfort as needed    Discharge instructions :   -Do not place anything (no partner, tampons or douche) in your vagina for 6 weeks  -You may walk for exercise for the first 6 weeks then gradually return to your usual activities    -Please do not drive for 1 week if you have no stitches and for 2 weeks if you have stitches or underwent a  delivery     -You may take baths or shower per your preference    -Please look at your bust (breasts) in the mirror daily and call provider for redness or tenderness or increased warmth  - If you have had a  please look at your incision daily as well and call provider for increasing redness or steady drainage from the incision    -Please call your provider if temperature > 100 4*F or 38* C, worsening pain or a foul discharge      Steve Bautista MD, PGY-1  2018  2:20 PM

## 2018-08-30 NOTE — ANESTHESIA PREPROCEDURE EVALUATION
Review of Systems/Medical History  Patient summary reviewed  Chart reviewed  No history of anesthetic complications     Cardiovascular  Negative cardio ROS Hypertension pregnancy-induced hypertension,    Pulmonary  Negative pulmonary ROS        GI/Hepatic  Negative GI/hepatic ROS          Negative  ROS        Endo/Other  History of thyroid disease , hypothyroidism,   Comment: Subclinical hyperthyroid    GYN  Negative gynecology ROS Currently pregnant ,     Comment: Prev c/s     Hematology      Comment: sickle cell trait Musculoskeletal  Negative musculoskeletal ROS        Neurology  Negative neurology ROS   Headaches,    Psychology   Negative psychology ROS              Physical Exam    Airway    Mallampati score: II  TM Distance: >3 FB  Neck ROM: full     Dental   No notable dental hx     Cardiovascular  Comment: Negative ROS,     Pulmonary      Other Findings        Anesthesia Plan  ASA Score- 2     Anesthesia Type- spinal with ASA Monitors  Additional Monitors:   Airway Plan:         Plan Factors-  Patient did not smoke on day of surgery  Induction-     Postoperative Plan-     Informed Consent- Anesthetic plan and risks discussed with patient

## 2018-08-30 NOTE — ANESTHESIA POSTPROCEDURE EVALUATION
Post-Op Assessment Note      CV Status:  Stable    Mental Status:  Alert and awake    Hydration Status:  Euvolemic    PONV Controlled:  Controlled    Airway Patency:  Patent    Post Op Vitals Reviewed: Yes          Staff: CRNA           /75 (08/30/18 1634)    Temp      Pulse 89 (08/30/18 1630)   Resp 18 (08/30/18 1630)    SpO2 100 % (08/30/18 1630)

## 2018-08-31 LAB
BASOPHILS # BLD AUTO: 0.01 THOUSANDS/ΜL (ref 0–0.1)
BASOPHILS NFR BLD AUTO: 0 % (ref 0–1)
EOSINOPHIL # BLD AUTO: 0.02 THOUSAND/ΜL (ref 0–0.61)
EOSINOPHIL NFR BLD AUTO: 0 % (ref 0–6)
ERYTHROCYTE [DISTWIDTH] IN BLOOD BY AUTOMATED COUNT: 13.2 % (ref 11.6–15.1)
HCT VFR BLD AUTO: 28.4 % (ref 34.8–46.1)
HGB BLD-MCNC: 9.5 G/DL (ref 11.5–15.4)
IMM GRANULOCYTES # BLD AUTO: 0.06 THOUSAND/UL (ref 0–0.2)
IMM GRANULOCYTES NFR BLD AUTO: 1 % (ref 0–2)
LYMPHOCYTES # BLD AUTO: 1.65 THOUSANDS/ΜL (ref 0.6–4.47)
LYMPHOCYTES NFR BLD AUTO: 16 % (ref 14–44)
MCH RBC QN AUTO: 27.9 PG (ref 26.8–34.3)
MCHC RBC AUTO-ENTMCNC: 33.5 G/DL (ref 31.4–37.4)
MCV RBC AUTO: 83 FL (ref 82–98)
MONOCYTES # BLD AUTO: 0.71 THOUSAND/ΜL (ref 0.17–1.22)
MONOCYTES NFR BLD AUTO: 7 % (ref 4–12)
NEUTROPHILS # BLD AUTO: 7.98 THOUSANDS/ΜL (ref 1.85–7.62)
NEUTS SEG NFR BLD AUTO: 76 % (ref 43–75)
NRBC BLD AUTO-RTO: 0 /100 WBCS
PLATELET # BLD AUTO: 209 THOUSANDS/UL (ref 149–390)
PMV BLD AUTO: 8.9 FL (ref 8.9–12.7)
RBC # BLD AUTO: 3.41 MILLION/UL (ref 3.81–5.12)
WBC # BLD AUTO: 10.43 THOUSAND/UL (ref 4.31–10.16)

## 2018-08-31 PROCEDURE — 85025 COMPLETE CBC W/AUTO DIFF WBC: CPT | Performed by: OBSTETRICS & GYNECOLOGY

## 2018-08-31 PROCEDURE — 99024 POSTOP FOLLOW-UP VISIT: CPT | Performed by: OBSTETRICS & GYNECOLOGY

## 2018-08-31 RX ADMIN — KETOROLAC TROMETHAMINE 30 MG: 30 INJECTION, SOLUTION INTRAMUSCULAR at 04:14

## 2018-08-31 RX ADMIN — KETOROLAC TROMETHAMINE 30 MG: 30 INJECTION, SOLUTION INTRAMUSCULAR at 09:39

## 2018-08-31 RX ADMIN — DOCUSATE SODIUM 100 MG: 100 CAPSULE, LIQUID FILLED ORAL at 09:38

## 2018-08-31 RX ADMIN — Medication 1 TABLET: at 09:38

## 2018-08-31 RX ADMIN — HYDROMORPHONE HYDROCHLORIDE 0.5 MG: 1 INJECTION, SOLUTION INTRAMUSCULAR; INTRAVENOUS; SUBCUTANEOUS at 12:33

## 2018-08-31 RX ADMIN — HYDROMORPHONE HYDROCHLORIDE 0.5 MG: 1 INJECTION, SOLUTION INTRAMUSCULAR; INTRAVENOUS; SUBCUTANEOUS at 02:08

## 2018-08-31 RX ADMIN — OXYCODONE HYDROCHLORIDE AND ACETAMINOPHEN 1 TABLET: 5; 325 TABLET ORAL at 15:15

## 2018-08-31 RX ADMIN — DOCUSATE SODIUM 100 MG: 100 CAPSULE, LIQUID FILLED ORAL at 15:16

## 2018-08-31 RX ADMIN — OXYCODONE HYDROCHLORIDE AND ACETAMINOPHEN 2 TABLET: 5; 325 TABLET ORAL at 19:34

## 2018-08-31 NOTE — PROGRESS NOTES
Post-op Note - OB/GYN   Stevan Necessary 34 y o  female MRN: [id number]  Unit/Bed#: -01 Encounter: [id number]    Assessment:  34 y o  G2 now  s/p RLTCS, POD#0    Plan:  RLTCS:  - POD#0  - Continue routine postpartum care  - Encourage ambulation as tolerated  - Encourage increased PO water intake  - Encouraged incentive spirometry  - Gomez in place, to remain until tomorrow AM  UOP adequate overnight, ranging from 0 4-9 0 cc/kg/hr  - Pain control as needed    Subjective/Objective   Chief Complaint:     34 y o  G2 now  s/p RLTCS, POD#0    Subjective:     Pain: yes, well controlled with pain regimen  Tolerating PO: yes  Voiding: Gomez in place  Flatus: no  BM: no  Ambulating: no  Chest pain: no  Shortness of breath: no  Leg pain: no    Pt reports overall doing well and feeling well  She would like some pain medications, as she feels her incision is starting to hurt more  Otherwise, pt has no complaints  Objective:     Vitals: Blood pressure 119/72, pulse 80, temperature 98 2 °F (36 8 °C), temperature source Oral, resp  rate 18, height 5' 4" (1 626 m), weight 88 5 kg (195 lb), last menstrual period 12/01/2017, SpO2 99 %, not currently breastfeeding  Physical Exam:   Gen: AaOx3, NAD, pleasant  Card: RRR, no m/r/g  Pulm: CTAB  Abd: Soft, appropriately tender, moderately distended  +BS  Bandage is clean, dry, and intact  : Fundus firm, nontender, located at +0XJ above umbilicus  Extremities: No edema, nontender, Negative Jannette's bilaterally      Lab, Imaging and other studies: I have personally reviewed pertinent reports        Lab Results   Component Value Date    WBC 10 43 (H) 08/31/2018    HGB 9 5 (L) 08/31/2018    HCT 28 4 (L) 08/31/2018    MCV 83 08/31/2018     08/31/2018         Sadi Sanchez, DO  OB/GYN, PGY3  8/31/2018, 8:57 AM

## 2018-08-31 NOTE — PROGRESS NOTES
Progress Note - OB/GYN   Daryle Quest 34 y o  female MRN: [id number]  Unit/Bed#:  301-01 Encounter: [id number]      Daryle Quest is a patient of [facility]    Assessment:  Post operative day #1 s/p RLTCS, stable, and doing well today  Her left gluteal abscess is still draining purulent fluid  The pain is well controlled, she denies fevers or chills  Overall she just complains of being tired but had no other concerns this morning  Plan:  1  Hidradenitis suppuritva   - L small gluteal abscess, draining purulent fluid   - I&D in ED 8/28/18, not on antibiotics   - Keep area clean and soak with warm water in Sitz bath or in shower  2  Sickle cell trait  3  Hemodynamically stable;    - Pre-op Hb 11 2 --> post-op Hb in process   - Vitals WNL, currently asymptomatic  4  Espinoza catheter   - Removed this am   - Making adequate urine output, 200cc/hr; will ensure that patient voids in 4 hours otherwise consider straight cath with espinoza   - F/u voiding trial today  5  Continue routine post partum care   - Encourage ambulation   - Encourage breastfeeding  6  Continue current meds   - See list below   - Pain adequately controlled with PO analgesics  7  Future contraception    - Pt desires Micronor for birth control as the DepoProvera made her "put on too much weight"  8  Disposition   - Stable   - Anticipate discharge home POD#3    Subjective/Objective     Chief Complaint:     Post operative day #1 from a RLTCS with no complaints today    Subjective:     Pain: no  Tolerating Oral Intake: yes  Voiding: yes  Flatus: yes  Bowel Movement: no  Ambulating: yes  Breastfeeding: Bottle feeding  Chest Pain: no  Shortness of Breath: no  Leg Pain/Discomfort: no  Lochia: minimal    Objective:   Vitals: /76 (BP Location: Right arm)   Pulse 77   Temp 98 2 °F (36 8 °C) (Oral)   Resp 18   Ht 5' 4" (1 626 m)   Wt 88 5 kg (195 lb)   LMP 12/01/2017 (Approximate)   SpO2 99%   Breastfeeding?  No   BMI 33 47 kg/m² Intake/Output Summary (Last 24 hours) at 08/31/18 0643  Last data filed at 08/31/18 0414   Gross per 24 hour   Intake             2200 ml   Output             1950 ml   Net              250 ml       Lab Results   Component Value Date    WBC 11 24 (H) 08/30/2018    HGB 11 2 (L) 08/30/2018    HCT 34 6 (L) 08/30/2018    MCV 83 08/30/2018     08/30/2018       Meds/Allergies     Physical Exam:  General: in no apparent distress and well developed and well nourished  Cardiovascular: Cor RRR  Lungs: clear to auscultation bilaterally  Abdomen: abdomen is soft without significant tenderness, masses, organomegaly or guarding; incision c/d/i closed with running absorbable suture  Fundus: Firm and non-tender, 1 cm below the umbilicus  Lower extremeties: nontender, SCDs in place bilaterally    Labs/Tests:   Recent Results (from the past 24 hour(s))   RPR    Collection Time: 08/30/18  8:48 AM   Result Value Ref Range    RPR Non-Reactive Non-Reactive   Type and screen and continue to monitor patient    Collection Time: 08/30/18  8:48 AM   Result Value Ref Range    ABO Grouping O     Rh Factor Positive     Antibody Screen Negative     Specimen Expiration Date [id number]    CBC    Collection Time: 08/30/18  8:49 AM   Result Value Ref Range    WBC 11 24 (H) 4 31 - 10 16 Thousand/uL    RBC 4 17 3 81 - 5 12 Million/uL    Hemoglobin 11 2 (L) 11 5 - 15 4 g/dL    Hematocrit 34 6 (L) 34 8 - 46 1 %    MCV 83 82 - 98 fL    MCH 26 9 26 8 - 34 3 pg    MCHC 32 4 31 4 - 37 4 g/dL    RDW 13 2 11 6 - 15 1 %    Platelets 131 710 - 049 Thousands/uL    MPV 9 3 8 9 - 12 7 fL   Blood gas, venous, cord    Collection Time: 08/30/18  2:56 PM   Result Value Ref Range    pH, Cord Yury 7 293 7 190 - 7 490    pCO2, Cord Yury 55 3 (H) 27 0 - 43 0 mm HG    pO2, Cord Yury 19 5 15 0 - 45 0 mm HG    HCO3, Cord Yury 26 2 12 2 - 28 6 mmol/L    Base Exc, Cord Yury -1 6 (L) 1 0 - 9 0 mmol/L    O2 Cont, Cord Yury 9 8 mL/dL    O2 HGB,VENOUS CORD 39 9 %   Blood gas, arterial, cord    Collection Time: 08/30/18  2:56 PM   Result Value Ref Range    pH, Cord Art 7 294 7 230 - 7 430    pCO2, Cord Art 54 2 30 0 - 60 0    pO2, Cord Art 19 5 5 0 - 25 0 mm HG    HCO3, Cord Art 25 7 17 3 - 27 3 mmol/L    Base Exc, Cord Art -1 9 (L) 3 0 - 11 0 mmol/L    O2 Content, Cord Art 9 8 ml/dl    O2 Hgb, Arterial Cord 39 9 %       MEDS:   Current Facility-Administered Medications   Medication Dose Route Frequency    acetaminophen (TYLENOL) tablet 650 mg  650 mg Oral Q4H PRN    calcium carbonate (TUMS) chewable tablet 1,000 mg  1,000 mg Oral Daily PRN    dexamethasone (DECADRON) injection 8 mg  8 mg Intravenous Once PRN    diphenhydrAMINE (BENADRYL) injection 25 mg  25 mg Intravenous Q6H PRN    docusate sodium (COLACE) capsule 100 mg  100 mg Oral Daily PRN    HYDROmorphone (DILAUDID) injection 0 5 mg  0 5 mg Intravenous Q5 Min PRN    HYDROmorphone (DILAUDID) injection 0 5 mg  0 5 mg Intravenous Q5 Min PRN    HYDROmorphone (DILAUDID) injection 1 mg  1 mg Intravenous Q2H PRN    ibuprofen (MOTRIN) tablet 600 mg  600 mg Oral Q4H PRN    ketorolac (TORADOL) injection 30 mg  30 mg Intravenous Q6H PRN    lactated ringers infusion  125 mL/hr Intravenous Continuous    lactated ringers infusion  125 mL/hr Intravenous Continuous    nalbuphine (NUBAIN) injection 5 mg  5 mg Intravenous Q3H PRN    naloxone (NARCAN) injection 0 1 mg  0 1 mg Intravenous Q3 min PRN    ondansetron (ZOFRAN) injection 4 mg  4 mg Intravenous Q4H PRN    ondansetron (ZOFRAN) injection 4 mg  4 mg Intravenous Q8H PRN    oxyCODONE-acetaminophen (PERCOCET) 5-325 mg per tablet 1 tablet  1 tablet Oral Q4H PRN    oxyCODONE-acetaminophen (PERCOCET) 5-325 mg per tablet 2 tablet  2 tablet Oral Q4H PRN    prenatal multivitamin tablet 1 tablet  1 tablet Oral Daily    simethicone (MYLICON) chewable tablet 80 mg  80 mg Oral 4x Daily PRN     Invasive Devices     Peripheral Intravenous Line            Peripheral IV 08/30/18 Left Forearm less than 1 day          Drain            Urethral Catheter Double-lumen;Non-latex 16 Fr  less than 1 day                Zane Blancas DO  PGY-2 OB/GYN   8/31/2018 6:43 AM

## 2018-09-01 PROCEDURE — 99024 POSTOP FOLLOW-UP VISIT: CPT | Performed by: OBSTETRICS & GYNECOLOGY

## 2018-09-01 RX ADMIN — Medication 1 TABLET: at 08:15

## 2018-09-01 RX ADMIN — DOCUSATE SODIUM 100 MG: 100 CAPSULE, LIQUID FILLED ORAL at 08:15

## 2018-09-01 RX ADMIN — OXYCODONE HYDROCHLORIDE AND ACETAMINOPHEN 2 TABLET: 5; 325 TABLET ORAL at 08:16

## 2018-09-01 RX ADMIN — OXYCODONE HYDROCHLORIDE AND ACETAMINOPHEN 2 TABLET: 5; 325 TABLET ORAL at 18:18

## 2018-09-01 RX ADMIN — OXYCODONE HYDROCHLORIDE AND ACETAMINOPHEN 1 TABLET: 5; 325 TABLET ORAL at 14:09

## 2018-09-01 RX ADMIN — DOCUSATE SODIUM 100 MG: 100 CAPSULE, LIQUID FILLED ORAL at 18:16

## 2018-09-01 RX ADMIN — OXYCODONE HYDROCHLORIDE AND ACETAMINOPHEN 2 TABLET: 5; 325 TABLET ORAL at 00:33

## 2018-09-01 RX ADMIN — OXYCODONE HYDROCHLORIDE AND ACETAMINOPHEN 2 TABLET: 5; 325 TABLET ORAL at 22:51

## 2018-09-01 NOTE — PROGRESS NOTES
Progress Note - OB/GYN   Shannon Turner 34 y o  female MRN: [id number]  Unit/Bed#:  301-01 Encounter: [id number]    Assessment:  POD#2 s/p Repeat low transverse  section, stable    Plan:  Plan:  1  Post partum   - Encourage ambulation  - Encourage breastfeeding  - Anticipate discharge tomorrow  2  Hidradenitis suppuritva  - L small gluteal abscess, draining purulent fluid  - I&D in ED 18, not on antibiotics  - Keep area clean and soak with warm water in Sitz bath or in shower  2  Sickle cell trait  3  Future contraception               - Pt desires Micronor for birth control        Subjective/Objective   Chief Complaint:     PP/POD#2 s/p Repeat low transverse  section    Subjective:     Pain: Incisional pain, cramping  Tolerating PO: yes  Voiding: yes  Flatus: yes  BM: no  Ambulating: yes  Breastfeeding: Breastfeeding  Chest pain: no  Shortness of breath: no  Leg pain: no  Lochia: Minimal    Objective:     Vitals:  Vitals:    18 2021 18 2347 18 0810 18 1546   BP: 118/67 129/71 115/63 104/67   BP Location:  Right arm  Right arm   Pulse: 89 93 91 82   Resp:    Temp: (!) 97 4 °F (36 3 °C) 98 3 °F (36 8 °C) 98 4 °F (36 9 °C) 98 3 °F (36 8 °C)   TempSrc: Oral Oral Oral Oral   SpO2:       Weight:       Height:           Physical Exam:     Physical Exam   Constitutional: She is oriented to person, place, and time  She appears well-developed and well-nourished  No distress  Cardiovascular: Normal rate, regular rhythm and normal heart sounds  Exam reveals no gallop and no friction rub  No murmur heard  Pulmonary/Chest: Effort normal and breath sounds normal  No respiratory distress  She has no wheezes  Abdominal: Soft  She exhibits no distension  There is no tenderness  Incision intact, no hematoma   Neurological: She is alert and oriented to person, place, and time  Skin: Skin is warm  She is not diaphoretic     Psychiatric: She has a normal mood and affect  Her behavior is normal      Uterine fundus firm and non-tender, -1 cm below the umbilicus       Lab, Imaging and other studies: I have personally reviewed pertinent reports        Lab Results   Component Value Date    WBC 10 43 (H) 08/31/2018    HGB 9 5 (L) 08/31/2018    HCT 28 4 (L) 08/31/2018    MCV 83 08/31/2018     08/31/2018               Nayana Lopez MD  [birth year]

## 2018-09-02 VITALS
HEART RATE: 80 BPM | WEIGHT: 195 LBS | HEIGHT: 64 IN | TEMPERATURE: 97.9 F | DIASTOLIC BLOOD PRESSURE: 60 MMHG | OXYGEN SATURATION: 97 % | RESPIRATION RATE: 18 BRPM | BODY MASS INDEX: 33.29 KG/M2 | SYSTOLIC BLOOD PRESSURE: 124 MMHG

## 2018-09-02 PROBLEM — Z3A.38 38 WEEKS GESTATION OF PREGNANCY: Status: RESOLVED | Noted: 2018-03-12 | Resolved: 2018-09-02

## 2018-09-02 RX ORDER — CALCIUM CARBONATE 200(500)MG
1000 TABLET,CHEWABLE ORAL DAILY PRN
Refills: 0 | Status: SHIPPED | OUTPATIENT
Start: 2018-09-02 | End: 2018-09-20

## 2018-09-02 RX ORDER — ACETAMINOPHEN 325 MG/1
TABLET ORAL
Qty: 30 TABLET | Refills: 0 | Status: SHIPPED | OUTPATIENT
Start: 2018-09-02 | End: 2018-09-20

## 2018-09-02 RX ORDER — OXYCODONE HYDROCHLORIDE AND ACETAMINOPHEN 5; 325 MG/1; MG/1
1 TABLET ORAL EVERY 4 HOURS PRN
Qty: 15 TABLET | Refills: 0 | Status: SHIPPED | OUTPATIENT
Start: 2018-09-02 | End: 2018-09-12

## 2018-09-02 RX ORDER — IBUPROFEN 600 MG/1
600 TABLET ORAL EVERY 4 HOURS PRN
Qty: 30 TABLET | Refills: 0 | Status: SHIPPED | OUTPATIENT
Start: 2018-09-02 | End: 2018-09-20

## 2018-09-02 RX ORDER — DOCUSATE SODIUM 100 MG/1
100 CAPSULE, LIQUID FILLED ORAL DAILY PRN
Qty: 10 CAPSULE | Refills: 0 | Status: SHIPPED | OUTPATIENT
Start: 2018-09-02 | End: 2018-09-06

## 2018-09-02 RX ORDER — SIMETHICONE 80 MG
80 TABLET,CHEWABLE ORAL 4 TIMES DAILY PRN
Qty: 30 TABLET | Refills: 0 | Status: SHIPPED | OUTPATIENT
Start: 2018-09-02 | End: 2018-09-20

## 2018-09-02 RX ORDER — ACETAMINOPHEN AND CODEINE PHOSPHATE 120; 12 MG/5ML; MG/5ML
1 SOLUTION ORAL DAILY
Qty: 28 TABLET | Refills: 0 | Status: SHIPPED | OUTPATIENT
Start: 2018-09-02 | End: 2018-09-20 | Stop reason: SDUPTHER

## 2018-09-02 RX ADMIN — OXYCODONE HYDROCHLORIDE AND ACETAMINOPHEN 2 TABLET: 5; 325 TABLET ORAL at 11:57

## 2018-09-02 RX ADMIN — IBUPROFEN 600 MG: 600 TABLET, FILM COATED ORAL at 09:33

## 2018-09-02 RX ADMIN — OXYCODONE HYDROCHLORIDE AND ACETAMINOPHEN 2 TABLET: 5; 325 TABLET ORAL at 06:08

## 2018-09-02 RX ADMIN — DOCUSATE SODIUM 100 MG: 100 CAPSULE, LIQUID FILLED ORAL at 07:43

## 2018-09-02 NOTE — DISCHARGE INSTRUCTIONS
WHAT YOU NEED TO KNOW:   A , or  section, is abdominal surgery to deliver your baby  DISCHARGE INSTRUCTIONS:   Call 911 for any of the following:   · You feel lightheaded, short of breath, and have chest pain  · You cough up blood  Seek care immediately if:   · Blood soaks through your bandage  · Your stitches come apart  · Your arm or leg feels warm, tender, and painful  It may look swollen and red  Contact your OB if:   · You have heavy vaginal bleeding that fills 1 or more sanitary pads in 1 hour  · You have a fever  · Your incision is swollen, red, or draining pus  · You have questions or concerns about yourself or your baby  Medicines: You may  need any of the following:  · Prescription pain medicine  may be given  Ask how to take this medicine safely  · Acetaminophen  decreases pain and fever  It is available without a doctor's order  Ask how much to take and how often to take it  Follow directions  Acetaminophen can cause liver damage if not taken correctly  · NSAIDs , such as ibuprofen, help decrease swelling, pain, and fever  NSAIDs can cause stomach bleeding or kidney problems in certain people  If you take blood thinner medicine, always ask your healthcare provider if NSAIDs are safe for you  Always read the medicine label and follow directions  · Take your medicine as directed  Contact your healthcare provider if you think your medicine is not helping or if you have side effects  Tell him or her if you are allergic to any medicine  Keep a list of the medicines, vitamins, and herbs you take  Include the amounts, and when and why you take them  Bring the list or the pill bottles to follow-up visits  Carry your medicine list with you in case of an emergency  Wound care:  Carefully wash your wound with soap and water every day  Keep your wound clean and dry  Wear loose, comfortable clothes that do not rub against your wound   Ask your OB about bathing and showering  Limit activity as directed:   · Ask when it is safe for you to drive, walk up stairs, lift heavy objects, and have sex  · Ask when it is okay to exercise, and what types of exercise to do  Start slowly and do more as you get stronger  Drink liquids as directed:  Liquids help keep you hydrated after your procedure and decrease your risk for a blood clot  Ask how much liquid to drink each day and which liquids are best for you  Follow up with your OB as directed: You may need to return to have your stitches or staples removed  Write down your questions so you remember to ask them during your visits  © 2017 [address] Information is for End User's use only and may not be sold, redistributed or otherwise used for commercial purposes  All illustrations and images included in CareNotes® are the copyrighted property of [employer] D [employer] M [employer]  or [employer]  The above information is an  only  It is not intended as medical advice for individual conditions or treatments  Talk to your doctor, nurse or pharmacist before following any medical regimen to see if it is safe and effective for you  Postpartum Depression   WHAT YOU NEED TO KNOW:   What is postpartum depression? Postpartum depression is a mood disorder that occurs after giving birth  A mood is an emotion or a feeling  Moods affect your behavior and how you feel about yourself and life in general  Depression is a sad mood that you cannot control  Women often feel sad, afraid, or nervous after their baby is born  These feelings are called postpartum blues or baby blues, and they usually go away in 1 to 2 weeks  With postpartum depression, these symptoms get worse and continue for more than 2 weeks  Postpartum depression is a serious condition that affects your daily activities and relationships  What causes postpartum depression?   Healthcare providers do not know exactly what causes postpartum depression  It may be caused by a sudden drop in hormone levels after childbirth  A previous episode of postpartum depression or a family history of depression may increase your risk  Several things may trigger postpartum depression:  · Lack of support from the baby's father or other family members    · Feeling more tired than usual    · Stress, a poor diet, or lack of sleep    · Pain after childbirth or pain during breastfeeding    · Sudden change in lifestyle  How is postpartum depression diagnosed? Postpartum depression affects your daily activities and your relationships with other people  Healthcare providers will ask you questions about your signs and symptoms and how they are affecting your life  The symptoms of postpartum depression usually begin within 1 month after childbirth  You feel depressed or lose interest in activities you enjoy nearly every day for at least 2 weeks  You also have 4 or more of the following symptoms:  · You feel tired or have less energy than usual      · You feel unimportant or guilty most of the time  · You think about hurting or killing yourself  · Your appetite changes  You may lose your appetite and lose weight without trying  Your appetite may also increase and you may gain weight  · You are restless, irritable, or withdrawn  · You have trouble concentrating and remembering things  You have trouble doing daily tasks or making decisions  · You have trouble sleeping, even after the baby is asleep  How is postpartum depression treated? · Psychotherapy:  During therapy, you will talk with healthcare providers about how to cope with your feelings and moods  This can be done alone or in a group  It may also be done with family members or your partner  · Antidepressants: This medicine is given to decrease or stop the symptoms of depression  You usually need to take antidepressants for several weeks before you begin to feel better   Do not stop taking antidepressants unless your healthcare provider tells you to  Healthcare providers may try a different antidepressant if one type does not work  What can I do to feel better? · Rest:  Do not try to do everything all at the same time  Do only what is needed and let other things wait until later  Ask your family or friends for help, especially if you have other children  Ask your partner to help with night feedings or other baby care  Try to sleep when the baby naps  · Get emotional support:  Share your feelings with your partner, a friend, or another mother  · Take care of yourself:  Shower and dress each day  Do not skip meals  Try to get out of the house a little each day  Get regular exercise  Eat a healthy diet  Avoid alcohol because it can make your depression worse  Do not isolate yourself  Go for a walk or meet with a friend  It is also important that you have some time by yourself each day  How do I find support and more information? · [address] [place] [address], Public Information & Communication Branch  [address], [address], [address]  [place] [address]   Phone: [phone]  Phone: [phone]  Web Address: [facility] tn  When should I contact my healthcare provider? · You cannot make it to your next visit  · Your depression does not get better with treatment or it gets worse  · You have questions or concerns about your condition or care  When should I seek immediate care or call 911? · You think about hurting or killing yourself, your baby, or someone else  · You feel like other people want to hurt you  · You hear voices telling you to hurt yourself or your baby  CARE AGREEMENT:   You have the right to help plan your care  Learn about your health condition and how it may be treated  Discuss treatment options with your caregivers to decide what care you want to receive   You always have the right to refuse treatment  The above information is an  only  It is not intended as medical advice for individual conditions or treatments  Talk to your doctor, nurse or pharmacist before following any medical regimen to see if it is safe and effective for you  © 2017 [address] Information is for End User's use only and may not be sold, redistributed or otherwise used for commercial purposes  All illustrations and images included in CareNotes® are the copyrighted property of A D A M , Inc  or Jae Ortiz

## 2018-09-02 NOTE — PROGRESS NOTES
Progress Note - OB/GYN   Ayaka Her 34 y o  female MRN: [id number]  Unit/Bed#:  301-01 Encounter: [id number]    Assessment:  POD#3 s/p Repeat low transverse  section, stable    Plan:  1  Incision site  - Minor oozing from incision site today, steri strips applied  - No wound dehiscence   - Patient afebrile  - Will follow-up for incision check in office later this week    2  Postop care  - Encourage ambulation  - Encourage breastfeeding  - Anticipate discharge today    3  Hidradenitis suppuritva  - L small gluteal abscess, draining purulent fluid  - I&D in ED 18, not on antibiotics  - Keep area clean and soak with warm water in Sitz bath or in shower    4  Sickle cell trait    5   Future contraception               - Pt desires Micronor for birth control        Subjective/Objective   Chief Complaint:     PP/POD#3 s/p Repeat low transverse  section    Subjective:     Pain: Incisional pain, cramping  Tolerating PO: yes  Voiding: yes  Flatus: yes  BM: no  Ambulating: yes  Breastfeeding: no  Chest pain: no  Shortness of breath: no  Leg pain: no  Lochia: Minimal    Objective:     Vitals:  Vitals:    18 0810 18 1546 18 0000 18 0733   BP: 115/63 104/67 138/72 118/70   BP Location:  Right arm Right arm    Pulse: 91 82 85 73   Resp:    Temp: 98 4 °F (36 9 °C) 98 3 °F (36 8 °C) 98 1 °F (36 7 °C) 98 °F (36 7 °C)   TempSrc: Oral Oral Oral Oral   SpO2:       Weight:       Height:           Physical Exam:     Physical Exam   Heart: Regular, rate and rhythm; no murmur  Lungs: CTAB, no wheezes or rales  Uterine fundus firm and non-tender, -2 cm below the umbilicus   Incision: Minor serosanguinous oozing from incision site, steristrips applied       Lab Results   Component Value Date    WBC 10 43 (H) 2018    HGB 9 5 (L) 2018    HCT 28 4 (L) 2018    MCV 83 2018     2018               Vic Leal MD  18

## 2018-09-04 ENCOUNTER — TRANSITIONAL CARE MANAGEMENT (OUTPATIENT)
Dept: FAMILY MEDICINE CLINIC | Facility: CLINIC | Age: 28
End: 2018-09-04

## 2018-09-04 ENCOUNTER — TELEPHONE (OUTPATIENT)
Dept: FAMILY MEDICINE CLINIC | Facility: CLINIC | Age: 28
End: 2018-09-04

## 2018-09-05 NOTE — CASE MANAGEMENT
Notification of Maternity Inpatient Admission/Maternity Inpatient Authorization Request  This is a Notification of Maternity Inpatient Admission/Maternity Inpatient Authorization Request to our facility [address] [place] [address]  Please be advised that this patient is currently in our facility under Inpatient Status  Below you will find the Birth/ Summary, Attending Physician and Facilitys information including NPI#  and contact information for the Utilization Review Department where the patient is receiving care services  Place of Service Code: 24   [facility]  Presentation Date & Time: 2018  8:18 AM  Inpatient Admission Date & Time: 18 3725  Discharge Date & Time: 2018  4:47 PM   Discharge Disposition (if discharged): Home/Self Care  Attending Physician & NPI: Violet Olguin Md [[id number]]  Mother's Admitting Diagnosis: Previous  section complicating pregnancy [Y76 221]  44 weeks gestation of pregnancy [Z3A 39]  Type of Delivery: Diagnosis: O82  DELIVERY    Estimated Date of Delivery: 18    Mother of [place] Information: Melvin Cruz   MRN: [id number] YOB: 1990   Information:   Information for the patient's :  Cirilo Mason [[id number]]      Delivery Information:  Sex: male  Delivered 2018 2:58 PM by , Low Transverse; Gestational Age: 36w0d    [place] Measurements:  Weight: 9 lb 13 oz (4451 g);   Height: 20 5"    APGAR 1 minute 5 minutes 10 minutes   Totals: 8 9      OB History      Para Term  AB Living    2 2 2     2    SAB TAB Ectopic Multiple Live Births          0 2        Obstetric Comments    PRE-ECLAMPSIA      Facility: [address] [place] [address])  Address: [address] [place] [address], [address] [place] [address]  Phone: [phone] Tax ID: [id number]  NPI: [id number]  Medicare ID: [id number]  Thank you,  [address] [place] [address] Utilization Review Department  Phone: [phone]; Fax [phone]  ATTENTION: Please call with any questions or concerns to [phone]  and carefully follow the prompts so that you are directed to the right person  Send all requests for admission clinical reviews, approved or denied determinations and any other requests to fax [phone]   All voicemails are confidential

## 2018-09-06 ENCOUNTER — OFFICE VISIT (OUTPATIENT)
Dept: OBGYN CLINIC | Facility: HOSPITAL | Age: 28
End: 2018-09-06
Payer: COMMERCIAL

## 2018-09-06 VITALS
HEIGHT: 64 IN | HEART RATE: 80 BPM | DIASTOLIC BLOOD PRESSURE: 77 MMHG | WEIGHT: 184.6 LBS | SYSTOLIC BLOOD PRESSURE: 117 MMHG | BODY MASS INDEX: 31.51 KG/M2

## 2018-09-06 DIAGNOSIS — Z09 POSTOPERATIVE EXAMINATION: Primary | ICD-10-CM

## 2018-09-06 PROCEDURE — 99024 POSTOP FOLLOW-UP VISIT: CPT | Performed by: NURSE PRACTITIONER

## 2018-09-06 NOTE — PROGRESS NOTES
Assessment/Plan:      Diagnoses and all orders for this visit:    Postoperative examination      -  Signs and symptoms to report reviewed    -  Continue ibuprofen and Percocet as needed for pain control    -  FMLA paperwork  Completed    -  Plans to use oral contraceptives for postpartum contraception  RTO 2 weeks PP exam     Subjective:     Patient ID: Ayaka Her is a 34 y o  female  HPI  here 7 days post repeat  section  Male infant weighing 9 lb 13 oz with Apgars of 9/9  Formula feeding  Pain well controlled with Percocet ( approximately 2 per day) and ibuprofen as needed  Minimal lochia  Denies bowel concerns, bladder concerns, drainage, erythema, fever, chills  Plans to use oral contraceptive pills for postpartum contraception  FMLA paperwork  Completed patient to return to work at 8 weeks postpartum, patient may desire to return early depending on how she feels  No  complaints of postpartum depression  Review of Systems   Respiratory: Negative  Cardiovascular: Negative  Gastrointestinal: Negative  Genitourinary: Negative  Objective:     Physical Exam   Constitutional: She is oriented to person, place, and time  She appears well-developed and well-nourished  Cardiovascular: Normal rate, regular rhythm and normal heart sounds  Pulmonary/Chest: Effort normal and breath sounds normal    Neurological: She is alert and oriented to person, place, and time  Skin: Skin is warm and dry  Incision- Steri-Strips removed, clean, dry, intact  Approximated  No erythema, no drainage  Psychiatric: She has a normal mood and affect   Her behavior is normal

## 2018-09-06 NOTE — PATIENT INSTRUCTIONS
Postpartum Bleeding   WHAT YOU NEED TO KNOW:   What is postpartum bleeding? Postpartum bleeding is vaginal bleeding after childbirth  This bleeding is normal, whether your baby was born vaginally or by   It contains blood and the tissue that lined the inside of your uterus when you were pregnant  What should I expect with postpartum bleeding? Postpartum bleeding usually lasts at least 10 days, and may last longer than 6 weeks  Your bleeding may range from light (barely staining a pad) to heavy (soaking a pad in 1 hour)  Usually, you have heavier bleeding right after childbirth, which slows over the next few weeks until it stops  The bleeding is red or dark brown with clots for the first 1 to 3 days  It then turns pink for several days, and then becomes a white or yellow discharge until it ends  When should I contact my healthcare provider? · Your bleeding increases, or you have heavy bleeding that soaks a pad in 1 hour for 2 hours in a row  · You pass large blood clots  · You are breathing faster than normal, or your heart is beating faster than normal     · You are urinating less than usual, or not at all  · You feel dizzy  · You have questions or concerns about your condition or care  When should I seek immediate care or call 911? · You are suddenly short of breath and feel lightheaded  · You have sudden chest pain  CARE AGREEMENT:   You have the right to help plan your care  Learn about your health condition and how it may be treated  Discuss treatment options with your caregivers to decide what care you want to receive  You always have the right to refuse treatment  The above information is an  only  It is not intended as medical advice for individual conditions or treatments  Talk to your doctor, nurse or pharmacist before following any medical regimen to see if it is safe and effective for you    © 2017 [employer] Information is for End User's use only and may not be sold, redistributed or otherwise used for commercial purposes  All illustrations and images included in CareNotes® are the copyrighted property of A D A M , Inc  or Jae Ortiz  Postpartum Depression   WHAT YOU NEED TO KNOW:   What is postpartum depression? Postpartum depression is a mood disorder that occurs after giving birth  A mood is an emotion or a feeling  Moods affect your behavior and how you feel about yourself and life in general  Depression is a sad mood that you cannot control  Women often feel sad, afraid, or nervous after their baby is born  These feelings are called postpartum blues or baby blues, and they usually go away in 1 to 2 weeks  With postpartum depression, these symptoms get worse and continue for more than 2 weeks  Postpartum depression is a serious condition that affects your daily activities and relationships  What causes postpartum depression? Healthcare providers do not know exactly what causes postpartum depression  It may be caused by a sudden drop in hormone levels after childbirth  A previous episode of postpartum depression or a family history of depression may increase your risk  Several things may trigger postpartum depression:  · Lack of support from the baby's father or other family members    · Feeling more tired than usual    · Stress, a poor diet, or lack of sleep    · Pain after childbirth or pain during breastfeeding    · Sudden change in lifestyle  How is postpartum depression diagnosed? Postpartum depression affects your daily activities and your relationships with other people  Healthcare providers will ask you questions about your signs and symptoms and how they are affecting your life  The symptoms of postpartum depression usually begin within 1 month after childbirth  You feel depressed or lose interest in activities you enjoy nearly every day for at least 2 weeks   You also have 4 or more of the following symptoms:  · You feel tired or have less energy than usual      · You feel unimportant or guilty most of the time  · You think about hurting or killing yourself  · Your appetite changes  You may lose your appetite and lose weight without trying  Your appetite may also increase and you may gain weight  · You are restless, irritable, or withdrawn  · You have trouble concentrating and remembering things  You have trouble doing daily tasks or making decisions  · You have trouble sleeping, even after the baby is asleep  How is postpartum depression treated? · Psychotherapy:  During therapy, you will talk with healthcare providers about how to cope with your feelings and moods  This can be done alone or in a group  It may also be done with family members or your partner  · Antidepressants: This medicine is given to decrease or stop the symptoms of depression  You usually need to take antidepressants for several weeks before you begin to feel better  Do not stop taking antidepressants unless your healthcare provider tells you to  Healthcare providers may try a different antidepressant if one type does not work  What can I do to feel better? · Rest:  Do not try to do everything all at the same time  Do only what is needed and let other things wait until later  Ask your family or friends for help, especially if you have other children  Ask your partner to help with night feedings or other baby care  Try to sleep when the baby naps  · Get emotional support:  Share your feelings with your partner, a friend, or another mother  · Take care of yourself:  Shower and dress each day  Do not skip meals  Try to get out of the house a little each day  Get regular exercise  Eat a healthy diet  Avoid alcohol because it can make your depression worse  Do not isolate yourself  Go for a walk or meet with a friend  It is also important that you have some time by yourself each day  How do I find support and more information? · [address] [facility], Public Information & Communication Branch  [address], [address], [address]  [place] [address]   Phone: [phone]  Phone: [phone]  Web Address: Masoud tn  When should I contact my healthcare provider? · You cannot make it to your next visit  · Your depression does not get better with treatment or it gets worse  · You have questions or concerns about your condition or care  When should I seek immediate care or call 91? · You think about hurting or killing yourself, your baby, or someone else  · You feel like other people want to hurt you  · You hear voices telling you to hurt yourself or your baby  CARE AGREEMENT:   You have the right to help plan your care  Learn about your health condition and how it may be treated  Discuss treatment options with your caregivers to decide what care you want to receive  You always have the right to refuse treatment  The above information is an  only  It is not intended as medical advice for individual conditions or treatments  Talk to your doctor, nurse or pharmacist before following any medical regimen to see if it is safe and effective for you  © 2017 [facility] Information is for End User's use only and may not be sold, redistributed or otherwise used for commercial purposes  All illustrations and images included in CareNotes® are the copyrighted property of [employer] , [employer]  or Jae Ortiz

## 2018-09-10 LAB — PLACENTA IN STORAGE: NORMAL

## 2018-09-20 ENCOUNTER — ROUTINE PRENATAL (OUTPATIENT)
Dept: OBGYN CLINIC | Facility: HOSPITAL | Age: 28
End: 2018-09-20
Payer: COMMERCIAL

## 2018-09-20 VITALS
BODY MASS INDEX: 31.58 KG/M2 | DIASTOLIC BLOOD PRESSURE: 67 MMHG | SYSTOLIC BLOOD PRESSURE: 110 MMHG | HEIGHT: 64 IN | HEART RATE: 82 BPM | WEIGHT: 185 LBS

## 2018-09-20 DIAGNOSIS — Z98.891 S/P REPEAT LOW TRANSVERSE C-SECTION: ICD-10-CM

## 2018-09-20 PROBLEM — O09.299 HX OF PREECLAMPSIA, PRIOR PREGNANCY, CURRENTLY PREGNANT: Status: RESOLVED | Noted: 2018-03-12 | Resolved: 2018-09-20

## 2018-09-20 PROBLEM — Z34.93 PRENATAL CARE IN THIRD TRIMESTER: Status: RESOLVED | Noted: 2018-06-05 | Resolved: 2018-09-20

## 2018-09-20 PROCEDURE — 99213 OFFICE O/P EST LOW 20 MIN: CPT | Performed by: NURSE PRACTITIONER

## 2018-09-20 RX ORDER — ACETAMINOPHEN AND CODEINE PHOSPHATE 120; 12 MG/5ML; MG/5ML
1 SOLUTION ORAL DAILY
Qty: 28 TABLET | Refills: 5 | Status: SHIPPED | OUTPATIENT
Start: 2018-09-20 | End: 2019-04-08

## 2018-09-20 NOTE — LETTER
September 20, 2018     Patient: Linette Ibarra   YOB: 1990   Date of Visit: 9/20/2018       To Whom it May Concern:    Linette Ibarra is under my professional care  She was seen in my office on 9/20/2018  She may return to work with limitations 9/27/18  She should have assistance with repositioning, lifting   Patient's and transporting  as needed  May return to work without limitations on 10/25/18  If you have any questions or concerns, please don't hesitate to call           Sincerely,          GABO Bennett        CC: No Recipients

## 2018-09-20 NOTE — PATIENT INSTRUCTIONS
Postpartum Bleeding   WHAT YOU NEED TO KNOW:   What is postpartum bleeding? Postpartum bleeding is vaginal bleeding after childbirth  This bleeding is normal, whether your baby was born vaginally or by   It contains blood and the tissue that lined the inside of your uterus when you were pregnant  What should I expect with postpartum bleeding? Postpartum bleeding usually lasts at least 10 days, and may last longer than 6 weeks  Your bleeding may range from light (barely staining a pad) to heavy (soaking a pad in 1 hour)  Usually, you have heavier bleeding right after childbirth, which slows over the next few weeks until it stops  The bleeding is red or dark brown with clots for the first 1 to 3 days  It then turns pink for several days, and then becomes a white or yellow discharge until it ends  When should I contact my healthcare provider? · Your bleeding increases, or you have heavy bleeding that soaks a pad in 1 hour for 2 hours in a row  · You pass large blood clots  · You are breathing faster than normal, or your heart is beating faster than normal     · You are urinating less than usual, or not at all  · You feel dizzy  · You have questions or concerns about your condition or care  When should I seek immediate care or call 911? · You are suddenly short of breath and feel lightheaded  · You have sudden chest pain  CARE AGREEMENT:   You have the right to help plan your care  Learn about your health condition and how it may be treated  Discuss treatment options with your caregivers to decide what care you want to receive  You always have the right to refuse treatment  The above information is an  only  It is not intended as medical advice for individual conditions or treatments  Talk to your doctor, nurse or pharmacist before following any medical regimen to see if it is safe and effective for you    © 2017 [employer] Information is for End User's use only and may not be sold, redistributed or otherwise used for commercial purposes  All illustrations and images included in CareNotes® are the copyrighted property of A D A M , Inc  or Jae Ortiz  Postpartum Depression   WHAT YOU NEED TO KNOW:   What is postpartum depression? Postpartum depression is a mood disorder that occurs after giving birth  A mood is an emotion or a feeling  Moods affect your behavior and how you feel about yourself and life in general  Depression is a sad mood that you cannot control  Women often feel sad, afraid, or nervous after their baby is born  These feelings are called postpartum blues or baby blues, and they usually go away in 1 to 2 weeks  With postpartum depression, these symptoms get worse and continue for more than 2 weeks  Postpartum depression is a serious condition that affects your daily activities and relationships  What causes postpartum depression? Healthcare providers do not know exactly what causes postpartum depression  It may be caused by a sudden drop in hormone levels after childbirth  A previous episode of postpartum depression or a family history of depression may increase your risk  Several things may trigger postpartum depression:  · Lack of support from the baby's father or other family members    · Feeling more tired than usual    · Stress, a poor diet, or lack of sleep    · Pain after childbirth or pain during breastfeeding    · Sudden change in lifestyle  How is postpartum depression diagnosed? Postpartum depression affects your daily activities and your relationships with other people  Healthcare providers will ask you questions about your signs and symptoms and how they are affecting your life  The symptoms of postpartum depression usually begin within 1 month after childbirth  You feel depressed or lose interest in activities you enjoy nearly every day for at least 2 weeks   You also have 4 or more of the following symptoms:  · You feel tired or have less energy than usual      · You feel unimportant or guilty most of the time  · You think about hurting or killing yourself  · Your appetite changes  You may lose your appetite and lose weight without trying  Your appetite may also increase and you may gain weight  · You are restless, irritable, or withdrawn  · You have trouble concentrating and remembering things  You have trouble doing daily tasks or making decisions  · You have trouble sleeping, even after the baby is asleep  How is postpartum depression treated? · Psychotherapy:  During therapy, you will talk with healthcare providers about how to cope with your feelings and moods  This can be done alone or in a group  It may also be done with family members or your partner  · Antidepressants: This medicine is given to decrease or stop the symptoms of depression  You usually need to take antidepressants for several weeks before you begin to feel better  Do not stop taking antidepressants unless your healthcare provider tells you to  Healthcare providers may try a different antidepressant if one type does not work  What can I do to feel better? · Rest:  Do not try to do everything all at the same time  Do only what is needed and let other things wait until later  Ask your family or friends for help, especially if you have other children  Ask your partner to help with night feedings or other baby care  Try to sleep when the baby naps  · Get emotional support:  Share your feelings with your partner, a friend, or another mother  · Take care of yourself:  Shower and dress each day  Do not skip meals  Try to get out of the house a little each day  Get regular exercise  Eat a healthy diet  Avoid alcohol because it can make your depression worse  Do not isolate yourself  Go for a walk or meet with a friend  It is also important that you have some time by yourself each day  How do I find support and more information? · [address] [facility], Public Information & Communication Branch  [address], [address], [address]  [place] [address]   Phone: [phone]  Phone: [phone]  Web Address: Masoud tn  When should I contact my healthcare provider? · You cannot make it to your next visit  · Your depression does not get better with treatment or it gets worse  · You have questions or concerns about your condition or care  When should I seek immediate care or call 911? · You think about hurting or killing yourself, your baby, or someone else  · You feel like other people want to hurt you  · You hear voices telling you to hurt yourself or your baby  CARE AGREEMENT:   You have the right to help plan your care  Learn about your health condition and how it may be treated  Discuss treatment options with your caregivers to decide what care you want to receive  You always have the right to refuse treatment  The above information is an  only  It is not intended as medical advice for individual conditions or treatments  Talk to your doctor, nurse or pharmacist before following any medical regimen to see if it is safe and effective for you  © 2017 [facility] Information is for End User's use only and may not be sold, redistributed or otherwise used for commercial purposes  All illustrations and images included in CareNotes® are the copyrighted property of [employer]  or Jae Ortiz

## 2018-09-20 NOTE — PROGRESS NOTES
Subjective     Winnie Mendez is a 34 y o  y o  female  who presents for a postpartum visit  She is 3 weeks postpartum following a   Repeat  section on 18    The delivery was at 44 gestational weeks  Male infant weighing 9 lb 13 oz with Apgars of 9/9  Postpartum course has been   Uncomplicated  Baby's course has been  uncomplicated  Baby is feeding by  formula  Bleeding staining only  Bowel function is normal  Bladder function is normal  Patient is not sexually active  Contraception method is oral progesterone-only contraceptive  Postpartum depression screening: negative  [address]- 3  States she has help at home, has a supportive family  Is sleeping well  Desires to return to work at the end of this month  Patient is a PCA at [employer]     The following portions of the patient's history were reviewed and updated as appropriate: allergies, current medications, past medical history, past social history, past surgical history and problem list     Review of Systems  Pertinent items are noted in HPI       Objective     /64   Wt 59 4 kg (131 lb)   BMI 22 49 kg/m²    General:   appears stated age, cooperative, alert normal mood and affect   Neck: Neck: normal, supple,trachea midline, no masses   Heart: regular rate and rhythm, S1, S2 normal, no murmur, click, rub or gallop   Lungs: clear to auscultation bilaterally     Incision: Clean, dry, intact no erythema no drainage  shelf noted at 2 areas- well-healed    Assessment/Plan     3 week  postpartum exam     1  Contraception: Norethindrone       -   Patient has progesterone only pills at home  Will begin this month  Reviewed with patient progesterone only pills are used up until 8 weeks postpartum after that we can transition her to combination pills that she had been on prior  Patient is agreeable, questions answered    2   Work note provided with limitations requesting 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

## 2018-10-15 ENCOUNTER — TELEPHONE (OUTPATIENT)
Dept: OBGYN CLINIC | Facility: HOSPITAL | Age: 28
End: 2018-10-15

## 2018-10-15 NOTE — TELEPHONE ENCOUNTER
Pt needs new letter to return to work   You gave one to return 10-25 but she wants to go back asap with no limitations

## 2019-01-17 ENCOUNTER — TELEPHONE (OUTPATIENT)
Dept: FAMILY MEDICINE CLINIC | Facility: CLINIC | Age: 29
End: 2019-01-17

## 2019-01-17 NOTE — TELEPHONE ENCOUNTER
CYST ON THE RIGHT SIDE OF HER GROIN FOR A MONTH NOW AND GETTING WORSE BY THE DAY  PATIENT WOULD LIKE TO SEE YOU SOMETIME TODAY HOWEVER LATER IN THE AFTERNOON WOULD WORK BETTER FOR HER      PLEASE ADVISE

## 2019-01-25 ENCOUNTER — HOSPITAL ENCOUNTER (EMERGENCY)
Facility: HOSPITAL | Age: 29
Discharge: HOME | End: 2019-01-26
Attending: EMERGENCY MEDICINE | Admitting: EMERGENCY MEDICINE
Payer: COMMERCIAL

## 2019-01-25 ENCOUNTER — APPOINTMENT (EMERGENCY)
Dept: RADIOLOGY | Facility: HOSPITAL | Age: 29
End: 2019-01-25
Payer: COMMERCIAL

## 2019-01-25 DIAGNOSIS — J20.9 ACUTE BRONCHITIS: Primary | ICD-10-CM

## 2019-01-25 LAB
FLUAV AG SPEC QL IA: NEGATIVE
FLUBV AG SPEC QL IA: NEGATIVE

## 2019-01-25 PROCEDURE — 94640 AIRWAY INHALATION TREATMENT: CPT

## 2019-01-25 PROCEDURE — 71046 X-RAY EXAM CHEST 2 VIEWS: CPT

## 2019-01-25 PROCEDURE — 87631 RESP VIRUS 3-5 TARGETS: CPT | Performed by: EMERGENCY MEDICINE

## 2019-01-25 PROCEDURE — 96361 HYDRATE IV INFUSION ADD-ON: CPT

## 2019-01-25 PROCEDURE — 99284 EMERGENCY DEPT VISIT MOD MDM: CPT

## 2019-01-25 PROCEDURE — 93005 ELECTROCARDIOGRAM TRACING: CPT

## 2019-01-25 RX ORDER — KETOROLAC TROMETHAMINE 30 MG/ML
15 INJECTION, SOLUTION INTRAMUSCULAR; INTRAVENOUS ONCE
Status: COMPLETED | OUTPATIENT
Start: 2019-01-25 | End: 2019-01-26

## 2019-01-25 RX ORDER — ALBUTEROL SULFATE 2.5 MG/3ML
5 SOLUTION RESPIRATORY (INHALATION) ONCE
Status: COMPLETED | OUTPATIENT
Start: 2019-01-25 | End: 2019-01-25

## 2019-01-25 RX ADMIN — ALBUTEROL SULFATE 5 MG: 2.5 SOLUTION RESPIRATORY (INHALATION) at 23:09

## 2019-01-25 RX ADMIN — IPRATROPIUM BROMIDE 0.5 MG: 0.5 SOLUTION RESPIRATORY (INHALATION) at 23:09

## 2019-01-25 RX ADMIN — SODIUM CHLORIDE 1000 ML: 0.9 INJECTION, SOLUTION INTRAVENOUS at 23:29

## 2019-01-26 VITALS
DIASTOLIC BLOOD PRESSURE: 77 MMHG | SYSTOLIC BLOOD PRESSURE: 119 MMHG | RESPIRATION RATE: 20 BRPM | OXYGEN SATURATION: 99 % | BODY MASS INDEX: 30.9 KG/M2 | WEIGHT: 180 LBS | HEART RATE: 102 BPM | TEMPERATURE: 98 F

## 2019-01-26 LAB
ATRIAL RATE: 95 BPM
EXT PREG TEST URINE: NORMAL
FLUAV AG SPEC QL: NORMAL
FLUBV AG SPEC QL: NORMAL
P AXIS: 79 DEGREES
PR INTERVAL: 126 MS
QRS AXIS: 74 DEGREES
QRSD INTERVAL: 74 MS
QT INTERVAL: 324 MS
QTC INTERVAL: 407 MS
RSV B RNA SPEC QL NAA+PROBE: NORMAL
T WAVE AXIS: 4 DEGREES
VENTRICULAR RATE: 95 BPM

## 2019-01-26 PROCEDURE — 96374 THER/PROPH/DIAG INJ IV PUSH: CPT

## 2019-01-26 PROCEDURE — 96361 HYDRATE IV INFUSION ADD-ON: CPT

## 2019-01-26 PROCEDURE — 81025 URINE PREGNANCY TEST: CPT | Performed by: EMERGENCY MEDICINE

## 2019-01-26 PROCEDURE — 93010 ELECTROCARDIOGRAM REPORT: CPT | Performed by: INTERNAL MEDICINE

## 2019-01-26 RX ORDER — IPRATROPIUM BROMIDE AND ALBUTEROL SULFATE 2.5; .5 MG/3ML; MG/3ML
3 SOLUTION RESPIRATORY (INHALATION) ONCE
Status: COMPLETED | OUTPATIENT
Start: 2019-01-26 | End: 2019-01-26

## 2019-01-26 RX ORDER — PREDNISONE 20 MG/1
40 TABLET ORAL DAILY
Qty: 8 TABLET | Refills: 0 | Status: SHIPPED | OUTPATIENT
Start: 2019-01-26 | End: 2019-01-30 | Stop reason: ALTCHOICE

## 2019-01-26 RX ORDER — ALBUTEROL SULFATE 90 UG/1
2 AEROSOL, METERED RESPIRATORY (INHALATION) EVERY 4 HOURS PRN
Qty: 1 INHALER | Refills: 0 | Status: SHIPPED | OUTPATIENT
Start: 2019-01-26 | End: 2019-02-28

## 2019-01-26 RX ORDER — ALBUTEROL SULFATE 90 UG/1
2 AEROSOL, METERED RESPIRATORY (INHALATION) ONCE
Status: COMPLETED | OUTPATIENT
Start: 2019-01-26 | End: 2019-01-26

## 2019-01-26 RX ADMIN — ALBUTEROL SULFATE 2 PUFF: 90 AEROSOL, METERED RESPIRATORY (INHALATION) at 01:37

## 2019-01-26 RX ADMIN — KETOROLAC TROMETHAMINE 15 MG: 30 INJECTION, SOLUTION INTRAMUSCULAR at 00:15

## 2019-01-26 RX ADMIN — IPRATROPIUM BROMIDE AND ALBUTEROL SULFATE 3 ML: 2.5; .5 SOLUTION RESPIRATORY (INHALATION) at 00:34

## 2019-01-26 RX ADMIN — PREDNISONE 50 MG: 20 TABLET ORAL at 01:36

## 2019-01-26 NOTE — ED PROVIDER NOTES
History  Chief Complaint   Patient presents with    Generalized Body Aches     pt reports "i feel like i have the flu", general body aches, chills, headache and "i feel like something is sitting on my chest", pt reports "it feels like i cant catch my breath"      [de-identified] year old female presents for evaluation multiple complaints for the past day  Patient complains of gradual onset of generalized body aches, malaise, shortness of breath, cough, headache, fevers, chills  Patient states rotation gradual onset of generalized body aches she rates as moderate to severe in intensity, constant, diffuse, without modifying factors  Associated with dry cough, feeling short of breath, wheezing, chills some subjective fever, generalized weakness, nondescript headache  Patient denies focal neuro deficits or weakness, neck pain or neck stiffness, difficulty swallowing, chest pain, abdominal pain, vomiting, diarrhea, rash  She has tried nothing for alleviation of symptoms other than Tylenol  History provided by:  Patient  Generalized Body Aches   Associated symptoms: congestion, cough, fever, headaches, myalgias, rhinorrhea, shortness of breath, sore throat and wheezing    Associated symptoms: no abdominal pain, no chest pain, no diarrhea, no fatigue, no nausea, no rash and no vomiting        Prior to Admission Medications   Prescriptions Last Dose Informant Patient Reported?  Taking?   norethindrone (MICRONOR) 0 35 MG tablet   No No   Sig: Take 1 tablet (0 35 mg total) by mouth daily      Facility-Administered Medications: None       Past Medical History:   Diagnosis Date     delivery delivered     RESOLVED: [id number]    Disease of thyroid gland     hyperthyroid    Hidradenitis suppurativa     LAST ASSESSED: [id number]    History of early menarche     FIRST PERIOD AT 8YEARS OLD    Hyperemesis gravidarum     RESOLVED: [id number]    Hypertension in pregnancy, pre-eclampsia     prior preg    Migraine     Sickle cell trait [facility])        Past Surgical History:   Procedure Laterality Date    ABSCESS DRAINAGE  2016    INCISION AND DRAINAGE OF SKIN ABSCESS; LABIAL CYST REMOVAL - [address]     SECTION      CHOLECYSTECTOMY  2009    [place]  DELIVERY ONLY N/A 2018    Procedure:  SECTION () REPEAT;  Surgeon: Angela Arce MD;  [facility];  Service: Obstetrics    WA EXC SWEAT GLAND Elfida Paulette Right 2016    Procedure: EXCISION GROIN CYST HIDRADENITIS;  Surgeon: Margarita Pan DO;  Location[facility];  Service: General    TONSILLECTOMY      VULVA BIOPSY N/A 2016    Procedure: INCISION AND DRAINAGE, EXCISION OF LABIAL CYST ;  Surgeon: Silvestre Mas DO;  [facility];  Service:        Family History   Problem Relation Age of Onset    Hyperlipidemia Mother         PURE    Cancer Father         lung    Diabetes Maternal Grandmother     No Known Problems Brother      I have reviewed and agree with the history as documented  Social History   Substance Use Topics    Smoking status: Never Smoker    Smokeless tobacco: Never Used    Alcohol use No      Comment: (HISTORY)        Review of Systems   Constitutional: Positive for chills and fever  Negative for appetite change, diaphoresis and fatigue  HENT: Positive for congestion, rhinorrhea and sore throat  Negative for dental problem, trouble swallowing and voice change  Eyes: Negative for pain  Respiratory: Positive for cough, shortness of breath and wheezing  Negative for chest tightness  Cardiovascular: Negative for chest pain and leg swelling  Gastrointestinal: Negative for abdominal pain, blood in stool, constipation, diarrhea, nausea and vomiting  Endocrine: Negative for polydipsia, polyphagia and polyuria     Genitourinary: Negative for difficulty urinating, dyspareunia, dysuria, enuresis, flank pain, frequency, hematuria, pelvic pain, vaginal bleeding and vaginal discharge  Musculoskeletal: Positive for arthralgias and myalgias  Negative for back pain, neck pain and neck stiffness  Skin: Negative for color change and rash  Neurological: Positive for headaches  Negative for dizziness, syncope, facial asymmetry, speech difficulty, weakness, light-headedness and numbness  Psychiatric/Behavioral: Negative for hallucinations and suicidal ideas  Physical Exam  Physical Exam   Constitutional: She is oriented to person, place, and time  She appears well-developed and well-nourished  HENT:   Mouth/Throat: No oropharyngeal exudate  TMs normal bilaterally +pharyngeal erythema +clear rhinorrhea nontender palpation of sinuses, normal looking turbinates   Eyes: Pupils are equal, round, and reactive to light  Conjunctivae and EOM are normal    Neck: Normal range of motion  Neck supple  No meningeal signs   Cardiovascular: Normal rate, regular rhythm, normal heart sounds and intact distal pulses  Pulmonary/Chest: Effort normal  No respiratory distress  She has wheezes  She has no rales  She exhibits no tenderness  Abdominal: Soft  Bowel sounds are normal  She exhibits no distension and no mass  There is no tenderness  No hernia  No cvat   Musculoskeletal: Normal range of motion  She exhibits no edema  Lymphadenopathy:     She has no cervical adenopathy  Neurological: She is alert and oriented to person, place, and time  No cranial nerve deficit  Skin: No rash noted  No erythema  No edema   Psychiatric: She has a normal mood and affect  Her behavior is normal    Nursing note and vitals reviewed        Vital Signs  ED Triage Vitals   Temperature Pulse Respirations Blood Pressure SpO2   01/25/19 2254 01/25/19 2253 01/25/19 2253 01/25/19 2253 01/25/19 2253   98 °F (36 7 °C) 79 22 137/81 97 %      Temp src Heart Rate Source Patient Position - Orthostatic VS BP Location FiO2 (%)   -- -- -- -- --             Pain Score       01/25/19 2253       6           Vitals: 01/25/19 2253   BP: 137/81   Pulse: 79       Visual Acuity      ED Medications  Medications   albuterol (PROVENTIL HFA,VENTOLIN HFA) inhaler 2 puff (not administered)   predniSONE tablet 50 mg (not administered)   ipratropium (ATROVENT) 0 02 % inhalation solution 0 5 mg (0 5 mg Nebulization Given 1/25/19 2309)   albuterol inhalation solution 5 mg (5 mg Nebulization Given 1/25/19 2309)   sodium chloride 0 9 % bolus 1,000 mL (0 mL Intravenous Stopped 1/26/19 0100)   ketorolac (TORADOL) injection 15 mg (15 mg Intravenous Given 1/26/19 0015)   ipratropium-albuterol (DUO-NEB) 0 5-2 5 mg/3 mL inhalation solution 3 mL (3 mL Nebulization Given 1/26/19 0034)       Diagnostic Studies  Results Reviewed     Procedure Component Value Units Date/Time    POCT pregnancy, urine [[id number]]  (Normal) Resulted:  01/26/19 0015    Lab Status:  Final result Updated:  01/26/19 0015     EXT PREG TEST UR (Ref: Negative) neg    Rapid Influenza Screen with Reflex PCR [[id number]]  (Normal) Collected:  01/25/19 2330    Lab Status:  Final result Specimen:  Nasopharyngeal from Nasopharyngeal Swab Updated:  01/25/19 2348     Rapid Influenza A Ag Negative     Rapid Influenza B Ag Negative    INFLUENZA A/B AND RSV, PCR [[id number]] Collected:  01/25/19 2330    Lab Status: In process Specimen:  Nasopharyngeal from Nasopharyngeal Swab Updated:  01/25/19 2348                 XR chest 2 views   ED Interpretation by Anna Christopher MD (01/26 0023)   Primary reviewed: no acute abnormality                 Procedures  Procedures       Phone Contacts  ED Phone Contact    ED Course  ED Course as of Jan 26 0126   Sat Jan 26, 2019   0123 Patient reports symptoms have improved  She no longer feels short of breath  On exam patient with faint end-expiratory wheezing noted  Will add on steroids, treat for acute bronchitis, reassure, counseled                                  MDM  Number of Diagnoses or Management Options  Diagnosis management comments: Acute bronchitis versus influenza like illness less likely pneumonia-will do IV fluids, symptomatic treatment, DuoNeb, reassess    CritCare Time    Disposition  Final diagnoses:   Acute bronchitis     Time reflects when diagnosis was documented in both MDM as applicable and the Disposition within this note     Time User Action Codes Description Comment    1/26/2019  1:24 AM Dustin Mays Add [J20 9] Acute bronchitis       ED Disposition     ED Disposition Condition Comment    Discharge  Dillan Morales discharge to home/self care  Condition at discharge: Good        Follow-up Information     Follow up With Specialties Details Why Contact Info    Boris Giordano MD Family Medicine Schedule an appointment as soon as possible for a visit in 2 days  [address] [place] [address] [facility] [address] [phone]            Patient's Medications   Discharge Prescriptions    ALBUTEROL (PROVENTIL HFA,VENTOLIN HFA) 90 MCG/ACT INHALER    Inhale 2 puffs every 4 (four) hours as needed for wheezing       Start Date: 1/26/2019 End Date: 1/26/2020       Order Dose: 2 puffs       Quantity: 1 Inhaler    Refills: 0    PREDNISONE 20 MG TABLET    Take 2 tablets (40 mg total) by mouth daily for 4 days       Start Date: 1/26/2019 End Date: 1/30/2019       Order Dose: 40 mg       Quantity: 8 tablet    Refills: 0     No discharge procedures on file      ED Provider  Electronically Signed by           Ariadne Bynum MD  01/26/19 9105

## 2019-01-26 NOTE — DISCHARGE INSTRUCTIONS
Acute Bronchitis   WHAT YOU SHOULD KNOW:   Acute bronchitis is swelling and irritation in the air passages of your lungs  This irritation may cause you to cough or have other breathing problems  Acute bronchitis often starts because of another viral illness, such as a cold or the flu  The illness spreads from your nose and throat to your windpipe and airways  Bronchitis is often called a chest cold  Acute bronchitis lasts about 2 weeks and is usually not a serious illness  AFTER YOU LEAVE:   Medicines:   · Ibuprofen or acetaminophen:  These medicines help lower a fever  They are available without a doctor's order  Ask your healthcare provider which medicine is right for you  Ask how much to take and how often to take it  Follow directions  These medicines can cause stomach bleeding if not taken correctly  Ibuprofen can cause kidney damage  Do not take ibuprofen if you have kidney disease, an ulcer, or allergies to aspirin  Acetaminophen can cause liver damage  Do not drink alcohol if you take acetaminophen  · Cough medicine: This medicine helps loosen mucus in your lungs and make it easier to cough up  This can help you breathe easier  · Inhalers: You may need one or more inhalers to help you breathe easier and cough less  An inhaler gives your medicine in a mist form so that you can breathe it into your lungs  Ask your healthcare provider to show you how to use your inhaler correctly  · Steroid medicine:  Steroid medicine helps open your air passages so you can breathe easier  · Take your medicine as directed  Call your healthcare provider if you think your medicine is not helping or if you have side effects  Tell him if you are allergic to any medicine  Keep a list of the medicines, vitamins, and herbs you take  Include the amounts, and when and why you take them  Bring the list or the pill bottles to follow-up visits  Carry your medicine list with you in case of an emergency    How to use an inhaler:   · Shake the inhaler well to make sure you get the correct amount of medicine per puff  Remove the cover from your inhaler's mouthpiece  If you are using a spacer, connect your inhaler to the flat end of the spacer  · Exhale as much air from your lungs as you can  Put the mouthpiece in your mouth past your front teeth and rest it on the top of your tongue  Do not block the mouthpiece opening with your tongue  · Breathe in through your mouth at a slow and steady rate  As you do this, press the inhaler to release the puff of medicine  Finish breathing in slowly and deeply as you inhale the medicine  When your lungs are full, hold your breath for 10 seconds  Then breathe out slowly through puckered lips or through your nose  · If you need to take more puffs, wait at least 1 minute between each puff  · Rinse your mouth with water after you use the inhaler  This may keep you from getting a mouth infection or irritation  · Follow the instructions that come with your inhaler to clean it  You should clean your inhaler at least once a week  Ways to care for yourself:   · Avoid alcohol:  Alcohol dulls your urge to cough and sneeze  When you have bronchitis, you need to be able to cough and sneeze to clear your air passages  Alcohol also causes your body to lose fluid  This can make the mucus in your lungs thicker and harder to cough up  · Avoid irritants in the air:  Do not smoke or allow others to smoke around you  Avoid chemicals, fumes, and dust  Wear a face mask if you must work around dust or fumes  Stay inside on days when air pollution levels are high  If you have allergies, stay inside when pollen counts are high  Avoid aerosol products  This includes spray-on deodorant, bug spray, and hair spray  · Drink more liquids:  Most people should drink at least 8 eight-ounce cups of water a day  You may need to drink more liquids when you have acute bronchitis   Liquids help keep your air passages moist and help you cough up mucus  · Get more rest:  You may feel like resting more  Slowly start to do more each day  Rest when you feel it is needed  · Eat healthy foods:  Eat a variety healthy foods every day  Your diet should include fruits, vegetables, breads, and protein (such as chicken, fish, and beans)  Dairy products (such as milk, cheese, and ice cream) can sometimes increase the amount of mucus your body makes  Ask if you should decrease your intake of dairy products  · Use a humidifier:  Use a cool mist humidifier to increase air moisture in your home  This may make it easier for you to breathe and help decrease your cough  Decrease your risk of acute bronchitis:   · Get the vaccinations you need:  Ask your healthcare provider if you should get vaccinated against the flu or pneumonia  · Avoid things that may irritate your lungs:  Stay inside or cover your mouth and nose with a scarf when you are outside during cold weather  You should also stay inside on days when air pollution levels are high  If you have allergies, stay inside when pollen counts are high  Avoid using aerosol products in your home  This includes spray-on deodorant, bug spray, and hair spray  · Avoid the spread of germs:        [facility] your hands often with soap and water  Carry germ-killing gel with you  You can use the gel to clean your hands when there is no soap and water available  ¨ Do not touch your eyes, nose, or mouth unless you have washed your hands first     ¨ Always cover your mouth when you cough  Cough into a tissue or your shirtsleeve so you do not spread germs from your hands  ¨ Try to avoid people who have a cold or the flu  If you are sick, stay away from others as much as possible  Follow up with your healthcare provider as directed:  Write down questions you have so you will remember to ask them during your follow-up visits    Contact your healthcare provider if:   · You have a fever     · Your skin becomes itchy or you have a rash after you take your medicine  · Your breathing problems do not go away or get worse  · Your cough does not get better with treatment  · You cough up blood  · You have questions or concerns about your condition or care  Seek care immediately or call 911 if:   · You faint  · Your lips or fingernails turn blue  · You feel like you are not getting enough air when you breathe  · You have swelling of your lips, tongue, or throat that makes it hard to breathe or swallow  © 2014 [address] [employer] is for End User's use only and may not be sold, redistributed or otherwise used for commercial purposes  All illustrations and images included in CareNotes® are the copyrighted property of [employer]  or Jae Ortiz  The above information is an  only  It is not intended as medical advice for individual conditions or treatments  Talk to your doctor, nurse or pharmacist before following any medical regimen to see if it is safe and effective for you

## 2019-01-26 NOTE — ED NOTES
Patient still with audible wheezes   States still gets short of breath and chest tightness when speaking in long sentences     Bienvenido Stiles RN  01/26/19 0100

## 2019-01-28 ENCOUNTER — TELEPHONE (OUTPATIENT)
Dept: FAMILY MEDICINE CLINIC | Facility: CLINIC | Age: 29
End: 2019-01-28

## 2019-01-28 NOTE — TELEPHONE ENCOUNTER
Patient was seen in the ER (only there for 2 hours, bronchitis) and called to schedule an appointment with you today  I notified her you are booked solid but she says she is available tomorrow morning as well  She says she is feeling a little bit better but is still a little short of breath  Patient also wanted you to follow up in regards to her reoccurring cyst in her groin  Please advise    Please call back at temporary number: [phone]

## 2019-01-30 ENCOUNTER — OFFICE VISIT (OUTPATIENT)
Dept: FAMILY MEDICINE CLINIC | Facility: CLINIC | Age: 29
End: 2019-01-30
Payer: COMMERCIAL

## 2019-01-30 VITALS
DIASTOLIC BLOOD PRESSURE: 76 MMHG | SYSTOLIC BLOOD PRESSURE: 118 MMHG | HEART RATE: 58 BPM | OXYGEN SATURATION: 99 % | WEIGHT: 168 LBS | RESPIRATION RATE: 16 BRPM | BODY MASS INDEX: 28.68 KG/M2 | HEIGHT: 64 IN | TEMPERATURE: 97.7 F

## 2019-01-30 DIAGNOSIS — J20.9 ACUTE BRONCHITIS, UNSPECIFIED ORGANISM: ICD-10-CM

## 2019-01-30 DIAGNOSIS — L73.2 SUPPURATIVE HIDRADENITIS: Primary | ICD-10-CM

## 2019-01-30 DIAGNOSIS — E05.90 SUBCLINICAL HYPERTHYROIDISM: ICD-10-CM

## 2019-01-30 PROCEDURE — 99213 OFFICE O/P EST LOW 20 MIN: CPT | Performed by: FAMILY MEDICINE

## 2019-01-30 RX ORDER — ACETAMINOPHEN AND CODEINE PHOSPHATE 300; 30 MG/1; MG/1
1 TABLET ORAL EVERY 4 HOURS PRN
Qty: 20 TABLET | Refills: 0 | Status: SHIPPED | OUTPATIENT
Start: 2019-01-30 | End: 2019-02-28

## 2019-01-30 RX ORDER — CEPHALEXIN 500 MG/1
500 CAPSULE ORAL EVERY 8 HOURS SCHEDULED
Qty: 21 CAPSULE | Refills: 0 | Status: SHIPPED | OUTPATIENT
Start: 2019-01-30 | End: 2019-02-06

## 2019-01-30 NOTE — PROGRESS NOTES
Assessment/Plan:         Diagnoses and all orders for this visit:    Suppurative hidradenitis  -     cephalexin (KEFLEX) 500 mg capsule; Take 1 capsule (500 mg total) by mouth every 8 (eight) hours for 7 days  -     acetaminophen-codeine (TYLENOL #3) 300-30 mg per tablet; Take 1 tablet by mouth every 4 (four) hours as needed for moderate pain    Acute bronchitis, unspecified organism    Subclinical hyperthyroidism  -     TSH, 3rd generation with Free T4 reflex; Future      feeling better from bronchitis  Keflex as directed for skin infection   To see GYN for possible surgery for this       Subjective:      Patient ID: Yuliet Ramírez is a 34 y o  female  HPI  Went to ER on 1/25/19 for body aches and shortness of breath   Was given Prednisone taper for bronchitis   Negative flu test  She is feeling better from it  C/o right groin pain and drainage for the last few days   No fever/chills     The following portions of the patient's history were reviewed and updated as appropriate: allergies, current medications, past family history, past medical history, past social history, past surgical history and problem list     Review of Systems   Constitutional: Negative  HENT: Negative  Respiratory: Negative  Cardiovascular: Negative  Skin: Positive for rash  Negative for color change and wound  Objective:      /76 (BP Location: Left arm, Patient Position: Sitting, Cuff Size: Standard)   Pulse 58   Temp 97 7 °F (36 5 °C)   Resp 16   Ht 5' 4" (1 626 m)   Wt 76 2 kg (168 lb)   SpO2 99%   BMI 28 84 kg/m²          Physical Exam   Constitutional: She appears well-developed and well-nourished  Cardiovascular: Normal rate and regular rhythm  Pulmonary/Chest: Effort normal and breath sounds normal  No respiratory distress  Skin: There is erythema     Lump over right lower labia

## 2019-02-28 ENCOUNTER — APPOINTMENT (EMERGENCY)
Dept: CT IMAGING | Facility: HOSPITAL | Age: 29
End: 2019-02-28
Payer: COMMERCIAL

## 2019-02-28 ENCOUNTER — ANESTHESIA (OUTPATIENT)
Dept: PERIOP | Facility: HOSPITAL | Age: 29
End: 2019-02-28
Payer: COMMERCIAL

## 2019-02-28 ENCOUNTER — HOSPITAL ENCOUNTER (OUTPATIENT)
Facility: HOSPITAL | Age: 29
Setting detail: OBSERVATION
Discharge: HOME | End: 2019-03-01
Attending: EMERGENCY MEDICINE | Admitting: INTERNAL MEDICINE
Payer: COMMERCIAL

## 2019-02-28 ENCOUNTER — ANESTHESIA EVENT (OUTPATIENT)
Dept: PERIOP | Facility: HOSPITAL | Age: 29
End: 2019-02-28
Payer: COMMERCIAL

## 2019-02-28 DIAGNOSIS — L02.91 PHLEGMON: Primary | ICD-10-CM

## 2019-02-28 DIAGNOSIS — M79.18 GLUTEAL PAIN: ICD-10-CM

## 2019-02-28 DIAGNOSIS — D72.829 LEUKOCYTOSIS: ICD-10-CM

## 2019-02-28 DIAGNOSIS — L02.31 ABSCESS OF BUTTOCK, RIGHT: ICD-10-CM

## 2019-02-28 LAB
ALBUMIN SERPL BCP-MCNC: 3.8 G/DL (ref 3.5–5)
ALP SERPL-CCNC: 83 U/L (ref 46–116)
ALT SERPL W P-5'-P-CCNC: 13 U/L (ref 12–78)
ANION GAP SERPL CALCULATED.3IONS-SCNC: 10 MMOL/L (ref 4–13)
AST SERPL W P-5'-P-CCNC: 11 U/L (ref 5–45)
BASOPHILS # BLD AUTO: 0.02 THOUSANDS/ΜL (ref 0–0.1)
BASOPHILS NFR BLD AUTO: 0 % (ref 0–1)
BILIRUB SERPL-MCNC: 0.8 MG/DL (ref 0.2–1)
BUN SERPL-MCNC: 6 MG/DL (ref 5–25)
CALCIUM SERPL-MCNC: 8.9 MG/DL (ref 8.3–10.1)
CHLORIDE SERPL-SCNC: 103 MMOL/L (ref 100–108)
CO2 SERPL-SCNC: 27 MMOL/L (ref 21–32)
CREAT SERPL-MCNC: 0.92 MG/DL (ref 0.6–1.3)
EOSINOPHIL # BLD AUTO: 0.01 THOUSAND/ΜL (ref 0–0.61)
EOSINOPHIL NFR BLD AUTO: 0 % (ref 0–6)
ERYTHROCYTE [DISTWIDTH] IN BLOOD BY AUTOMATED COUNT: 16.4 % (ref 11.6–15.1)
EXT PREGNANCY TEST URINE: NEGATIVE
GFR SERPL CREATININE-BSD FRML MDRD: 99 ML/MIN/1.73SQ M
GLUCOSE SERPL-MCNC: 96 MG/DL (ref 65–140)
HCT VFR BLD AUTO: 37 % (ref 34.8–46.1)
HGB BLD-MCNC: 11.8 G/DL (ref 11.5–15.4)
IMM GRANULOCYTES # BLD AUTO: 0.04 THOUSAND/UL (ref 0–0.2)
IMM GRANULOCYTES NFR BLD AUTO: 0 % (ref 0–2)
LYMPHOCYTES # BLD AUTO: 1.48 THOUSANDS/ΜL (ref 0.6–4.47)
LYMPHOCYTES NFR BLD AUTO: 14 % (ref 14–44)
MCH RBC QN AUTO: 24 PG (ref 26.8–34.3)
MCHC RBC AUTO-ENTMCNC: 31.9 G/DL (ref 31.4–37.4)
MCV RBC AUTO: 75 FL (ref 82–98)
MONOCYTES # BLD AUTO: 0.75 THOUSAND/ΜL (ref 0.17–1.22)
MONOCYTES NFR BLD AUTO: 7 % (ref 4–12)
NEUTROPHILS # BLD AUTO: 8.68 THOUSANDS/ΜL (ref 1.85–7.62)
NEUTS SEG NFR BLD AUTO: 79 % (ref 43–75)
NRBC BLD AUTO-RTO: 0 /100 WBCS
PLATELET # BLD AUTO: 276 THOUSANDS/UL (ref 149–390)
PMV BLD AUTO: 9.5 FL (ref 8.9–12.7)
POTASSIUM SERPL-SCNC: 3.7 MMOL/L (ref 3.5–5.3)
PROT SERPL-MCNC: 8.2 G/DL (ref 6.4–8.2)
RBC # BLD AUTO: 4.91 MILLION/UL (ref 3.81–5.12)
SODIUM SERPL-SCNC: 140 MMOL/L (ref 136–145)
WBC # BLD AUTO: 10.98 THOUSAND/UL (ref 4.31–10.16)

## 2019-02-28 PROCEDURE — 99284 EMERGENCY DEPT VISIT MOD MDM: CPT

## 2019-02-28 PROCEDURE — 96375 TX/PRO/DX INJ NEW DRUG ADDON: CPT

## 2019-02-28 PROCEDURE — 87040 BLOOD CULTURE FOR BACTERIA: CPT | Performed by: EMERGENCY MEDICINE

## 2019-02-28 PROCEDURE — 88304 TISSUE EXAM BY PATHOLOGIST: CPT | Performed by: PATHOLOGY

## 2019-02-28 PROCEDURE — 87070 CULTURE OTHR SPECIMN AEROBIC: CPT | Performed by: SURGERY

## 2019-02-28 PROCEDURE — 72193 CT PELVIS W/DYE: CPT

## 2019-02-28 PROCEDURE — 87176 TISSUE HOMOGENIZATION CULTR: CPT | Performed by: SURGERY

## 2019-02-28 PROCEDURE — 87075 CULTR BACTERIA EXCEPT BLOOD: CPT | Performed by: SURGERY

## 2019-02-28 PROCEDURE — 36415 COLL VENOUS BLD VENIPUNCTURE: CPT | Performed by: EMERGENCY MEDICINE

## 2019-02-28 PROCEDURE — 99244 OFF/OP CNSLTJ NEW/EST MOD 40: CPT | Performed by: SURGERY

## 2019-02-28 PROCEDURE — 80053 COMPREHEN METABOLIC PANEL: CPT | Performed by: EMERGENCY MEDICINE

## 2019-02-28 PROCEDURE — 99219 PR INITIAL OBSERVATION CARE/DAY 50 MINUTES: CPT | Performed by: INTERNAL MEDICINE

## 2019-02-28 PROCEDURE — 96374 THER/PROPH/DIAG INJ IV PUSH: CPT

## 2019-02-28 PROCEDURE — 10061 I&D ABSCESS COMP/MULTIPLE: CPT | Performed by: SURGERY

## 2019-02-28 PROCEDURE — 96361 HYDRATE IV INFUSION ADD-ON: CPT

## 2019-02-28 PROCEDURE — 85025 COMPLETE CBC W/AUTO DIFF WBC: CPT | Performed by: EMERGENCY MEDICINE

## 2019-02-28 PROCEDURE — 87205 SMEAR GRAM STAIN: CPT | Performed by: SURGERY

## 2019-02-28 RX ORDER — ACETAMINOPHEN 325 MG/1
650 TABLET ORAL EVERY 6 HOURS PRN
Status: DISCONTINUED | OUTPATIENT
Start: 2019-02-28 | End: 2019-03-01 | Stop reason: HOSPADM

## 2019-02-28 RX ORDER — ONDANSETRON 2 MG/ML
4 INJECTION INTRAMUSCULAR; INTRAVENOUS ONCE
Status: DISCONTINUED | OUTPATIENT
Start: 2019-02-28 | End: 2019-02-28 | Stop reason: HOSPADM

## 2019-02-28 RX ORDER — PROPOFOL 10 MG/ML
INJECTION, EMULSION INTRAVENOUS AS NEEDED
Status: DISCONTINUED | OUTPATIENT
Start: 2019-02-28 | End: 2019-02-28 | Stop reason: SURG

## 2019-02-28 RX ORDER — MORPHINE SULFATE 4 MG/ML
4 INJECTION, SOLUTION INTRAMUSCULAR; INTRAVENOUS EVERY 4 HOURS PRN
Status: DISCONTINUED | OUTPATIENT
Start: 2019-02-28 | End: 2019-02-28

## 2019-02-28 RX ORDER — SODIUM CHLORIDE 9 MG/ML
125 INJECTION, SOLUTION INTRAVENOUS CONTINUOUS
Status: DISCONTINUED | OUTPATIENT
Start: 2019-02-28 | End: 2019-02-28

## 2019-02-28 RX ORDER — ACETAMINOPHEN AND CODEINE PHOSPHATE 120; 12 MG/5ML; MG/5ML
1 SOLUTION ORAL DAILY
Status: DISCONTINUED | OUTPATIENT
Start: 2019-02-28 | End: 2019-03-01 | Stop reason: HOSPADM

## 2019-02-28 RX ORDER — KETOROLAC TROMETHAMINE 30 MG/ML
30 INJECTION, SOLUTION INTRAMUSCULAR; INTRAVENOUS ONCE
Status: COMPLETED | OUTPATIENT
Start: 2019-02-28 | End: 2019-02-28

## 2019-02-28 RX ORDER — ONDANSETRON 2 MG/ML
INJECTION INTRAMUSCULAR; INTRAVENOUS AS NEEDED
Status: DISCONTINUED | OUTPATIENT
Start: 2019-02-28 | End: 2019-02-28 | Stop reason: SURG

## 2019-02-28 RX ORDER — FENTANYL CITRATE 50 UG/ML
INJECTION, SOLUTION INTRAMUSCULAR; INTRAVENOUS AS NEEDED
Status: DISCONTINUED | OUTPATIENT
Start: 2019-02-28 | End: 2019-02-28 | Stop reason: SURG

## 2019-02-28 RX ORDER — MORPHINE SULFATE 4 MG/ML
4 INJECTION, SOLUTION INTRAMUSCULAR; INTRAVENOUS EVERY 4 HOURS PRN
Status: DISCONTINUED | OUTPATIENT
Start: 2019-02-28 | End: 2019-03-01 | Stop reason: HOSPADM

## 2019-02-28 RX ORDER — ONDANSETRON 2 MG/ML
4 INJECTION INTRAMUSCULAR; INTRAVENOUS EVERY 6 HOURS PRN
Status: DISCONTINUED | OUTPATIENT
Start: 2019-02-28 | End: 2019-03-01 | Stop reason: HOSPADM

## 2019-02-28 RX ORDER — FENTANYL CITRATE/PF 50 MCG/ML
25 SYRINGE (ML) INJECTION
Status: DISCONTINUED | OUTPATIENT
Start: 2019-02-28 | End: 2019-02-28 | Stop reason: HOSPADM

## 2019-02-28 RX ORDER — KETOROLAC TROMETHAMINE 30 MG/ML
30 INJECTION, SOLUTION INTRAMUSCULAR; INTRAVENOUS EVERY 6 HOURS PRN
Status: DISCONTINUED | OUTPATIENT
Start: 2019-02-28 | End: 2019-02-28

## 2019-02-28 RX ORDER — KETOROLAC TROMETHAMINE 30 MG/ML
30 INJECTION, SOLUTION INTRAMUSCULAR; INTRAVENOUS EVERY 6 HOURS PRN
Status: DISCONTINUED | OUTPATIENT
Start: 2019-02-28 | End: 2019-03-01 | Stop reason: HOSPADM

## 2019-02-28 RX ORDER — MORPHINE SULFATE 4 MG/ML
4 INJECTION, SOLUTION INTRAMUSCULAR; INTRAVENOUS ONCE AS NEEDED
Status: COMPLETED | OUTPATIENT
Start: 2019-02-28 | End: 2019-02-28

## 2019-02-28 RX ORDER — HYDROMORPHONE HCL/PF 1 MG/ML
0.5 SYRINGE (ML) INJECTION
Status: DISCONTINUED | OUTPATIENT
Start: 2019-02-28 | End: 2019-02-28 | Stop reason: HOSPADM

## 2019-02-28 RX ORDER — DOCUSATE SODIUM 100 MG/1
100 CAPSULE, LIQUID FILLED ORAL 2 TIMES DAILY PRN
Status: DISCONTINUED | OUTPATIENT
Start: 2019-02-28 | End: 2019-03-01 | Stop reason: HOSPADM

## 2019-02-28 RX ORDER — MIDAZOLAM HYDROCHLORIDE 1 MG/ML
INJECTION INTRAMUSCULAR; INTRAVENOUS AS NEEDED
Status: DISCONTINUED | OUTPATIENT
Start: 2019-02-28 | End: 2019-02-28 | Stop reason: SURG

## 2019-02-28 RX ORDER — MORPHINE SULFATE 4 MG/ML
4 INJECTION, SOLUTION INTRAMUSCULAR; INTRAVENOUS ONCE
Status: COMPLETED | OUTPATIENT
Start: 2019-02-28 | End: 2019-02-28

## 2019-02-28 RX ORDER — DEXTROSE AND SODIUM CHLORIDE 5; .45 G/100ML; G/100ML
75 INJECTION, SOLUTION INTRAVENOUS CONTINUOUS
Status: DISCONTINUED | OUTPATIENT
Start: 2019-02-28 | End: 2019-02-28

## 2019-02-28 RX ORDER — OXYCODONE HYDROCHLORIDE 5 MG/1
5 TABLET ORAL EVERY 4 HOURS PRN
Status: DISCONTINUED | OUTPATIENT
Start: 2019-02-28 | End: 2019-03-01 | Stop reason: HOSPADM

## 2019-02-28 RX ADMIN — MIDAZOLAM 2 MG: 1 INJECTION INTRAMUSCULAR; INTRAVENOUS at 11:49

## 2019-02-28 RX ADMIN — ONDANSETRON 4 MG: 2 INJECTION INTRAMUSCULAR; INTRAVENOUS at 08:02

## 2019-02-28 RX ADMIN — DEXTROSE AND SODIUM CHLORIDE 75 ML/HR: 5; .45 INJECTION, SOLUTION INTRAVENOUS at 13:28

## 2019-02-28 RX ADMIN — SODIUM CHLORIDE 125 ML/HR: 0.9 INJECTION, SOLUTION INTRAVENOUS at 11:05

## 2019-02-28 RX ADMIN — SODIUM CHLORIDE 3 G: 9 INJECTION, SOLUTION INTRAVENOUS at 10:00

## 2019-02-28 RX ADMIN — SODIUM CHLORIDE 1000 ML: 0.9 INJECTION, SOLUTION INTRAVENOUS at 01:13

## 2019-02-28 RX ADMIN — SODIUM CHLORIDE 125 ML/HR: 0.9 INJECTION, SOLUTION INTRAVENOUS at 04:21

## 2019-02-28 RX ADMIN — KETOROLAC TROMETHAMINE 30 MG: 30 INJECTION, SOLUTION INTRAMUSCULAR at 05:15

## 2019-02-28 RX ADMIN — KETOROLAC TROMETHAMINE 30 MG: 30 INJECTION, SOLUTION INTRAMUSCULAR at 01:18

## 2019-02-28 RX ADMIN — DEXAMETHASONE SODIUM PHOSPHATE 4 MG: 10 INJECTION INTRAMUSCULAR; INTRAVENOUS at 12:06

## 2019-02-28 RX ADMIN — MORPHINE SULFATE 4 MG: 4 INJECTION, SOLUTION INTRAMUSCULAR; INTRAVENOUS at 08:03

## 2019-02-28 RX ADMIN — MORPHINE SULFATE 4 MG: 4 INJECTION INTRAVENOUS at 18:45

## 2019-02-28 RX ADMIN — MORPHINE SULFATE 4 MG: 4 INJECTION INTRAVENOUS at 02:46

## 2019-02-28 RX ADMIN — MORPHINE SULFATE 2 MG: 2 INJECTION, SOLUTION INTRAMUSCULAR; INTRAVENOUS at 15:42

## 2019-02-28 RX ADMIN — SODIUM CHLORIDE 3 G: 9 INJECTION, SOLUTION INTRAVENOUS at 15:00

## 2019-02-28 RX ADMIN — IOHEXOL 100 ML: 350 INJECTION, SOLUTION INTRAVENOUS at 01:53

## 2019-02-28 RX ADMIN — FENTANYL CITRATE 25 MCG: 50 INJECTION, SOLUTION INTRAMUSCULAR; INTRAVENOUS at 13:16

## 2019-02-28 RX ADMIN — SODIUM CHLORIDE 3 G: 9 INJECTION, SOLUTION INTRAVENOUS at 04:00

## 2019-02-28 RX ADMIN — FENTANYL CITRATE 25 MCG: 50 INJECTION, SOLUTION INTRAMUSCULAR; INTRAVENOUS at 13:08

## 2019-02-28 RX ADMIN — ONDANSETRON 4 MG: 2 INJECTION INTRAMUSCULAR; INTRAVENOUS at 12:06

## 2019-02-28 RX ADMIN — SODIUM CHLORIDE 3 G: 9 INJECTION, SOLUTION INTRAVENOUS at 19:40

## 2019-02-28 RX ADMIN — MORPHINE SULFATE 4 MG: 4 INJECTION INTRAVENOUS at 01:18

## 2019-02-28 RX ADMIN — OXYCODONE HYDROCHLORIDE 5 MG: 5 TABLET ORAL at 22:37

## 2019-02-28 RX ADMIN — FENTANYL CITRATE 25 MCG: 50 INJECTION, SOLUTION INTRAMUSCULAR; INTRAVENOUS at 12:53

## 2019-02-28 RX ADMIN — PROPOFOL 150 MG: 10 INJECTION, EMULSION INTRAVENOUS at 11:54

## 2019-02-28 RX ADMIN — FENTANYL CITRATE 100 MCG: 50 INJECTION, SOLUTION INTRAMUSCULAR; INTRAVENOUS at 11:51

## 2019-02-28 NOTE — ASSESSMENT & PLAN NOTE
Patient reports increasing pain in right buttock for the past week   Patient reports she was recently seen for another abscess which was drained and placed on 7 days of Keflex on 1/30/19   Phlegmon in right gluteal region seen on CT scan   Unasyn given in ED, will continue   General surgery consult   Will keep NPO with IV fluids   Analgesics as needed

## 2019-02-28 NOTE — H&P
[address] [facility]  H&P- Kenneth Fraga 1990, 34 y o  female MRN: [id number]  Unit/Bed#: ED 19 Encounter: [id number]  Primary Care Provider: Fabi Barragan MD   Date and time admitted to hospital: 2/28/2019 12:45 AM    * Abscess of buttock, right  Assessment & Plan  Patient reports increasing pain in right buttock for the past week   Patient reports she was recently seen for another abscess which was drained and placed on 7 days of Keflex on 1/30/19   Phlegmon in right gluteal region seen on CT scan   Unasyn given in ED, will continue   General surgery consult   Will keep NPO with IV fluids   Analgesics as needed     Suppurative hidradenitis  Assessment & Plan  Patient reports history of multiple abscesses throughout her life   Most recently needed drainage of abscess in January   Reports need for surgical excision of abscess in 2016         VTE Prophylaxis: low risk, ambulate  / reason for no mechanical VTE prophylaxis low risk   Code Status: full code  POLST: POLST form is not discussed and not completed at this time  Discussion with family: none    Anticipated Length of Stay:  Patient will be admitted on an Observation basis with an anticipated length of stay of  Less than 2 midnights  Justification for Hospital Stay: abscess, need for general surgery consult    Total Time for Visit, including Counseling / Coordination of Care: 30 minutes  Greater than 50% of this total time spent on direct patient counseling and coordination of care  Chief Complaint:   Right buttock abscess    History of Present Illness:    Kenneth Fraga is a 34 y o  female with a PMHx of hydradenitis suppurativa who presents with one week of a right buttock abscess  Patient reports she gets multiple abscesses secondary to her hydradenitis  Patient reports she typically gets her abscesses drained and has no further complications but reports this abscess does not appear to come to a head and is much deeper   Patient reports severe pain with movement or pressure  Patient reports she feels pressure in her buttock area any time she stands and severe sharp pain when she sits on the abscess  Patient reports she was taking Tylenol 3 as prescribed but reports the medication was making her very drowsy  Patient reports she did have an abscess that needed to be surgically removed because it was too deep, this occurred in 2016  Patient denies any fevers, chills, abdominal pain, chest pain or shortness of breath  Review of Systems:    Review of Systems   Constitutional: Negative for activity change, fatigue and fever  HENT: Negative for ear pain, nosebleeds, sinus pressure, sneezing, sore throat and trouble swallowing  Eyes: Negative for photophobia, pain and visual disturbance  Respiratory: Negative for cough, chest tightness, shortness of breath and wheezing  Cardiovascular: Negative for chest pain, palpitations and leg swelling  Gastrointestinal: Negative for abdominal pain, diarrhea, nausea and vomiting  Endocrine: Negative  Genitourinary: Negative for difficulty urinating, dysuria, flank pain and hematuria  Musculoskeletal: Negative for back pain, gait problem and neck stiffness  Skin: Positive for wound  Negative for rash  Allergic/Immunologic: Negative  Neurological: Negative for syncope, speech difficulty, weakness and light-headedness  Hematological: Negative  Psychiatric/Behavioral: Negative for confusion and sleep disturbance  All other systems reviewed and are negative        Past Medical and Surgical History:     Past Medical History:   Diagnosis Date     delivery delivered     RESOLVED: [id number]    Disease of thyroid gland     hyperthyroid    Hidradenitis suppurativa     LAST ASSESSED: [id number]    History of early menarche     [de-identified] PERIOD AT 8YEARS OLD    Hyperemesis gravidarum     RESOLVED: [id number]    Hypertension in pregnancy, pre-eclampsia     prior preg    Migraine     Sickle cell trait [facility])        Past Surgical History:   Procedure Laterality Date    ABSCESS DRAINAGE  2016    INCISION AND DRAINAGE OF SKIN ABSCESS; LABIAL CYST REMOVAL - [address]     SECTION      CHOLECYSTECTOMY  2009    [place]  DELIVERY ONLY N/A 2018    Procedure:  SECTION () REPEAT;  Surgeon: Karissa Bond MD;  [facility] LD;  Service: Obstetrics    SC EXC SWEAT GLAND Radha Griffin Right 2016    Procedure: EXCISION GROIN CYST HIDRADENITIS;  Surgeon: Luz Elena Pacheco DO;  Location: [facility] MAIN OR;  Service: General    TONSILLECTOMY      VULVA BIOPSY N/A 2016    Procedure: INCISION AND DRAINAGE, EXCISION OF LABIAL CYST ;  Surgeon: Jacobo Goldberg, DO;  Location[facility];  Service:        Meds/Allergies:    Prior to Admission medications    Medication Sig Start Date End Date Taking? Authorizing Provider   norethindrone (MICRONOR) 0 35 MG tablet Take 1 tablet (0 35 mg total) by mouth daily 18  Yes GABO Jacobs   acetaminophen-codeine (TYLENOL #3) 300-30 mg per tablet Take 1 tablet by mouth every 4 (four) hours as needed for moderate pain 19  Ariadna Sherman MD   albuterol (PROVENTIL HFA,VENTOLIN HFA) 90 mcg/act inhaler Inhale 2 puffs every 4 (four) hours as needed for wheezing 19  Soyla Opitz, MD     I have reviewed home medications with patient personally      Allergies: No Known Allergies    Social History:     Marital Status: Single   Occupation: hospital staff  Patient Pre-hospital Living Situation: home  Patient Pre-hospital Level of Mobility: ambulatory  Patient Pre-hospital Diet Restrictions: none  Substance Use History:   Social History     Substance and Sexual Activity   Alcohol Use No    Comment: (HISTORY)     Social History     Tobacco Use   Smoking Status Never Smoker   Smokeless Tobacco Never Used     Social History     Substance and Sexual Activity   Drug Use No       Family History:    Family History   Problem Relation Age of Onset    Hyperlipidemia Mother         PURE    Cancer Father         lung    Diabetes Maternal Grandmother     No Known Problems Brother        Physical Exam:     Vitals:   Blood Pressure: 144/77 (02/28/19 0044)  Pulse: 105 (02/28/19 0044)  Temperature: 98 9 °F (37 2 °C) (02/28/19 0044)  Respirations: 18 (02/28/19 0044)  Weight - Scale: 70 7 kg (155 lb 12 8 oz) (02/28/19 0044)  SpO2: 100 % (02/28/19 0044)    Physical Exam   Constitutional: She is oriented to person, place, and time  She appears well-nourished  HENT:   Head: Normocephalic and atraumatic  Mouth/Throat: Oropharynx is clear and moist    Eyes: Conjunctivae and EOM are normal  No scleral icterus  Neck: Neck supple  No JVD present  Cardiovascular: Normal rate, regular rhythm and normal heart sounds  No murmur heard  Pulmonary/Chest: Effort normal and breath sounds normal  No respiratory distress  She has no wheezes  She has no rales  Abdominal: Soft  She exhibits no distension  There is no tenderness  There is no guarding  Musculoskeletal: Normal range of motion  She exhibits no edema  Neurological: She is alert and oriented to person, place, and time  Skin: Skin is warm and dry  Right gluteal region, indurated, warm, no overlying skin changes, extremely tender    Psychiatric: She has a normal mood and affect  Her behavior is normal  Thought content normal    Vitals reviewed  Additional Data:     Lab Results: I have personally reviewed pertinent reports        Results from last 7 days   Lab Units 02/28/19  0121   WBC Thousand/uL 10 98*   HEMOGLOBIN g/dL 11 8   HEMATOCRIT % 37 0   PLATELETS Thousands/uL 276   NEUTROS PCT % 79*   LYMPHS PCT % 14   MONOS PCT % 7   EOS PCT % 0     Results from last 7 days   Lab Units 02/28/19  0121   SODIUM mmol/L 140   POTASSIUM mmol/L 3 7   CHLORIDE mmol/L 103   CO2 mmol/L 27   BUN mg/dL 6   CREATININE mg/dL 0 92   ANION GAP mmol/L 10 CALCIUM mg/dL 8 9   ALBUMIN g/dL 3 8   TOTAL BILIRUBIN mg/dL 0 80   ALK PHOS U/L 83   ALT U/L 13   AST U/L 11   GLUCOSE RANDOM mg/dL 96                       Imaging: I have personally reviewed pertinent reports  CT pelvis w contrast   Final Result by Josiah Del Valle MD (02/28 0215)      Right gluteal region demonstrates an area of phlegmon beginnings of a capsule  I do not believe this is a drainable at this time however this will likely become so shortly  Ultrasound can be used for repeat evaluation if indicated  Workstation performed: [id number]               Allscripts / Epic Records Reviewed: Yes     ** Please Note: This note has been constructed using a voice recognition system   **

## 2019-02-28 NOTE — LETTER
[id number] [address] [place] [id number]  Dept: [phone]    March 1, 2019     Patient: Aaron Ennis   YOB: 1990   Date of Visit: 2/28/2019       To Whom it May Concern:    Aaron Ennis is under my professional care  She was seen in the hospital from 2/28/2019   to 03/01/19  Please excuse from all work related activities during this time and the interim until she will have follow-up with surgeon week of 3/4/2019 whom will decide when patient can return to work  If you have any questions or concerns, please don't hesitate to call           Sincerely,              DO [facility]  Diplomat, [employer] Board of Internal Medicine

## 2019-02-28 NOTE — PLAN OF CARE
Problem: Nutrition/Hydration-ADULT  Goal: Nutrient/Hydration intake appropriate for improving, restoring or maintaining nutritional needs  Description  Monitor and assess patient's nutrition/hydration status for malnutrition (ex- brittle hair, bruises, dry skin, pale skin and conjunctiva, muscle wasting, smooth red tongue, and disorientation)  Collaborate with interdisciplinary team and initiate plan and interventions as ordered  Monitor patient's weight and dietary intake as ordered or per policy  Utilize nutrition screening tool and intervene per policy  Determine patient's food preferences and provide high-protein, high-caloric foods as appropriate       INTERVENTIONS:  - Monitor oral intake, urinary output, labs, and treatment plans  - Assess nutrition and hydration status and recommend course of action  - Evaluate amount of meals eaten  - Assist patient with eating if necessary   - Allow adequate time for meals  - Recommend/ encourage appropriate diets, oral nutritional supplements, and vitamin/mineral supplements  - Order, calculate, and assess calorie counts as needed  - Recommend, monitor, and adjust tube feedings and TPN/PPN based on assessed needs  - Assess need for intravenous fluids  - Provide specific nutrition/hydration education as appropriate  - Include patient/family/caregiver in decisions related to nutrition  Outcome: Progressing     Problem: SKIN/TISSUE INTEGRITY - ADULT  Goal: Skin integrity remains intact  Description  INTERVENTIONS  - Identify patients at risk for skin breakdown  - Assess and monitor skin integrity  - Assess and monitor nutrition and hydration status  - Monitor labs (i e  albumin)  - Assess for incontinence   - Turn and reposition patient  - Assist with mobility/ambulation  - Relieve pressure over bony prominences  - Avoid friction and shearing  - Provide appropriate hygiene as needed including keeping skin clean and dry  - Evaluate need for skin moisturizer/barrier cream  - Collaborate with interdisciplinary team (i e  Nutrition, Rehabilitation, etc )   - Patient/family teaching  Outcome: Progressing  Goal: Incision(s), wounds(s) or drain site(s) healing without S/S of infection  Description  INTERVENTIONS  - Assess and document risk factors for skin impairment   - Assess and document dressing, incision, wound bed, drain sites and surrounding tissue  - Initiate Nutrition services consult and/or wound management as needed  Outcome: Progressing     Problem: PAIN - ADULT  Goal: Verbalizes/displays adequate comfort level or baseline comfort level  Description  Interventions:  - Encourage patient to monitor pain and request assistance  - Assess pain using appropriate pain scale  - Administer analgesics based on type and severity of pain and evaluate response  - Implement non-pharmacological measures as appropriate and evaluate response  - Consider cultural and social influences on pain and pain management  - Notify physician/advanced practitioner if interventions unsuccessful or patient reports new pain  Outcome: Progressing     Problem: INFECTION - ADULT  Goal: Absence or prevention of progression during hospitalization  Description  INTERVENTIONS:  - Assess and monitor for signs and symptoms of infection  - Monitor lab/diagnostic results  - Monitor all insertion sites, i e  indwelling lines, tubes, and drains  - Monitor endotracheal (as able) and nasal secretions for changes in amount and color  - [place] appropriate cooling/warming therapies per order  - Administer medications as ordered  - Instruct and encourage patient and family to use good hand hygiene technique  - Identify and instruct in appropriate isolation precautions for identified infection/condition  Outcome: Progressing  Goal: Absence of fever/infection during neutropenic period  Description  INTERVENTIONS:  - Monitor WBC  - Implement neutropenic guidelines  Outcome: Progressing     Problem: SAFETY ADULT  Goal: Patient will remain free of falls  Description  INTERVENTIONS:  - Assess patient frequently for physical needs  -  Identify cognitive and physical deficits and behaviors that affect risk of falls    -  [place] fall precautions as indicated by assessment   - Educate patient/family on patient safety including physical limitations  - Instruct patient to call for assistance with activity based on assessment  - Modify environment to reduce risk of injury  - Consider OT/PT consult to assist with strengthening/mobility  Outcome: Progressing  Goal: Maintain or return to baseline ADL function  Description  INTERVENTIONS:  -  Assess patient's ability to carry out ADLs; assess patient's baseline for ADL function and identify physical deficits which impact ability to perform ADLs (bathing, care of mouth/teeth, toileting, grooming, dressing, etc )  - Assess/evaluate cause of self-care deficits   - Assess range of motion  - Assess patient's mobility; develop plan if impaired  - Assess patient's need for assistive devices and provide as appropriate  - Encourage maximum independence but intervene and supervise when necessary  ¯ Involve family in performance of ADLs  ¯ Assess for home care needs following discharge   ¯ Request OT consult to assist with ADL evaluation and planning for discharge  ¯ Provide patient education as appropriate  Outcome: Progressing  Goal: Maintain or return mobility status to optimal level  Description  INTERVENTIONS:  - Assess patient's baseline mobility status (ambulation, transfers, stairs, etc )    - Identify cognitive and physical deficits and behaviors that affect mobility  - Identify mobility aids required to assist with transfers and/or ambulation (gait belt, sit-to-stand, lift, walker, cane, etc )  - [place] fall precautions as indicated by assessment  - Record patient progress and toleration of activity level on Mobility SBAR; progress patient to next Phase/Stage  - Instruct patient to call for assistance with activity based on assessment  - Request Rehabilitation consult to assist with strengthening/weightbearing, etc   Outcome: Progressing     Problem: DISCHARGE PLANNING  Goal: Discharge to home or other facility with appropriate resources  Description  INTERVENTIONS:  - Identify barriers to discharge w/patient and caregiver  - Arrange for needed discharge resources and transportation as appropriate  - Identify discharge learning needs (meds, wound care, etc )  - Arrange for interpretive services to assist at discharge as needed  - Refer to Case Management Department for coordinating discharge planning if the patient needs post-hospital services based on physician/advanced practitioner order or complex needs related to functional status, cognitive ability, or social support system  Outcome: Progressing     Problem: Knowledge Deficit  Goal: Patient/family/caregiver demonstrates understanding of disease process, treatment plan, medications, and discharge instructions  Description  Complete learning assessment and assess knowledge base    Interventions:  - Provide teaching at level of understanding  - Provide teaching via preferred learning methods  Outcome: Progressing

## 2019-02-28 NOTE — ED PROVIDER NOTES
History  Chief Complaint   Patient presents with    Abscess     Pt states "cyst on my butt  nothing has come out of it"     33 yo female with hidraadenitis suppirativa who presents with R gluteal abscess  Pt has had a few days of worsening pain, no discharge  Unfortunately pt exquisitely tender, abscess seems deep without any fluctuance or cellulitis - no overlying skin changes  History provided by:  Patient   used: No    Abscess   Location:  Torso  Size:  4  Abscess quality: induration and painful  Draining: cm x 4cm  Red streaking: no    Progression:  Worsening  Pain details:     Quality:  Aching    Severity:  Severe    Timing:  Constant    Progression:  Worsening  Chronicity:  New  Relieved by:  Nothing  Exacerbated by: anything touching area  Associated symptoms: no fatigue, no fever, no headaches, no nausea and no vomiting    Risk factors: prior abscess        Prior to Admission Medications   Prescriptions Last Dose Informant Patient Reported?  Taking?   acetaminophen-codeine (TYLENOL #3) 300-30 mg per tablet   No No   Sig: Take 1 tablet by mouth every 4 (four) hours as needed for moderate pain   albuterol (PROVENTIL HFA,VENTOLIN HFA) 90 mcg/act inhaler   No No   Sig: Inhale 2 puffs every 4 (four) hours as needed for wheezing   norethindrone (MICRONOR) 0 35 MG tablet   No No   Sig: Take 1 tablet (0 35 mg total) by mouth daily      Facility-Administered Medications: None       Past Medical History:   Diagnosis Date     delivery delivered     RESOLVED: [id number]    Disease of thyroid gland     hyperthyroid    Hidradenitis suppurativa     LAST ASSESSED: [id number]    History of early menarche     FIRST PERIOD AT 8YEARS OLD    Hyperemesis gravidarum     RESOLVED: [id number]    Hypertension in pregnancy, pre-eclampsia     prior preg    Migraine     Sickle cell trait ([facility] [address] )        Past Surgical History:   Procedure Laterality Date    ABSCESS DRAINAGE  2016 INCISION AND DRAINAGE OF SKIN ABSCESS; LABIAL CYST REMOVAL - [facility]     SECTION      CHOLECYSTECTOMY  2009    [place]  DELIVERY ONLY N/A 2018    Procedure:  SECTION () REPEAT;  Surgeon: Xuan Peña MD;  Location: [facility] LD;  Service: Obstetrics    [place] EXC SWEAT GLAND [place] [facility] Right 2016    Procedure: EXCISION GROIN CYST HIDRADENITIS;  Surgeon: Timothy Barrett DO;  [facility] MAIN OR;  Service: General    TONSILLECTOMY      VULVA BIOPSY N/A 2016    Procedure: INCISION AND DRAINAGE, EXCISION OF LABIAL CYST ;  Surgeon: Christen Jorge DO;  [facility];  Service:        Family History   Problem Relation Age of Onset    Hyperlipidemia Mother         PURE    Cancer Father         lung    Diabetes Maternal Grandmother     No Known Problems Brother      I have reviewed and agree with the history as documented  Social History     Tobacco Use    Smoking status: Never Smoker    Smokeless tobacco: Never Used   Substance Use Topics    Alcohol use: No     Comment: (HISTORY)    Drug use: No        Review of Systems   Constitutional: Negative for appetite change, chills, fatigue and fever  HENT: Negative for sore throat  Eyes: Negative for visual disturbance  Respiratory: Negative for shortness of breath  Cardiovascular: Negative for chest pain  Gastrointestinal: Negative for abdominal pain, diarrhea, nausea and vomiting  Genitourinary: Negative for dysuria, frequency, vaginal bleeding and vaginal discharge  Musculoskeletal: Negative for back pain, neck pain and neck stiffness  Skin: Negative for pallor and rash  R buttock abscess   Allergic/Immunologic: Negative for immunocompromised state  Neurological: Negative for light-headedness and headaches  Psychiatric/Behavioral: Negative for confusion  All other systems reviewed and are negative        Physical Exam  Physical Exam   Constitutional: She is oriented to person, place, and time  She appears well-developed and well-nourished  No distress (ucomfortable with movement)  HENT:   Head: Normocephalic and atraumatic  Mouth/Throat: Oropharynx is clear and moist    Eyes: Pupils are equal, round, and reactive to light  EOM are normal    Neck: Normal range of motion  Neck supple  Cardiovascular: Normal rate and regular rhythm  No murmur heard  Pulmonary/Chest: Effort normal and breath sounds normal  No respiratory distress  Abdominal: Soft  Bowel sounds are normal    Musculoskeletal: Normal range of motion  Neurological: She is alert and oriented to person, place, and time  Skin: Skin is warm  No rash noted  No pallor  R buttock with very tender indurated area inner R gluteal region - no overlying skin changes - very difficult to exam due to pain   Psychiatric: She has a normal mood and affect  Her behavior is normal    Nursing note and vitals reviewed  Vital Signs  ED Triage Vitals [02/28/19 0044]   Temperature Pulse Respirations Blood Pressure SpO2   98 9 °F (37 2 °C) 105 18 144/77 100 %      Temp src Heart Rate Source Patient Position - Orthostatic VS BP Location FiO2 (%)   -- -- -- -- --      Pain Score       8           Vitals:    02/28/19 0044   BP: 144/77   Pulse: 105       Visual Acuity      ED Medications  Medications - No data to display    Diagnostic Studies  Results Reviewed     None                 No orders to display              Procedures  Procedures       Phone Contacts  ED Phone Contact    ED Course  ED Course as of Feb 28 0402   Thu Feb 28, 2019   [address] Pt seen and examined  35 yo female with hidraadenitis suppirativa who presents with R gluteal abscess  Pt has had a few days of worsening pain, no discharge  Unfortunately pt exquisitely tender, abscess seems deep without any fluctuance or cellulitis - no overlying skin changes  Will give IVF, morphine and toradol, keep NPO and check labs, CT for better determination        0128 WBC 10 98       0106 CT shows Right gluteal region demonstrates an area of phlegmon beginnings of a capsule  I do not believe this is a drainable at this time however this will likely become so shortly  Ultrasound can be used for repeat evaluation if indicated  MDM    Disposition  Final diagnoses:   None     ED Disposition     None      Follow-up Information    None         Patient's Medications   Discharge Prescriptions    No medications on file     No discharge procedures on file      ED Provider  Electronically Signed by           Argenis Santos DO  02/28/19 0418

## 2019-02-28 NOTE — PLAN OF CARE
Problem: Nutrition/Hydration-ADULT  Goal: Nutrient/Hydration intake appropriate for improving, restoring or maintaining nutritional needs  Description  Monitor and assess patient's nutrition/hydration status for malnutrition (ex- brittle hair, bruises, dry skin, pale skin and conjunctiva, muscle wasting, smooth red tongue, and disorientation)  Collaborate with interdisciplinary team and initiate plan and interventions as ordered  Monitor patient's weight and dietary intake as ordered or per policy  Utilize nutrition screening tool and intervene per policy  Determine patient's food preferences and provide high-protein, high-caloric foods as appropriate       INTERVENTIONS:  - Monitor oral intake, urinary output, labs, and treatment plans  - Assess nutrition and hydration status and recommend course of action  - Evaluate amount of meals eaten  - Assist patient with eating if necessary   - Allow adequate time for meals  - Recommend/ encourage appropriate diets, oral nutritional supplements, and vitamin/mineral supplements  - Order, calculate, and assess calorie counts as needed  - Recommend, monitor, and adjust tube feedings and TPN/PPN based on assessed needs  - Assess need for intravenous fluids  - Provide specific nutrition/hydration education as appropriate  - Include patient/family/caregiver in decisions related to nutrition  Outcome: Progressing     Problem: SKIN/TISSUE INTEGRITY - ADULT  Goal: Skin integrity remains intact  Description  INTERVENTIONS  - Identify patients at risk for skin breakdown  - Assess and monitor skin integrity  - Assess and monitor nutrition and hydration status  - Monitor labs (i e  albumin)  - Assess for incontinence   - Turn and reposition patient  - Assist with mobility/ambulation  - Relieve pressure over bony prominences  - Avoid friction and shearing  - Provide appropriate hygiene as needed including keeping skin clean and dry  - Evaluate need for skin moisturizer/barrier cream  - Collaborate with interdisciplinary team (i e  Nutrition, Rehabilitation, etc )   - Patient/family teaching  Outcome: Progressing  Goal: Incision(s), wounds(s) or drain site(s) healing without S/S of infection  Description  INTERVENTIONS  - Assess and document risk factors for skin impairment   - Assess and document dressing, incision, wound bed, drain sites and surrounding tissue  - Initiate Nutrition services consult and/or wound management as needed  Outcome: Progressing     Problem: PAIN - ADULT  Goal: Verbalizes/displays adequate comfort level or baseline comfort level  Description  Interventions:  - Encourage patient to monitor pain and request assistance  - Assess pain using appropriate pain scale  - Administer analgesics based on type and severity of pain and evaluate response  - Implement non-pharmacological measures as appropriate and evaluate response  - Consider cultural and social influences on pain and pain management  - Notify physician/advanced practitioner if interventions unsuccessful or patient reports new pain  Outcome: Progressing     Problem: INFECTION - ADULT  Goal: Absence or prevention of progression during hospitalization  Description  INTERVENTIONS:  - Assess and monitor for signs and symptoms of infection  - Monitor lab/diagnostic results  - Monitor all insertion sites, i e  indwelling lines, tubes, and drains  - Monitor endotracheal (as able) and nasal secretions for changes in amount and color  - Back[place] appropriate cooling/warming therapies per order  - Administer medications as ordered  - Instruct and encourage patient and family to use good hand hygiene technique  - Identify and instruct in appropriate isolation precautions for identified infection/condition  Outcome: Progressing  Goal: Absence of fever/infection during neutropenic period  Description  INTERVENTIONS:  - Monitor WBC  - Implement neutropenic guidelines  Outcome: Progressing     Problem: SAFETY ADULT  Goal: Patient will remain free of falls  Description  INTERVENTIONS:  - Assess patient frequently for physical needs  -  Identify cognitive and physical deficits and behaviors that affect risk of falls    -  [place] fall precautions as indicated by assessment   - Educate patient/family on patient safety including physical limitations  - Instruct patient to call for assistance with activity based on assessment  - Modify environment to reduce risk of injury  - Consider OT/PT consult to assist with strengthening/mobility  Outcome: Progressing  Goal: Maintain or return to baseline ADL function  Description  INTERVENTIONS:  -  Assess patient's ability to carry out ADLs; assess patient's baseline for ADL function and identify physical deficits which impact ability to perform ADLs (bathing, care of mouth/teeth, toileting, grooming, dressing, etc )  - Assess/evaluate cause of self-care deficits   - Assess range of motion  - Assess patient's mobility; develop plan if impaired  - Assess patient's need for assistive devices and provide as appropriate  - Encourage maximum independence but intervene and supervise when necessary  ¯ Involve family in performance of ADLs  ¯ Assess for home care needs following discharge   ¯ Request OT consult to assist with ADL evaluation and planning for discharge  ¯ Provide patient education as appropriate  Outcome: Progressing  Goal: Maintain or return mobility status to optimal level  Description  INTERVENTIONS:  - Assess patient's baseline mobility status (ambulation, transfers, stairs, etc )    - Identify cognitive and physical deficits and behaviors that affect mobility  - Identify mobility aids required to assist with transfers and/or ambulation (gait belt, sit-to-stand, lift, walker, cane, etc )  - [place] fall precautions as indicated by assessment  - Record patient progress and toleration of activity level on Mobility SBAR; progress patient to next Phase/Stage  - Instruct patient to call for assistance with activity based on assessment  - Request Rehabilitation consult to assist with strengthening/weightbearing, etc   Outcome: Progressing     Problem: DISCHARGE PLANNING  Goal: Discharge to home or other facility with appropriate resources  Description  INTERVENTIONS:  - Identify barriers to discharge w/patient and caregiver  - Arrange for needed discharge resources and transportation as appropriate  - Identify discharge learning needs (meds, wound care, etc )  - Arrange for interpretive services to assist at discharge as needed  - Refer to Case Management Department for coordinating discharge planning if the patient needs post-hospital services based on physician/advanced practitioner order or complex needs related to functional status, cognitive ability, or social support system  Outcome: Progressing     Problem: Knowledge Deficit  Goal: Patient/family/caregiver demonstrates understanding of disease process, treatment plan, medications, and discharge instructions  Description  Complete learning assessment and assess knowledge base    Interventions:  - Provide teaching at level of understanding  - Provide teaching via preferred learning methods  Outcome: Progressing

## 2019-02-28 NOTE — CONSULTS
Consultation - General Surgery   Axel Chapa 34 y o  female MRN: [id number]  Unit/Bed#: [place] 2 - Encounter: [id number]    Assessment/Plan     Assessment/Plan:  Gluteal Abscess/phlegmon  - will plan for I+D and debridement of abscess in the operating room  -IV abx  -local wound care  Hx of hydradenitis      History of Present Illness   Cc: gluteal pain  HPI:  Axel Chapa is a 34 y o  female who presents with gluteal pain and swelling  Patient with history of hydradenitis and has had previous abscesses s/p I+D  Patient normally responds to drainage  Pain was so severe despite using Tylenol #3 she presented to the ED  Denies fevers, chills, SOB or CP  Inpatient consult to Acute Care Surgery  Consult performed by: Melissa Park PA-C  Consult ordered by: Rudolph Do PA-C          Review of Systems   Constitutional: Negative for chills, fatigue and fever  HENT: Negative for tinnitus and trouble swallowing  Eyes: Negative for visual disturbance  Respiratory: Negative for apnea, choking, chest tightness and shortness of breath  Cardiovascular: Negative for chest pain and leg swelling  Gastrointestinal: Positive for rectal pain  Negative for abdominal distention, nausea and vomiting  Endocrine: Negative for polyphagia and polyuria  Genitourinary: Negative for difficulty urinating and dyspareunia  Musculoskeletal: Negative for arthralgias and back pain  Skin: Positive for wound  Neurological: Negative for dizziness and facial asymmetry  Hematological: Negative for adenopathy  Psychiatric/Behavioral: Negative for agitation and behavioral problems         Historical Information   Past Medical History:   Diagnosis Date     delivery delivered     RESOLVED: [id number]    Disease of thyroid gland     hyperthyroid    Hidradenitis suppurativa     LAST ASSESSED: [id number]    History of early menarche     [de-identified] PERIOD AT 8YEARS OLD    Hyperemesis gravidarum RESOLVED: [id number]    Hypertension in pregnancy, pre-eclampsia     prior preg    Migraine     Sickle cell trait ([facility] [address] )      Past Surgical History:   Procedure Laterality Date    ABSCESS DRAINAGE  2016    INCISION AND DRAINAGE OF SKIN ABSCESS; LABIAL CYST REMOVAL - [facility]     SECTION      CHOLECYSTECTOMY  2009    [place]  DELIVERY ONLY N/A 2018    Procedure:  SECTION () REPEAT;  Surgeon: Thor Rhoades MD;  [facility]: [facility];  Service: Obstetrics    DC EXC SWEAT GLAND Steve Bae Right 2016    Procedure: EXCISION GROIN CYST HIDRADENITIS;  Surgeon: Mehrdad Miranda DO;  [facility];  Service: General    TONSILLECTOMY      VULVA BIOPSY N/A 2016    Procedure: INCISION AND DRAINAGE, EXCISION OF LABIAL CYST ;  Surgeon: Nadine Pekc DO;  [facility];  Service:      Social History   Social History     Substance and Sexual Activity   Alcohol Use No    Comment: (HISTORY)     Social History     Substance and Sexual Activity   Drug Use No     Social History     Tobacco Use   Smoking Status Never Smoker   Smokeless Tobacco Never Used     Family History: non-contributory    Meds/Allergies   all current active meds have been reviewed  No Known Allergies    Objective   First Vitals:   Blood Pressure: 144/77 (19)  Pulse: 105 (19)  Temperature: 98 9 °F (37 2 °C) (19)  Temp Source: Temporal (19)  Respirations: 18 (19)  Weight - Scale: 70 7 kg (155 lb 12 8 oz) (19)  SpO2: 100 % (19)    Current Vitals:   Blood Pressure: 125/78 (19)  Pulse: 66 (19)  Temperature: 98 6 °F (37 °C) (19)  Temp Source: Temporal (19)  Respirations: 18 (19)  Weight - Scale: 70 7 kg (155 lb 12 8 oz) (19)  SpO2: 100 % (1930)      Intake/Output Summary (Last 24 hours) at 2019 1009  Last data filed at 2019 8298  Gross per 24 hour   Intake 1000 ml   Output    Net 1000 ml       Invasive Devices     Peripheral Intravenous Line            Peripheral IV 02/28/19 Left Antecubital less than 1 day                Physical Exam   Constitutional: She appears well-developed and well-nourished  HENT:   Head: Normocephalic and atraumatic  Eyes: Pupils are equal, round, and reactive to light  Neck: Normal range of motion  Neck supple  Cardiovascular: Normal rate and regular rhythm  Pulmonary/Chest: Effort normal and breath sounds normal    Abdominal: Soft  Bowel sounds are normal    Musculoskeletal: Normal range of motion  Neurological: She is alert  Skin: Skin is warm and dry  Left gluteal fold near perineum with tenderness to palpitation and  extends towards labia   No induration possible fluctuance  No erythema noted       Lab Results:   CBC:   Lab Results   Component Value Date    WBC 10 98 (H) 02/28/2019    HGB 11 8 02/28/2019    HCT 37 0 02/28/2019    MCV 75 (L) 02/28/2019     02/28/2019    MCH 24 0 (L) 02/28/2019    MCHC 31 9 02/28/2019    RDW 16 4 (H) 02/28/2019    MPV 9 5 02/28/2019    NRBC 0 02/28/2019   , CMP:   Lab Results   Component Value Date    SODIUM 140 02/28/2019    K 3 7 02/28/2019     02/28/2019    CO2 27 02/28/2019    BUN 6 02/28/2019    CREATININE 0 92 02/28/2019    CALCIUM 8 9 02/28/2019    AST 11 02/28/2019    ALT 13 02/28/2019    ALKPHOS 83 02/28/2019    EGFR 99 02/28/2019     Imaging: I have personally reviewed pertinent reports  Right gluteal region demonstrates an area of phlegmon beginnings of a capsule  I do not believe this is a drainable at this time however this will likely become so shortly  Ultrasound can be used for repeat evaluation if indicated  EKG, Pathology, and Other Studies: I have personally reviewed pertinent reports        Counseling / Coordination of Care

## 2019-02-28 NOTE — OP NOTE
INCISION AND DRAINAGE (I&D) BUTTOCK  Excisional Debridement and Drainage of an Abscess:  Postoperative Note      PATIENT NAME: Melida Jackson  : 1990  MRN: [id number]  [facility] OR ROOM 08    Surgery Date: 2019    Pre operative diagnosis:  Phlegmon [L02 91]    Operative Indications:  Abscess    Consent:  The risks, benefits, and alternatives to the surgery were discussed with the patient and/or with the family prior to surgery, personally by Dr Flash White  If the consent was obtained by the physician assistant or other representative, the consent was reviewed once again personally by the operating physician  Common complications particular for this procedure as well as unusual complications were discussed, including but not limited to:  bleeding, wound infection, prolonged wound healing, open wounds, reoperation, leak from the bowel or viscus, leak from the bile duct or injury to adjacent or other organs or blood vessels in the abdomen and or possible reoperation discussion was also carried out that the patient may need recurrent operations to drain the abscess  A  was used if necessary  The patient expressed understanding of the issues discussed and wished and consented to the procedure to proceed  All questions were answered  Dr Flash White personally discussed the informed consent with this patient  Operative Findings:  Pus in robyn anal region  5 cc pus  Post operative diagnosis:  Post-Op Diagnosis Codes:     * Phlegmon [L02 91]    Procedure:  Procedure(s):  INCISION AND DRAINAGE (I&D) BUTTOCK    Surgeon(s) and Role:     * Shon Freed MD - Primary    The Physician Assistant was medically necessary for surgical safety the case including suturing, retraction, and hemostasis  No qualified resident was available  I was present for the entire procedure       Drains:  * No LDAs found *    Specimens:  ID Type Source Tests Collected by Time Destination   A : perineal abscess right gluteal Tissue Perineum ANAEROBIC CULTURE AND GRAM STAIN, CULTURE, TISSUE AND GRAM STAIN Ramírez Mullins MD 2019 1218        Estimated Blood Loss:   Minimal    Anesthesia Type:   General     Procedure: The patient was brought to the operating room and identified as the proper patient  Location of the procedure site was confirmed with the patient  Staff confirmed site, patient name, , and laterality, if applicable  The abscess and surrounding area were prepped and draped in a sterile fashion  A time-out was performed  Local anesthesia was used  An elliptical incision was made at buttocks over the area of the obvious abscess  The tissue described below was EXCISED (removed) in the fashion listed below:     1  scalpel, scissors, and cautery were used for dissection and excision  2  Skin, soft tissue and fat were removed  Tissue type: skin, subcutaneous tissue and fat   This was sent for pathology evaluation  3  The tissue was: with necrosis, devitalization, and non viable tissue  Approximately  5 mL of purulent fluid fluid and debris was removed  4  This was widely removed (excised) until clean, healthy, bleeding tissue was seen  Cultures were taken and sent  Finger dissection was used to break up any loculations  Once these were all broken up, the irrigation with sterile saline was used to clean the site  Tissue and fluid were sent for culture and pathology  Hemostasis was assured  The wound was then packed with 68"  Of two inch cheri soaked in betadine   Exact measurements of the wound cavity are 3x3x3 cm       The wound was dressed  The patient tolerated the procedure in stable condition  Sponge and needle count were correct at the end of the procedure           Complications: None    Condition: Stable to PACU    SIGNATURE: Ramírez Mullins MD   DATE: 2019   TIME: 12:22 PM        Some portions of this record may have been generated with voice recognition software  There may be translation, syntax,  or grammatical errors  Occasional wrong word or "sound-a-like" substitutions may have occurred due to the inherent limitations of the voice recognition software  Read the chart carefully and recognize, using context, where substations may have occurred  If you have any questions, please contact the dictating provider for clarification or correction, as needed

## 2019-02-28 NOTE — ASSESSMENT & PLAN NOTE
Patient reports history of multiple abscesses throughout her life   Most recently needed drainage of abscess in January   Reports need for surgical excision of abscess in 2016

## 2019-02-28 NOTE — ANESTHESIA PREPROCEDURE EVALUATION
Review of Systems/Medical History  Patient summary reviewed  Chart reviewed      Cardiovascular  Hypertension ,   Comment: HTN with pregnancy,  Pulmonary  Negative pulmonary ROS        GI/Hepatic  Negative GI/hepatic ROS          Negative  ROS        Endo/Other  History of thyroid disease ,   Comment: 1 TSH in 01/18 was low TSH and other thyroid tests since that time have been normal    GYN       Hematology  Negative hematology ROS      Musculoskeletal  Negative musculoskeletal ROS        Neurology    Headaches,   Comment: Hx of migraines    Last occurred 2 months ago Psychology   Negative psychology ROS              Physical Exam    Airway       Dental   No notable dental hx     Cardiovascular      Pulmonary      Other Findings        Anesthesia Plan  ASA Score- 2 Emergent    Anesthesia Type- general with ASA Monitors  Additional Monitors:   Airway Plan: LMA  Plan Factors-Patient not instructed to abstain from smoking on day of procedure  Patient did not smoke on day of surgery  Induction- intravenous  Postoperative Plan- Plan for postoperative opioid use  Informed Consent- Anesthetic plan and risks discussed with patient

## 2019-03-01 ENCOUNTER — TELEPHONE (OUTPATIENT)
Dept: SURGERY | Facility: CLINIC | Age: 29
End: 2019-03-01

## 2019-03-01 VITALS
DIASTOLIC BLOOD PRESSURE: 80 MMHG | HEART RATE: 49 BPM | BODY MASS INDEX: 26.6 KG/M2 | OXYGEN SATURATION: 98 % | TEMPERATURE: 98.5 F | WEIGHT: 155.8 LBS | SYSTOLIC BLOOD PRESSURE: 116 MMHG | HEIGHT: 64 IN | RESPIRATION RATE: 18 BRPM

## 2019-03-01 LAB
BASOPHILS # BLD AUTO: 0.01 THOUSANDS/ΜL (ref 0–0.1)
BASOPHILS NFR BLD AUTO: 0 % (ref 0–1)
EOSINOPHIL # BLD AUTO: 0 THOUSAND/ΜL (ref 0–0.61)
EOSINOPHIL NFR BLD AUTO: 0 % (ref 0–6)
ERYTHROCYTE [DISTWIDTH] IN BLOOD BY AUTOMATED COUNT: 16.2 % (ref 11.6–15.1)
HCT VFR BLD AUTO: 33.4 % (ref 34.8–46.1)
HGB BLD-MCNC: 10.5 G/DL (ref 11.5–15.4)
IMM GRANULOCYTES # BLD AUTO: 0.07 THOUSAND/UL (ref 0–0.2)
IMM GRANULOCYTES NFR BLD AUTO: 1 % (ref 0–2)
LYMPHOCYTES # BLD AUTO: 1.86 THOUSANDS/ΜL (ref 0.6–4.47)
LYMPHOCYTES NFR BLD AUTO: 14 % (ref 14–44)
MCH RBC QN AUTO: 24 PG (ref 26.8–34.3)
MCHC RBC AUTO-ENTMCNC: 31.4 G/DL (ref 31.4–37.4)
MCV RBC AUTO: 76 FL (ref 82–98)
MONOCYTES # BLD AUTO: 0.56 THOUSAND/ΜL (ref 0.17–1.22)
MONOCYTES NFR BLD AUTO: 4 % (ref 4–12)
NEUTROPHILS # BLD AUTO: 10.91 THOUSANDS/ΜL (ref 1.85–7.62)
NEUTS SEG NFR BLD AUTO: 81 % (ref 43–75)
NRBC BLD AUTO-RTO: 0 /100 WBCS
PLATELET # BLD AUTO: 251 THOUSANDS/UL (ref 149–390)
PMV BLD AUTO: 10.3 FL (ref 8.9–12.7)
RBC # BLD AUTO: 4.38 MILLION/UL (ref 3.81–5.12)
WBC # BLD AUTO: 13.41 THOUSAND/UL (ref 4.31–10.16)

## 2019-03-01 PROCEDURE — 99024 POSTOP FOLLOW-UP VISIT: CPT | Performed by: PHYSICIAN ASSISTANT

## 2019-03-01 PROCEDURE — 85025 COMPLETE CBC W/AUTO DIFF WBC: CPT | Performed by: PHYSICIAN ASSISTANT

## 2019-03-01 PROCEDURE — 99217 PR OBSERVATION CARE DISCHARGE MANAGEMENT: CPT | Performed by: INTERNAL MEDICINE

## 2019-03-01 RX ORDER — AMOXICILLIN AND CLAVULANATE POTASSIUM 875; 125 MG/1; MG/1
1 TABLET, FILM COATED ORAL EVERY 12 HOURS SCHEDULED
Qty: 14 TABLET | Refills: 0 | Status: SHIPPED | OUTPATIENT
Start: 2019-03-01 | End: 2019-03-08

## 2019-03-01 RX ORDER — AMOXICILLIN AND CLAVULANATE POTASSIUM 875; 125 MG/1; MG/1
1 TABLET, FILM COATED ORAL EVERY 12 HOURS SCHEDULED
Qty: 14 TABLET | Refills: 0 | Status: SHIPPED | OUTPATIENT
Start: 2019-03-01 | End: 2019-03-01 | Stop reason: SDUPTHER

## 2019-03-01 RX ORDER — OXYCODONE HYDROCHLORIDE 5 MG/1
5 TABLET ORAL EVERY 4 HOURS PRN
Qty: 30 TABLET | Refills: 0 | Status: SHIPPED | OUTPATIENT
Start: 2019-03-01 | End: 2019-03-16

## 2019-03-01 RX ADMIN — MORPHINE SULFATE 4 MG: 4 INJECTION INTRAVENOUS at 15:31

## 2019-03-01 RX ADMIN — SODIUM CHLORIDE 3 G: 9 INJECTION, SOLUTION INTRAVENOUS at 08:43

## 2019-03-01 RX ADMIN — OXYCODONE HYDROCHLORIDE 5 MG: 5 TABLET ORAL at 13:38

## 2019-03-01 RX ADMIN — OXYCODONE HYDROCHLORIDE 5 MG: 5 TABLET ORAL at 08:42

## 2019-03-01 RX ADMIN — ONDANSETRON 4 MG: 2 INJECTION INTRAMUSCULAR; INTRAVENOUS at 13:31

## 2019-03-01 RX ADMIN — DOCUSATE SODIUM 100 MG: 100 CAPSULE, LIQUID FILLED ORAL at 09:48

## 2019-03-01 RX ADMIN — MORPHINE SULFATE 4 MG: 4 INJECTION INTRAVENOUS at 09:47

## 2019-03-01 RX ADMIN — OXYCODONE HYDROCHLORIDE 5 MG: 5 TABLET ORAL at 03:15

## 2019-03-01 RX ADMIN — SODIUM CHLORIDE 3 G: 9 INJECTION, SOLUTION INTRAVENOUS at 01:14

## 2019-03-01 RX ADMIN — MORPHINE SULFATE 4 MG: 4 INJECTION INTRAVENOUS at 05:00

## 2019-03-01 RX ADMIN — KETOROLAC TROMETHAMINE 30 MG: 30 INJECTION, SOLUTION INTRAMUSCULAR at 00:47

## 2019-03-01 NOTE — PLAN OF CARE
Problem: Nutrition/Hydration-ADULT  Goal: Nutrient/Hydration intake appropriate for improving, restoring or maintaining nutritional needs  Description  Monitor and assess patient's nutrition/hydration status for malnutrition (ex- brittle hair, bruises, dry skin, pale skin and conjunctiva, muscle wasting, smooth red tongue, and disorientation)  Collaborate with interdisciplinary team and initiate plan and interventions as ordered  Monitor patient's weight and dietary intake as ordered or per policy  Utilize nutrition screening tool and intervene per policy  Determine patient's food preferences and provide high-protein, high-caloric foods as appropriate       INTERVENTIONS:  - Monitor oral intake, urinary output, labs, and treatment plans  - Assess nutrition and hydration status and recommend course of action  - Evaluate amount of meals eaten  - Assist patient with eating if necessary   - Allow adequate time for meals  - Recommend/ encourage appropriate diets, oral nutritional supplements, and vitamin/mineral supplements  - Order, calculate, and assess calorie counts as needed  - Recommend, monitor, and adjust tube feedings and TPN/PPN based on assessed needs  - Assess need for intravenous fluids  - Provide specific nutrition/hydration education as appropriate  - Include patient/family/caregiver in decisions related to nutrition  Outcome: Progressing     Problem: SKIN/TISSUE INTEGRITY - ADULT  Goal: Skin integrity remains intact  Description  INTERVENTIONS  - Identify patients at risk for skin breakdown  - Assess and monitor skin integrity  - Assess and monitor nutrition and hydration status  - Monitor labs (i e  albumin)  - Assess for incontinence   - Turn and reposition patient  - Assist with mobility/ambulation  - Relieve pressure over bony prominences  - Avoid friction and shearing  - Provide appropriate hygiene as needed including keeping skin clean and dry  - Evaluate need for skin moisturizer/barrier cream  - Collaborate with interdisciplinary team (i e  Nutrition, Rehabilitation, etc )   - Patient/family teaching  Outcome: Progressing  Goal: Incision(s), wounds(s) or drain site(s) healing without S/S of infection  Description  INTERVENTIONS  - Assess and document risk factors for skin impairment   - Assess and document dressing, incision, wound bed, drain sites and surrounding tissue  - Initiate Nutrition services consult and/or wound management as needed  Outcome: Progressing     Problem: PAIN - ADULT  Goal: Verbalizes/displays adequate comfort level or baseline comfort level  Description  Interventions:  - Encourage patient to monitor pain and request assistance  - Assess pain using appropriate pain scale  - Administer analgesics based on type and severity of pain and evaluate response  - Implement non-pharmacological measures as appropriate and evaluate response  - Consider cultural and social influences on pain and pain management  - Notify physician/advanced practitioner if interventions unsuccessful or patient reports new pain  Outcome: Progressing     Problem: INFECTION - ADULT  Goal: Absence or prevention of progression during hospitalization  Description  INTERVENTIONS:  - Assess and monitor for signs and symptoms of infection  - Monitor lab/diagnostic results  - Monitor all insertion sites, i e  indwelling lines, tubes, and drains  - Monitor endotracheal (as able) and nasal secretions for changes in amount and color  - [place] appropriate cooling/warming therapies per order  - Administer medications as ordered  - Instruct and encourage patient and family to use good hand hygiene technique  - Identify and instruct in appropriate isolation precautions for identified infection/condition  Outcome: Progressing     Problem: SAFETY ADULT  Goal: Patient will remain free of falls  Description  INTERVENTIONS:  - Assess patient frequently for physical needs  -  Identify cognitive and physical deficits and behaviors that affect risk of falls    -  [place] fall precautions as indicated by assessment   - Educate patient/family on patient safety including physical limitations  - Instruct patient to call for assistance with activity based on assessment  - Modify environment to reduce risk of injury  - Consider OT/PT consult to assist with strengthening/mobility  Outcome: Progressing  Goal: Maintain or return to baseline ADL function  Description  INTERVENTIONS:  -  Assess patient's ability to carry out ADLs; assess patient's baseline for ADL function and identify physical deficits which impact ability to perform ADLs (bathing, care of mouth/teeth, toileting, grooming, dressing, etc )  - Assess/evaluate cause of self-care deficits   - Assess range of motion  - Assess patient's mobility; develop plan if impaired  - Assess patient's need for assistive devices and provide as appropriate  - Encourage maximum independence but intervene and supervise when necessary  ¯ Involve family in performance of ADLs  ¯ Assess for home care needs following discharge   ¯ Request OT consult to assist with ADL evaluation and planning for discharge  ¯ Provide patient education as appropriate  Outcome: Progressing  Goal: Maintain or return mobility status to optimal level  Description  INTERVENTIONS:  - Assess patient's baseline mobility status (ambulation, transfers, stairs, etc )    - Identify cognitive and physical deficits and behaviors that affect mobility  - Identify mobility aids required to assist with transfers and/or ambulation (gait belt, sit-to-stand, lift, walker, cane, etc )  - [place] fall precautions as indicated by assessment  - Record patient progress and toleration of activity level on Mobility SBAR; progress patient to next Phase/Stage  - Instruct patient to call for assistance with activity based on assessment  - Request Rehabilitation consult to assist with strengthening/weightbearing, etc   Outcome: Progressing     Problem: DISCHARGE PLANNING  Goal: Discharge to home or other facility with appropriate resources  Description  INTERVENTIONS:  - Identify barriers to discharge w/patient and caregiver  - Arrange for needed discharge resources and transportation as appropriate  - Identify discharge learning needs (meds, wound care, etc )  - Arrange for interpretive services to assist at discharge as needed  - Refer to Case Management Department for coordinating discharge planning if the patient needs post-hospital services based on physician/advanced practitioner order or complex needs related to functional status, cognitive ability, or social support system  Outcome: Progressing     Problem: Knowledge Deficit  Goal: Patient/family/caregiver demonstrates understanding of disease process, treatment plan, medications, and discharge instructions  Description  Complete learning assessment and assess knowledge base  Interventions:  - Provide teaching at level of understanding  - Provide teaching via preferred learning methods  Outcome: Progressing     Problem: Potential for Falls  Goal: Patient will remain free of falls  Description  INTERVENTIONS:  - Assess patient frequently for physical needs  -  Identify cognitive and physical deficits and behaviors that affect risk of falls    -  [place] fall precautions as indicated by assessment   - Educate patient/family on patient safety including physical limitations  - Instruct patient to call for assistance with activity based on assessment  - Modify environment to reduce risk of injury  - Consider OT/PT consult to assist with strengthening/mobility  Outcome: Progressing     Problem: INFECTION - ADULT  Goal: Absence of fever/infection during neutropenic period  Description  INTERVENTIONS:  - Monitor WBC  - Implement neutropenic guidelines  Outcome: Completed

## 2019-03-01 NOTE — CONSULTS
Consult Note - Wound   Mirandan Bagley 34 y o  female MRN: [id number]  Unit/Bed#: [place] [address] -01 Encounter: [id number]      History and Present Illness:  34year old female presented to the hospital with right buttock abscess  Patient's history significant for hidradenitis  Status post I & D of right buttock 2/28/19  Assessment Findings:   Right buttock wound packing removed by surgical PA earlier this AM--wound is clean and beefy red  Per surgical team, patient is ok to shower  Nursing team to re-pack wound after showering  Patient states her boyfriend will be available around 4 o'clock--will teach him wet-to-dry dressing application at that time  Wet-to-dry wound care supplies provided  Patient instructed to call Dr Antonette Cabrera office for appointment on Monday 3/4/19 and the [facility] wound center for an appointment on either Friday, 3/8/19 or Monday, 3/11/19  Plan:   Discharge home today        Medhat GUZMAN, RN, [employer]

## 2019-03-01 NOTE — PROGRESS NOTES
Progress Note - General Surgery   Adrian Reyes 34 y o  female MRN: [id number]  Unit/Bed#: [place] [address] -01 Encounter: [id number]    Assessment/Plan:    1  Gluteal phlegmon   -patient is POD#1 s/p I&D of gluteal phlegmon with Dr Collette Leo   -1 piece of packing was removed today, wound is clean with no discharge or odor, patient can shower  -as long as patient is stable, possible for her to be d/c this afternoon  -patient will be d/c on PO abx, wet to dry dressings   -for now continue pain medication as needed, encourage OOB, shower   -nursing to repack wound after shower, patient will follow up with Dr Collette Leo in the office and schedule a wound clinic appointment     Subjective/Objective   Chief Complaint: gluteal wound    Subjective: Patient doing well  Moderate pain relieved with PO pain medication  Tolerating diet, ambulating without difficulty     Objective:     Blood pressure 116/80, pulse (!) 49, temperature 98 5 °F (36 9 °C), temperature source Temporal, resp  rate 18, height 5' 4" (1 626 m), weight 70 7 kg (155 lb 12 8 oz), last menstrual period 02/01/2019, SpO2 98 %, not currently breastfeeding  ,Body mass index is 26 74 kg/m²  Intake/Output Summary (Last 24 hours) at 3/1/2019 1026  Last data filed at 3/1/2019 0114  Gross per 24 hour   Intake 685 ml   Output    Net 685 ml       Invasive Devices     Peripheral Intravenous Line            Peripheral IV 02/28/19 Left Antecubital 1 day                Physical Exam: /80 (BP Location: Right arm)   Pulse (!) 49   Temp 98 5 °F (36 9 °C) (Temporal)   Resp 18   Ht 5' 4" (1 626 m)   Wt 70 7 kg (155 lb 12 8 oz)   LMP 02/01/2019   SpO2 98%   BMI 26 74 kg/m²     General Appearance:    Alert, cooperative, no distress, appears stated age   Back:    Surgical wound clean with packing in place   No purulence, surrounding erythema or foul odor noted   Extremities:   Extremities normal, atraumatic, no cyanosis or edema   Skin:   Skin color, texture, turgor normal, no rashes or lesions       Lab, Imaging and other studies:  CBC:   Lab Results   Component Value Date    WBC 13 41 (H) 03/01/2019    HGB 10 5 (L) 03/01/2019    HCT 33 4 (L) 03/01/2019    MCV 76 (L) 03/01/2019     03/01/2019    MCH 24 0 (L) 03/01/2019    MCHC 31 4 03/01/2019    RDW 16 2 (H) 03/01/2019    MPV 10 3 03/01/2019    NRBC 0 03/01/2019   , CMP: No results found for: SODIUM, K, CL, CO2, ANIONGAP, BUN, CREATININE, GLUCOSE, CALCIUM, AST, ALT, ALKPHOS, PROT, BILITOT, EGFR  VTE Pharmacologic Prophylaxis: Sequential compression device (Venodyne)   VTE Mechanical Prophylaxis: sequential compression device

## 2019-03-01 NOTE — PLAN OF CARE
Problem: Nutrition/Hydration-ADULT  Goal: Nutrient/Hydration intake appropriate for improving, restoring or maintaining nutritional needs  Description  Monitor and assess patient's nutrition/hydration status for malnutrition (ex- brittle hair, bruises, dry skin, pale skin and conjunctiva, muscle wasting, smooth red tongue, and disorientation)  Collaborate with interdisciplinary team and initiate plan and interventions as ordered  Monitor patient's weight and dietary intake as ordered or per policy  Utilize nutrition screening tool and intervene per policy  Determine patient's food preferences and provide high-protein, high-caloric foods as appropriate       INTERVENTIONS:  - Monitor oral intake, urinary output, labs, and treatment plans  - Assess nutrition and hydration status and recommend course of action  - Evaluate amount of meals eaten  - Assist patient with eating if necessary   - Allow adequate time for meals  - Recommend/ encourage appropriate diets, oral nutritional supplements, and vitamin/mineral supplements  - Order, calculate, and assess calorie counts as needed  - Recommend, monitor, and adjust tube feedings and TPN/PPN based on assessed needs  - Assess need for intravenous fluids  - Provide specific nutrition/hydration education as appropriate  - Include patient/family/caregiver in decisions related to nutrition  Outcome: Progressing     Problem: SKIN/TISSUE INTEGRITY - ADULT  Goal: Skin integrity remains intact  Description  INTERVENTIONS  - Identify patients at risk for skin breakdown  - Assess and monitor skin integrity  - Assess and monitor nutrition and hydration status  - Monitor labs (i e  albumin)  - Assess for incontinence   - Turn and reposition patient  - Assist with mobility/ambulation  - Relieve pressure over bony prominences  - Avoid friction and shearing  - Provide appropriate hygiene as needed including keeping skin clean and dry  - Evaluate need for skin moisturizer/barrier cream  - Collaborate with interdisciplinary team (i e  Nutrition, Rehabilitation, etc )   - Patient/family teaching  Outcome: Progressing  Goal: Incision(s), wounds(s) or drain site(s) healing without S/S of infection  Description  INTERVENTIONS  - Assess and document risk factors for skin impairment   - Assess and document dressing, incision, wound bed, drain sites and surrounding tissue  - Initiate Nutrition services consult and/or wound management as needed  Outcome: Progressing     Problem: PAIN - ADULT  Goal: Verbalizes/displays adequate comfort level or baseline comfort level  Description  Interventions:  - Encourage patient to monitor pain and request assistance  - Assess pain using appropriate pain scale  - Administer analgesics based on type and severity of pain and evaluate response  - Implement non-pharmacological measures as appropriate and evaluate response  - Consider cultural and social influences on pain and pain management  - Notify physician/advanced practitioner if interventions unsuccessful or patient reports new pain  Outcome: Progressing     Problem: INFECTION - ADULT  Goal: Absence or prevention of progression during hospitalization  Description  INTERVENTIONS:  - Assess and monitor for signs and symptoms of infection  - Monitor lab/diagnostic results  - Monitor all insertion sites, i e  indwelling lines, tubes, and drains  - Monitor endotracheal (as able) and nasal secretions for changes in amount and color  - [place] appropriate cooling/warming therapies per order  - Administer medications as ordered  - Instruct and encourage patient and family to use good hand hygiene technique  - Identify and instruct in appropriate isolation precautions for identified infection/condition  Outcome: Progressing  Goal: Absence of fever/infection during neutropenic period  Description  INTERVENTIONS:  - Monitor WBC  - Implement neutropenic guidelines  Outcome: Progressing     Problem: SAFETY ADULT  Goal: Patient will remain free of falls  Description  INTERVENTIONS:  - Assess patient frequently for physical needs  -  Identify cognitive and physical deficits and behaviors that affect risk of falls    -  [place] fall precautions as indicated by assessment   - Educate patient/family on patient safety including physical limitations  - Instruct patient to call for assistance with activity based on assessment  - Modify environment to reduce risk of injury  - Consider OT/PT consult to assist with strengthening/mobility  Outcome: Progressing  Goal: Maintain or return to baseline ADL function  Description  INTERVENTIONS:  -  Assess patient's ability to carry out ADLs; assess patient's baseline for ADL function and identify physical deficits which impact ability to perform ADLs (bathing, care of mouth/teeth, toileting, grooming, dressing, etc )  - Assess/evaluate cause of self-care deficits   - Assess range of motion  - Assess patient's mobility; develop plan if impaired  - Assess patient's need for assistive devices and provide as appropriate  - Encourage maximum independence but intervene and supervise when necessary  ¯ Involve family in performance of ADLs  ¯ Assess for home care needs following discharge   ¯ Request OT consult to assist with ADL evaluation and planning for discharge  ¯ Provide patient education as appropriate  Outcome: Progressing  Goal: Maintain or return mobility status to optimal level  Description  INTERVENTIONS:  - Assess patient's baseline mobility status (ambulation, transfers, stairs, etc )    - Identify cognitive and physical deficits and behaviors that affect mobility  - Identify mobility aids required to assist with transfers and/or ambulation (gait belt, sit-to-stand, lift, walker, cane, etc )  - [place] fall precautions as indicated by assessment  - Record patient progress and toleration of activity level on Mobility SBAR; progress patient to next Phase/Stage  - Instruct patient to call for assistance with activity based on assessment  - Request Rehabilitation consult to assist with strengthening/weightbearing, etc   Outcome: Progressing     Problem: DISCHARGE PLANNING  Goal: Discharge to home or other facility with appropriate resources  Description  INTERVENTIONS:  - Identify barriers to discharge w/patient and caregiver  - Arrange for needed discharge resources and transportation as appropriate  - Identify discharge learning needs (meds, wound care, etc )  - Arrange for interpretive services to assist at discharge as needed  - Refer to Case Management Department for coordinating discharge planning if the patient needs post-hospital services based on physician/advanced practitioner order or complex needs related to functional status, cognitive ability, or social support system  Outcome: Progressing     Problem: Knowledge Deficit  Goal: Patient/family/caregiver demonstrates understanding of disease process, treatment plan, medications, and discharge instructions  Description  Complete learning assessment and assess knowledge base    Interventions:  - Provide teaching at level of understanding  - Provide teaching via preferred learning methods  Outcome: Progressing

## 2019-03-01 NOTE — DISCHARGE SUMMARY
[facility] Internal Medicine  Discharge- Kenneth Fraga 1990, 34 y o  female MRN: [id number]  Unit/Bed#: [address] [address] [place] [address] Encounter: [id number]  Primary Care Provider: Fabi Barragan MD   Date and time admitted to hospital: 2/28/2019 12:45 AM        Admitting Provider:  Gricel Rosario DO  Discharge Provider:  Gricel Rosario DO  Admission Date: 2/28/2019       Discharge Date: 03/01/19   LOS: 0  Primary Care Physician at Discharge: Fabi Barragan [phone]    HOSPITAL COURSE:  Kenneth Fraga is a 34 y o  female who presented to the hospital with gluteal phlegmon  She required I&D by surgery  She did not have any medical comorbidities requiring adjustment in medications  Wound cultures are pending at this time and packing has been changed by surgery at day of discharge  Family will be instructed on how to change packing and will have follow-up with surgery next week  DISCHARGE DIAGNOSES  * Abscess of buttock, right  Assessment & Plan  Right buttock abscess status post I&D  Has been on unasyn during hospitalization  Has been transition to Augmentin by general surgery and will need close follow-up  Wound care orders per surgical service    Suppurative hidradenitis  Assessment & Plan  Patient reports history of multiple abscesses throughout her life       Manan Moura surgery    PROCEDURES PERFORMED  I&D of abscess    RADIOLOGY RESULTS  Ct Pelvis W Contrast  Result Date: 2/28/2019  Impression: Right gluteal region demonstrates an area of phlegmon beginnings of a capsule  I do not believe this is a drainable at this time however this will likely become so shortly  Ultrasound can be used for repeat evaluation if indicated   Workstation performed: [id number]       LABS  Results from last 7 days   Lab Units 03/01/19  0525 02/28/19  0121   WBC Thousand/uL 13 41* 10 98*   HEMOGLOBIN g/dL 10 5* 11 8   HEMATOCRIT % 33 4* 37 0   MCV fL 76* 75*   PLATELETS Thousands/uL 251 276     Results from last 7 days   Lab Units 02/28/19  0121   SODIUM mmol/L 140   POTASSIUM mmol/L 3 7   CHLORIDE mmol/L 103   CO2 mmol/L 27   BUN mg/dL 6   CREATININE mg/dL 0 92   CALCIUM mg/dL 8 9   ALBUMIN g/dL 3 8   TOTAL BILIRUBIN mg/dL 0 80   ALK PHOS U/L 83   ALT U/L 13   AST U/L 11   EGFR ml/min/1 73sq m 99   GLUCOSE RANDOM mg/dL 96         Cultures:     Results from last 7 days   Lab Units 02/28/19  0239 02/28/19  0238   BLOOD CULTURE  No Growth at 24 hrs  No Growth at 24 hrs  PHYSICAL EXAM:  Vitals:   Blood Pressure: 116/80 (03/01/19 0735)  Pulse: (!) 49 (03/01/19 0735)  Temperature: 98 5 °F (36 9 °C) (03/01/19 0735)  Temp Source: Temporal (03/01/19 0735)  Respirations: 18 (03/01/19 0735)  Height: 5' 4" (162 6 cm) (02/28/19 1152)  Weight - Scale: 70 7 kg (155 lb 12 8 oz) (02/28/19 0044)  SpO2: 98 % (03/01/19 0735)    General appearance: alert, appears stated age and cooperative  Head: atraumatic  Eyes: conjunctivae/corneas clear  PERRL, EOM's intact  Lungs: clear to auscultation bilaterally  Heart: regular rate and rhythm  Abdomen: soft, non-tender; bowel sounds normal; no masses,  no organomegaly  Back: range of motion normal  Extremities: extremities normal, atraumatic, no cyanosis or edema    Planned Re-admission: No  Discharge Disposition: Home/Self Care    Test Results Pending at Discharge:    Order Current Status    Culture, tissue and Gram stain In process    POCT pregnancy, urine In process    Tissue Exam In process    Anaerobic culture and Gram stain Preliminary result    Blood culture #1 Preliminary result    Blood culture #2 Preliminary result      Medications   · Summary of Medication Adjustments made as a result of this hospitalization:   · Augmentin x7 days  · Oxycodone 5 mg ##30 prescribed  · Medication Dosing Tapers - Please refer to Discharge Medication List for details on any medication dosing tapers (if applicable to patient)    · Discharge Medication List: See after visit summary for reconciled discharge medications  Diet restrictions:  Regular diet   Activity restrictions: No strenuous activity  Discharge Condition: good    Outpatient Follow-Up and Discharge Instructions  See after visit summary section titled Discharge Instructions for information provided to patient and family  Code Status: Level 1 - Full Code  Discharge Statement   I spent 35 minutes discharging the patient  This time was spent on the day of discharge  Greater than 50% of total time was spent with the patient and / or family counseling and / or coordination of care  ** Please Note: This note has been constructed using a voice recognition system   **

## 2019-03-01 NOTE — TELEPHONE ENCOUNTER
Currently inpatient  Post-op INCISION AND DRAINAGE (I&D) BUTTOCK  Excisional Debridement and Drainage of an Abscess: On 2/28/19

## 2019-03-01 NOTE — DISCHARGE INSTRUCTIONS
Rohan Nieves Instructions  Dr Ora Addison MD, FACS    1  General: You will feel pulling sensations around the wound or funny aches and pains around the incisions  This is normal  Even minor surgery is a change in your body and this is your bodys way of reaction to it  If you have had abdominal surgery, it may help to support the incision with a small pillow or blanket for comfort when moving or coughing  2  Wound care: remove packing daily and shower and bath  Irrigate wound with NSS and pack with wet to dry dressing  3  Water: You may shower over the wound, unless there are drain tubes left in place  Do not bathe or use a pool or hot tub until cleared by the physician  You may shower right over the staples or Steri-Strips and packing dry when you are done  4  Activity: You may go up and down stairs, walk as much as you are comfortable, but walk at least 3 times each day  If you have had abdominal surgery, do not lift anything heavier than 15 pounds for at least 2-4 weeks, unless cleared by the doctor  5  Diet: You may resume a regular diet  If you had a same-day surgery or overnight stay surgery, you may wish to eat lightly for a few days: soups, crackers, and sandwiches  You may resume a regular diet when ready  6  Medications: Resume all of your previous medications, unless told otherwise by the doctor  Avoid aspirin or ibuprofen (Advil, Motrin, etc ) products for 2-3 days after the date of surgery  You may, at that time, began to take them again  Tylenol is always fine, unless you are taking any narcotic pain medication containing Tylenol (such as Percocet, Darvocet, Vicodin, or anything containing acetaminophen)  Do not take Tylenol if you're taking these medications  You do not need to take the narcotic pain medications unless you are having significant pain and discomfort  7  Driving: You will need someone to drive you home on the day of surgery   Do not drive or make any important decisions while on narcotic pain medication or 24 hours and after anesthesia or sedation for surgery  Generally, you may drive when your off all narcotic pain medications  8  Upset Stomach: You may take Maalox, Tums, or similar items for an upset stomach  If your narcotic pain medication causes an upset stomach, do not take it on an empty stomach  Try taking it with at least some crackers or toast      9  Constipation: Patients often experienced constipation after surgery  You may take over-the-counter medication for this, such as Metamucil, Senokot, Dulcolax, milk of magnesia, etc  You may take a suppository unless you have had anorectal surgery such as a procedure on your hemorrhoids  If you experience significant nausea or vomiting after abdominal surgery, call the office before trying any of these medications  10  Call the office: If you are experiencing any of the following, fevers above 101 5°, significant nausea or vomiting, if the wound develops drainage and/or is excessive redness around the wound, or if you have significant diarrhea or other worsening symptoms  11  Pain: You may be given a prescription for pain  This will be given to the hospital, the day of surgery  12  Sexual Activity: You may resume sexual activity when you feel ready and comfortable and your incision is sealed and healed without apparent infection risk  13  Urination: If you haven't urinated in 6 hours, go directly to the ER for evaluation for urinary retention       [address], [address]  [place], [address]   Phone: [phone]

## 2019-03-01 NOTE — ASSESSMENT & PLAN NOTE
Right buttock abscess status post I&D  Has been on unasyn during hospitalization  Has been transition to Augmentin by general surgery and will need close follow-up    Wound care orders per surgical service

## 2019-03-01 NOTE — NURSING NOTE
All discharge instructions were given and reviewed with the patient  Wound care instructions were explained and demonstrated to the patient and her boyfriend who would be doing the wound care changes  Additional supplies were given to the patient to be able to take care of her wound  Prescriptions were [facility] to [facility] for her to  before being discharged  She took all belongings with when leaving

## 2019-03-01 NOTE — UTILIZATION REVIEW
Initial Clinical Review    Admission: Date/Time/Statement: OBS 19 @0232  Orders Placed This Encounter   Procedures    Place in Observation     Standing Status:   Standing     Number of Occurrences:   1     Order Specific Question:   Admitting Physician     Answer:   Lauren Rodriguez [[id number]]     Order Specific Question:   Level of Care     Answer:   Med Surg [16]     ED: Date/Time/Mode of Arrival:   ED Arrival Information     Expected Arrival Acuity Means of Arrival Escorted By Service Admission Type    - 2019 00:37 Urgent Walk-In Self Hospitalist Urgent    Arrival Complaint    abscess        Chief Complaint:   Chief Complaint   Patient presents with    Abscess     Pt states "cyst on my butt  nothing has come out of it"   Assessment/Plan: 34year old female to ED from home with complaints of a cyst on her buttocks  Admitted under observation for abscess of right buttock, suppurative hidradenitis  Due to the severity of pain and swelling, patient required surgical I&D for abscess  IVF, NPO, CT, IV abx  Recently seen and treated for another abscess  Surgical note:    Procedure: The patient was brought to the operating room and identified as the proper patient  Location of the procedure site was confirmed with the patient  Staff confirmed site, patient name, , and laterality, if applicable  5ML of purulent fluid and debris removed  The wound was then packed with 68"  Of two inch cheri soaked in betadine   Exact measurements of the wound cavity are 3x3x3 cm         ED Vital Signs:   ED Triage Vitals   Temperature Pulse Respirations Blood Pressure SpO2   19 0044 19 0044 19 0044 19 0044 19 0044   98 9 °F (37 2 °C) 105 18 144/77 100 %      Temp Source Heart Rate Source Patient Position - Orthostatic VS BP Location FiO2 (%)   19 0338 19 0730 19 0338 19 0338 --   Temporal Monitor Lying Right arm       Pain Score       19 0044       8        Wt Readings from Last 1 Encounters:   19 70 7 kg (155 lb 12 8 oz)     Pertinent Labs/Diagnostic Test Results:   WBC:  10 98, 13 41  CT pelvis:  Right gluteal region demonstrates an area of phlegmon beginnings of a capsule  ED Treatment:   Medication Administration from 2019 0037 to 2019 0335       Date/Time Order Dose Route Action     2019 0113 sodium chloride 0 9 % bolus 1,000 mL 1,000 mL Intravenous New Bag     2019 0118 ketorolac (TORADOL) injection 30 mg 30 mg Intravenous Given     2019 0118 morphine (PF) 4 mg/mL injection 4 mg 4 mg Intravenous Given     2019 0246 morphine (PF) 4 mg/mL injection 4 mg 4 mg Intravenous Given        Past Medical/Surgical History: Active Ambulatory Problems     Diagnosis Date Noted    Subclinical hyperthyroidism 2018    Hx of sickle cell trait 2018    Suppurative hidradenitis 10/31/2017     Past Medical History:   Diagnosis Date     delivery delivered     Disease of thyroid gland     Hidradenitis suppurativa     History of early menarche     Hyperemesis gravidarum     Hypertension in pregnancy, pre-eclampsia     Migraine     Sickle cell trait ([facility] [address] )      Admitting Diagnosis: Phlegmon [L02 91]  Abscess [L02 91]  Leukocytosis [D72 829]  Gluteal pain [M79 18]  Age/Sex: 34 y o  female    Admission Orders:   Activity as tolerated  Up and OOB  Ambulate  Diet: regular  Wound Care cons  Acute care sug cons    Scheduled Meds:   Current Facility-Administered Medications:  acetaminophen 650 mg Oral Q6H PRN    ampicillin-sulbactam 3 g Intravenous Q6H Last Rate: 3 g (19 0843)   docusate sodium 100 mg Oral BID PRN    ketorolac 30 mg Intravenous Q6H PRN    morphine injection 4 mg Intravenous Q4H PRN    norethindrone 1 tablet Oral Daily    ondansetron 4 mg Intravenous Q6H PRN    oxyCODONE 5 mg Oral Q4H PRN

## 2019-03-02 LAB — BACTERIA SPEC ANAEROBE CULT: NORMAL

## 2019-03-03 LAB
BACTERIA TISS AEROBE CULT: ABNORMAL
GRAM STN SPEC: ABNORMAL
GRAM STN SPEC: ABNORMAL

## 2019-03-04 ENCOUNTER — TRANSITIONAL CARE MANAGEMENT (OUTPATIENT)
Dept: FAMILY MEDICINE CLINIC | Facility: CLINIC | Age: 29
End: 2019-03-04

## 2019-03-04 ENCOUNTER — OFFICE VISIT (OUTPATIENT)
Dept: SURGERY | Facility: CLINIC | Age: 29
End: 2019-03-04

## 2019-03-04 VITALS
DIASTOLIC BLOOD PRESSURE: 84 MMHG | TEMPERATURE: 97.8 F | HEIGHT: 64 IN | HEART RATE: 68 BPM | RESPIRATION RATE: 18 BRPM | WEIGHT: 165 LBS | BODY MASS INDEX: 28.17 KG/M2 | SYSTOLIC BLOOD PRESSURE: 128 MMHG

## 2019-03-04 DIAGNOSIS — L02.31 ABSCESS OF BUTTOCK, RIGHT: Primary | ICD-10-CM

## 2019-03-04 PROCEDURE — 1111F DSCHRG MED/CURRENT MED MERGE: CPT | Performed by: PHYSICIAN ASSISTANT

## 2019-03-04 PROCEDURE — 99024 POSTOP FOLLOW-UP VISIT: CPT | Performed by: PHYSICIAN ASSISTANT

## 2019-03-04 NOTE — PROGRESS NOTES
Assessment/Plan:   Rodo Key is a 34 y  o female who comes in today for postoperative check s/p I+D abscess      Doing well  Less pain,    Pathology: pending  Postoperative restrictions reviewed  All questions answered  ____________________________________________________________    HPI:  Rodo Key is a 34 y  o female who comes in today for postoperative check after recent surgery of I+D with debridement of right gluteal abscess    Currently doing well without problems, no fever or chills,no nausea and no vomiting  Reports minimal drainage  Improved pain       ROS:  General ROS: negative for - chills, fatigue, fever or night sweats, weight loss  Respiratory ROS: no cough, shortness of breath, or wheezing  Cardiovascular ROS: no chest pain or dyspnea on exertion  Genito-Urinary ROS: no dysuria, trouble voiding, or hematuria  Musculoskeletal ROS: negative for - gait disturbance, joint pain or muscle pain  Neurological ROS: no TIA or stroke symptoms  GI ROS: see HPI  Skin ROS: no new rashes or lesions   Lymphatic ROS: no new adenopathy noted by pt  GYN ROS: see HPI, no new GYN history or bleeding noted  Psy ROS: no new mental or behavioral disturbances       Patient Active Problem List   Diagnosis    Subclinical hyperthyroidism    Hx of sickle cell trait    Suppurative hidradenitis    Abscess of buttock, right    Phlegmon         Allergies:  Patient has no known allergies        Current Outpatient Medications:     norethindrone (MICRONOR) 0 35 MG tablet, Take 1 tablet (0 35 mg total) by mouth daily, Disp: 28 tablet, Rfl: 5    oxyCODONE (ROXICODONE) 5 mg immediate release tablet, Take 1 tablet (5 mg total) by mouth every 4 (four) hours as needed for moderate painMax Daily Amount: 30 mg, Disp: 30 tablet, Rfl: 0    amoxicillin-clavulanate (AUGMENTIN) 875-125 mg per tablet, Take 1 tablet by mouth every 12 (twelve) hours for 7 days (Patient not taking: Reported on 3/4/2019), Disp: 14 tablet, Rfl: 0    Past Medical History:   Diagnosis Date     delivery delivered     RESOLVED: [id number]    Disease of thyroid gland     hyperthyroid    Hidradenitis suppurativa     LAST ASSESSED: [id number]    History of early menarche     [de-identified] PERIOD AT 8YEARS OLD    Hyperemesis gravidarum     RESOLVED: [id number]    Hypertension in pregnancy, pre-eclampsia     prior preg    Migraine     Sickle cell trait ([address] )        Past Surgical History:   Procedure Laterality Date    ABSCESS DRAINAGE  2016    INCISION AND DRAINAGE OF SKIN ABSCESS; LABIAL CYST REMOVAL - [facility]     SECTION      CHOLECYSTECTOMY  2009    INCISION AND DRAINAGE OF WOUND Right 2019    Procedure: INCISION AND DRAINAGE (I&D) BUTTOCK;  Surgeon: Shon Freed MD;  [facility];  Service: General    CT  DELIVERY ONLY N/A 2018    Procedure:  SECTION () REPEAT;  Surgeon: Trey Griffin MD;  [facility]: [facility];  Service: Obstetrics    CT EXC SWEAT GLAND [place] Right 2016    Procedure: EXCISION GROIN CYST HIDRADENITIS;  Surgeon: Saira Le DO;  [facility];  Service: General    TONSILLECTOMY      VULVA BIOPSY N/A 2016    Procedure: INCISION AND DRAINAGE, EXCISION OF LABIAL CYST ;  Surgeon: Kristi Lundberg DO;  [facility];  Service:        Family History   Problem Relation Age of Onset    Hyperlipidemia Mother         PURE    Cancer Father         lung    Diabetes Maternal Grandmother     No Known Problems Brother         reports that she has never smoked  She has never used smokeless tobacco  She reports that she does not drink alcohol or use drugs      Results from last 7 days   Lab Units 19  0525 19  0121   WBC Thousand/uL 13 41* 10 98*   HEMOGLOBIN g/dL 10 5* 11 8   HEMATOCRIT % 33 4* 37 0   PLATELETS Thousands/uL 251 276   NEUTROS PCT % 81* 79*   MONOS PCT % 4 7       Results from last 7 days   Lab Units 02/28/19  0121   POTASSIUM mmol/L 3 7   CHLORIDE mmol/L 103   CO2 mmol/L 27   BUN mg/dL 6   CREATININE mg/dL 0 92   CALCIUM mg/dL 8 9   ALK PHOS U/L 83   ALT U/L 13   AST U/L 11       Imaging: No new pertinent imaging studies  PHYSICAL EXAM  General: normal, cooperative, no distress  Incision: clean, dry, and intact and healing well      Some portions of this record may have been generated with voice recognition software  There may be translation, syntax,  or grammatical errors  Occasional wrong word or "sound-a-like" substitutions may have occurred due to the inherent limitations of the voice recognition software  Read the chart carefully and recognize, using context, where substitutions may have occurred  If you have any questions, please contact the dictating provider for clarification or correction, as needed  This encounter has been coded by a non-certified coder         Jennifer Mccullough MD    Date: 3/4/2019 Time: 9:11 AM

## 2019-03-05 ENCOUNTER — TELEPHONE (OUTPATIENT)
Dept: FAMILY MEDICINE CLINIC | Facility: CLINIC | Age: 29
End: 2019-03-05

## 2019-03-05 LAB
BACTERIA BLD CULT: NORMAL
BACTERIA BLD CULT: NORMAL

## 2019-03-05 NOTE — TELEPHONE ENCOUNTER
discharged 3/1/2019 [facility] for pt to call office 3/1/2019, 3/4/2019 and 3/5/2019 to obtain information and scheudle a TCM or hospital follow up

## 2019-03-08 ENCOUNTER — OFFICE VISIT (OUTPATIENT)
Dept: SURGERY | Facility: CLINIC | Age: 29
End: 2019-03-08

## 2019-03-08 VITALS — WEIGHT: 165 LBS | BODY MASS INDEX: 28.17 KG/M2 | TEMPERATURE: 97.8 F | HEIGHT: 64 IN

## 2019-03-08 DIAGNOSIS — L73.2 HYDRADENITIS: Primary | ICD-10-CM

## 2019-03-08 PROCEDURE — 99024 POSTOP FOLLOW-UP VISIT: CPT | Performed by: SURGERY

## 2019-03-08 RX ORDER — CLINDAMYCIN HYDROCHLORIDE 300 MG/1
300 CAPSULE ORAL 4 TIMES DAILY
Qty: 40 CAPSULE | Refills: 0 | Status: SHIPPED | OUTPATIENT
Start: 2019-03-08 | End: 2019-03-16

## 2019-03-08 NOTE — LETTER
March 8, 2019     Patient: Kathy Crow   YOB: 1990   Date of Visit: 3/8/2019       To Whom it May Concern:    Kathy Crow is under my professional care  She was seen in my office on 3/8/2019  She may return to work on 3/10/19  If you have any questions or concerns, please don't hesitate to call           Sincerely,          Devendra Wise MD        CC: No Recipients

## 2019-03-08 NOTE — TELEPHONE ENCOUNTER
Patient was seen in the office today for another post-op/wound check  Results were given at that time

## 2019-03-08 NOTE — PROGRESS NOTES
Assessment/Plan:   Vannesa Poon is a 34 y  o female who comes in today for postoperative check s/p perirectal abscess  Patient complaining of new area in groin that is inflamed and tender  Patient with know history of hydradenitis in the past that have responded to conservative therapy and occasionally required Incision and Drainage    Postoperative restrictions reviewed  All questions answered  Will start antibiotics  Patient aware area may spontaneously drain  If area worsens on antibiotics she may need drainage in office    ____________________________________________________________    HPI:  Vannesa Poon is a 34 y  o female who comes in today for postoperative check after recent surgery  Currently doing well with some problems : new area of concern in right groin, no fever or chills,no nausea and no vomiting  Reports new area with tenderness and redness in right froin    ROS:  General ROS: negative for - chills, fatigue, fever or night sweats, weight loss  Respiratory ROS: no cough, shortness of breath, or wheezing  Cardiovascular ROS: no chest pain or dyspnea on exertion  Genito-Urinary ROS: no dysuria, trouble voiding, or hematuria  Musculoskeletal ROS: negative for - gait disturbance, joint pain or muscle pain  Neurological ROS: no TIA or stroke symptoms  GI ROS: see HPI  Skin ROS: no new rashes or lesions   Lymphatic ROS: no new adenopathy noted by pt  GYN ROS: see HPI, no new GYN history or bleeding noted  Psy ROS: no new mental or behavioral disturbances       Patient Active Problem List   Diagnosis    Subclinical hyperthyroidism    Hx of sickle cell trait    Suppurative hidradenitis    Abscess of buttock, right    Phlegmon         Allergies:  Patient has no known allergies        Current Outpatient Medications:     norethindrone (MICRONOR) 0 35 MG tablet, Take 1 tablet (0 35 mg total) by mouth daily, Disp: 28 tablet, Rfl: 5    oxyCODONE (ROXICODONE) 5 mg immediate release tablet, Take 1 tablet (5 mg total) by mouth every 4 (four) hours as needed for moderate painMax Daily Amount: 30 mg, Disp: 30 tablet, Rfl: 0    amoxicillin-clavulanate (AUGMENTIN) 875-125 mg per tablet, Take 1 tablet by mouth every 12 (twelve) hours for 7 days (Patient not taking: Reported on 3/4/2019), Disp: 14 tablet, Rfl: 0    clindamycin (CLEOCIN) 300 MG capsule, Take 1 capsule (300 mg total) by mouth 4 (four) times a day for 10 days, Disp: 40 capsule, Rfl: 0    Past Medical History:   Diagnosis Date     delivery delivered     RESOLVED: [id number]    Disease of thyroid gland     hyperthyroid    Hidradenitis suppurativa     LAST ASSESSED: [id number]    History of early menarche     FIRST PERIOD AT 8YEARS OLD    Hyperemesis gravidarum     RESOLVED: [id number]    Hypertension in pregnancy, pre-eclampsia     prior preg    Migraine     Sickle cell trait ([facility] [address] )        Past Surgical History:   Procedure Laterality Date    ABSCESS DRAINAGE  2016    INCISION AND DRAINAGE OF SKIN ABSCESS; LABIAL CYST REMOVAL - [facility]     SECTION      CHOLECYSTECTOMY  2009    INCISION AND DRAINAGE OF WOUND Right 2019    Procedure: INCISION AND DRAINAGE (I&D) BUTTOCK;  Surgeon: Sary Mar MD;  [facility];  Service: General    [place]  DELIVERY ONLY N/A 2018    Procedure:  SECTION () REPEAT;  Surgeon: Fito Haque MD;  [facility];  Service: Obstetrics    OK EXC SWEAT GLAND [place] Right 2016    Procedure: EXCISION GROIN CYST HIDRADENITIS;  Surgeon: Davin Heaton DO;  [facility];  Service: General    TONSILLECTOMY      VULVA BIOPSY N/A 2016    Procedure: INCISION AND DRAINAGE, EXCISION OF LABIAL CYST ;  Surgeon: David Martinez DO;  [facility];  Service:        Family History   Problem Relation Age of Onset    Hyperlipidemia Mother         PURE    Cancer Father         lung    Diabetes Maternal Grandmother     No Known Problems Brother         reports that she has never smoked  She has never used smokeless tobacco  She reports that she does not drink alcohol or use drugs  Invalid input(s):  EOSPCT          Invalid input(s): LABALBU    Imaging: No new pertinent imaging studies  PHYSICAL EXAM  General: normal, cooperative, no distress  Incision: clean, dry, open with goo granulation  Right groin with hydradenitis      Some portions of this record may have been generated with voice recognition software  There may be translation, syntax,  or grammatical errors  Occasional wrong word or "sound-a-like" substitutions may have occurred due to the inherent limitations of the voice recognition software  Read the chart carefully and recognize, using context, where substitutions may have occurred  If you have any questions, please contact the dictating provider for clarification or correction, as needed  This encounter has been coded by a non-certified coder         Susie Norris MD    Date: 3/8/2019 Time: 11:11 AM

## 2019-03-16 ENCOUNTER — HOSPITAL ENCOUNTER (EMERGENCY)
Facility: HOSPITAL | Age: 29
Discharge: HOME | End: 2019-03-16
Attending: EMERGENCY MEDICINE | Admitting: EMERGENCY MEDICINE
Payer: COMMERCIAL

## 2019-03-16 VITALS
OXYGEN SATURATION: 100 % | WEIGHT: 156.75 LBS | RESPIRATION RATE: 18 BRPM | TEMPERATURE: 98.8 F | BODY MASS INDEX: 26.91 KG/M2 | SYSTOLIC BLOOD PRESSURE: 119 MMHG | DIASTOLIC BLOOD PRESSURE: 76 MMHG | HEART RATE: 63 BPM

## 2019-03-16 DIAGNOSIS — F41.0 PANIC ATTACK: ICD-10-CM

## 2019-03-16 DIAGNOSIS — K29.70 GASTRITIS: Primary | ICD-10-CM

## 2019-03-16 LAB
ALBUMIN SERPL BCP-MCNC: 3.6 G/DL (ref 3.5–5)
ALP SERPL-CCNC: 77 U/L (ref 46–116)
ALT SERPL W P-5'-P-CCNC: 17 U/L (ref 12–78)
ANION GAP SERPL CALCULATED.3IONS-SCNC: 8 MMOL/L (ref 4–13)
AST SERPL W P-5'-P-CCNC: 13 U/L (ref 5–45)
BASOPHILS # BLD AUTO: 0.03 THOUSANDS/ΜL (ref 0–0.1)
BASOPHILS NFR BLD AUTO: 1 % (ref 0–1)
BILIRUB SERPL-MCNC: 0.38 MG/DL (ref 0.2–1)
BILIRUB UR QL STRIP: NEGATIVE
BUN SERPL-MCNC: 7 MG/DL (ref 5–25)
CALCIUM SERPL-MCNC: 8.9 MG/DL (ref 8.3–10.1)
CHLORIDE SERPL-SCNC: 103 MMOL/L (ref 100–108)
CLARITY UR: CLEAR
CO2 SERPL-SCNC: 27 MMOL/L (ref 21–32)
COLOR UR: YELLOW
COLOR, POC: YELLOW
CREAT SERPL-MCNC: 0.77 MG/DL (ref 0.6–1.3)
EOSINOPHIL # BLD AUTO: 0.02 THOUSAND/ΜL (ref 0–0.61)
EOSINOPHIL NFR BLD AUTO: 0 % (ref 0–6)
ERYTHROCYTE [DISTWIDTH] IN BLOOD BY AUTOMATED COUNT: 16.2 % (ref 11.6–15.1)
EXT PREG TEST URINE: NEGATIVE
GFR SERPL CREATININE-BSD FRML MDRD: 123 ML/MIN/1.73SQ M
GLUCOSE SERPL-MCNC: 93 MG/DL (ref 65–140)
GLUCOSE UR STRIP-MCNC: NEGATIVE MG/DL
HCT VFR BLD AUTO: 34.1 % (ref 34.8–46.1)
HGB BLD-MCNC: 10.8 G/DL (ref 11.5–15.4)
HGB UR QL STRIP.AUTO: NEGATIVE
IMM GRANULOCYTES # BLD AUTO: 0.01 THOUSAND/UL (ref 0–0.2)
IMM GRANULOCYTES NFR BLD AUTO: 0 % (ref 0–2)
KETONES UR STRIP-MCNC: NEGATIVE MG/DL
LEUKOCYTE ESTERASE UR QL STRIP: NEGATIVE
LIPASE SERPL-CCNC: 115 U/L (ref 73–393)
LYMPHOCYTES # BLD AUTO: 1.82 THOUSANDS/ΜL (ref 0.6–4.47)
LYMPHOCYTES NFR BLD AUTO: 38 % (ref 14–44)
MCH RBC QN AUTO: 23.4 PG (ref 26.8–34.3)
MCHC RBC AUTO-ENTMCNC: 31.7 G/DL (ref 31.4–37.4)
MCV RBC AUTO: 74 FL (ref 82–98)
MONOCYTES # BLD AUTO: 0.3 THOUSAND/ΜL (ref 0.17–1.22)
MONOCYTES NFR BLD AUTO: 6 % (ref 4–12)
NEUTROPHILS # BLD AUTO: 2.58 THOUSANDS/ΜL (ref 1.85–7.62)
NEUTS SEG NFR BLD AUTO: 55 % (ref 43–75)
NITRITE UR QL STRIP: NEGATIVE
NRBC BLD AUTO-RTO: 0 /100 WBCS
PH UR STRIP.AUTO: 5.5 [PH] (ref 4.5–8)
PLATELET # BLD AUTO: 282 THOUSANDS/UL (ref 149–390)
PMV BLD AUTO: 9.3 FL (ref 8.9–12.7)
POTASSIUM SERPL-SCNC: 3.5 MMOL/L (ref 3.5–5.3)
PROT SERPL-MCNC: 7.6 G/DL (ref 6.4–8.2)
PROT UR STRIP-MCNC: NEGATIVE MG/DL
RBC # BLD AUTO: 4.62 MILLION/UL (ref 3.81–5.12)
SODIUM SERPL-SCNC: 138 MMOL/L (ref 136–145)
SP GR UR STRIP.AUTO: 1.01 (ref 1–1.03)
UROBILINOGEN UR QL STRIP.AUTO: 0.2 E.U./DL
WBC # BLD AUTO: 4.76 THOUSAND/UL (ref 4.31–10.16)

## 2019-03-16 PROCEDURE — C9113 INJ PANTOPRAZOLE SODIUM, VIA: HCPCS | Performed by: PHYSICIAN ASSISTANT

## 2019-03-16 PROCEDURE — 96374 THER/PROPH/DIAG INJ IV PUSH: CPT

## 2019-03-16 PROCEDURE — 96361 HYDRATE IV INFUSION ADD-ON: CPT

## 2019-03-16 PROCEDURE — 83690 ASSAY OF LIPASE: CPT | Performed by: PHYSICIAN ASSISTANT

## 2019-03-16 PROCEDURE — 81025 URINE PREGNANCY TEST: CPT | Performed by: PHYSICIAN ASSISTANT

## 2019-03-16 PROCEDURE — 81003 URINALYSIS AUTO W/O SCOPE: CPT

## 2019-03-16 PROCEDURE — 36415 COLL VENOUS BLD VENIPUNCTURE: CPT | Performed by: PHYSICIAN ASSISTANT

## 2019-03-16 PROCEDURE — 80053 COMPREHEN METABOLIC PANEL: CPT | Performed by: PHYSICIAN ASSISTANT

## 2019-03-16 PROCEDURE — 93005 ELECTROCARDIOGRAM TRACING: CPT

## 2019-03-16 PROCEDURE — 85025 COMPLETE CBC W/AUTO DIFF WBC: CPT | Performed by: PHYSICIAN ASSISTANT

## 2019-03-16 PROCEDURE — 99285 EMERGENCY DEPT VISIT HI MDM: CPT

## 2019-03-16 RX ORDER — SUCRALFATE ORAL 1 G/10ML
1000 SUSPENSION ORAL ONCE
Status: COMPLETED | OUTPATIENT
Start: 2019-03-16 | End: 2019-03-16

## 2019-03-16 RX ORDER — PANTOPRAZOLE SODIUM 40 MG/1
40 TABLET, DELAYED RELEASE ORAL DAILY
Qty: 30 TABLET | Refills: 0 | Status: SHIPPED | OUTPATIENT
Start: 2019-03-16 | End: 2019-04-08

## 2019-03-16 RX ORDER — PANTOPRAZOLE SODIUM 40 MG/1
40 INJECTION, POWDER, FOR SOLUTION INTRAVENOUS ONCE
Status: COMPLETED | OUTPATIENT
Start: 2019-03-16 | End: 2019-03-16

## 2019-03-16 RX ADMIN — PANTOPRAZOLE SODIUM 40 MG: 40 INJECTION, POWDER, FOR SOLUTION INTRAVENOUS at 10:18

## 2019-03-16 RX ADMIN — SODIUM CHLORIDE 1000 ML: 0.9 INJECTION, SOLUTION INTRAVENOUS at 10:18

## 2019-03-16 RX ADMIN — SUCRALFATE 1000 MG: 1 SUSPENSION ORAL at 10:16

## 2019-03-16 NOTE — ED PROVIDER NOTES
History  Chief Complaint   Patient presents with    Abdominal Pain     Pt reports generalized abdominal pain since last night  No N/V/D    Rapid Heart Rate     Pt feels like her heart is "beating out of her chest" since last night  Pt reports the feeling comes and goes every 5 mins  Pt reports she feels "like it is her nerves"     Patient is a 53-year-old female presents evaluation abdominal pain and rapid heart beat  Patient states that she developed epigastric abdominal pain since yesterday  Pain is deep within the stomach and is aching and constant  She does report that she has had 3 days vomiting as well  Denies diarrhea  Denies any urinary complaints  Denies chest pain  He does report some palpitations last night and a sense worry after her shift ended  Denies any back pain  Denies any shortness of breath  Denies any diaphoresis  Denies any fevers or chills  She is currently taking an antibiotic an abscess on the right buttock  Prior to Admission Medications   Prescriptions Last Dose Informant Patient Reported?  Taking?   norethindrone (MICRONOR) 0 35 MG tablet 3/15/2019 at Unknown time Self No Yes   Sig: Take 1 tablet (0 35 mg total) by mouth daily      Facility-Administered Medications: None       Past Medical History:   Diagnosis Date     delivery delivered     RESOLVED: [id number]    Disease of thyroid gland     hyperthyroid    Hidradenitis suppurativa     LAST ASSESSED: [id number]    History of early menarche     FIRST PERIOD AT 8YEARS OLD    Hyperemesis gravidarum     RESOLVED: [id number]    Hypertension in pregnancy, pre-eclampsia     prior preg    Migraine     Sickle cell trait ([facility] [address] )        Past Surgical History:   Procedure Laterality Date    ABSCESS DRAINAGE  2016    INCISION AND DRAINAGE OF SKIN ABSCESS; LABIAL CYST REMOVAL - [facility]     SECTION      CHOLECYSTECTOMY  2009    INCISION AND DRAINAGE OF WOUND Right 2019 Procedure: INCISION AND DRAINAGE (I&D) BUTTOCK;  Surgeon: Alyse Andre MD;  [facility];  Service: General    [place]  DELIVERY ONLY N/A 2018    Procedure:  SECTION () REPEAT;  Surgeon: Thor Rhoades MD;  Location: [facility] LD;  Service: Obstetrics    [place] EXC SWEAT GLAND Steve Bae Right 2016    Procedure: EXCISION GROIN CYST HIDRADENITIS;  Surgeon: Mehrdad Miranda DO;  [facility]: [facility];  Service: General    TONSILLECTOMY      VULVA BIOPSY N/A 2016    Procedure: INCISION AND DRAINAGE, EXCISION OF LABIAL CYST ;  Surgeon: Nadine Peck DO;  [facility];  Service:        Family History   Problem Relation Age of Onset    Hyperlipidemia Mother         PURE    Cancer Father         lung    Diabetes Maternal Grandmother     No Known Problems Brother      I have reviewed and agree with the history as documented  Social History     Tobacco Use    Smoking status: Never Smoker    Smokeless tobacco: Never Used   Substance Use Topics    Alcohol use: No     Comment: (HISTORY)    Drug use: No        Review of Systems   Constitutional: Negative for activity change, appetite change and fatigue  HENT: Negative for nosebleeds, sneezing, sore throat, trouble swallowing and voice change  Eyes: Negative for photophobia, pain and visual disturbance  Respiratory: Negative for apnea, choking and stridor  Cardiovascular: Positive for palpitations  Negative for leg swelling  Gastrointestinal: Positive for abdominal pain  Negative for anal bleeding and constipation  Endocrine: Negative for cold intolerance, heat intolerance, polydipsia and polyphagia  Genitourinary: Negative for decreased urine volume, enuresis, frequency, genital sores and urgency  Musculoskeletal: Negative for joint swelling and myalgias  Allergic/Immunologic: Negative for environmental allergies and food allergies     Neurological: Negative for tremors, seizures, speech difficulty and weakness  Hematological: Negative for adenopathy  Psychiatric/Behavioral: Negative for behavioral problems, decreased concentration, dysphoric mood and hallucinations  Physical Exam  Physical Exam   Constitutional: She is oriented to person, place, and time  She appears well-developed and well-nourished  No distress  HENT:   Head: Normocephalic and atraumatic  Right Ear: External ear normal    Left Ear: External ear normal    Nose: Nose normal    Mouth/Throat: Oropharynx is clear and moist    Eyes: Pupils are equal, round, and reactive to light  Conjunctivae and EOM are normal    Neck: Normal range of motion  Neck supple  Cardiovascular: Normal rate, regular rhythm and normal heart sounds  Exam reveals no gallop and no friction rub  No murmur heard  Pulmonary/Chest: Effort normal and breath sounds normal  No respiratory distress  She has no wheezes  Abdominal: Soft  Bowel sounds are normal    Genitourinary: No vaginal discharge found  Neurological: She is alert and oriented to person, place, and time  Skin: Skin is warm and dry  She is not diaphoretic  Psychiatric: She has a normal mood and affect  Her behavior is normal    Vitals reviewed        Vital Signs  ED Triage Vitals [03/16/19 0933]   Temperature Pulse Respirations Blood Pressure SpO2   98 8 °F (37 1 °C) 87 18 158/74 100 %      Temp Source Heart Rate Source Patient Position - Orthostatic VS BP Location FiO2 (%)   Temporal Monitor Sitting Right arm --      Pain Score       6           Vitals:    03/16/19 0933 03/16/19 1117   BP: 158/74 119/76   Pulse: 87 63   Patient Position - Orthostatic VS: Sitting        qSOFA     Row Name 03/16/19 1117 03/16/19 0933             Altered mental status GCS < 15  --  --       Respiratory Rate > / =22  0  0       Systolic BP < / =248  0  0       Q Sofa Score  0  0             Visual Acuity      ED Medications  Medications   sodium chloride 0 9 % bolus 1,000 mL (0 mL Intravenous Stopped 3/16/19 1203)   pantoprazole (PROTONIX) injection 40 mg (40 mg Intravenous Given 3/16/19 1018)   sucralfate (CARAFATE) oral suspension 1,000 mg (1,000 mg Oral Given 3/16/19 1016)       Diagnostic Studies  Results Reviewed     Procedure Component Value Units Date/Time    POCT pregnancy, urine [[id number]]  (Normal) Resulted:  03/16/19 1112    Lab Status:  Final result Updated:  03/16/19 1113     EXT PREG TEST UR (Ref: Negative) NEGATIVE    POCT urinalysis dipstick [[id number]]  (Normal) Resulted:  03/16/19 1110    Lab Status:  Final result Specimen:  Urine Updated:  03/16/19 1112     Color, UA yellow    ED Urine Macroscopic [[id number]] Collected:  03/16/19 1114    Lab Status:  Final result Specimen:  Urine Updated:  03/16/19 1112     Color, UA Yellow     Clarity, UA Clear     pH, UA 5 5     Leukocytes, UA Negative     Nitrite, UA Negative     Protein, UA Negative mg/dl      Glucose, UA Negative mg/dl      Ketones, UA Negative mg/dl      Urobilinogen, UA 0 2 E U /dl      Bilirubin, UA Negative     Blood, UA Negative     Specific Gravity, UA 1 015    Narrative:       CLINITEK RESULT    Comprehensive metabolic panel [[id number]] Collected:  03/16/19 1013    Lab Status:  Final result Specimen:  Blood from Arm, Left Updated:  03/16/19 1034     Sodium 138 mmol/L      Potassium 3 5 mmol/L      Chloride 103 mmol/L      CO2 27 mmol/L      ANION GAP 8 mmol/L      BUN 7 mg/dL      Creatinine 0 77 mg/dL      Glucose 93 mg/dL      Calcium 8 9 mg/dL      AST 13 U/L      ALT 17 U/L      Alkaline Phosphatase 77 U/L      Total Protein 7 6 g/dL      Albumin 3 6 g/dL      Total Bilirubin 0 38 mg/dL      eGFR 123 ml/min/1 73sq m     Narrative:       National Kidney Disease Education Program recommendations are as follows:  GFR calculation is accurate only with a steady state creatinine  Chronic Kidney disease less than 60 ml/min/1 73 sq  meters  Kidney failure less than 15 ml/min/1 73 sq  meters      Lipase [[id number]]  (Normal) Collected:  03/16/19 1013    Lab Status:  Final result Specimen:  Blood from Arm, Left Updated:  03/16/19 1034     Lipase 115 u/L     CBC and differential [[id number]]  (Abnormal) Collected:  03/16/19 1013    Lab Status:  Final result Specimen:  Blood from Arm, Left Updated:  03/16/19 1019     WBC 4 76 Thousand/uL      RBC 4 62 Million/uL      Hemoglobin 10 8 g/dL      Hematocrit 34 1 %      MCV 74 fL      MCH 23 4 pg      MCHC 31 7 g/dL      RDW 16 2 %      MPV 9 3 fL      Platelets 165 Thousands/uL      nRBC 0 /100 WBCs      Neutrophils Relative 55 %      Immat GRANS % 0 %      Lymphocytes Relative 38 %      Monocytes Relative 6 %      Eosinophils Relative 0 %      Basophils Relative 1 %      Neutrophils Absolute 2 58 Thousands/µL      Immature Grans Absolute 0 01 Thousand/uL      Lymphocytes Absolute 1 82 Thousands/µL      Monocytes Absolute 0 30 Thousand/µL      Eosinophils Absolute 0 02 Thousand/µL      Basophils Absolute 0 03 Thousands/µL                  No orders to display              Procedures  Procedures       Phone Contacts  ED Phone Contact    ED Course                               MDM    Disposition  Final diagnoses:   Gastritis   Panic attack     Time reflects when diagnosis was documented in both MDM as applicable and the Disposition within this note     Time User Action Codes Description Comment    3/16/2019 12:05 PM Alblow Bar Add [K29 70] Gastritis     3/16/2019 12:05 PM Dotty Bar Add [F41 0] Panic attack       ED Disposition     ED Disposition Condition Date/Time Comment    Discharge Stable Sat Mar 16, 2019 12:05 PM Perry Overcast discharge to home/self care              Follow-up Information     Follow up With Specialties Details Why Contact Info    Socrates Palomino MD Family Medicine   [address]  [address] [place] [address] [address]  [address]  [phone]            Discharge Medication List as of 3/16/2019 12:05 PM      START taking these medications    Details   pantoprazole (PROTONIX) 40 mg tablet Take 1 tablet (40 mg total) by mouth daily for 30 days, Starting Sat 3/16/2019, Until Mon 4/15/2019, Print         CONTINUE these medications which have NOT CHANGED    Details   norethindrone (MICRONOR) 0 35 MG tablet Take 1 tablet (0 35 mg total) by mouth daily, Starting Thu 2018, Print           No discharge procedures on file      ED Provider  Electronically Signed by           Jumana Meesk PA-C  [address] [id number]  [phone]

## 2019-03-18 LAB
ATRIAL RATE: 72 BPM
P AXIS: 63 DEGREES
PR INTERVAL: 140 MS
QRS AXIS: 64 DEGREES
QRSD INTERVAL: 86 MS
QT INTERVAL: 396 MS
QTC INTERVAL: 433 MS
T WAVE AXIS: 13 DEGREES
VENTRICULAR RATE: 72 BPM

## 2019-03-18 PROCEDURE — 93010 ELECTROCARDIOGRAM REPORT: CPT | Performed by: INTERNAL MEDICINE

## 2019-04-08 ENCOUNTER — HOSPITAL ENCOUNTER (EMERGENCY)
Facility: HOSPITAL | Age: 29
Discharge: HOME | End: 2019-04-08
Attending: EMERGENCY MEDICINE | Admitting: EMERGENCY MEDICINE
Payer: COMMERCIAL

## 2019-04-08 ENCOUNTER — APPOINTMENT (EMERGENCY)
Dept: CT IMAGING | Facility: HOSPITAL | Age: 29
End: 2019-04-08
Payer: COMMERCIAL

## 2019-04-08 VITALS
OXYGEN SATURATION: 98 % | SYSTOLIC BLOOD PRESSURE: 122 MMHG | DIASTOLIC BLOOD PRESSURE: 80 MMHG | RESPIRATION RATE: 18 BRPM | TEMPERATURE: 98.6 F | WEIGHT: 156.97 LBS | BODY MASS INDEX: 26.94 KG/M2 | HEART RATE: 78 BPM

## 2019-04-08 DIAGNOSIS — R19.7 ABDOMINAL PAIN, VOMITING, AND DIARRHEA: Primary | ICD-10-CM

## 2019-04-08 DIAGNOSIS — S20.212A RIB CONTUSION, LEFT, INITIAL ENCOUNTER: ICD-10-CM

## 2019-04-08 DIAGNOSIS — R11.10 ABDOMINAL PAIN, VOMITING, AND DIARRHEA: Primary | ICD-10-CM

## 2019-04-08 DIAGNOSIS — R10.9 ABDOMINAL PAIN, VOMITING, AND DIARRHEA: Primary | ICD-10-CM

## 2019-04-08 LAB
ALBUMIN SERPL BCP-MCNC: 3.4 G/DL (ref 3.5–5)
ALP SERPL-CCNC: 83 U/L (ref 46–116)
ALT SERPL W P-5'-P-CCNC: 23 U/L (ref 12–78)
ANION GAP SERPL CALCULATED.3IONS-SCNC: 9 MMOL/L (ref 4–13)
AST SERPL W P-5'-P-CCNC: 18 U/L (ref 5–45)
BASOPHILS # BLD AUTO: 0.01 THOUSANDS/ΜL (ref 0–0.1)
BASOPHILS NFR BLD AUTO: 0 % (ref 0–1)
BILIRUB SERPL-MCNC: 0.92 MG/DL (ref 0.2–1)
BILIRUB UR QL STRIP: NEGATIVE
BUN SERPL-MCNC: 5 MG/DL (ref 5–25)
CALCIUM SERPL-MCNC: 8.7 MG/DL (ref 8.3–10.1)
CHLORIDE SERPL-SCNC: 104 MMOL/L (ref 100–108)
CLARITY UR: CLEAR
CLARITY, POC: CLEAR
CO2 SERPL-SCNC: 26 MMOL/L (ref 21–32)
COLOR UR: YELLOW
COLOR, POC: YELLOW
CREAT SERPL-MCNC: 0.87 MG/DL (ref 0.6–1.3)
EOSINOPHIL # BLD AUTO: 0 THOUSAND/ΜL (ref 0–0.61)
EOSINOPHIL NFR BLD AUTO: 0 % (ref 0–6)
ERYTHROCYTE [DISTWIDTH] IN BLOOD BY AUTOMATED COUNT: 17.2 % (ref 11.6–15.1)
EXT PREG TEST URINE: NEGATIVE
GFR SERPL CREATININE-BSD FRML MDRD: 106 ML/MIN/1.73SQ M
GLUCOSE SERPL-MCNC: 93 MG/DL (ref 65–140)
GLUCOSE UR STRIP-MCNC: NEGATIVE MG/DL
HCT VFR BLD AUTO: 37 % (ref 34.8–46.1)
HGB BLD-MCNC: 11.7 G/DL (ref 11.5–15.4)
HGB UR QL STRIP.AUTO: NEGATIVE
IMM GRANULOCYTES # BLD AUTO: 0.01 THOUSAND/UL (ref 0–0.2)
IMM GRANULOCYTES NFR BLD AUTO: 0 % (ref 0–2)
KETONES UR STRIP-MCNC: NEGATIVE MG/DL
LEUKOCYTE ESTERASE UR QL STRIP: NEGATIVE
LIPASE SERPL-CCNC: 66 U/L (ref 73–393)
LYMPHOCYTES # BLD AUTO: 0.89 THOUSANDS/ΜL (ref 0.6–4.47)
LYMPHOCYTES NFR BLD AUTO: 14 % (ref 14–44)
MCH RBC QN AUTO: 23.5 PG (ref 26.8–34.3)
MCHC RBC AUTO-ENTMCNC: 31.6 G/DL (ref 31.4–37.4)
MCV RBC AUTO: 74 FL (ref 82–98)
MONOCYTES # BLD AUTO: 0.31 THOUSAND/ΜL (ref 0.17–1.22)
MONOCYTES NFR BLD AUTO: 5 % (ref 4–12)
NEUTROPHILS # BLD AUTO: 5.02 THOUSANDS/ΜL (ref 1.85–7.62)
NEUTS SEG NFR BLD AUTO: 81 % (ref 43–75)
NITRITE UR QL STRIP: NEGATIVE
NRBC BLD AUTO-RTO: 0 /100 WBCS
PH UR STRIP.AUTO: 6 [PH] (ref 4.5–8)
PLATELET # BLD AUTO: 209 THOUSANDS/UL (ref 149–390)
PMV BLD AUTO: 9.4 FL (ref 8.9–12.7)
POTASSIUM SERPL-SCNC: 3.5 MMOL/L (ref 3.5–5.3)
PROT SERPL-MCNC: 7.4 G/DL (ref 6.4–8.2)
PROT UR STRIP-MCNC: NEGATIVE MG/DL
RBC # BLD AUTO: 4.97 MILLION/UL (ref 3.81–5.12)
SODIUM SERPL-SCNC: 139 MMOL/L (ref 136–145)
SP GR UR STRIP.AUTO: 1.02 (ref 1–1.03)
UROBILINOGEN UR QL STRIP.AUTO: 0.2 E.U./DL
WBC # BLD AUTO: 6.24 THOUSAND/UL (ref 4.31–10.16)

## 2019-04-08 PROCEDURE — 99284 EMERGENCY DEPT VISIT MOD MDM: CPT

## 2019-04-08 PROCEDURE — 81003 URINALYSIS AUTO W/O SCOPE: CPT

## 2019-04-08 PROCEDURE — 74177 CT ABD & PELVIS W/CONTRAST: CPT

## 2019-04-08 PROCEDURE — 85025 COMPLETE CBC W/AUTO DIFF WBC: CPT | Performed by: EMERGENCY MEDICINE

## 2019-04-08 PROCEDURE — 83690 ASSAY OF LIPASE: CPT | Performed by: EMERGENCY MEDICINE

## 2019-04-08 PROCEDURE — 96375 TX/PRO/DX INJ NEW DRUG ADDON: CPT

## 2019-04-08 PROCEDURE — 99284 EMERGENCY DEPT VISIT MOD MDM: CPT | Performed by: EMERGENCY MEDICINE

## 2019-04-08 PROCEDURE — 80053 COMPREHEN METABOLIC PANEL: CPT | Performed by: EMERGENCY MEDICINE

## 2019-04-08 PROCEDURE — 96361 HYDRATE IV INFUSION ADD-ON: CPT

## 2019-04-08 PROCEDURE — 36415 COLL VENOUS BLD VENIPUNCTURE: CPT | Performed by: EMERGENCY MEDICINE

## 2019-04-08 PROCEDURE — 96374 THER/PROPH/DIAG INJ IV PUSH: CPT

## 2019-04-08 PROCEDURE — 81025 URINE PREGNANCY TEST: CPT | Performed by: EMERGENCY MEDICINE

## 2019-04-08 RX ORDER — NAPROXEN 500 MG/1
500 TABLET ORAL 2 TIMES DAILY WITH MEALS
Qty: 15 TABLET | Refills: 0 | Status: SHIPPED | OUTPATIENT
Start: 2019-04-08 | End: 2020-01-02

## 2019-04-08 RX ORDER — KETOROLAC TROMETHAMINE 30 MG/ML
15 INJECTION, SOLUTION INTRAMUSCULAR; INTRAVENOUS ONCE
Status: COMPLETED | OUTPATIENT
Start: 2019-04-08 | End: 2019-04-08

## 2019-04-08 RX ORDER — ONDANSETRON 4 MG/1
4 TABLET, ORALLY DISINTEGRATING ORAL EVERY 8 HOURS PRN
Qty: 10 TABLET | Refills: 0 | Status: SHIPPED | OUTPATIENT
Start: 2019-04-08 | End: 2019-04-08 | Stop reason: SDUPTHER

## 2019-04-08 RX ORDER — ONDANSETRON 2 MG/ML
4 INJECTION INTRAMUSCULAR; INTRAVENOUS ONCE
Status: COMPLETED | OUTPATIENT
Start: 2019-04-08 | End: 2019-04-08

## 2019-04-08 RX ORDER — DICYCLOMINE HCL 20 MG
20 TABLET ORAL EVERY 6 HOURS PRN
Qty: 15 TABLET | Refills: 0 | Status: SHIPPED | OUTPATIENT
Start: 2019-04-08 | End: 2020-01-02

## 2019-04-08 RX ORDER — ONDANSETRON 4 MG/1
4 TABLET, ORALLY DISINTEGRATING ORAL EVERY 8 HOURS PRN
Qty: 10 TABLET | Refills: 0 | Status: SHIPPED | OUTPATIENT
Start: 2019-04-08 | End: 2020-01-02

## 2019-04-08 RX ORDER — DICYCLOMINE HCL 20 MG
20 TABLET ORAL ONCE
Status: COMPLETED | OUTPATIENT
Start: 2019-04-08 | End: 2019-04-08

## 2019-04-08 RX ORDER — LIDOCAINE 50 MG/G
1 PATCH TOPICAL ONCE
Status: DISCONTINUED | OUTPATIENT
Start: 2019-04-08 | End: 2019-04-09 | Stop reason: HOSPADM

## 2019-04-08 RX ADMIN — KETOROLAC TROMETHAMINE 15 MG: 30 INJECTION, SOLUTION INTRAMUSCULAR at 20:47

## 2019-04-08 RX ADMIN — ONDANSETRON 4 MG: 2 INJECTION INTRAMUSCULAR; INTRAVENOUS at 20:47

## 2019-04-08 RX ADMIN — DICYCLOMINE HYDROCHLORIDE 20 MG: 20 TABLET ORAL at 22:06

## 2019-04-08 RX ADMIN — LIDOCAINE 1 PATCH: 50 PATCH TOPICAL at 22:06

## 2019-04-08 RX ADMIN — SODIUM CHLORIDE 1000 ML: 0.9 INJECTION, SOLUTION INTRAVENOUS at 20:47

## 2019-04-08 RX ADMIN — IOHEXOL 100 ML: 350 INJECTION, SOLUTION INTRAVENOUS at 21:22

## 2019-12-29 ENCOUNTER — APPOINTMENT (EMERGENCY)
Dept: RADIOLOGY | Facility: HOSPITAL | Age: 29
End: 2019-12-29

## 2019-12-29 ENCOUNTER — HOSPITAL ENCOUNTER (EMERGENCY)
Facility: HOSPITAL | Age: 29
Discharge: HOME | End: 2019-12-29
Attending: EMERGENCY MEDICINE | Admitting: EMERGENCY MEDICINE
Payer: COMMERCIAL

## 2019-12-29 VITALS
DIASTOLIC BLOOD PRESSURE: 77 MMHG | RESPIRATION RATE: 18 BRPM | SYSTOLIC BLOOD PRESSURE: 118 MMHG | WEIGHT: 130 LBS | OXYGEN SATURATION: 97 % | HEART RATE: 67 BPM | TEMPERATURE: 97.8 F | BODY MASS INDEX: 22.31 KG/M2

## 2019-12-29 DIAGNOSIS — R10.31 RLQ ABDOMINAL PAIN: ICD-10-CM

## 2019-12-29 DIAGNOSIS — N83.291 COMPLEX CYST OF RIGHT OVARY: Primary | ICD-10-CM

## 2019-12-29 LAB
ALBUMIN SERPL BCP-MCNC: 4 G/DL (ref 3.5–5)
ALP SERPL-CCNC: 64 U/L (ref 46–116)
ALT SERPL W P-5'-P-CCNC: 20 U/L (ref 12–78)
ANION GAP SERPL CALCULATED.3IONS-SCNC: 3 MMOL/L (ref 4–13)
AST SERPL W P-5'-P-CCNC: 10 U/L (ref 5–45)
BASOPHILS # BLD AUTO: 0.03 THOUSANDS/ΜL (ref 0–0.1)
BASOPHILS NFR BLD AUTO: 1 % (ref 0–1)
BILIRUB SERPL-MCNC: 0.86 MG/DL (ref 0.2–1)
BILIRUB UR QL STRIP: NEGATIVE
BUN SERPL-MCNC: 9 MG/DL (ref 5–25)
CALCIUM SERPL-MCNC: 9.4 MG/DL (ref 8.3–10.1)
CHLORIDE SERPL-SCNC: 109 MMOL/L (ref 100–108)
CLARITY UR: CLEAR
CO2 SERPL-SCNC: 25 MMOL/L (ref 21–32)
COLOR UR: YELLOW
CREAT SERPL-MCNC: 0.84 MG/DL (ref 0.6–1.3)
EOSINOPHIL # BLD AUTO: 0.02 THOUSAND/ΜL (ref 0–0.61)
EOSINOPHIL NFR BLD AUTO: 0 % (ref 0–6)
ERYTHROCYTE [DISTWIDTH] IN BLOOD BY AUTOMATED COUNT: 15.3 % (ref 11.6–15.1)
EXT PREG TEST URINE: NEGATIVE
EXT. CONTROL ED NAV: NORMAL
GFR SERPL CREATININE-BSD FRML MDRD: 110 ML/MIN/1.73SQ M
GLUCOSE SERPL-MCNC: 96 MG/DL (ref 65–140)
GLUCOSE UR STRIP-MCNC: NEGATIVE MG/DL
HCT VFR BLD AUTO: 41.6 % (ref 34.8–46.1)
HGB BLD-MCNC: 14.1 G/DL (ref 11.5–15.4)
HGB UR QL STRIP.AUTO: NEGATIVE
IMM GRANULOCYTES # BLD AUTO: 0.01 THOUSAND/UL (ref 0–0.2)
IMM GRANULOCYTES NFR BLD AUTO: 0 % (ref 0–2)
KETONES UR STRIP-MCNC: NEGATIVE MG/DL
LEUKOCYTE ESTERASE UR QL STRIP: NEGATIVE
LIPASE SERPL-CCNC: 68 U/L (ref 73–393)
LYMPHOCYTES # BLD AUTO: 1.62 THOUSANDS/ΜL (ref 0.6–4.47)
LYMPHOCYTES NFR BLD AUTO: 30 % (ref 14–44)
MCH RBC QN AUTO: 28.1 PG (ref 26.8–34.3)
MCHC RBC AUTO-ENTMCNC: 33.9 G/DL (ref 31.4–37.4)
MCV RBC AUTO: 83 FL (ref 82–98)
MONOCYTES # BLD AUTO: 0.42 THOUSAND/ΜL (ref 0.17–1.22)
MONOCYTES NFR BLD AUTO: 8 % (ref 4–12)
NEUTROPHILS # BLD AUTO: 3.27 THOUSANDS/ΜL (ref 1.85–7.62)
NEUTS SEG NFR BLD AUTO: 61 % (ref 43–75)
NITRITE UR QL STRIP: NEGATIVE
NRBC BLD AUTO-RTO: 0 /100 WBCS
PH UR STRIP.AUTO: 6 [PH] (ref 4.5–8)
PLATELET # BLD AUTO: 198 THOUSANDS/UL (ref 149–390)
PMV BLD AUTO: 10.3 FL (ref 8.9–12.7)
POTASSIUM SERPL-SCNC: 4 MMOL/L (ref 3.5–5.3)
PROT SERPL-MCNC: 8.3 G/DL (ref 6.4–8.2)
PROT UR STRIP-MCNC: NEGATIVE MG/DL
RBC # BLD AUTO: 5.02 MILLION/UL (ref 3.81–5.12)
SODIUM SERPL-SCNC: 137 MMOL/L (ref 136–145)
SP GR UR STRIP.AUTO: 1.02 (ref 1–1.03)
UROBILINOGEN UR QL STRIP.AUTO: 0.2 E.U./DL
WBC # BLD AUTO: 5.37 THOUSAND/UL (ref 4.31–10.16)

## 2019-12-29 PROCEDURE — 81003 URINALYSIS AUTO W/O SCOPE: CPT

## 2019-12-29 PROCEDURE — 99284 EMERGENCY DEPT VISIT MOD MDM: CPT | Performed by: EMERGENCY MEDICINE

## 2019-12-29 PROCEDURE — 81025 URINE PREGNANCY TEST: CPT | Performed by: EMERGENCY MEDICINE

## 2019-12-29 PROCEDURE — 85025 COMPLETE CBC W/AUTO DIFF WBC: CPT | Performed by: EMERGENCY MEDICINE

## 2019-12-29 PROCEDURE — 83690 ASSAY OF LIPASE: CPT | Performed by: EMERGENCY MEDICINE

## 2019-12-29 PROCEDURE — 76856 US EXAM PELVIC COMPLETE: CPT

## 2019-12-29 PROCEDURE — 99244 OFF/OP CNSLTJ NEW/EST MOD 40: CPT | Performed by: SURGERY

## 2019-12-29 PROCEDURE — 96374 THER/PROPH/DIAG INJ IV PUSH: CPT

## 2019-12-29 PROCEDURE — 99284 EMERGENCY DEPT VISIT MOD MDM: CPT

## 2019-12-29 PROCEDURE — 96376 TX/PRO/DX INJ SAME DRUG ADON: CPT

## 2019-12-29 PROCEDURE — 36415 COLL VENOUS BLD VENIPUNCTURE: CPT

## 2019-12-29 PROCEDURE — 74177 CT ABD & PELVIS W/CONTRAST: CPT

## 2019-12-29 PROCEDURE — 76830 TRANSVAGINAL US NON-OB: CPT

## 2019-12-29 PROCEDURE — 96375 TX/PRO/DX INJ NEW DRUG ADDON: CPT

## 2019-12-29 PROCEDURE — 80053 COMPREHEN METABOLIC PANEL: CPT | Performed by: EMERGENCY MEDICINE

## 2019-12-29 RX ORDER — HYDROMORPHONE HCL/PF 1 MG/ML
0.5 SYRINGE (ML) INJECTION ONCE
Status: COMPLETED | OUTPATIENT
Start: 2019-12-29 | End: 2019-12-29

## 2019-12-29 RX ORDER — KETOROLAC TROMETHAMINE 30 MG/ML
15 INJECTION, SOLUTION INTRAMUSCULAR; INTRAVENOUS ONCE
Status: DISCONTINUED | OUTPATIENT
Start: 2019-12-29 | End: 2019-12-29

## 2019-12-29 RX ORDER — KETOROLAC TROMETHAMINE 30 MG/ML
15 INJECTION, SOLUTION INTRAMUSCULAR; INTRAVENOUS ONCE
Status: COMPLETED | OUTPATIENT
Start: 2019-12-29 | End: 2019-12-29

## 2019-12-29 RX ADMIN — KETOROLAC TROMETHAMINE 15 MG: 30 INJECTION, SOLUTION INTRAMUSCULAR at 14:43

## 2019-12-29 RX ADMIN — IOHEXOL 100 ML: 350 INJECTION, SOLUTION INTRAVENOUS at 14:36

## 2019-12-29 RX ADMIN — HYDROMORPHONE HYDROCHLORIDE 0.5 MG: 1 INJECTION, SOLUTION INTRAMUSCULAR; INTRAVENOUS; SUBCUTANEOUS at 17:44

## 2019-12-29 RX ADMIN — HYDROMORPHONE HYDROCHLORIDE 0.5 MG: 1 INJECTION, SOLUTION INTRAMUSCULAR; INTRAVENOUS; SUBCUTANEOUS at 15:10

## 2019-12-29 NOTE — ED PROVIDER NOTES
History  Chief Complaint   Patient presents with    Abdominal Pain     Pt woke up with RLQ pain this AM     Patient is a 42-year-old female with so pertinent medical hx who presents to the ED for evaluation of sudden onset RLQ abdominal pain that started this morning at around 10am, waking her from sleep  No prior hx of this pain in the past  Her pain is dull and sharp at times, waxing and waning intensity, w/o radiation, w/o alleviating or exacerbating factors  Denies N/V, diarrhea, constipation, melena, hematochezia, fever/chills, urinary sx, and vaginal bleeding/discharge  Past surgical hx includes cholecystectomy and C-sections  No recent trauma, no drugs or alcohol         Abdominal Pain   Pain location:  RLQ  Pain quality: dull and sharp    Pain radiates to:  Does not radiate  Onset quality:  Sudden  Timing:  Constant  Progression:  Waxing and waning  Chronicity:  New  Relieved by:  None tried  Worsened by:  Nothing  Ineffective treatments:  None tried  Associated symptoms: no chest pain, no chills, no constipation, no cough, no diarrhea, no dysuria, no fever, no nausea, no shortness of breath and no vomiting    Risk factors: multiple surgeries    Risk factors: no alcohol abuse and not pregnant        None       Past Medical History:   Diagnosis Date     delivery delivered     RESOLVED: [id number]    Disease of thyroid gland     hyperthyroid    Hidradenitis suppurativa     LAST ASSESSED: [id number]    History of early menarche     FIRST PERIOD AT 8YEARS OLD    Hyperemesis gravidarum     RESOLVED: [id number]    Hypertension in pregnancy, pre-eclampsia     prior preg    Migraine     Sickle cell trait ([facility] [address] )        Past Surgical History:   Procedure Laterality Date    ABSCESS DRAINAGE  2016    INCISION AND DRAINAGE OF SKIN ABSCESS; LABIAL CYST REMOVAL - [facility]     SECTION      CHOLECYSTECTOMY  2009    INCISION AND DRAINAGE OF WOUND Right 2019    Procedure: INCISION AND DRAINAGE (I&D) BUTTOCK;  Surgeon: Jo Alcazar MD;  [facility];  Service: General    [place]  DELIVERY ONLY N/A 2018    Procedure: Claudia Husain () REPEAT;  Surgeon: Duong Medellin MD;  Location: [facility] LD;  Service: Obstetrics    [place] EXC SWEAT GLAND Heloise Cogan Right 2016    Procedure: EXCISION GROIN CYST HIDRADENITIS;  Surgeon: Sharan Goode DO;  [facility]: [facility];  Service: General    TONSILLECTOMY      VULVA BIOPSY N/A 2016    Procedure: INCISION AND DRAINAGE, EXCISION OF LABIAL CYST ;  Surgeon: Loida Denney DO;  [facility];  Service:        Family History   Problem Relation Age of Onset    Hyperlipidemia Mother         PURE    Cancer Father         lung    Diabetes Maternal Grandmother     No Known Problems Brother      I have reviewed and agree with the history as documented  Social History     Tobacco Use    Smoking status: Never Smoker    Smokeless tobacco: Never Used   Substance Use Topics    Alcohol use: Yes     Comment: (HISTORY)    Drug use: No        Review of Systems   Constitutional: Negative  Negative for appetite change, chills and fever  HENT: Negative  Eyes: Negative  Negative for photophobia and visual disturbance  Respiratory: Negative  Negative for cough, chest tightness and shortness of breath  Cardiovascular: Negative  Negative for chest pain and leg swelling  Gastrointestinal: Positive for abdominal pain  Negative for blood in stool, constipation, diarrhea, nausea and vomiting  Endocrine: Negative  Genitourinary: Negative  Negative for difficulty urinating, dysuria, flank pain, frequency and urgency  Musculoskeletal: Negative  Negative for back pain, neck pain and neck stiffness  Skin: Negative  Allergic/Immunologic: Negative  Neurological: Negative  Negative for dizziness, weakness, light-headedness and headaches  Hematological: Negative      Psychiatric/Behavioral: Negative  Physical Exam  ED Triage Vitals [12/29/19 1211]   Temperature Pulse Respirations Blood Pressure SpO2   97 8 °F (36 6 °C) 95 18 140/91 100 %      Temp Source Heart Rate Source Patient Position - Orthostatic VS BP Location FiO2 (%)   Oral Monitor Sitting Left arm --      Pain Score       7             Orthostatic Vital Signs  Vitals:    12/29/19 1401 12/29/19 1612 12/29/19 1751 12/29/19 1911   BP:  114/70 133/86 118/77   Pulse: 57 66 62 67   Patient Position - Orthostatic VS:  Lying Lying Sitting       Physical Exam   Constitutional: She is oriented to person, place, and time  She appears well-developed and well-nourished  HENT:   Head: Normocephalic and atraumatic  Right Ear: External ear normal    Left Ear: External ear normal    Nose: Nose normal    Mouth/Throat: Oropharynx is clear and moist    Eyes: Conjunctivae and EOM are normal  No scleral icterus  Neck: Normal range of motion  No JVD present  No tracheal deviation present  Cardiovascular: Normal rate, regular rhythm, normal heart sounds and intact distal pulses  No murmur heard  Pulmonary/Chest: Effort normal and breath sounds normal  No respiratory distress  Abdominal: Soft  Bowel sounds are normal  She exhibits no distension  There is no hepatosplenomegaly  There is tenderness in the right lower quadrant  There is guarding  There is no rigidity  Musculoskeletal: Normal range of motion  She exhibits no edema  Neurological: She is alert and oriented to person, place, and time  She exhibits normal muscle tone  Skin: Skin is warm and dry  Capillary refill takes less than 2 seconds  She is not diaphoretic  Psychiatric: She has a normal mood and affect  Her behavior is normal    Vitals reviewed        ED Medications  Medications   ketorolac (TORADOL) injection 15 mg (15 mg Intravenous Given 12/29/19 1443)   iohexol (OMNIPAQUE) 350 MG/ML injection (MULTI-DOSE) 100 mL (100 mL Intravenous Given 12/29/19 1436)   HYDROmorphone (DILAUDID) injection 0 5 mg (0 5 mg Intravenous Given 12/29/19 1510)   HYDROmorphone (DILAUDID) injection 0 5 mg (0 5 mg Intravenous Given 12/29/19 1744)       Diagnostic Studies  Results Reviewed     Procedure Component Value Units Date/Time    POCT urinalysis dipstick [[id number]]  (Normal) Resulted:  12/29/19 1443    Lab Status:  Final result Updated:  12/29/19 1443    POCT pregnancy, urine [[id number]]  (Normal) Resulted:  12/29/19 1415    Lab Status:  Final result Updated:  12/29/19 1415     EXT PREG TEST UR (Ref: Negative) negative     Control valid    Urine Macroscopic, POC [[id number]] Collected:  12/29/19 1403    Lab Status:  Final result Specimen:  Urine Updated:  12/29/19 1401     Color, UA Yellow     Clarity, UA Clear     pH, UA 6 0     Leukocytes, UA Negative     Nitrite, UA Negative     Protein, UA Negative mg/dl      Glucose, UA Negative mg/dl      Ketones, UA Negative mg/dl      Urobilinogen, UA 0 2 E U /dl      Bilirubin, UA Negative     Blood, UA Negative     Specific Gravity, UA 1 020    Narrative:       CLINITEK RESULT    Comprehensive metabolic panel [[id number]]  (Abnormal) Collected:  12/29/19 1301    Lab Status:  Final result Specimen:  Blood from Arm, Left Updated:  12/29/19 1337     Sodium 137 mmol/L      Potassium 4 0 mmol/L      Chloride 109 mmol/L      CO2 25 mmol/L      ANION GAP 3 mmol/L      BUN 9 mg/dL      Creatinine 0 84 mg/dL      Glucose 96 mg/dL      Calcium 9 4 mg/dL      AST 10 U/L      ALT 20 U/L      Alkaline Phosphatase 64 U/L      Total Protein 8 3 g/dL      Albumin 4 0 g/dL      Total Bilirubin 0 86 mg/dL      eGFR 110 ml/min/1 73sq m     Narrative:       [place] guidelines for Chronic Kidney Disease (CKD):     Stage 1 with normal or high GFR (GFR > 90 mL/min/1 73 square meters)    Stage 2 Mild CKD (GFR = 60-89 mL/min/1 73 square meters)    Stage 3A Moderate CKD (GFR = 45-59 mL/min/1 73 square meters)    Stage 3B Moderate CKD (GFR = 30-44 mL/min/1 73 square meters)    Stage 4 Severe CKD (GFR = 15-29 mL/min/1 73 square meters)    Stage 5 End Stage CKD (GFR <15 mL/min/1 73 square meters)  Note: GFR calculation is accurate only with a steady state creatinine    Lipase [[id number]]  (Abnormal) Collected:  12/29/19 1301    Lab Status:  Final result Specimen:  Blood from Arm, Left Updated:  12/29/19 1337     Lipase 68 u/L     CBC and differential [[id number]]  (Abnormal) Collected:  12/29/19 1301    Lab Status:  Final result Specimen:  Blood from Arm, Left Updated:  12/29/19 1326     WBC 5 37 Thousand/uL      RBC 5 02 Million/uL      Hemoglobin 14 1 g/dL      Hematocrit 41 6 %      MCV 83 fL      MCH 28 1 pg      MCHC 33 9 g/dL      RDW 15 3 %      MPV 10 3 fL      Platelets 159 Thousands/uL      nRBC 0 /100 WBCs      Neutrophils Relative 61 %      Immat GRANS % 0 %      Lymphocytes Relative 30 %      Monocytes Relative 8 %      Eosinophils Relative 0 %      Basophils Relative 1 %      Neutrophils Absolute 3 27 Thousands/µL      Immature Grans Absolute 0 01 Thousand/uL      Lymphocytes Absolute 1 62 Thousands/µL      Monocytes Absolute 0 42 Thousand/µL      Eosinophils Absolute 0 02 Thousand/µL      Basophils Absolute 0 03 Thousands/µL                  US pelvis complete w transvaginal   Final Result by Janine Justice MD (12/29 4080)       Complex right adnexal cyst measuring up to 5 9 cm  Follow-up with repeat ultrasound in 6-12 weeks  The study was marked in [facility] for immediate notification  Workstation performed: [id number]         CT abdomen pelvis with contrast   Final Result by Orlena Hamman, MD (12/29 9701)      1   6 cm cystic lesion in the right adnexa with surrounding fluid  Further evaluation with ultrasound is recommended to exclude torsion  2   Left upper quadrant jejunal intussusception without obstruction    This is likely transient though can be seen in the setting of small bowel mucosal disease such as celiac disease  3   Pneumobilia, not present previously  Correlation for history of sphincterotomy recommended  The study was marked in [facility] for immediate notification  Workstation performed: [id number]               Procedures  Procedures      ED Course  ED Course as of Jan 07 1228   [facility] Dec 29, 2019   1652 Red surg to come eval pt      1703 Gyn to come eval pt                                  MDM  Number of Diagnoses or Management Options  Complex cyst of right ovary: new and requires workup  RLQ abdominal pain: new and requires workup  Diagnosis management comments: 29yo female, appears uncomfortable on exam, w/ RLQ abd tenderness and voluntary guarding  Doubtful appendicitis, given sudden onset and no other associated sx  Ddx includes but not limited to ovarian cyst, torsion, ectopic, ureterolithiasis, appendicitis  Will obtain Urine preg to evaluate for ectopic, UA to eval for hematuria which would possibly indicate stone disease  Given broad ddx for RLQ, will obtain CT abd/pelvis w/ IV contrast    Labs wnl, UA no hematuria, urine preg neg  CT reveals complex ovarian cyst w/ fluid, and jejunal non-obstructing intussusception  Will obtain transvaginal US with doppler to evaluate flow  gen surg consult to evaluate, although given patient has no LUQ pain, doubtful this is related to current chief complaint  Given complex cyst, consult obgyn who saw patient and recommended surgery however patient cannot take off from work  She will see them outpatient  On reevaluation, pain significantly improved  Discharge home, follow up w/ obgyn  Return precautions for worsening pain  Patient counseled that because of the size of her ovarian cyst, she is at risk for torsion, and hence must return to the ED for new/worsening sx  She acknowledges understanding and will return if needed          Amount and/or Complexity of Data Reviewed  Clinical lab tests: ordered and reviewed  Tests in the radiology section of CPT®: ordered and reviewed  Review and summarize past medical records: yes  Discuss the patient with other providers: yes  Independent visualization of images, tracings, or specimens: yes          Disposition  Final diagnoses:   Complex cyst of right ovary   RLQ abdominal pain     Time reflects when diagnosis was documented in both MDM as applicable and the Disposition within this note     Time User Action Codes Description Comment    12/29/2019  7:13 PM Rosana Level Add [T45 530] Complex cyst of right ovary     12/29/2019  7:13 PM Rosana Level Add [R10 31] RLQ abdominal pain       ED Disposition     ED Disposition Condition Date/Time Comment    Discharge Stable Sun Dec 29, 2019  7:14 PM Natalia Hayden discharge to home/self care  Follow-up Information     Follow up With Specialties Details Why Contact Info Additional Information    Linda Lazo MD Family Medicine Call  As needed [address] [place] [address] [phone]       [address] [place] [address] Emergency Department Emergency Medicine Go to  As needed, If symptoms worsen [address]  [phone]  ED, [address] [place] [address]            Discharge Medication List as of 12/29/2019  7:16 PM      CONTINUE these medications which have NOT CHANGED    Details   dicyclomine (BENTYL) 20 mg tablet Take 1 tablet (20 mg total) by mouth every 6 (six) hours as needed (diarrhea, abdominal cramps), Starting Mon 4/8/2019, Print      naproxen (NAPROSYN) 500 mg tablet Take 1 tablet (500 mg total) by mouth 2 (two) times a day with meals, Starting Mon 4/8/2019, Print      ondansetron (ZOFRAN-ODT) 4 mg disintegrating tablet Take 1 tablet (4 mg total) by mouth every 8 (eight) hours as needed for nausea or vomiting, Starting Mon 4/8/2019, Print           No discharge procedures on file      ED Provider  Attending physically available and wendy Villagran I managed the patient along with the ED Attending      Electronically Signed by         Enrique Monge DO  01/07/20 5485

## 2019-12-29 NOTE — CONSULTS
Consultation - ED  Dedrick Nichols 34 y o  female MRN: [id number]  Unit/Bed#: ED 14 Encounter: [id number]      Consults      Chief Complaint   Patient presents with    Abdominal Pain     Pt woke up with RLQ pain this AM       History of Present Illness   Physician Requesting Consult: No att  providers found  Reason for Consult / Principal Problem: Right ovarian cyst  Subspeciality: General GYN     HPI: Dedrick Nichols is a 34 y  o  (history of 2 prior LTCS) female who presents to the ED with right lower quadrant pain  Patient states that the pain began when she woke up this morning around 1000  She describes the pain as sharp, excruciating, nonradiating right lower quadrant  No alleviating or aggravating factors  Pain was initially constant but has become intermittent since she arrived to ED  She denies fever, nausea, vomiting, shortness of breath, chest pain  She is hungry and hasn't eaten since last evening  She had two formed bowel movements this morning  States it was painful due to her RLQ pain  OBGYN history:  History of 2 prior LTCS  History of preeclampsia with prior pregnancy  Not currently on contraception  LMP 19  History of regular cycles, every 30 days, lasting 5 days  H/o OCP and depo provera use in the past  Currently sexually active with one male partner (father of her 2 kids)  No history of STD    Patient of [facility]    Review of Systems   Constitutional: Negative  HENT: Negative  Eyes: Negative  Respiratory: Negative  Cardiovascular: Negative  Gastrointestinal: Positive for abdominal pain  Negative for abdominal distention, constipation, diarrhea, nausea, rectal pain and vomiting  RLQ abdominal pain   Genitourinary: Negative  Musculoskeletal: Negative         Historical Information   Past Medical History:   Diagnosis Date     delivery delivered     RESOLVED: [id number]    Disease of thyroid gland     hyperthyroid    Hidradenitis suppurativa     LAST ASSESSED: [id number]    History of early menarche     [de-identified] PERIOD AT 8YEARS OLD    Hyperemesis gravidarum     RESOLVED: [id number]    Hypertension in pregnancy, pre-eclampsia     prior preg    Migraine     Sickle cell trait ([facility] [address] )      Past Surgical History:   Procedure Laterality Date    ABSCESS DRAINAGE  2016    INCISION AND DRAINAGE OF SKIN ABSCESS; LABIAL CYST REMOVAL - [address]     SECTION      CHOLECYSTECTOMY  2009    INCISION AND DRAINAGE OF WOUND Right 2019    Procedure: INCISION AND DRAINAGE (I&D) BUTTOCK;  Surgeon: Abhijeet Alas MD;  [facility];  Service: General    [place]  DELIVERY ONLY N/A 2018    Procedure:  SECTION () REPEAT;  Surgeon: Kaykay Rey MD;  Location: [facility] LD;  Service: Obstetrics    [place] EXC SWEAT GLAND Dahlia Merry Right 2016    Procedure: EXCISION GROIN CYST HIDRADENITIS;  Surgeon: Wilfrido Tinajero DO;  Location: [facility] MAIN OR;  Service: General    TONSILLECTOMY      VULVA BIOPSY N/A 2016    Procedure: INCISION AND DRAINAGE, EXCISION OF LABIAL CYST ;  Surgeon: Mallorie Lazo DO;  Location: [facility];  Service:      OB History    Para Term  AB Living   2 2 2     2   SAB TAB Ectopic Multiple Live Births         0 2      # Outcome Date GA Lbr Brandon/2nd Weight Sex Delivery Anes PTL Lv   2 Term 18 39w0d  4451 g (9 lb 13 oz) M CS-LTranv Spinal N SONNY   1 Term 04/25/15   4309 g (9 lb 8 oz) F CS-LTranv EPI  SONNY      Complications: Failure to Progress in Second Stage      Obstetric Comments   PRE-ECLAMPSIA     Family History   Problem Relation Age of Onset    Hyperlipidemia Mother         PURE    Cancer Father         lung    Diabetes Maternal Grandmother     No Known Problems Brother      Social History   Social History     Substance and Sexual Activity   Alcohol Use Yes    Comment: (HISTORY)     Social History     Substance and Sexual Activity   Drug Use No     Social History Tobacco Use   Smoking Status Never Smoker   Smokeless Tobacco Never Used       Meds/Allergies   No current facility-administered medications for this encounter  No Known Allergies    Objective   Vitals: Blood pressure 118/77, pulse 67, temperature 97 8 °F (36 6 °C), temperature source Oral, resp  rate 18, weight 59 kg (130 lb), last menstrual period 12/01/2019, SpO2 97 %, not currently breastfeeding  Body mass index is 22 31 kg/m²  No intake or output data in the 24 hours ending 12/29/19 1934    Invasive Devices     None                 Physical Exam   Constitutional: She is oriented to person, place, and time  She appears well-developed and well-nourished  No distress  Cardiovascular: Normal rate, regular rhythm and normal heart sounds  Exam reveals no gallop and no friction rub  No murmur heard  Pulmonary/Chest: Effort normal and breath sounds normal  No stridor  No respiratory distress  She has no wheezes  She has no rales  She exhibits no tenderness  Abdominal: Soft  Bowel sounds are normal  She exhibits no distension and no mass  There is tenderness  There is no rebound and no guarding  Patient received 0 5mg dilaudid prior to my evaluation  She was comfortable during the exam, however, still noted right lower quadrant tenderness  Bimanual exam significant for mild fullness in the RLQ  Cervix mobile, nontender  Uterus mobile  Left ovary unable to be palpated  Neurological: She is alert and oriented to person, place, and time  Skin: She is not diaphoretic  Vitals reviewed         Lab Results:   Recent Results (from the past 24 hour(s))   CBC and differential    Collection Time: 12/29/19  1:01 PM   Result Value Ref Range    WBC 5 37 4 31 - 10 16 Thousand/uL    RBC 5 02 3 81 - 5 12 Million/uL    Hemoglobin 14 1 11 5 - 15 4 g/dL    Hematocrit 41 6 34 8 - 46 1 %    MCV 83 82 - 98 fL    MCH 28 1 26 8 - 34 3 pg    MCHC 33 9 31 4 - 37 4 g/dL    RDW 15 3 (H) 11 6 - 15 1 %    MPV 10 3 8 9 - 12 7 fL    Platelets 989 792 - 093 Thousands/uL    nRBC 0 /100 WBCs    Neutrophils Relative 61 43 - 75 %    Immat GRANS % 0 0 - 2 %    Lymphocytes Relative 30 14 - 44 %    Monocytes Relative 8 4 - 12 %    Eosinophils Relative 0 0 - 6 %    Basophils Relative 1 0 - 1 %    Neutrophils Absolute 3 27 1 85 - 7 62 Thousands/µL    Immature Grans Absolute 0 01 0 00 - 0 20 Thousand/uL    Lymphocytes Absolute 1 62 0 60 - 4 47 Thousands/µL    Monocytes Absolute 0 42 0 17 - 1 22 Thousand/µL    Eosinophils Absolute 0 02 0 00 - 0 61 Thousand/µL    Basophils Absolute 0 03 0 00 - 0 10 Thousands/µL   Comprehensive metabolic panel    Collection Time: 12/29/19  1:01 PM   Result Value Ref Range    Sodium 137 136 - 145 mmol/L    Potassium 4 0 3 5 - 5 3 mmol/L    Chloride 109 (H) 100 - 108 mmol/L    CO2 25 21 - 32 mmol/L    ANION GAP 3 (L) 4 - 13 mmol/L    BUN 9 5 - 25 mg/dL    Creatinine 0 84 0 60 - 1 30 mg/dL    Glucose 96 65 - 140 mg/dL    Calcium 9 4 8 3 - 10 1 mg/dL    AST 10 5 - 45 U/L    ALT 20 12 - 78 U/L    Alkaline Phosphatase 64 46 - 116 U/L    Total Protein 8 3 (H) 6 4 - 8 2 g/dL    Albumin 4 0 3 5 - 5 0 g/dL    Total Bilirubin 0 86 0 20 - 1 00 mg/dL    eGFR 110 ml/min/1 73sq m   Lipase    Collection Time: 12/29/19  1:01 PM   Result Value Ref Range    Lipase 68 (L) 73 - 393 u/L   Urine Macroscopic, POC    Collection Time: 12/29/19  2:03 PM   Result Value Ref Range    Color, UA Yellow     Clarity, UA Clear     pH, UA 6 0 4 5 - 8 0    Leukocytes, UA Negative Negative    Nitrite, UA Negative Negative    Protein, UA Negative Negative mg/dl    Glucose, UA Negative Negative mg/dl    Ketones, UA Negative Negative mg/dl    Urobilinogen, UA 0 2 0 2, 1 0 E U /dl E U /dl    Bilirubin, UA Negative Negative    Blood, UA Negative Negative    Specific Gravity, UA 1 020 1 003 - 1 030   POCT pregnancy, urine    Collection Time: 12/29/19  2:15 PM   Result Value Ref Range    EXT PREG TEST UR (Ref: Negative) negative     Control valid Assessment/Plan     Assessment:  34year old  (h/o 2 prior CS) who presents with right lower quadrant abdominal pain concerning for intermittent ovarian torsion    Plan:    Suspected intermittent right ovarian torsion  Vital signs stable, afebrile  UPT negative, CBC/CMP WNL  CT showed 6cm cystic lesion in right adnexa with surrounding fluid  Left upper quadrant jejunal intussusception without obstruction  Pelvic ultrasound showed complex right adnexal cyst measuring 5 9 x 4 5 x 5 0cm  Doppler flow within normal limits  Patient was evaluated by general surgery for LUQ jejunal intussusception  Transient intussusception is most likely incidental with no acute surgical intervention required  Dr Dominik Stone (SOD) and I discussed radiologic findings with patient  She likely has intermittent ovarian torsion, given her sharp, 10/10 nonradiating, intermittent RLQ pain and 6cm complex adnexal cyst  Given that she has remained afebrile with no evidence of leukocytosis, denies nausea or vomiting, is comfortable now s/p analgesia, and has normal doppler flow on ultrasound, emergency surgery is not indicated  We gave the patient two options: 1) discharge home with close follow up with [facility] for medical or outpatient surgical management or 2) admission to [facility] and plan for diagnostic laparoscopy, cystectomy, and detorsion as an add on case for tomorrow  Patient feels better than when she arrived and would like to go home  She is aware to call her OBGYN and return to ED if she experiences worsening pain, fever, or any new onset symptoms  Will task [facility] clerical staff to help schedule patient's follow up appointment  D/w Dr Dominik Stone      Code Status: Prior  Advance Directive and Living Will:      Power of :    POLST:      Counseling / Coordination of Care  Total floor / unit time spent today20 minutes  minutes   Greater than 50% of total time was spent with the patient and / or family counseling and / or coordination of care   A description of the counseling / coordination of care: evaluation of RLQ pain    Shailesh Villanueva MD  12/29/2019  7:34 PM

## 2019-12-29 NOTE — ED ATTENDING ATTESTATION
12/29/2019  Jules MOE MD, saw and evaluated the patient  I have discussed the patient with the resident/non-physician practitioner and agree with the resident's/non-physician practitioner's findings, Plan of Care, and MDM as documented in the resident's/non-physician practitioner's note, except where noted  All available labs and Radiology studies were reviewed  I was present for key portions of any procedure(s) performed by the resident/non-physician practitioner and I was immediately available to provide assistance  At this point I agree with the current assessment done in the Emergency Department  I have conducted an independent evaluation of this patient a history and physical is as follows: The patient presents for evaluation right lower abdominal pain that started relatively suddenly at 10:00 a m   Over from sleep she broke out in a sweat she got nauseous but did not vomit she had no vomiting diarrhea no urinary symptoms no vaginal discharge or vaginal bleeding her next menstrual period is due to arrive on January 2nd  Patient has had no fever or chills  No prior abdominal surgeries  Exam the patient is somewhat uncomfortable she is in no acute distress HEENT exam is unremarkable sclerae anicteric mucous membranes are moist lungs clear heart regular abdomen soft positive bowel sounds tenderness in the right lower abdomen mild tenderness in the right mid abdomen as well no rebound no guarding no mass noted  Impression abdominal pain  Differential considerations would include appendicitis, kidney stone, urinary tract infection, ovarian pathology Will check pregnancy to rule out ectopic pain control IV fluids  ED Course         Critical Care Time  Procedures

## 2019-12-29 NOTE — CONSULTS
Consultation - General Surgery  Jagdeep Palomares 34 y o  female MRN: [id number]  Unit/Bed#: ED 14 Encounter: [id number]      Assessment/Plan      Assessment:  Patient is a 72-year-old female presenting with acute abdominal pain  CT imaging consistent with 6 cm cystic lesion in the right lower quadrant, and Left upper quadrant suggestive of jejunal intussusception without obstruction  Transvaginal ultrasound consistent with right ovarian cyst with normal Doppler flow  Vital signs are stable  Afebrile  White blood cell count 5 37  Creatinine 0 84  Abdomen soft, tender in the right lower quadrant, with mild guarding  No rebound  No tenderness in the left upper quadrant  Plan:  -Transient left upper quadrant intussusception most likely incidental,   defer further management to OBGYN team to rule out torsion R cyst  -No acute surgical intervention  -Will sign off, thank you for this consult, please contact with questions    History of Present Illness   Physician Requesting Consult: Dinesh Cade MD  Reason for Consult / Principal Problem: left upper quadrant incidental intussessption  Hx and PE limited by:  None  HPI: Jagdeep Palomares is a 34y o  year old female with past medical history of sickle cell trait, hypertension in pregnancy, hyper M emesis gravidarum, hyperthyroidism, and 2 prior C-sections who presents acute abdominal pain since 10 o'clock this morning  Patient explained that she woke up with severe right lower quadrant abdominal pain, which was sharp in nature, nonradiating, and 10/10 in severity  Pain progressively got worse throughout the day  Nothing made the pain better  Patient explained that she was sweating secondary to the pain, but denied any nausea vomiting diarrhea  Never had this pain before, and denies any prior knowledge of ovarian cysts  Reported surgical history of lap cholecystectomy, and 2 prior C-sections    Denied any fevers, chills, chest pain or shortness of breath today  Consult to surgery general  Consult performed by: Carolynn Westfall MD  Consult ordered by: Fred Solis MD          Review of Systems   Constitutional: Negative  Negative for chills and fever  Eyes: Negative  Respiratory: Negative  Cardiovascular: Negative  Gastrointestinal: Positive for abdominal pain  Negative for abdominal distention, anal bleeding, blood in stool, constipation, diarrhea, nausea and vomiting  Endocrine: Negative  Genitourinary: Negative  Musculoskeletal: Negative  Skin: Negative  Allergic/Immunologic: Negative  Neurological: Negative  Hematological: Negative  Psychiatric/Behavioral: Negative          Historical Information   Past Medical History:   Diagnosis Date     delivery delivered     RESOLVED: [id number]    Disease of thyroid gland     hyperthyroid    Hidradenitis suppurativa     LAST ASSESSED: [id number]    History of early menarche     [de-identified] PERIOD AT 8YEARS OLD    Hyperemesis gravidarum     RESOLVED: [id number]    Hypertension in pregnancy, pre-eclampsia     prior preg    Migraine     Sickle cell trait ([facility] [address] )      Past Surgical History:   Procedure Laterality Date    ABSCESS DRAINAGE  2016    INCISION AND DRAINAGE OF SKIN ABSCESS; LABIAL CYST REMOVAL - [facility]     SECTION      CHOLECYSTECTOMY      INCISION AND DRAINAGE OF WOUND Right 2019    Procedure: INCISION AND DRAINAGE (I&D) BUTTOCK;  Surgeon: Cornelio Rodríguez MD;  [facility];  Service: General    [place]  DELIVERY ONLY N/A 2018    Procedure:  SECTION () REPEAT;  Surgeon: Alice Castillo MD;  Location: [facility] ;  Service: Obstetrics    [place] EXC SWEAT GLAND Dorathy Fu Right 2016    Procedure: EXCISION GROIN CYST HIDRADENITIS;  Surgeon: Suresh Vann DO;  Location: [facility];  Service: General    TONSILLECTOMY      VULVA BIOPSY N/A 2016    Procedure: INCISION AND DRAINAGE, EXCISION OF LABIAL CYST ;  Surgeon: Ancelmo Yeager DO;  [facility];  Service:      Social History   Social History     Substance and Sexual Activity   Alcohol Use Yes    Comment: (HISTORY)     Social History     Substance and Sexual Activity   Drug Use No     Social History     Tobacco Use   Smoking Status Never Smoker   Smokeless Tobacco Never Used     Family History   Problem Relation Age of Onset    Hyperlipidemia Mother         PURE    Cancer Father         lung    Diabetes Maternal Grandmother     No Known Problems Brother        Meds/Allergies   all current active meds have been reviewed    No Known Allergies    Objective   No intake or output data in the 24 hours ending 12/29/19 1725    Invasive Devices     Peripheral Intravenous Line            Peripheral IV 12/29/19 Left Antecubital less than 1 day                Physical Exam   Constitutional: She is oriented to person, place, and time  She appears well-developed and well-nourished  HENT:   Head: Normocephalic  Eyes: Pupils are equal, round, and reactive to light  Neck: Normal range of motion  Neck supple  No JVD present  No thyromegaly present  Cardiovascular: Normal rate, regular rhythm and normal heart sounds  Exam reveals no gallop and no friction rub  No murmur heard  Pulmonary/Chest: Effort normal  No stridor  No respiratory distress  She has no wheezes  Abdominal: Soft  She exhibits no distension and no mass  There is tenderness  There is guarding  There is no rebound  RLQ tenderness  Mild guarding  Genitourinary:   Genitourinary Comments: deferred   Musculoskeletal: Normal range of motion  She exhibits no edema  Neurological: She is alert and oriented to person, place, and time  Skin: Skin is warm  Capillary refill takes less than 2 seconds  Psychiatric: She has a normal mood and affect  Vitals reviewed  Lab Results:   I have personally reviewed pertinent lab results  , CBC:   Lab Results Component Value Date    WBC 5 37 12/29/2019    HGB 14 1 12/29/2019    HCT 41 6 12/29/2019    MCV 83 12/29/2019     12/29/2019    MCH 28 1 12/29/2019    MCHC 33 9 12/29/2019    RDW 15 3 (H) 12/29/2019    MPV 10 3 12/29/2019    NRBC 0 12/29/2019   , CMP:   Lab Results   Component Value Date    SODIUM 137 12/29/2019    K 4 0 12/29/2019     (H) 12/29/2019    CO2 25 12/29/2019    BUN 9 12/29/2019    CREATININE 0 84 12/29/2019    CALCIUM 9 4 12/29/2019    AST 10 12/29/2019    ALT 20 12/29/2019    ALKPHOS 64 12/29/2019    EGFR 110 12/29/2019     Imaging Studies: I have personally reviewed pertinent reports  Pathology, and Other Studies: none    Code Status: Prior  Advance Directive and Living Will:      Power of :    POLST:      Counseling / Coordination of Care  Total floor / unit time spent today 30 minutes  Greater than 50% of total time was spent with the patient and / or family counseling and / or coordination of care   A description of the counseling / coordination of care: 30

## 2019-12-30 NOTE — DISCHARGE INSTRUCTIONS
Take ibuprofen and tylenol (ibuprofen 600mg every 6-8hrs as needed, tylenol 1000mg every 4-6hrs, no more than 4000mg tylenol per day)  If your pain returns/worsens, you should come back to the ED within 48hrs for reevaluation   Follow up with your obgyn, call the clinic tomorrow

## 2020-01-02 ENCOUNTER — OFFICE VISIT (OUTPATIENT)
Dept: OBGYN CLINIC | Facility: CLINIC | Age: 30
End: 2020-01-02

## 2020-01-02 VITALS
SYSTOLIC BLOOD PRESSURE: 113 MMHG | WEIGHT: 130 LBS | HEART RATE: 69 BPM | DIASTOLIC BLOOD PRESSURE: 74 MMHG | HEIGHT: 64 IN | BODY MASS INDEX: 22.2 KG/M2

## 2020-01-02 DIAGNOSIS — N83.201 RIGHT OVARIAN CYST: Primary | ICD-10-CM

## 2020-01-02 PROCEDURE — 99214 OFFICE O/P EST MOD 30 MIN: CPT | Performed by: OBSTETRICS & GYNECOLOGY

## 2020-01-02 NOTE — PROGRESS NOTES
[address]    S:  Kayla Rosales 34 y o  [id number] presenting for ED follow up  Patient was evaluated in the emergency department 12/29/2019 for right lower quadrant/abdominal pain  She subsequently found to have a large ovarian cystic structure on the right side on ultrasound-5 4 x 4 1 x 5 0 cm complex right ovarian cyst, Doppler flow within normal limits  CT abdomen pelvis showed a left upper quadrant jejunal intussusception without obstruction, Letty surgery evaluated and determined to be a transient event thus not requiring intervention  Patient was [place] by the [place] gyn team regarding her complex adnexal cyst and concern for torsion  At that time, patient stated that her pain diminished significantly and that she was unable to go for surgery at that time due to complex with work  Recommendation was made for patient to return to the emergency department if pain was to return, otherwise schedule appointment with OBGYN as outpatient  Today patient was evaluated, she is found to be comfortable but complains of persistent pain in her right lower quadrant that is not severe but irritating  "She desires definitive surgical management  She denies any vaginal bleeding, irregular menses, vulvar/vaginal symptoms, history of STDs, fever, chills, chest pain, shortness of breath, nausea/vomiting  She is not on birth control and desires pregnancy in the near future  O:  /74 (BP Location: Right arm, Patient Position: Sitting, Cuff Size: Standard)   Pulse 69   Ht 5' 4" (1 626 m)   Wt 59 kg (130 lb)   LMP 12/02/2019   BMI 22 31 kg/m²   Physical Exam   Constitutional: She is oriented to person, place, and time  She appears well-developed and well-nourished  No distress  Cardiovascular: Normal rate, regular rhythm, normal heart sounds and intact distal pulses  Pulmonary/Chest: Effort normal and breath sounds normal  No respiratory distress  She has no wheezes     Abdominal: Soft  Bowel sounds are normal  She exhibits no distension  There is tenderness (minimal at RLQ)  There is no rebound and no guarding  Neurological: She is alert and oriented to person, place, and time  She displays normal reflexes  She exhibits normal muscle tone  Skin: She is not diaphoretic  Psychiatric: She has a normal mood and affect  Her behavior is normal  Judgment and thought content normal      A/P:  Thenjerrell Deeds 34 y o  [id number] presenting with persistent pelvic discomfort from complex right cystic structure affecting her quality of life  She does not desire any medical interventions at this time and desires definitive surgical management  Patient provided written and verbal informed consent for diagnostic laparoscopy, possible right laparoscopic ovarian cystectomy/oophorectomy/salpingectomy, and all other indicated procedures  Reviewed risks, benefits, and alternatives  Patient is in agreement with the plan  Low suspicion for ovarian torsion at this time, however, instructed patient to return to the emergency department immediately if she develops sudden worsening abdominal/pelvic pain associated with nausea/vomiting  Patient states that she understands  Patient return to clinic for surgical H&P/surgery schedule      Christofer Reyes MD  OB/GYN PGY-4  1/2/2020 5:17 PM

## 2020-01-03 PROBLEM — N83.201 CYST OF RIGHT OVARY: Status: ACTIVE | Noted: 2020-01-03

## 2020-01-06 ENCOUNTER — TELEPHONE (OUTPATIENT)
Dept: OBGYN CLINIC | Facility: CLINIC | Age: 30
End: 2020-01-06

## 2020-01-06 NOTE — TELEPHONE ENCOUNTER
Patient is scheduled for surgery on 1/9/20  Patient was called due to inactive medical insurance  Patient stated that she has no Medical coverage at this time  she is working on getting back on insurance by the beginning of Febuary  She asked to cancel her surgery and she will contact the office once she is back on insurance  Surgery was cancelled

## 2020-03-09 ENCOUNTER — OFFICE VISIT (OUTPATIENT)
Dept: OBGYN CLINIC | Facility: CLINIC | Age: 30
End: 2020-03-09

## 2020-03-09 VITALS
HEIGHT: 64 IN | SYSTOLIC BLOOD PRESSURE: 117 MMHG | WEIGHT: 150 LBS | HEART RATE: 75 BPM | DIASTOLIC BLOOD PRESSURE: 72 MMHG | BODY MASS INDEX: 25.61 KG/M2

## 2020-03-09 DIAGNOSIS — N83.201 RIGHT OVARIAN CYST: Primary | ICD-10-CM

## 2020-03-09 PROCEDURE — 1036F TOBACCO NON-USER: CPT | Performed by: OBSTETRICS & GYNECOLOGY

## 2020-03-09 PROCEDURE — T1015 CLINIC SERVICE: HCPCS | Performed by: OBSTETRICS & GYNECOLOGY

## 2020-03-09 PROCEDURE — 3008F BODY MASS INDEX DOCD: CPT | Performed by: OBSTETRICS & GYNECOLOGY

## 2020-03-09 PROCEDURE — 99213 OFFICE O/P EST LOW 20 MIN: CPT | Performed by: OBSTETRICS & GYNECOLOGY

## 2020-03-09 NOTE — PROGRESS NOTES
33 yo  with LMP 20 who presents today to sign consent for surgery  She was initially seen in the emergency department on 2019 with complaint of right lower quadrant pain and was found to have a 6 cm complex ovarian mass that was likely intermittently torsed  At that time patient reported that her pain improved and she elected to defer surgery  After observation she remained stable and was discharged with plan for close follow up  She subsequently followed up on 2020 and continued to report persistent right lower quadrant pain  Discussion at that time was to proceed with surgical removal of a right ovarian cyst   However patient lost her insurance and had to cancel surgery  She returns today to sign consent for laparoscopic right ovarian cystectomy versus oophorectomy  We reviewed the procedure today as well as risks, benefits and alternatives to surgery  I discussed with patient that a repeat pelvic US should be performed prior to surgery given that it has been over 2 months since her imaging  We discussed that if the size of the cyst has decreased that she may no longer require surgery  All questions were answered  Patient will obtain pelvic US prior to H&P for surgery      Vitals:    20 1546   BP: 117/72   Pulse: 75     Prem Jensen MD, MPH  OB/GYN, PGY4  3/9/2020, 6:23 PM

## 2020-03-19 ENCOUNTER — TELEPHONE (OUTPATIENT)
Dept: OBGYN CLINIC | Facility: CLINIC | Age: 30
End: 2020-03-19

## 2020-03-19 NOTE — TELEPHONE ENCOUNTER
Instructed pt to call central scheduling to get her pelvic ultrasound done as soon as possible  Provided the pt with central scheduling's number  Offered any assistance with scheduling her pelvic ultrasound  Will call pt with results and/or updates about her surgery on 04/21/2020  Explained to pt that given the COVID-19 situation, her surgery case was reviewed by surgeons who indicated that we may have to postpone her surgery  We will contact the patient ASAP once we have any updates       Pt verbalized understanding and agreed to plan of care

## 2020-04-02 ENCOUNTER — TELEPHONE (OUTPATIENT)
Dept: FAMILY MEDICINE CLINIC | Facility: CLINIC | Age: 30
End: 2020-04-02

## 2020-04-03 ENCOUNTER — TELEPHONE (OUTPATIENT)
Dept: OBGYN CLINIC | Facility: CLINIC | Age: 30
End: 2020-04-03

## 2020-04-08 ENCOUNTER — HOSPITAL ENCOUNTER (OUTPATIENT)
Dept: RADIOLOGY | Facility: HOSPITAL | Age: 30
Discharge: HOME | End: 2020-04-08
Payer: COMMERCIAL

## 2020-04-08 ENCOUNTER — TRANSCRIBE ORDERS (OUTPATIENT)
Dept: RADIOLOGY | Facility: HOSPITAL | Age: 30
End: 2020-04-08

## 2020-04-08 ENCOUNTER — HOSPITAL ENCOUNTER (EMERGENCY)
Facility: HOSPITAL | Age: 30
Discharge: HOME | End: 2020-04-08
Attending: EMERGENCY MEDICINE | Admitting: EMERGENCY MEDICINE
Payer: COMMERCIAL

## 2020-04-08 VITALS
TEMPERATURE: 98.2 F | RESPIRATION RATE: 20 BRPM | DIASTOLIC BLOOD PRESSURE: 74 MMHG | OXYGEN SATURATION: 98 % | SYSTOLIC BLOOD PRESSURE: 135 MMHG | HEART RATE: 88 BPM

## 2020-04-08 DIAGNOSIS — N83.201 RIGHT OVARIAN CYST: ICD-10-CM

## 2020-04-08 DIAGNOSIS — N83.201 RIGHT OVARIAN CYST: Primary | ICD-10-CM

## 2020-04-08 DIAGNOSIS — R10.31 RLQ ABDOMINAL PAIN: ICD-10-CM

## 2020-04-08 PROBLEM — N83.209 OVARIAN CYST: Status: ACTIVE | Noted: 2020-04-08

## 2020-04-08 LAB
ABO GROUP BLD: NORMAL
ALBUMIN SERPL BCP-MCNC: 3.9 G/DL (ref 3.5–5)
ALP SERPL-CCNC: 68 U/L (ref 46–116)
ALT SERPL W P-5'-P-CCNC: 19 U/L (ref 12–78)
ANION GAP SERPL CALCULATED.3IONS-SCNC: 5 MMOL/L (ref 4–13)
AST SERPL W P-5'-P-CCNC: 8 U/L (ref 5–45)
BASOPHILS # BLD AUTO: 0.02 THOUSANDS/ΜL (ref 0–0.1)
BASOPHILS NFR BLD AUTO: 0 % (ref 0–1)
BILIRUB SERPL-MCNC: 0.52 MG/DL (ref 0.2–1)
BILIRUB UR QL STRIP: NEGATIVE
BLD GP AB SCN SERPL QL: NEGATIVE
BUN SERPL-MCNC: 7 MG/DL (ref 5–25)
CALCIUM SERPL-MCNC: 9.4 MG/DL (ref 8.3–10.1)
CHLORIDE SERPL-SCNC: 104 MMOL/L (ref 100–108)
CLARITY UR: CLEAR
CO2 SERPL-SCNC: 27 MMOL/L (ref 21–32)
COLOR UR: YELLOW
COLOR, POC: NORMAL
CREAT SERPL-MCNC: 0.9 MG/DL (ref 0.6–1.3)
EOSINOPHIL # BLD AUTO: 0.03 THOUSAND/ΜL (ref 0–0.61)
EOSINOPHIL NFR BLD AUTO: 0 % (ref 0–6)
ERYTHROCYTE [DISTWIDTH] IN BLOOD BY AUTOMATED COUNT: 14.2 % (ref 11.6–15.1)
EXT PREG TEST URINE: NEGATIVE
EXT. CONTROL ED NAV: NORMAL
GFR SERPL CREATININE-BSD FRML MDRD: 101 ML/MIN/1.73SQ M
GLUCOSE SERPL-MCNC: 87 MG/DL (ref 65–140)
GLUCOSE UR STRIP-MCNC: NEGATIVE MG/DL
HCT VFR BLD AUTO: 42.6 % (ref 34.8–46.1)
HGB BLD-MCNC: 14.4 G/DL (ref 11.5–15.4)
HGB UR QL STRIP.AUTO: NEGATIVE
IMM GRANULOCYTES # BLD AUTO: 0.02 THOUSAND/UL (ref 0–0.2)
IMM GRANULOCYTES NFR BLD AUTO: 0 % (ref 0–2)
KETONES UR STRIP-MCNC: NEGATIVE MG/DL
LEUKOCYTE ESTERASE UR QL STRIP: NEGATIVE
LIPASE SERPL-CCNC: 70 U/L (ref 73–393)
LYMPHOCYTES # BLD AUTO: 1.99 THOUSANDS/ΜL (ref 0.6–4.47)
LYMPHOCYTES NFR BLD AUTO: 29 % (ref 14–44)
MCH RBC QN AUTO: 29 PG (ref 26.8–34.3)
MCHC RBC AUTO-ENTMCNC: 33.8 G/DL (ref 31.4–37.4)
MCV RBC AUTO: 86 FL (ref 82–98)
MONOCYTES # BLD AUTO: 0.46 THOUSAND/ΜL (ref 0.17–1.22)
MONOCYTES NFR BLD AUTO: 7 % (ref 4–12)
NEUTROPHILS # BLD AUTO: 4.33 THOUSANDS/ΜL (ref 1.85–7.62)
NEUTS SEG NFR BLD AUTO: 64 % (ref 43–75)
NITRITE UR QL STRIP: NEGATIVE
NRBC BLD AUTO-RTO: 0 /100 WBCS
PH UR STRIP.AUTO: 7 [PH] (ref 4.5–8)
PLATELET # BLD AUTO: 205 THOUSANDS/UL (ref 149–390)
PMV BLD AUTO: 9.5 FL (ref 8.9–12.7)
POTASSIUM SERPL-SCNC: 3.8 MMOL/L (ref 3.5–5.3)
PROT SERPL-MCNC: 8 G/DL (ref 6.4–8.2)
PROT UR STRIP-MCNC: NEGATIVE MG/DL
RBC # BLD AUTO: 4.96 MILLION/UL (ref 3.81–5.12)
RH BLD: POSITIVE
SODIUM SERPL-SCNC: 136 MMOL/L (ref 136–145)
SP GR UR STRIP.AUTO: 1.01 (ref 1–1.03)
SPECIMEN EXPIRATION DATE: NORMAL
UROBILINOGEN UR QL STRIP.AUTO: 0.2 E.U./DL
WBC # BLD AUTO: 6.85 THOUSAND/UL (ref 4.31–10.16)

## 2020-04-08 PROCEDURE — 86901 BLOOD TYPING SEROLOGIC RH(D): CPT | Performed by: EMERGENCY MEDICINE

## 2020-04-08 PROCEDURE — 36415 COLL VENOUS BLD VENIPUNCTURE: CPT | Performed by: EMERGENCY MEDICINE

## 2020-04-08 PROCEDURE — 83690 ASSAY OF LIPASE: CPT | Performed by: EMERGENCY MEDICINE

## 2020-04-08 PROCEDURE — 81003 URINALYSIS AUTO W/O SCOPE: CPT

## 2020-04-08 PROCEDURE — 85025 COMPLETE CBC W/AUTO DIFF WBC: CPT | Performed by: EMERGENCY MEDICINE

## 2020-04-08 PROCEDURE — 99285 EMERGENCY DEPT VISIT HI MDM: CPT | Performed by: EMERGENCY MEDICINE

## 2020-04-08 PROCEDURE — 86900 BLOOD TYPING SEROLOGIC ABO: CPT | Performed by: EMERGENCY MEDICINE

## 2020-04-08 PROCEDURE — 99284 EMERGENCY DEPT VISIT MOD MDM: CPT

## 2020-04-08 PROCEDURE — 81025 URINE PREGNANCY TEST: CPT | Performed by: EMERGENCY MEDICINE

## 2020-04-08 PROCEDURE — 86850 RBC ANTIBODY SCREEN: CPT | Performed by: EMERGENCY MEDICINE

## 2020-04-08 PROCEDURE — 76856 US EXAM PELVIC COMPLETE: CPT

## 2020-04-08 PROCEDURE — 96374 THER/PROPH/DIAG INJ IV PUSH: CPT

## 2020-04-08 PROCEDURE — 76830 TRANSVAGINAL US NON-OB: CPT

## 2020-04-08 PROCEDURE — NC001 PR NO CHARGE: Performed by: OBSTETRICS & GYNECOLOGY

## 2020-04-08 PROCEDURE — 80053 COMPREHEN METABOLIC PANEL: CPT | Performed by: EMERGENCY MEDICINE

## 2020-04-08 RX ORDER — KETOROLAC TROMETHAMINE 30 MG/ML
15 INJECTION, SOLUTION INTRAMUSCULAR; INTRAVENOUS ONCE
Status: COMPLETED | OUTPATIENT
Start: 2020-04-08 | End: 2020-04-08

## 2020-04-08 RX ORDER — NORETHINDRONE ACETATE AND ETHINYL ESTRADIOL 1MG-20(21)
1 KIT ORAL DAILY
Qty: 30 TABLET | Refills: 3 | Status: SHIPPED | OUTPATIENT
Start: 2020-04-08 | End: 2021-08-30

## 2020-04-08 RX ORDER — OXYCODONE HYDROCHLORIDE 5 MG/1
5 TABLET ORAL ONCE
Status: COMPLETED | OUTPATIENT
Start: 2020-04-08 | End: 2020-04-08

## 2020-04-08 RX ORDER — NAPROXEN 500 MG/1
500 TABLET ORAL 2 TIMES DAILY WITH MEALS
Qty: 20 TABLET | Refills: 1 | Status: SHIPPED | OUTPATIENT
Start: 2020-04-08 | End: 2020-04-15 | Stop reason: ALTCHOICE

## 2020-04-08 RX ADMIN — OXYCODONE HYDROCHLORIDE 5 MG: 5 TABLET ORAL at 12:55

## 2020-04-08 RX ADMIN — KETOROLAC TROMETHAMINE 15 MG: 30 INJECTION, SOLUTION INTRAMUSCULAR at 11:03

## 2020-04-09 ENCOUNTER — HOSPITAL ENCOUNTER (OUTPATIENT)
Facility: HOSPITAL | Age: 30
Setting detail: OBSERVATION
Discharge: HOME | End: 2020-04-10
Attending: EMERGENCY MEDICINE | Admitting: GENERAL PRACTICE
Payer: COMMERCIAL

## 2020-04-09 ENCOUNTER — APPOINTMENT (EMERGENCY)
Dept: RADIOLOGY | Facility: HOSPITAL | Age: 30
End: 2020-04-09
Payer: COMMERCIAL

## 2020-04-09 DIAGNOSIS — R10.31 RIGHT LOWER QUADRANT ABDOMINAL PAIN: Primary | ICD-10-CM

## 2020-04-09 DIAGNOSIS — R11.2 NAUSEA AND VOMITING: ICD-10-CM

## 2020-04-09 DIAGNOSIS — N83.201 CYST OF RIGHT OVARY: ICD-10-CM

## 2020-04-09 PROBLEM — R10.2 PELVIC PAIN: Status: ACTIVE | Noted: 2020-03-09

## 2020-04-09 LAB
ALBUMIN SERPL BCP-MCNC: 3.9 G/DL (ref 3.5–5)
ALP SERPL-CCNC: 71 U/L (ref 46–116)
ALT SERPL W P-5'-P-CCNC: 16 U/L (ref 12–78)
ANION GAP SERPL CALCULATED.3IONS-SCNC: 5 MMOL/L (ref 4–13)
AST SERPL W P-5'-P-CCNC: 10 U/L (ref 5–45)
BACTERIA UR QL AUTO: ABNORMAL /HPF
BASOPHILS # BLD AUTO: 0.02 THOUSANDS/ΜL (ref 0–0.1)
BASOPHILS NFR BLD AUTO: 0 % (ref 0–1)
BILIRUB SERPL-MCNC: 0.78 MG/DL (ref 0.2–1)
BILIRUB UR QL STRIP: NEGATIVE
BUN SERPL-MCNC: 7 MG/DL (ref 5–25)
CALCIUM SERPL-MCNC: 9.1 MG/DL (ref 8.3–10.1)
CHLORIDE SERPL-SCNC: 107 MMOL/L (ref 100–108)
CLARITY UR: CLEAR
CLARITY, POC: CLEAR
CO2 SERPL-SCNC: 25 MMOL/L (ref 21–32)
COLOR UR: YELLOW
COLOR, POC: YELLOW
CREAT SERPL-MCNC: 0.93 MG/DL (ref 0.6–1.3)
EOSINOPHIL # BLD AUTO: 0.01 THOUSAND/ΜL (ref 0–0.61)
EOSINOPHIL NFR BLD AUTO: 0 % (ref 0–6)
ERYTHROCYTE [DISTWIDTH] IN BLOOD BY AUTOMATED COUNT: 14.1 % (ref 11.6–15.1)
EXT PREG TEST URINE: NEGATIVE
EXT. CONTROL ED NAV: NORMAL
GFR SERPL CREATININE-BSD FRML MDRD: 97 ML/MIN/1.73SQ M
GLUCOSE SERPL-MCNC: 120 MG/DL (ref 65–140)
GLUCOSE UR STRIP-MCNC: NEGATIVE MG/DL
HCT VFR BLD AUTO: 41.5 % (ref 34.8–46.1)
HGB BLD-MCNC: 13.9 G/DL (ref 11.5–15.4)
HGB UR QL STRIP.AUTO: ABNORMAL
HYALINE CASTS #/AREA URNS LPF: ABNORMAL /LPF
IMM GRANULOCYTES # BLD AUTO: 0.03 THOUSAND/UL (ref 0–0.2)
IMM GRANULOCYTES NFR BLD AUTO: 0 % (ref 0–2)
KETONES UR STRIP-MCNC: ABNORMAL MG/DL
LEUKOCYTE ESTERASE UR QL STRIP: NEGATIVE
LIPASE SERPL-CCNC: 67 U/L (ref 73–393)
LYMPHOCYTES # BLD AUTO: 1.42 THOUSANDS/ΜL (ref 0.6–4.47)
LYMPHOCYTES NFR BLD AUTO: 19 % (ref 14–44)
MCH RBC QN AUTO: 28.8 PG (ref 26.8–34.3)
MCHC RBC AUTO-ENTMCNC: 33.5 G/DL (ref 31.4–37.4)
MCV RBC AUTO: 86 FL (ref 82–98)
MONOCYTES # BLD AUTO: 0.42 THOUSAND/ΜL (ref 0.17–1.22)
MONOCYTES NFR BLD AUTO: 6 % (ref 4–12)
NEUTROPHILS # BLD AUTO: 5.64 THOUSANDS/ΜL (ref 1.85–7.62)
NEUTS SEG NFR BLD AUTO: 75 % (ref 43–75)
NITRITE UR QL STRIP: NEGATIVE
NON-SQ EPI CELLS URNS QL MICRO: ABNORMAL /HPF
NRBC BLD AUTO-RTO: 0 /100 WBCS
PH UR STRIP.AUTO: 6 [PH] (ref 4.5–8)
PLATELET # BLD AUTO: 210 THOUSANDS/UL (ref 149–390)
PMV BLD AUTO: 9.4 FL (ref 8.9–12.7)
POTASSIUM SERPL-SCNC: 3.6 MMOL/L (ref 3.5–5.3)
PROT SERPL-MCNC: 8.2 G/DL (ref 6.4–8.2)
PROT UR STRIP-MCNC: ABNORMAL MG/DL
RBC # BLD AUTO: 4.82 MILLION/UL (ref 3.81–5.12)
RBC #/AREA URNS AUTO: ABNORMAL /HPF
SODIUM SERPL-SCNC: 137 MMOL/L (ref 136–145)
SP GR UR STRIP.AUTO: 1.01 (ref 1–1.03)
UROBILINOGEN UR QL STRIP.AUTO: 0.2 E.U./DL
WBC # BLD AUTO: 7.54 THOUSAND/UL (ref 4.31–10.16)
WBC #/AREA URNS AUTO: ABNORMAL /HPF

## 2020-04-09 PROCEDURE — 81025 URINE PREGNANCY TEST: CPT | Performed by: EMERGENCY MEDICINE

## 2020-04-09 PROCEDURE — 36415 COLL VENOUS BLD VENIPUNCTURE: CPT | Performed by: EMERGENCY MEDICINE

## 2020-04-09 PROCEDURE — 76830 TRANSVAGINAL US NON-OB: CPT

## 2020-04-09 PROCEDURE — 96374 THER/PROPH/DIAG INJ IV PUSH: CPT

## 2020-04-09 PROCEDURE — 81002 URINALYSIS NONAUTO W/O SCOPE: CPT | Performed by: EMERGENCY MEDICINE

## 2020-04-09 PROCEDURE — 99285 EMERGENCY DEPT VISIT HI MDM: CPT | Performed by: EMERGENCY MEDICINE

## 2020-04-09 PROCEDURE — 99285 EMERGENCY DEPT VISIT HI MDM: CPT

## 2020-04-09 PROCEDURE — 83690 ASSAY OF LIPASE: CPT | Performed by: EMERGENCY MEDICINE

## 2020-04-09 PROCEDURE — 96361 HYDRATE IV INFUSION ADD-ON: CPT

## 2020-04-09 PROCEDURE — 96375 TX/PRO/DX INJ NEW DRUG ADDON: CPT

## 2020-04-09 PROCEDURE — 96376 TX/PRO/DX INJ SAME DRUG ADON: CPT

## 2020-04-09 PROCEDURE — 81001 URINALYSIS AUTO W/SCOPE: CPT

## 2020-04-09 PROCEDURE — 85025 COMPLETE CBC W/AUTO DIFF WBC: CPT | Performed by: EMERGENCY MEDICINE

## 2020-04-09 PROCEDURE — NC001 PR NO CHARGE: Performed by: OBSTETRICS & GYNECOLOGY

## 2020-04-09 PROCEDURE — 99219 PR INITIAL OBSERVATION CARE/DAY 50 MINUTES: CPT | Performed by: GENERAL PRACTICE

## 2020-04-09 PROCEDURE — 74177 CT ABD & PELVIS W/CONTRAST: CPT

## 2020-04-09 PROCEDURE — 76856 US EXAM PELVIC COMPLETE: CPT

## 2020-04-09 PROCEDURE — 80053 COMPREHEN METABOLIC PANEL: CPT | Performed by: EMERGENCY MEDICINE

## 2020-04-09 RX ORDER — ONDANSETRON 2 MG/ML
4 INJECTION INTRAMUSCULAR; INTRAVENOUS ONCE
Status: COMPLETED | OUTPATIENT
Start: 2020-04-09 | End: 2020-04-09

## 2020-04-09 RX ORDER — NAPROXEN 500 MG/1
500 TABLET ORAL 2 TIMES DAILY WITH MEALS
Status: DISCONTINUED | OUTPATIENT
Start: 2020-04-09 | End: 2020-04-10 | Stop reason: HOSPADM

## 2020-04-09 RX ORDER — ONDANSETRON 2 MG/ML
4 INJECTION INTRAMUSCULAR; INTRAVENOUS EVERY 6 HOURS PRN
Status: DISCONTINUED | OUTPATIENT
Start: 2020-04-09 | End: 2020-04-10 | Stop reason: HOSPADM

## 2020-04-09 RX ORDER — ACETAMINOPHEN 325 MG/1
650 TABLET ORAL EVERY 6 HOURS PRN
Qty: 30 TABLET | Refills: 0 | Status: SHIPPED | OUTPATIENT
Start: 2020-04-09 | End: 2021-08-30

## 2020-04-09 RX ORDER — ONDANSETRON 4 MG/1
4 TABLET, ORALLY DISINTEGRATING ORAL EVERY 6 HOURS PRN
Qty: 20 TABLET | Refills: 0 | Status: SHIPPED | OUTPATIENT
Start: 2020-04-09 | End: 2020-04-15

## 2020-04-09 RX ORDER — OXYCODONE HYDROCHLORIDE 5 MG/1
5 TABLET ORAL EVERY 4 HOURS PRN
Status: DISCONTINUED | OUTPATIENT
Start: 2020-04-09 | End: 2020-04-10 | Stop reason: HOSPADM

## 2020-04-09 RX ORDER — ACETAMINOPHEN 325 MG/1
975 TABLET ORAL EVERY 8 HOURS SCHEDULED
Status: DISCONTINUED | OUTPATIENT
Start: 2020-04-09 | End: 2020-04-10 | Stop reason: HOSPADM

## 2020-04-09 RX ORDER — MORPHINE SULFATE 4 MG/ML
4 INJECTION, SOLUTION INTRAMUSCULAR; INTRAVENOUS ONCE
Status: COMPLETED | OUTPATIENT
Start: 2020-04-09 | End: 2020-04-09

## 2020-04-09 RX ORDER — HYDROMORPHONE HCL/PF 1 MG/ML
0.5 SYRINGE (ML) INJECTION EVERY 4 HOURS PRN
Status: DISCONTINUED | OUTPATIENT
Start: 2020-04-09 | End: 2020-04-10 | Stop reason: HOSPADM

## 2020-04-09 RX ORDER — ACETAMINOPHEN 325 MG/1
975 TABLET ORAL EVERY 8 HOURS SCHEDULED
Status: DISCONTINUED | OUTPATIENT
Start: 2020-04-09 | End: 2020-04-09

## 2020-04-09 RX ORDER — OXYCODONE HYDROCHLORIDE 5 MG/1
2.5 TABLET ORAL EVERY 4 HOURS PRN
Status: DISCONTINUED | OUTPATIENT
Start: 2020-04-09 | End: 2020-04-10 | Stop reason: HOSPADM

## 2020-04-09 RX ADMIN — HYDROMORPHONE HYDROCHLORIDE 0.5 MG: 1 INJECTION, SOLUTION INTRAMUSCULAR; INTRAVENOUS; SUBCUTANEOUS at 18:31

## 2020-04-09 RX ADMIN — MORPHINE SULFATE 4 MG: 4 INJECTION INTRAVENOUS at 14:33

## 2020-04-09 RX ADMIN — ONDANSETRON 4 MG: 2 INJECTION INTRAMUSCULAR; INTRAVENOUS at 14:32

## 2020-04-09 RX ADMIN — SODIUM CHLORIDE 1000 ML: 0.9 INJECTION, SOLUTION INTRAVENOUS at 10:22

## 2020-04-09 RX ADMIN — MORPHINE SULFATE 4 MG: 4 INJECTION INTRAVENOUS at 11:04

## 2020-04-09 RX ADMIN — MORPHINE SULFATE 4 MG: 4 INJECTION INTRAVENOUS at 11:55

## 2020-04-09 RX ADMIN — ONDANSETRON 4 MG: 2 INJECTION INTRAMUSCULAR; INTRAVENOUS at 11:03

## 2020-04-09 RX ADMIN — OXYCODONE HYDROCHLORIDE 5 MG: 5 TABLET ORAL at 16:19

## 2020-04-09 RX ADMIN — OXYCODONE HYDROCHLORIDE 5 MG: 5 TABLET ORAL at 21:22

## 2020-04-09 RX ADMIN — ACETAMINOPHEN 975 MG: 325 TABLET ORAL at 16:18

## 2020-04-09 RX ADMIN — IOHEXOL 100 ML: 350 INJECTION, SOLUTION INTRAVENOUS at 10:51

## 2020-04-10 VITALS
SYSTOLIC BLOOD PRESSURE: 124 MMHG | WEIGHT: 155 LBS | RESPIRATION RATE: 18 BRPM | HEIGHT: 64 IN | OXYGEN SATURATION: 100 % | TEMPERATURE: 98.7 F | DIASTOLIC BLOOD PRESSURE: 78 MMHG | BODY MASS INDEX: 26.46 KG/M2 | HEART RATE: 59 BPM

## 2020-04-10 PROCEDURE — 99217 PR OBSERVATION CARE DISCHARGE MANAGEMENT: CPT | Performed by: FAMILY MEDICINE

## 2020-04-10 RX ORDER — ONDANSETRON 4 MG/1
4 TABLET, FILM COATED ORAL EVERY 8 HOURS PRN
Qty: 20 TABLET | Refills: 0 | Status: SHIPPED | OUTPATIENT
Start: 2020-04-10 | End: 2020-04-15

## 2020-04-10 RX ORDER — OXYCODONE HYDROCHLORIDE AND ACETAMINOPHEN 5; 325 MG/1; MG/1
1 TABLET ORAL EVERY 6 HOURS PRN
Qty: 15 TABLET | Refills: 0 | Status: SHIPPED | OUTPATIENT
Start: 2020-04-10 | End: 2020-04-15 | Stop reason: SDUPTHER

## 2020-04-10 RX ADMIN — ACETAMINOPHEN 975 MG: 325 TABLET ORAL at 00:13

## 2020-04-10 RX ADMIN — OXYCODONE HYDROCHLORIDE 5 MG: 5 TABLET ORAL at 01:23

## 2020-04-10 RX ADMIN — ACETAMINOPHEN 975 MG: 325 TABLET ORAL at 09:10

## 2020-04-10 RX ADMIN — OXYCODONE HYDROCHLORIDE 5 MG: 5 TABLET ORAL at 11:18

## 2020-04-10 RX ADMIN — OXYCODONE HYDROCHLORIDE 5 MG: 5 TABLET ORAL at 06:59

## 2020-04-10 RX ADMIN — ONDANSETRON 4 MG: 2 INJECTION INTRAMUSCULAR; INTRAVENOUS at 01:24

## 2020-04-13 ENCOUNTER — TELEPHONE (OUTPATIENT)
Dept: OBGYN CLINIC | Facility: CLINIC | Age: 30
End: 2020-04-13

## 2020-04-13 ENCOUNTER — TRANSITIONAL CARE MANAGEMENT (OUTPATIENT)
Dept: FAMILY MEDICINE CLINIC | Facility: CLINIC | Age: 30
End: 2020-04-13

## 2020-04-15 ENCOUNTER — TELEPHONE (OUTPATIENT)
Dept: FAMILY MEDICINE CLINIC | Facility: CLINIC | Age: 30
End: 2020-04-15

## 2020-04-15 ENCOUNTER — OFFICE VISIT (OUTPATIENT)
Dept: FAMILY MEDICINE CLINIC | Facility: CLINIC | Age: 30
End: 2020-04-15
Payer: COMMERCIAL

## 2020-04-15 VITALS — HEIGHT: 64 IN | BODY MASS INDEX: 26.46 KG/M2 | WEIGHT: 155 LBS

## 2020-04-15 DIAGNOSIS — R10.31 RIGHT LOWER QUADRANT ABDOMINAL PAIN: ICD-10-CM

## 2020-04-15 DIAGNOSIS — N83.201 RIGHT OVARIAN CYST: ICD-10-CM

## 2020-04-15 DIAGNOSIS — Z09 HOSPITAL DISCHARGE FOLLOW-UP: Primary | ICD-10-CM

## 2020-04-15 PROBLEM — L02.31 ABSCESS OF BUTTOCK, RIGHT: Status: RESOLVED | Noted: 2019-02-28 | Resolved: 2020-04-15

## 2020-04-15 PROCEDURE — 1111F DSCHRG MED/CURRENT MED MERGE: CPT | Performed by: FAMILY MEDICINE

## 2020-04-15 PROCEDURE — 99496 TRANSJ CARE MGMT HIGH F2F 7D: CPT | Performed by: FAMILY MEDICINE

## 2020-04-15 RX ORDER — IBUPROFEN 800 MG/1
800 TABLET ORAL EVERY 8 HOURS PRN
Qty: 30 TABLET | Refills: 0 | Status: SHIPPED | OUTPATIENT
Start: 2020-04-15 | End: 2021-08-30

## 2020-04-15 RX ORDER — OXYCODONE HYDROCHLORIDE AND ACETAMINOPHEN 5; 325 MG/1; MG/1
1 TABLET ORAL EVERY 6 HOURS PRN
Qty: 15 TABLET | Refills: 0 | Status: SHIPPED | OUTPATIENT
Start: 2020-04-15 | End: 2020-04-25

## 2020-04-20 ENCOUNTER — OFFICE VISIT (OUTPATIENT)
Dept: OBGYN CLINIC | Facility: CLINIC | Age: 30
End: 2020-04-20

## 2020-04-20 VITALS
WEIGHT: 152 LBS | TEMPERATURE: 98 F | HEIGHT: 64 IN | DIASTOLIC BLOOD PRESSURE: 93 MMHG | SYSTOLIC BLOOD PRESSURE: 138 MMHG | HEART RATE: 99 BPM | BODY MASS INDEX: 25.95 KG/M2

## 2020-04-20 DIAGNOSIS — N83.201 RIGHT OVARIAN CYST: Primary | ICD-10-CM

## 2020-04-20 PROCEDURE — 3008F BODY MASS INDEX DOCD: CPT | Performed by: OBSTETRICS & GYNECOLOGY

## 2020-04-20 PROCEDURE — T1015 CLINIC SERVICE: HCPCS | Performed by: OBSTETRICS & GYNECOLOGY

## 2020-04-20 PROCEDURE — 1036F TOBACCO NON-USER: CPT | Performed by: OBSTETRICS & GYNECOLOGY

## 2020-04-20 PROCEDURE — 99213 OFFICE O/P EST LOW 20 MIN: CPT | Performed by: OBSTETRICS & GYNECOLOGY

## 2020-10-02 ENCOUNTER — HOSPITAL ENCOUNTER (EMERGENCY)
Facility: HOSPITAL | Age: 30
Discharge: HOME | End: 2020-10-02
Attending: EMERGENCY MEDICINE | Admitting: EMERGENCY MEDICINE
Payer: COMMERCIAL

## 2020-10-02 ENCOUNTER — APPOINTMENT (EMERGENCY)
Dept: RADIOLOGY | Facility: HOSPITAL | Age: 30
End: 2020-10-02
Payer: COMMERCIAL

## 2020-10-02 VITALS
TEMPERATURE: 98.3 F | HEART RATE: 64 BPM | DIASTOLIC BLOOD PRESSURE: 83 MMHG | RESPIRATION RATE: 16 BRPM | SYSTOLIC BLOOD PRESSURE: 148 MMHG | OXYGEN SATURATION: 100 %

## 2020-10-02 DIAGNOSIS — K59.00 CONSTIPATION: Primary | ICD-10-CM

## 2020-10-02 DIAGNOSIS — R10.9 ABDOMINAL PAIN: ICD-10-CM

## 2020-10-02 LAB
BILIRUB UR QL STRIP: NEGATIVE
CLARITY UR: CLEAR
COLOR UR: YELLOW
EXT PREG TEST URINE: NEGATIVE
EXT. CONTROL ED NAV: NORMAL
GLUCOSE UR STRIP-MCNC: NEGATIVE MG/DL
HGB UR QL STRIP.AUTO: NEGATIVE
KETONES UR STRIP-MCNC: NEGATIVE MG/DL
LEUKOCYTE ESTERASE UR QL STRIP: NEGATIVE
NITRITE UR QL STRIP: NEGATIVE
PH UR STRIP.AUTO: 7 [PH] (ref 4.5–8)
PROT UR STRIP-MCNC: NEGATIVE MG/DL
SP GR UR STRIP.AUTO: 1.02 (ref 1–1.03)
UROBILINOGEN UR QL STRIP.AUTO: 0.2 E.U./DL

## 2020-10-02 PROCEDURE — G1004 CDSM NDSC: HCPCS

## 2020-10-02 PROCEDURE — 96374 THER/PROPH/DIAG INJ IV PUSH: CPT

## 2020-10-02 PROCEDURE — 76830 TRANSVAGINAL US NON-OB: CPT

## 2020-10-02 PROCEDURE — 74176 CT ABD & PELVIS W/O CONTRAST: CPT

## 2020-10-02 PROCEDURE — 81003 URINALYSIS AUTO W/O SCOPE: CPT

## 2020-10-02 PROCEDURE — 76856 US EXAM PELVIC COMPLETE: CPT

## 2020-10-02 PROCEDURE — 81025 URINE PREGNANCY TEST: CPT | Performed by: EMERGENCY MEDICINE

## 2020-10-02 PROCEDURE — 99284 EMERGENCY DEPT VISIT MOD MDM: CPT

## 2020-10-02 PROCEDURE — 99285 EMERGENCY DEPT VISIT HI MDM: CPT | Performed by: EMERGENCY MEDICINE

## 2020-10-02 PROCEDURE — 96361 HYDRATE IV INFUSION ADD-ON: CPT

## 2020-10-02 PROCEDURE — 96375 TX/PRO/DX INJ NEW DRUG ADDON: CPT

## 2020-10-02 RX ORDER — KETOROLAC TROMETHAMINE 30 MG/ML
15 INJECTION, SOLUTION INTRAMUSCULAR; INTRAVENOUS ONCE
Status: DISCONTINUED | OUTPATIENT
Start: 2020-10-02 | End: 2020-10-02

## 2020-10-02 RX ORDER — ONDANSETRON 2 MG/ML
4 INJECTION INTRAMUSCULAR; INTRAVENOUS ONCE
Status: COMPLETED | OUTPATIENT
Start: 2020-10-02 | End: 2020-10-02

## 2020-10-02 RX ORDER — HYDROMORPHONE HCL/PF 1 MG/ML
0.5 SYRINGE (ML) INJECTION ONCE
Status: COMPLETED | OUTPATIENT
Start: 2020-10-02 | End: 2020-10-02

## 2020-10-02 RX ORDER — KETOROLAC TROMETHAMINE 30 MG/ML
15 INJECTION, SOLUTION INTRAMUSCULAR; INTRAVENOUS ONCE
Status: COMPLETED | OUTPATIENT
Start: 2020-10-02 | End: 2020-10-02

## 2020-10-02 RX ORDER — POLYETHYLENE GLYCOL 3350 17 G/17G
17 POWDER, FOR SOLUTION ORAL DAILY
Qty: 510 G | Refills: 0 | Status: SHIPPED | OUTPATIENT
Start: 2020-10-02 | End: 2021-08-30

## 2020-10-02 RX ADMIN — SODIUM CHLORIDE 1000 ML: 0.9 INJECTION, SOLUTION INTRAVENOUS at 19:22

## 2020-10-02 RX ADMIN — ONDANSETRON 4 MG: 2 INJECTION INTRAMUSCULAR; INTRAVENOUS at 19:23

## 2020-10-02 RX ADMIN — HYDROMORPHONE HYDROCHLORIDE 0.5 MG: 1 INJECTION, SOLUTION INTRAMUSCULAR; INTRAVENOUS; SUBCUTANEOUS at 19:23

## 2020-10-02 RX ADMIN — KETOROLAC TROMETHAMINE 15 MG: 30 INJECTION, SOLUTION INTRAMUSCULAR at 18:16

## 2021-06-14 NOTE — PROGRESS NOTES
Chief Complaint  Patient received depo      Active Problems    1  Acute pharyngitis, unspecified etiology (462) (J02 9)   2  Cellulitis of skin (682 9) (L03 90)   3  Contraception (V25 9) (Z30 9)   4  Encounter for Depo-Provera contraception (V25 49) (Z30 42)   5  Encounter for incision and drainage procedure (V72 85) (Z01 89)   6  Encounter for routine gynecological examination (V72 31) (Z01 419)   7  Epidermal inclusion cyst (706 2) (L72 0)   8  Low back pain (724 2) (M54 5)   9  Muscle spasm of back (724 8) (M62 830)   10  Screen for STD (sexually transmitted disease) (V74 5) (Z11 3)   11  Sore throat (462) (J02 9)    Current Meds   1  Acetaminophen-Codeine 300-30 MG Oral Tablet; TAKE 1 TABLET DAILY AS NEEDED   FOR PAIN;   Therapy: 48VHS0880 to (Evaluate:33Uxt2834); Last Rx:13Jan2016 Ordered   2  Depo-Provera 150 MG/ML Intramuscular Suspension; Therapy: 55Xia5958 to Recorded   3  MedroxyPROGESTERone Acetate 150 MG/ML Intramuscular Suspension; INJECT   EVERY 11 WEEKS AS DIRECTED; Recurring Schedule:22Oct2015 to   (Exp:25Aug2016); Status: IN PROGRESS - Order Generated Ordered   4  Sulfamethoxazole-Trimethoprim 800-160 MG Oral Tablet; Take 1 tablet twice daily; Therapy: 77TQL4123 to (Evaluate:20Jan2016)  Requested for: 71IER5741; Last   Rx:13Jan2016 Ordered    Allergies    1  No Known Drug Allergies    2  No Known Environmental Allergies   3   No Known Food Allergies    Plan  Contraception    · MedroxyPROGESTERone Acetate 150 MG/ML Intramuscular Suspension  (Depo-Provera)    Future Appointments    Date/Time Provider Specialty Site   06/10/2016 08:15 AM [facility], Injection Schedule   Saulo Rosenberg [address]     Signatures   Electronically signed by : LUIS Rao ; Apr 1 2016 10:18AM EST                       (Author) Renal failure and hyperkalemia in the setting of obstructive uropathy

## 2021-08-30 ENCOUNTER — OFFICE VISIT (OUTPATIENT)
Dept: FAMILY MEDICINE CLINIC | Facility: CLINIC | Age: 31
End: 2021-08-30
Payer: COMMERCIAL

## 2021-08-30 VITALS
WEIGHT: 172.2 LBS | RESPIRATION RATE: 16 BRPM | HEART RATE: 82 BPM | BODY MASS INDEX: 28.69 KG/M2 | SYSTOLIC BLOOD PRESSURE: 120 MMHG | HEIGHT: 65 IN | DIASTOLIC BLOOD PRESSURE: 82 MMHG | OXYGEN SATURATION: 99 % | TEMPERATURE: 97.6 F

## 2021-08-30 DIAGNOSIS — L65.9 HYPOTRICHOSIS: ICD-10-CM

## 2021-08-30 DIAGNOSIS — R10.9 LEFT SIDED ABDOMINAL PAIN: ICD-10-CM

## 2021-08-30 DIAGNOSIS — Z00.00 ANNUAL PHYSICAL EXAM: Primary | ICD-10-CM

## 2021-08-30 PROCEDURE — 99395 PREV VISIT EST AGE 18-39: CPT | Performed by: FAMILY MEDICINE

## 2021-08-30 RX ORDER — BIMATOPROST 3 UG/ML
SOLUTION TOPICAL
Qty: 5 ML | Refills: 3 | Status: SHIPPED | OUTPATIENT
Start: 2021-08-30

## 2021-08-30 RX ORDER — BIMATOPROST 3 UG/ML
SOLUTION TOPICAL
Qty: 5 ML | Refills: 3 | Status: SHIPPED | OUTPATIENT
Start: 2021-08-30 | End: 2021-08-30

## 2021-08-30 NOTE — PROGRESS NOTES
[address] [facility]    NAME: Winnie Mendez  AGE: 36 y o  SEX: female  : 1990     DATE: 2021     Assessment and Plan:     Problem List Items Addressed This Visit     None      Visit Diagnoses     Annual physical exam    -  Primary    Relevant Orders    Comprehensive metabolic panel    Lipid Panel with Direct LDL reflex    CBC and differential    Left sided abdominal pain        Relevant Orders    US abdominal wall    Hypotrichosis        Relevant Medications    bimatoprost (LATISSE) 0 03 % ophthalmic solution          Immunizations and preventive care screenings were discussed with patient today  Appropriate education was printed on patient's after visit summary  Counseling:  Alcohol/drug use: discussed moderation in alcohol intake, the recommendations for healthy alcohol use, and avoidance of illicit drug use  Dental Health: discussed importance of regular tooth brushing, flossing, and dental visits  Injury prevention: discussed safety/seat belts, safety helmets, smoke detectors, carbon dioxide detectors, and smoking near bedding or upholstery  Sexual health: discussed sexually transmitted diseases, partner selection, use of condoms, avoidance of unintended pregnancy, and contraceptive alternatives  · Exercise: the importance of regular exercise/physical activity was discussed  Recommend exercise 3-5 times per week for at least 30 minutes  BMI Counseling: Body mass index is 28 62 kg/m²  The BMI is above normal  Nutrition recommendations include decreasing portion sizes and encouraging healthy choices of fruits and vegetables  Exercise recommendations include moderate physical activity 150 minutes/week  No pharmacotherapy was ordered  No follow-ups on file       Chief Complaint:     Chief Complaint   Patient presents with    Physical Exam     Patient is here today for an annual exam       History of Present Replacement of a right internal jugular temporary dialysis catheter over a

guidewire 3/25/2020



INDICATION: Pre-existing catheter nonfunctional



Discussion 

 The procedure was explained in its entirety to the patient or the patients

designated representative by a member of the treatment team, including a

discussion of the risks, benefits and commonly accepted alternatives to the

procedure, as well as the expected consequences of no therapy whatsoever.  

Discussion of the risks included, but was not limited to, those that are most

frequent and those that are rare but possibly severe or life-threatening, as

well as the possibility of unforeseen complications.



  All elements of maximal sterile barrier technique including the use of a

cap, mask, sterile gown, sterile gloves, large sterile sheet, appropriate hand

hygiene, and 2% chlorhexidine for cutaneous antisepsis (or acceptable

alternative antiseptic per current guidelines) were followed for this

procedure.



The pre-existing catheter was removed over a guidewire and replaced with a 15

cm temporary dialysis catheter which was found to flush and aspirate normally.

Catheter was flushed, secured in place, and sterile dressings were applied.



Impression: Replacement of a right internal jugular temporary dialysis

catheter over a guidewire Illness:     Adult Annual Physical   Patient here for a comprehensive physical exam  The patient reports problems - intermittent left sided abdominal pain for 1 year  Bending makes it worse  Squeezing type of pain     Diet and Physical Activity  · Diet/Nutrition: well balanced diet and consuming 3-5 servings of fruits/vegetables daily  · Exercise: walking  Depression Screening  PHQ-9 Depression Screening    PHQ-9:   Frequency of the following problems over the past two weeks:      Little interest or pleasure in doing things: 0 - not at all  Feeling down, depressed, or hopeless: 0 - not at all  PHQ-2 Score: 0       General Health  · Sleep: sleeps well and gets 7-8 hours of sleep on average  · Hearing: normal - bilateral   · Vision: goes for regular eye exams and most recent eye exam >1 year ago  · Dental: regular dental visits and brushes teeth twice daily  /GYN Health  · Last menstrual period: 2 days ago   · Contraceptive method: none  · History of STDs?: no      Review of Systems:     Review of Systems   Constitutional: Negative  HENT: Negative  Eyes: Negative  Respiratory: Negative  Cardiovascular: Negative  Gastrointestinal: Negative  Endocrine: Negative  Genitourinary: Negative  Musculoskeletal: Negative  Skin: Negative  Allergic/Immunologic: Negative  Neurological: Negative  Hematological: Negative  Psychiatric/Behavioral: Negative         Past Medical History:     Past Medical History:   Diagnosis Date    Abscess of buttock, right 2019     delivery delivered     RESOLVED: 93YJY2422    Disease of thyroid gland     hyperthyroid    Hidradenitis suppurativa     LAST ASSESSED: 55CTM8679    History of early menarche     [de-identified] PERIOD AT 8YEARS OLD    Hyperemesis gravidarum     RESOLVED: 10TYO6305    Hypertension in pregnancy, pre-eclampsia     prior preg    Migraine     Sickle cell trait ([facility] [address] )       Past Surgical History:     Past Surgical History:   Procedure Laterality Date    ABDOMINAL SURGERY      ABSCESS DRAINAGE  2016    INCISION AND DRAINAGE OF SKIN ABSCESS; LABIAL CYST REMOVAL - [address]     SECTION      CHOLECYSTECTOMY  2009    INCISION AND DRAINAGE OF WOUND Right 2019    Procedure: INCISION AND DRAINAGE (I&D) BUTTOCK;  Surgeon: Alicia Ortiz MD;  [facility];  Service: General    KS  DELIVERY ONLY N/A 2018    Procedure:  SECTION () REPEAT;  Surgeon: Linda Yung MD;  Location: [facility];  Service: Obstetrics    KS EXC SWEAT GLAND [place] Right 2016    Procedure: EXCISION GROIN CYST HIDRADENITIS;  Surgeon: Ramon Opitz, DO;  [facility];  Service: General    TONSILLECTOMY      VULVA BIOPSY N/A 2016    Procedure: INCISION AND DRAINAGE, EXCISION OF LABIAL CYST ;  Surgeon: Gabriel Mccoy DO;  [facility];  Service:       Social History:     Social History     Socioeconomic History    Marital status: Single     Spouse name: None    Number of children: None    Years of education: 15    Highest education level: None   Occupational History    Occupation: unemployed   Tobacco Use    Smoking status: Never Smoker    Smokeless tobacco: Never Used   Vaping Use    Vaping Use: Never used   Substance and Sexual Activity    Alcohol use: Yes     Comment: social    Drug use: No    Sexual activity: Yes     Partners: Male     Birth control/protection: None   Other Topics Concern    None   Social History Narrative    None     Social Determinants of Health     Financial Resource Strain:     Difficulty of Paying Living Expenses:    Food Insecurity:     Worried About Running Out of Food in the Last Year:     Ran Out of Food in the Last Year:    Transportation Needs:     Lack of Transportation (Medical):      Lack of Transportation (Non-Medical):    Physical Activity:     Days of Exercise per Week:     Minutes of Exercise per Session:    Stress:     Feeling of Stress :    Social Connections:     Frequency of Communication with Friends and Family:     Frequency of Social Gatherings with Friends and Family:     Attends Confucianism Services:     Active Member of Clubs or Organizations:     Attends Club or Organization Meetings:     Marital Status:    Intimate Partner Violence:     Fear of Current or Ex-Partner:     Emotionally Abused:     Physically Abused:     Sexually Abused:       Family History:     Family History   Problem Relation Age of Onset    Hyperlipidemia Mother         PURE    Cancer Father         lung    Diabetes Maternal Grandmother     No Known Problems Brother       Current Medications:     Current Outpatient Medications   Medication Sig Dispense Refill    bimatoprost (LATISSE) 0 03 % ophthalmic solution Place one drop on applicator and apply evenly along the skin of the upper eyelid at base of eyelashes once daily at bedtime; repeat procedure for second eye (use a clean applicator)  5 mL 3     No current facility-administered medications for this visit  Allergies:     No Known Allergies   Physical Exam:     /82 (BP Location: Left arm, Patient Position: Sitting, Cuff Size: Adult)   Pulse 82   Temp 97 6 °F (36 4 °C) (Skin)   Resp 16   Ht 5' 5 04" (1 652 m)   Wt 78 1 kg (172 lb 3 2 oz)   SpO2 99%   BMI 28 62 kg/m²     Physical Exam  Constitutional:       Appearance: She is well-developed  HENT:      Head: Normocephalic and atraumatic  Right Ear: Tympanic membrane normal  There is no impacted cerumen  Left Ear: Tympanic membrane normal  There is no impacted cerumen  Nose: Nose normal    Eyes:      Pupils: Pupils are equal, round, and reactive to light  Cardiovascular:      Rate and Rhythm: Normal rate and regular rhythm  Pulses: Normal pulses  Heart sounds: Normal heart sounds     Pulmonary:      Effort: Pulmonary effort is normal  No respiratory distress  Breath sounds: Normal breath sounds  No wheezing  Abdominal:      General: Bowel sounds are normal       Palpations: Abdomen is soft  Musculoskeletal:         General: No deformity  Normal range of motion  Cervical back: Normal range of motion and neck supple  Skin:     General: Skin is warm  Capillary Refill: Capillary refill takes less than 2 seconds  Neurological:      General: No focal deficit present  Mental Status: She is alert and oriented to person, place, and time     Psychiatric:         Mood and Affect: Mood normal          Behavior: Behavior normal           Khang Murray MD   [address] [facility]

## 2021-08-30 NOTE — PATIENT INSTRUCTIONS

## 2021-10-08 ENCOUNTER — ANNUAL EXAM (OUTPATIENT)
Dept: OBGYN CLINIC | Facility: CLINIC | Age: 31
End: 2021-10-08

## 2021-10-08 VITALS
WEIGHT: 178 LBS | HEART RATE: 74 BPM | HEIGHT: 65 IN | DIASTOLIC BLOOD PRESSURE: 84 MMHG | SYSTOLIC BLOOD PRESSURE: 127 MMHG | BODY MASS INDEX: 29.66 KG/M2

## 2021-10-08 DIAGNOSIS — Z12.4 CERVICAL CANCER SCREENING: ICD-10-CM

## 2021-10-08 DIAGNOSIS — Z01.419 WOMEN'S ANNUAL ROUTINE GYNECOLOGICAL EXAMINATION: ICD-10-CM

## 2021-10-08 DIAGNOSIS — Z23 NEED FOR HPV VACCINATION: ICD-10-CM

## 2021-10-08 DIAGNOSIS — Z11.3 SCREENING FOR STD (SEXUALLY TRANSMITTED DISEASE): Primary | ICD-10-CM

## 2021-10-08 DIAGNOSIS — Z11.3 SCREEN FOR STD (SEXUALLY TRANSMITTED DISEASE): ICD-10-CM

## 2021-10-08 DIAGNOSIS — Z12.39 ENCOUNTER FOR BREAST CANCER SCREENING USING NON-MAMMOGRAM MODALITY: ICD-10-CM

## 2021-10-08 DIAGNOSIS — Z12.4 ENCOUNTER FOR PAPANICOLAOU SMEAR OF CERVIX: ICD-10-CM

## 2021-10-08 PROCEDURE — 90651 9VHPV VACCINE 2/3 DOSE IM: CPT

## 2021-10-08 PROCEDURE — 99395 PREV VISIT EST AGE 18-39: CPT | Performed by: NURSE PRACTITIONER

## 2021-10-08 PROCEDURE — 87591 N.GONORRHOEAE DNA AMP PROB: CPT | Performed by: NURSE PRACTITIONER

## 2021-10-08 PROCEDURE — G0145 SCR C/V CYTO,THINLAYER,RESCR: HCPCS | Performed by: NURSE PRACTITIONER

## 2021-10-08 PROCEDURE — 90471 IMMUNIZATION ADMIN: CPT

## 2021-10-08 PROCEDURE — 87491 CHLMYD TRACH DNA AMP PROBE: CPT | Performed by: NURSE PRACTITIONER

## 2021-10-09 LAB
C TRACH DNA SPEC QL NAA+PROBE: NEGATIVE
N GONORRHOEA DNA SPEC QL NAA+PROBE: NEGATIVE

## 2021-10-15 LAB
LAB AP GYN PRIMARY INTERPRETATION: NORMAL
Lab: NORMAL

## 2021-12-15 ENCOUNTER — CLINICAL SUPPORT (OUTPATIENT)
Dept: OBGYN CLINIC | Facility: CLINIC | Age: 31
End: 2021-12-15

## 2021-12-15 DIAGNOSIS — Z23 NEED FOR VACCINATION: Primary | ICD-10-CM

## 2021-12-15 PROCEDURE — 90651 9VHPV VACCINE 2/3 DOSE IM: CPT

## 2021-12-15 PROCEDURE — 90471 IMMUNIZATION ADMIN: CPT

## 2021-12-15 PROCEDURE — 96372 THER/PROPH/DIAG INJ SC/IM: CPT

## 2022-02-14 ENCOUNTER — ULTRASOUND (OUTPATIENT)
Dept: OBGYN CLINIC | Facility: CLINIC | Age: 32
End: 2022-02-14

## 2022-02-14 VITALS
BODY MASS INDEX: 28.86 KG/M2 | HEIGHT: 65 IN | WEIGHT: 173.2 LBS | SYSTOLIC BLOOD PRESSURE: 130 MMHG | DIASTOLIC BLOOD PRESSURE: 70 MMHG | HEART RATE: 74 BPM

## 2022-02-14 DIAGNOSIS — O21.9 NAUSEA AND VOMITING DURING PREGNANCY: ICD-10-CM

## 2022-02-14 DIAGNOSIS — N92.6 MISSED PERIOD: Primary | ICD-10-CM

## 2022-02-14 LAB — SL AMB POCT URINE HCG: POSITIVE

## 2022-02-14 PROCEDURE — 99213 OFFICE O/P EST LOW 20 MIN: CPT | Performed by: OBSTETRICS & GYNECOLOGY

## 2022-02-14 PROCEDURE — 76830 TRANSVAGINAL US NON-OB: CPT | Performed by: OBSTETRICS & GYNECOLOGY

## 2022-02-14 RX ORDER — DIPHENHYDRAMINE HYDROCHLORIDE 25 MG/1
25 CAPSULE ORAL DAILY
Qty: 30 TABLET | Refills: 1 | Status: SHIPPED | OUTPATIENT
Start: 2022-02-14

## 2022-02-14 NOTE — PROGRESS NOTES
Note on use of High Level Disinfection Process (Trophon) for transvaginal probe:     Esthela White    REF: [id number]   SN: [id number]     [address] [facility] #[id number]

## 2022-02-14 NOTE — PATIENT INSTRUCTIONS
Pregnancy   AMBULATORY CARE:   What you need to know about pregnancy:  A normal pregnancy lasts about 40 weeks  The first trimester lasts from your last period through the 12th week of pregnancy  The second trimester lasts from the 13th week through the 23rd week  The third trimester lasts from the 24th week until your baby is born  If you know the date of your last period, your healthcare provider can estimate your due date  You may give birth to your baby any time from 37 weeks to 2 weeks after your due date  Seek care immediately if:   · You develop a severe headache that does not go away  · You have new or increased vision changes, such as blurred or spotted vision  · You have new or increased swelling in your face or hands  · You have pain or cramping in your abdomen or low back  · You have vaginal bleeding  Call your doctor or obstetrician if:   · You have abdominal cramps, pressure, or tightening  · You have a change in vaginal discharge  · You cannot keep food or drinks down, and you are losing weight  · You have chills or a fever  · You have vaginal itching, burning, or pain  · You have yellow, green, white, or foul-smelling vaginal discharge  · You have pain or burning when you urinate, less urine than usual, or pink or bloody urine  · You have questions or concerns about your condition or care  Prenatal care:  Prenatal care is a series of visits with your healthcare provider throughout your pregnancy  Prenatal care can help prevent problems during pregnancy and childbirth  At each prenatal visit, your provider will weigh you and check your blood pressure  He or she will also check your baby's heartbeat and growth  You may also need the following at some visits:  · A pelvic exam  allows your healthcare provider to see your cervix (the bottom part of your uterus)  Your healthcare provider will use a speculum to open your vagina   He or she will check the size and shape of your uterus  At your first prenatal visit, you may also have a Pap smear  This is a test to check your cervix for abnormal cells  · Blood tests  may be done to check for any of the following:     ? Gestational diabetes or anemia (low iron level)    ? Blood type or Rh factor, or certain birth defects    ? Immunity to certain diseases, such as chickenpox or rubella    ? An infection, such as a sexually transmitted infection, HIV, or hepatitis B    · Hepatitis B  may need to be prevented or treated  Hepatitis B is inflammation of the liver caused by the hepatitis B virus (HBV)  HBV can spread from a mother to her baby during delivery  You will be checked for HBV as early as possible in the first trimester of each pregnancy  You need the test even if you received the hepatitis B vaccine or were tested before  You may need to have an HBV infection treated before you give birth  · Urine tests  may also be done to check for sugar and protein  These can be signs of gestational diabetes or preeclampsia  Urine tests may also be done to check for signs of infection  · A fetal ultrasound  shows pictures of your baby inside your uterus  The pictures are used to check your baby's development, movement, and position  · Genetic disorder screening tests  may be offered to you  These tests check your baby's risk for genetic disorders such as Down syndrome  A screening test may include blood tests and an ultrasound  Blood tests may be used to check your DNA or your partner's DNA  Genetic tests are not always accurate or complete  Your baby may be born with a genetic disorder that did not show up in the tests  Talk to your healthcare provider about any concerns you have with genetic testing  Body changes that may occur during your pregnancy:   · Breast changes  you will experience include tenderness and tingling during the early part of your pregnancy  Your breasts will become larger   You may need to use a support bra  You may see a thin, yellow fluid, called colostrum, leak from your nipples during the second trimester  Colostrum is a liquid that changes to milk about 3 days after you give birth  · Skin changes and stretch marks  may occur during your pregnancy  You may have red marks, called stretch marks, on your skin  Stretch marks will usually fade after pregnancy  Use lotion if your skin is dry and itchy  The skin on your face, around your nipples, and below your belly button may darken  Most of the time, your skin will return to its normal color after your baby is born  · Morning sickness  is nausea and vomiting that can happen at any time of day  Avoid fatty and spicy foods  Eat small meals throughout the day instead of large meals  Violeta may help to decrease nausea  Ask your healthcare provider about other ways of decreasing nausea and vomiting  · Heartburn  may be caused by changes in your hormones during pregnancy  Your growing uterus may also push your stomach upward and force stomach acid to back up into your esophagus  Eat 4 or 5 small meals each day instead of large meals  Avoid spicy foods  Avoid eating right before bedtime  · Constipation  may develop during your pregnancy  To treat constipation, eat foods high in fiber such as fiber cereals, beans, fruits, vegetables, whole-grain breads, and prune juice  Get regular exercise and drink plenty of water  Your healthcare provider may also suggest a fiber supplement to soften your bowel movements  Talk to your healthcare provider before you use any medicines to decrease constipation  · Hemorrhoids  are enlarged veins in the rectal area  They may cause pain, itching, and bright red bleeding from your rectum  To decrease your risk for hemorrhoids, prevent constipation and do not strain to have a bowel movement  If you have hemorrhoids, soak in a tub of warm water to ease discomfort   Ask your healthcare provider how you can treat hemorrhoids  · Leg cramps and swelling  may be caused by low calcium levels or the added weight of pregnancy  Raise your legs above the level of your heart to decrease swelling  During a leg cramp, stretch or massage the muscle that has the cramp  Heat may help decrease pain and muscle spasms  Apply heat on your muscle for 20 to 30 minutes every 2 hours for as many days as directed  · Back pain  may occur as your baby grows  Do not stand for long periods of time or lift heavy items  Use good posture while you stand, squat, or bend  Wear low-heeled shoes with good support  Rest may also help to relieve back pain  Ask your healthcare provider about exercises you can do to strengthen your back muscles  Stay healthy during your pregnancy:       · Eat a variety of healthy foods  Healthy foods include fruits, vegetables, whole-grain breads, low-fat dairy foods, beans, lean meats, and fish  Drink liquids as directed  Ask how much liquid to drink each day and which liquids are best for you  Limit caffeine to less than 200 milligrams each day  Limit your intake of fish to 2 servings each week  Choose fish low in mercury such as canned light tuna, shrimp, crab, salmon, cod, or tilapia  Do not  eat fish high in mercury such as swordfish, tilefish, douglas mackerel, and shark  · Take prenatal vitamins as directed  Your need for certain vitamins and minerals, such as folic acid, increases during pregnancy  Prenatal vitamins provide some of the extra vitamins and minerals you need  Prenatal vitamins may also help to decrease the risk for certain birth defects  · Ask how much weight you should gain during your pregnancy  Too much or too little weight gain can be unhealthy for you and your baby  · Talk to your healthcare provider about exercise  Moderate exercise can help you stay fit  Your healthcare provider will help you plan an exercise program that is safe for you during pregnancy  · Do not smoke  Smoking increases your risk for a miscarriage and heart and blood vessel problems  Smoking can cause your baby to be born too early or weigh less at birth  Quit smoking as soon as you think you might be pregnant  Ask your healthcare provider for information if you need help quitting  · Do not drink alcohol  Alcohol passes from your body to your baby through the placenta  It can affect your baby's brain development and cause fetal alcohol syndrome (FAS)  FAS is a group of conditions that causes mental, behavior, and growth problems  · Talk to your healthcare provider before you take any medicines  Many medicines may harm your baby if you take them when you are pregnant  Do not take any medicines, vitamins, herbs, or supplements without first talking to your healthcare provider  Never use illegal or street drugs (such as marijuana or cocaine) while you are pregnant  Safety tips:   · Avoid hot tubs and saunas  Do not use a hot tub or sauna while you are pregnant, especially during your first trimester  Hot tubs and saunas may raise your baby's temperature and increase the risk for birth defects  · Avoid toxoplasmosis  This is an infection caused by eating raw meat or being around infected cat feces  It can cause birth defects, miscarriages, and other problems  Wash your hands after you touch raw meat  Make sure any meat is well-cooked before you eat it  Avoid raw eggs and unpasteurized milk  Use gloves or ask someone else to clean your cat's litter box while you are pregnant  · Ask your healthcare provider about travel  The most comfortable time to travel is during the second trimester  Ask your healthcare provider if you can travel after 36 weeks  You may not be able to travel in an airplane after 36 weeks  He or she may also recommend that you avoid long road trips      Follow up with your doctor or obstetrician as directed:  Go to all of your prenatal visits during your pregnancy  Write down your questions so you remember to ask them during your visits  © Copyright [employer] 2021 Information is for End User's use only and may not be sold, redistributed or otherwise used for commercial purposes  All illustrations and images included in CareNotes® are the copyrighted property of [employer]  or Lizbeth Cheng  The above information is an  only  It is not intended as medical advice for individual conditions or treatments  Talk to your doctor, nurse or pharmacist before following any medical regimen to see if it is safe and effective for you

## 2022-02-14 NOTE — PROGRESS NOTES
[place] [place]  Name: Brigido Garcia  MRN: [id number]  : 1990      ASSESSMENT/PLAN:    #1  IUP at 6 weeks and 4 days  - Viable pregnancy on TVUS  - Unisom and B6 sent to pharmacy  - RTC in 4 weeks for nurse intake visit      SUBJECTIVE:   36 y o  [id number] who presents for viability scan with LMP of 21  She is 10 weeks and 5 days by her LMP  She reports doing well overall though reports nausea, she has not tried anything for it She denies any cramping or vaginal bleeding  This is a desired pregnancy  OBJECTIVE:  Vitals:    22 1252   BP: 130/70   Pulse: 74   Weight: 78 6 kg (173 lb 3 2 oz)   Height: 5' 4 5" (1 638 m)       TVUS: viable, betancourt IUP at 6 weeks 4 days with CRL 0 73 cm  FHT 128bpm  JOY 10/6/22  Final dating via [address]                    Pilar Gooden MD  OB/GYN PGY-2  2022  1:17 PM

## 2022-03-01 ENCOUNTER — APPOINTMENT (OUTPATIENT)
Dept: LAB | Facility: HOSPITAL | Age: 32
End: 2022-03-01
Payer: COMMERCIAL

## 2022-03-01 DIAGNOSIS — N92.6 MISSED PERIOD: ICD-10-CM

## 2022-03-01 DIAGNOSIS — Z00.00 ANNUAL PHYSICAL EXAM: ICD-10-CM

## 2022-03-01 LAB
ABO GROUP BLD: NORMAL
BASOPHILS # BLD AUTO: 0.02 THOUSANDS/ΜL (ref 0–0.1)
BASOPHILS NFR BLD AUTO: 0 % (ref 0–1)
BILIRUB UR QL STRIP: NEGATIVE
BLD GP AB SCN SERPL QL: NEGATIVE
CLARITY UR: CLEAR
COLOR UR: NORMAL
EOSINOPHIL # BLD AUTO: 0.04 THOUSAND/ΜL (ref 0–0.61)
EOSINOPHIL NFR BLD AUTO: 1 % (ref 0–6)
ERYTHROCYTE [DISTWIDTH] IN BLOOD BY AUTOMATED COUNT: 12.2 % (ref 11.6–15.1)
GLUCOSE UR STRIP-MCNC: NEGATIVE MG/DL
HBV SURFACE AG SER QL: NORMAL
HCT VFR BLD AUTO: 36.1 % (ref 34.8–46.1)
HGB BLD-MCNC: 12.5 G/DL (ref 11.5–15.4)
HGB UR QL STRIP.AUTO: NEGATIVE
IMM GRANULOCYTES # BLD AUTO: 0.03 THOUSAND/UL (ref 0–0.2)
IMM GRANULOCYTES NFR BLD AUTO: 0 % (ref 0–2)
KETONES UR STRIP-MCNC: NEGATIVE MG/DL
LEUKOCYTE ESTERASE UR QL STRIP: NEGATIVE
LYMPHOCYTES # BLD AUTO: 1.58 THOUSANDS/ΜL (ref 0.6–4.47)
LYMPHOCYTES NFR BLD AUTO: 20 % (ref 14–44)
MCH RBC QN AUTO: 30.6 PG (ref 26.8–34.3)
MCHC RBC AUTO-ENTMCNC: 34.6 G/DL (ref 31.4–37.4)
MCV RBC AUTO: 88 FL (ref 82–98)
MONOCYTES # BLD AUTO: 0.41 THOUSAND/ΜL (ref 0.17–1.22)
MONOCYTES NFR BLD AUTO: 5 % (ref 4–12)
NEUTROPHILS # BLD AUTO: 5.72 THOUSANDS/ΜL (ref 1.85–7.62)
NEUTS SEG NFR BLD AUTO: 74 % (ref 43–75)
NITRITE UR QL STRIP: NEGATIVE
NRBC BLD AUTO-RTO: 0 /100 WBCS
PH UR STRIP.AUTO: 6 [PH]
PLATELET # BLD AUTO: 274 THOUSANDS/UL (ref 149–390)
PMV BLD AUTO: 10.1 FL (ref 8.9–12.7)
PROT UR STRIP-MCNC: NEGATIVE MG/DL
RBC # BLD AUTO: 4.09 MILLION/UL (ref 3.81–5.12)
RH BLD: POSITIVE
RUBV IGG SERPL IA-ACNC: >175 IU/ML
SP GR UR STRIP.AUTO: 1.01 (ref 1–1.03)
SPECIMEN EXPIRATION DATE: NORMAL
UROBILINOGEN UR STRIP-ACNC: <2 MG/DL
WBC # BLD AUTO: 7.8 THOUSAND/UL (ref 4.31–10.16)

## 2022-03-01 PROCEDURE — 36415 COLL VENOUS BLD VENIPUNCTURE: CPT

## 2022-03-01 PROCEDURE — 80081 OBSTETRIC PANEL INC HIV TSTG: CPT

## 2022-03-01 PROCEDURE — 87086 URINE CULTURE/COLONY COUNT: CPT

## 2022-03-02 LAB
HIV 1+2 AB+HIV1 P24 AG SERPL QL IA: NORMAL
RPR SER QL: NORMAL

## 2022-03-03 LAB — BACTERIA UR CULT: NORMAL

## 2022-03-08 ENCOUNTER — INITIAL PRENATAL (OUTPATIENT)
Dept: OBGYN CLINIC | Facility: CLINIC | Age: 32
End: 2022-03-08

## 2022-03-08 DIAGNOSIS — Z34.91 PRENATAL CARE IN FIRST TRIMESTER: Primary | ICD-10-CM

## 2022-03-08 PROCEDURE — T1001 NURSING ASSESSMENT/EVALUATN: HCPCS

## 2022-03-08 NOTE — LETTER
Work Letter    Luma Birch  1990  [address] [place] [id number]    Dear Luma Birch,      03/08/22        Your employee is a patient at [facility]  We recommend that all pregnant women:    1  Have a well-ventilated workspace  2  Wear low-heeled shoes  3  Work no more than 40 hours per week  4  Have a 15 minute break every 2 hours and at least 30 minutes for a meal break  5  Use good body mechanics by bending at your knees to avoid back strain and lift no more than 20 pounds without assistance  Will need assistance with lifting over 20 lbs  6  Have ready access to bathrooms and water  She may continue to work until her due date unless medical complications arise  We anticipate she may return to work in 6-8 weeks after delivery       Sincerely,  1 Shana Cheng

## 2022-03-08 NOTE — LETTER
Proof of Pregnancy Letter    Gogo Cid  1990  [address] [facility]        03/08/22      Gogo Cid is a patient at our facility  Gogojudi Cid Estimated Date of Delivery: None noted  Any questions or concerns, please feel free to contact our office  Sincerely,    1 [facility]    [address] [facility]   [phone]

## 2022-03-08 NOTE — LETTER
Proof of Pregnancy Letter    Kt Rangel  1990  [address]        03/08/22      Kt Rangel is a patient at our facility  Kt Rangel Estimated Date of Delivery: 10/06/2022  Any questions or concerns, please feel free to contact our office  Sincerely,    1 [facility]   [address] [place] [address], [facility]

## 2022-03-08 NOTE — LETTER
[facility] Letter    Chloe Morrison  1990  [address] [facility]       03/08/22          Chloe Morrison is a patient and under our care in our office  Melisa Fraction Estimated Date of Delivery: 10/0/47651  Any questions or concerns feel free to contact our office       Thank you,    [address] [facility],[address]  [id number] [facility]  [facility] [place]  [address]  [phone]

## 2022-03-08 NOTE — PROGRESS NOTES
OB INTAKE INTERVIEW  Pt presents for OB intake  [id number]  OB History    Para Term  AB Living   3 2 2     2   SAB IAB Ectopic Multiple Live Births         0 2      # Outcome Date GA Lbr Brandon/2nd Weight Sex Delivery Anes PTL Lv   3 Current            2 Term 18 39w0d  4451 g (9 lb 13 oz) M CS-LTranv Spinal N SONNY   1 Term 04/25/15   4309 g (9 lb 8 oz) F CS-LTranv EPI  SONNY      Complications: Failure to Progress in Second Stage      Obstetric Comments   PRE-ECLAMPSIA     Hx of  delivery prior to 36 weeks 6 days:  No   If yes, place a referral for cervical surveillance at 16 weeks  Last Menstrual Period:    Patient's last menstrual period was 2021 (exact date)  Ultrasound date: 2022  6 weeks 4 days     Estimated Date of Delivery: None noted  by LMP or US  H&P visit scheduled  03/15/2022 @ 11:30  with Dr Candance Rider     Last pap smear: 10/08/2021  Findings; lab pap smear results: no abnormalities    Current Issues:  Constipation :   No  Headaches :   No  Cramping:  No  Spotting :   No  PICA cravings :  No  FOB Involved:   Yes  Planned pregnancy:  No  I have these concerns about this prenatal patient:   Unplanned but happy about pregnancy  2 previous c/s, patient is sickle cell trait carrier,  FOB status is unknown  Same FOB as other children  Patient had flu shot and 2 covid vaccines  Has not yet received covid booster  Interview education   [address] [place] [address]'s Pregnancy Essentials reviewed and discussed    [facility] Handout   [facility] Handout   Discussed genetic testing   Prenatal lab work: Scripts printed and given to pt   Influenza vaccine given today: No   Discussed Tdap vaccine     Immunizations:   Immunization History   Administered Date(s) Administered    DTaP 5 1992, 1993, 1993, 1994, 1997    HPV9 10/08/2021, 12/15/2021    Hep B, adult 1992, 1992, 1994    Hib (PRP-OMP) 1992, 1993, 07/23/1993, 03/14/1994    INFLUENZA 11/29/2010, 10/14/2016, 10/31/2017, 10/01/2018    Influenza Quadrivalent Preservative Free 3 years and older IM 12/05/2014, 10/14/2016, 10/31/2017    MMR 12/13/1993, 08/25/1997    OPV 01/28/1993, 04/30/1993, 10/04/1994, 08/25/1997    Td (adult), adsorbed 09/09/2004    Tdap 04/27/2015, 06/27/2018    Tuberculin Skin Test-PPD Intradermal 09/13/1993, 07/27/2009, 08/10/2010    Varicella 05/17/1996, 02/24/2010     Depression Screening Follow-up Plan: Patient's depression screening was negative with an Larose score of  1  Clinically patient does not have depression  No treatment is required     Nurse/Family Partnership- referral placed:  No   If yes, place referral for nurse family partnership    Infection Screening: Does the pt have a hx of MRSA? No  If yes- please follow MRSA protocol and obtain a nasal swab for MRSA culture  The patient was oriented to our practice and all questions were answered    Interviewed by: Seth Rushing RN 03/08/22

## 2022-03-08 NOTE — LETTER
Dentist Letter    Brigido Garcia  1990  [address] [place], [place] [address]  [address] [address]          03/08/22    We have had several requests from local dentist requesting permission to perform procedures on our patients who are pregnant  We wish to respond with this letter regarding some of the more routine procedures that we have been asked about  The following procedures may be performed on our obstetric patients:   1  Administration of local anesthesia   2  Administration of antibiotics such as PCN, Ampicillin, and Erythromycin  3  Administration of pain medications such as Tylenol, Tylenol with codeine, and if needed Percocet  4  Shielded X-rays    Should you have any questions, please do not hesitate to contact at [phone]          Sincerely,    1 [facility]   [phone]

## 2022-03-09 ENCOUNTER — PATIENT OUTREACH (OUTPATIENT)
Dept: OBGYN CLINIC | Facility: CLINIC | Age: 32
End: 2022-03-09

## 2022-03-13 NOTE — PROGRESS NOTES
OB/GYN  PRENATAL H&P VISIT  Thelma Padilla  3/15/2022  11:39 AM  Dr Ricky Landry MD      SUBJECTIVE  Patient is a [id number] at 10w5d by first trimester US here for initial prenatal H&P  This is an unintended and desired pregnancy  She is currently doing well overall  She works as a medication technition  She denies hx of STD/STI, denies a hx of TB or close contacts with persons with TB  She has not had MRSA  She denies a family history of inheritable conditions such as physical or intellectual disabilities, birth defects, blood disorders, heart or neural tube defects  She denies recent travel or travel planned in the near future  She denies use of nicotine or recreational drug use  She denies use of ETOH  She denies vaginal bleeding, occasional cramping, leakage, abnormal discharge  Still having some nausea, helped somewhat with the B6  Is able to tolerate some PO  She has a history of two prior  sections  Feeling a little under the weather today, with itchy throat  She reports a history of pre-eclampsia in her two prior pregnancies  OB History    Para Term  AB Living   3 2 2 0 0 2   SAB IAB Ectopic Multiple Live Births   0 0 0 0 2      # Outcome Date GA Lbr Brandon/2nd Weight Sex Delivery Anes PTL Lv   3 Current            2 Term 18 39w0d  4451 g (9 lb 13 oz) M CS-LTranv Spinal N SONNY      Name: Jessica Gonzalez  ([address])      Apgar1: 8  Apgar5: 9   1 Term 04/25/15   4309 g (9 lb 8 oz) F CS-LTranv EPI  SONNY      Complications: Failure to Progress in Second Stage      Name: [facility]      Obstetric Comments   PRE-ECLAMPSIA       Review of Systems   Constitutional: Negative for chills and fever  HENT: Positive for sore throat  Negative for postnasal drip, rhinorrhea, sinus pressure, sneezing and trouble swallowing  Eyes: Negative for photophobia and visual disturbance  Respiratory: Negative for shortness of breath      Cardiovascular: Negative for chest pain, palpitations and leg swelling  Gastrointestinal: Positive for nausea  Negative for abdominal distention, abdominal pain, anal bleeding, constipation, diarrhea and vomiting  Genitourinary: Positive for pelvic pain  Negative for vaginal bleeding, vaginal discharge and vaginal pain  Musculoskeletal: Negative for back pain and joint swelling  Skin: Negative for pallor and rash  Neurological: Negative for dizziness, seizures, syncope, light-headedness, numbness and headaches  Hematological: Does not bruise/bleed easily         Past Medical History:   Diagnosis Date    Abscess of buttock, right 2019     delivery delivered     RESOLVED: [id number]    Disease of thyroid gland     hyperthyroid    Hidradenitis suppurativa     LAST ASSESSED: [id number]    History of early menarche     [de-identified] PERIOD AT 8YEARS OLD    Hyperemesis gravidarum     RESOLVED: [id number]    Hypertension in pregnancy, pre-eclampsia     prior preg    Migraine     [facility]        Past Surgical History:   Procedure Laterality Date    ABDOMINAL SURGERY      ABSCESS DRAINAGE  2016    INCISION AND DRAINAGE OF SKIN ABSCESS; LABIAL CYST REMOVAL - [address]     SECTION      CHOLECYSTECTOMY      INCISION AND DRAINAGE OF WOUND Right 2019    Procedure: INCISION AND DRAINAGE (I&D) BUTTOCK;  Surgeon: Eliana Cruz MD;  Location: [facility];  Service: General    DE  DELIVERY ONLY N/A 2018    Procedure:  SECTION () REPEAT;  Surgeon: Jud Knight MD;  Location: [facility] LD;  Service: Obstetrics    DE EXC SWEAT GLAND Negrito Reddy Right 2016    Procedure: EXCISION GROIN CYST HIDRADENITIS;  Surgeon: Eddie Cuenca DO;  Location: [facility];  Service: General    TONSILLECTOMY      VULVA BIOPSY N/A 2016    Procedure: INCISION AND DRAINAGE, EXCISION OF LABIAL CYST ;  Surgeon: Gabriel Mccoy DO;  [facility];  Service: Social History     Socioeconomic History    Marital status: Single     Spouse name: Not on file    Number of children: Not on file    Years of education: 15    Highest education level: Not on file   Occupational History    Occupation: unemployed   Tobacco Use    Smoking status: Never Smoker    Smokeless tobacco: Never Used   Vaping Use    Vaping Use: Never used   Substance and Sexual Activity    Alcohol use: Not Currently     Comment: social    Drug use: No    Sexual activity: Yes     Partners: Male     Birth control/protection: None   Other Topics Concern    Not on file   Social History Narrative    Not on file     Social Determinants of Health     Financial Resource Strain: Low Risk     Difficulty of Paying Living Expenses: Not hard at all   Food Insecurity: No Food Insecurity    Worried About [address] in the Last Year: Never true    [address] in the Last Year: Never true   Transportation Needs: No Transportation Needs    Lack of Transportation (Medical): No    Lack of Transportation (Non-Medical): No   Physical Activity: Not on file   Stress: Not on file   Social Connections: Not on file   Intimate Partner Violence: Not At Risk    Fear of Current or Ex-Partner: No    Emotionally Abused: No    Physically Abused: No    Sexually Abused: No   Housing Stability: Low Risk     Unable to Pay for Housing in the Last Year: No    Number of Places Lived in the Last Year: 1    Unstable Housing in the Last Year: No         OBJECTIVE  Vitals:    03/15/22 1100   BP: 130/71   Pulse: 75             Physical Exam  Constitutional:       General: She is not in acute distress  Appearance: She is well-developed  She is not ill-appearing, toxic-appearing or diaphoretic  Genitourinary:      No lesions in the vagina  Right Labia: No rash, tenderness, lesions or skin changes  Left Labia: No tenderness, lesions, skin changes or rash       No vaginal discharge, erythema, tenderness or bleeding  Right Adnexa: not full  Left Adnexa: not full  No cervical motion tenderness, discharge, friability, lesion or polyp  No parametrium nodularity or thickening present  Uterus is not fixed or tender  No uterine mass detected  Breasts: Breasts are symmetrical       Breasts are soft  Right: No mass, nipple discharge, skin change, tenderness, axillary adenopathy or supraclavicular adenopathy  Left: No mass, nipple discharge, skin change, tenderness, axillary adenopathy or supraclavicular adenopathy  HENT:      Head: Normocephalic and atraumatic  Eyes:      Conjunctiva/sclera: Conjunctivae normal    Neck:      Thyroid: No thyroid mass, thyromegaly or thyroid tenderness  Cardiovascular:      Rate and Rhythm: Normal rate and regular rhythm  Heart sounds: Normal heart sounds  No murmur heard  No friction rub  No gallop  Pulmonary:      Effort: Pulmonary effort is normal  No respiratory distress  Breath sounds: Normal breath sounds  No stridor  No wheezing or rales  Chest:      Chest wall: No mass, deformity or tenderness  Abdominal:      General: There is no distension  Palpations: Abdomen is soft  There is no mass  Tenderness: There is no abdominal tenderness  There is no guarding or rebound  Hernia: No hernia is present  Comments: C section scar well healed   Musculoskeletal:         General: No deformity  Normal range of motion  Cervical back: Normal range of motion and neck supple  Lymphadenopathy:      Upper Body:      Right upper body: No supraclavicular or axillary adenopathy  Left upper body: No supraclavicular or axillary adenopathy  Neurological:      General: No focal deficit present  Mental Status: She is alert  Mental status is at baseline  Skin:     General: Skin is warm and dry  Findings: No erythema     Psychiatric:         Mood and Affect: Mood normal          Behavior: Behavior normal          ASSESSMENT AND PLAN    36 y o , , with /71   Pulse 75   Ht 5' 4 5" (1 638 m)   Wt 80 3 kg (177 lb)   LMP 2021 (Exact Date) , at 10w5d by first trimester US here for her prenatal H&P   by TEE    Pregnancy: H&P completed today  PN Labs reviewed today  O positive, >100k mixed contaminants, did not require treatment  Labor expectations discussed with patient, including appointment schedule, nutrition, exercise, medications, sexual intercourse, and nausea/vomiting  Patient's BMI is 29  Recommended weight gain is 15-25lbs  Screening: Pap smear NILM 10/8/21  GC/CT collected   Center appointment 3/29/22  Consents:  Sign delivery consent form at 28 weeks  [facility]  Contraception: Different methods of contraception were discussed with patient, including progesterone only oral pills, depo provera, nexplanon, mirena, and paragard  Patient is uncertain what to use during postpartum phase  History of pre-eclampsia: Discussed elevated risk of hypertensive disorders in this pregnancy  Discussed recommend LDASA for prophylaxis  Discussed continue until 36 weeks  Discussed natural history of pre-eclampsia, risk factors, and potential complications  History of hyperemesis: Nausea and vomiting in this pregnancy not as bad  Patient will continue B6 and will call if symptoms worsen and unable to tolerate PO    Immunizations: Patient received Covid-19 vaccine series, has not received booster  Discussed booster in pregnancy, discussed safety of vaccine in pregnancy  She is due for her third dose of Gardasil, discussed lack of evidence of safety while pregnant  Will defer third dose to postpartum state  Sickle cell carrier: FOB status unknown  Sore throat: Consider Covid-19 testing    Follow up: RTC in 4 weeks  Precautions regarding labor, leakage, bleeding, and fetal movement reviewed      Discussed with Dr Kiara Garza, Sandy Damon MD  3/15/2022  11:39 AM

## 2022-03-15 ENCOUNTER — ROUTINE PRENATAL (OUTPATIENT)
Dept: OBGYN CLINIC | Facility: CLINIC | Age: 32
End: 2022-03-15

## 2022-03-15 VITALS
DIASTOLIC BLOOD PRESSURE: 71 MMHG | WEIGHT: 177 LBS | HEIGHT: 65 IN | HEART RATE: 75 BPM | BODY MASS INDEX: 29.49 KG/M2 | SYSTOLIC BLOOD PRESSURE: 130 MMHG

## 2022-03-15 DIAGNOSIS — Z3A.10 10 WEEKS GESTATION OF PREGNANCY: ICD-10-CM

## 2022-03-15 DIAGNOSIS — Z72.51 HIGH RISK HETEROSEXUAL BEHAVIOR: ICD-10-CM

## 2022-03-15 DIAGNOSIS — Z11.3 SCREENING FOR STDS (SEXUALLY TRANSMITTED DISEASES): ICD-10-CM

## 2022-03-15 DIAGNOSIS — Z87.59 H/O PRE-ECLAMPSIA: ICD-10-CM

## 2022-03-15 DIAGNOSIS — Z34.91 PRENATAL CARE IN FIRST TRIMESTER: Primary | ICD-10-CM

## 2022-03-15 PROCEDURE — 87491 CHLMYD TRACH DNA AMP PROBE: CPT | Performed by: OBSTETRICS & GYNECOLOGY

## 2022-03-15 PROCEDURE — 99213 OFFICE O/P EST LOW 20 MIN: CPT | Performed by: OBSTETRICS & GYNECOLOGY

## 2022-03-15 PROCEDURE — 87591 N.GONORRHOEAE DNA AMP PROB: CPT | Performed by: OBSTETRICS & GYNECOLOGY

## 2022-03-15 RX ORDER — SWAB
1 SWAB, NON-MEDICATED MISCELLANEOUS DAILY
COMMUNITY

## 2022-03-15 RX ORDER — ASPIRIN 81 MG/1
162 TABLET, CHEWABLE ORAL DAILY
Qty: 60 TABLET | Refills: 6 | Status: SHIPPED | OUTPATIENT
Start: 2022-03-15

## 2022-03-15 NOTE — LETTER
March 15, 2022     Patient: Zaida Jain   YOB: 1990   Date of Visit: 3/15/2022       To Whom it May Concern:    Zaida Jain is under my professional care  She was seen in my office on 3/15/2022  She may return to work on 3/16/22  If you have any questions or concerns, please don't hesitate to call           Sincerely,          Angel Barillas MD        CC: Zaidagustavo Jain

## 2022-03-15 NOTE — PATIENT INSTRUCTIONS
Postpartum Birth Control Options   Almost half of all pregnancies are not planned, which can sometimes be a happy surprise, but other times can be a stressful situation for a woman or family  Birth control can be an empowering tool for women to take control of their family planning so that they can have healthy pregnancies in the future if and when they want  We recommend at least 6 months, ideally 18 months, between delivery to conception of the next pregnancy  Implant  The birth control implant, brand name Nexplanon, is a small matchstick sized device that is placed under the skin of your upper arm  This releases a small amount of hormone daily that prevents pregnancy for up to 3 years of use  You can have the implant inserted after delivery prior to being discharged from the hospital or at any time in your OBGYN office depending on insurance coverage  Pros  - Less than 1 out of 100 women will get pregnant in 1 year using this method  - Once it is placed you do not have to do anything else to prevent pregnancy, like remembering to take a daily pill   - Once removed, the implant is immediately reversible and you will return to your normal ability to get pregnant  - While there is a theoretical risk of low milk supply when these methods are started right after birth, there is no research to support this theory  The implant is considered safe for use in breastfeeding mothers    Cons  - Most common side effects of the implant include changes in your period- which can be heavier, lighter, spotting in between periods or stopping your periods all together- it depends on the person  - Other side effects include headaches and acne    Intrauterine Device (IUD)  An intrauterine device (IUD) is a small T shaped device that is placed into the uterus   The IUD comes in 2 forms: hormonal and non-hormonal  - The non-hormonal IUD or copper IUD has been approved up to 10 years of use   - The hormonal IUD comes in forms that are approved for 3-6 years of use    This device can be placed immediately following delivery, 4 weeks after delivery or any time following that in your OBGYN office depending on insurance coverage  Pros  - Less than 1 out of 100 women will get pregnant in 1 year using this method  - Once it is placed you do not have to do anything else to not get pregnant, like remembering to take a daily pill  - Many women have lighter periods or no periods at all on the hormonal IUD  - Once removed, the IUD is immediately reversible and you will return to your normal ability to get pregnant  - While there is a theoretical risk of low milk supply when these methods are started right after birth, there is no research to support this theory  The IUD is considered safe for use in breastfeeding mothers    Cons  - Possibility of IUD falling out of the uterus   - Occurs in approximately 2-5% of women   - If your IUD is placed immediately after delivery the risk of it falling out is slightly higher at 10-27%  - Hormonal IUD can result in irregular spotting in the first 3-6 months that usually normalizes  - Non-hormonal IUD may cause heavier and more crampy periods in the first few months of use, which tends to improve after the first year of use  - Other side effects of the hormonal IUD include headaches and acne    Birth Control Shot  The birth control shot, brand name Depo Provera, is given every 3 months to prevent pregnancy  This injection can be received before being discharged from the hospital, following the delivery of your child or at any time at your OBGYN office       Pros  - About 4 out of 100 women will get pregnant in 1 year using this method  - You do not need to remember to take a daily pill but you do need to remember to get the injection at your OBGYN office every 3 months  - While there is a theoretical risk of low milk supply when this birth control are started right after birth, there is no research to support this theory  The birth control shot is considered safe for use in breastfeeding mothers  Cons  - Most common side effect of the birth control injection is irregular bleeding; this usually normalizes after 1 year of use  - After your last shot, sometimes your ability to get pregnant can be delayed by as long as 1 year  - Weight gain, headaches, and mood changes are side effects that some women may experience  - Decreased bone mineral density can occur with long-term use; however, this is reversible once you stop using the injection and does not increase your lifetime risk of osteoporosis    Progesterone-Only Pills  The "mini pill" is a pill that must be taken once daily at the same time each day  This can be started as soon as you go home from the hospital after delivery, or at any time  Pros  - About 8 out of 100 women will get pregnant in 1 year using this method  - You have complete control of when to start and stop taking the pill  - Many women have lighter periods or no periods at all while taking this pill  - While there is a theoretical risk of low milk supply when this birth control are started right after birth, there is no research to support this theory  The progesterone only pill is considered safe for use in breastfeeding mothers    Cons  - You must take the pill at the same time every day  If you miss one day, you need another form of pregnancy prevention for at least 7 days  - Some people have irregular, unscheduled bleeding while taking the pill  - Other side effects include headaches and acne    Combined Hormonal Contraceptives (Pill, Patch, Ring)  These methods all contain 2 hormones, estrogen and progesterone  The pill is taken once daily, the patch is worn on the skin and changed once weekly, the ring is placed vaginally and changed once monthly  These methods can be started 4-6 weeks after giving birth      Pros  - About 8 out of 100 women will get pregnant in 1 year using this method  - You have complete control of when to start and stop using these methods  - Many women have lighter, less painful, regular periods while taking this pill    Cons  - Recommend against use while breastfeeding as estrogen can decrease milk supply  - You must take the pill at the same time every day  If you miss one day, you need another form of pregnancy prevention for at least 7 days    Male/Female condoms, withdrawal, fertility awareness  These are methods that you either buy over the counter or do yourself  Condoms and withdrawal must be used with every sexual act  Fertility awareness must be assessed every day  Condoms and withdrawal can be used immediately after delivery once your doctor has performed your postpartum office exam  Fertility awareness can be started after return of your period      Pros  - You have complete control of when to start and stop using these methods  - No issue with use and breastfeeding    Cons  - About 15-25 out of 100 women will get pregnant in 1 year using this method  - Condoms and withdrawal must be used with every sexual act  - Fertility awareness must be assessed every day

## 2022-03-16 PROBLEM — Z34.91 PRENATAL CARE IN FIRST TRIMESTER: Status: ACTIVE | Noted: 2022-03-16

## 2022-03-16 LAB
C TRACH DNA SPEC QL NAA+PROBE: NEGATIVE
N GONORRHOEA DNA SPEC QL NAA+PROBE: NEGATIVE

## 2022-03-27 PROBLEM — O99.211 MATERNAL OBESITY, ANTEPARTUM, FIRST TRIMESTER: Status: ACTIVE | Noted: 2022-03-27

## 2022-03-27 PROBLEM — O99.019 SICKLE CELL TRAIT IN MOTHER AFFECTING PREGNANCY (HCC): Status: ACTIVE | Noted: 2022-03-27

## 2022-03-27 PROBLEM — D57.3 SICKLE CELL TRAIT IN MOTHER AFFECTING PREGNANCY (HCC): Status: ACTIVE | Noted: 2022-03-27

## 2022-03-27 PROBLEM — O09.291 HX OF PREECLAMPSIA, PRIOR PREGNANCY, CURRENTLY PREGNANT, FIRST TRIMESTER: Status: ACTIVE | Noted: 2022-03-27

## 2022-03-27 NOTE — PROGRESS NOTES
Please refer to the [facility] ultrasound report in Ob Procedures for additional information regarding today's visit

## 2022-03-29 ENCOUNTER — PATIENT OUTREACH (OUTPATIENT)
Dept: OBGYN CLINIC | Facility: CLINIC | Age: 32
End: 2022-03-29

## 2022-03-29 ENCOUNTER — ROUTINE PRENATAL (OUTPATIENT)
Dept: PERINATAL CARE | Facility: CLINIC | Age: 32
End: 2022-03-29
Payer: COMMERCIAL

## 2022-03-29 ENCOUNTER — DOCUMENTATION (OUTPATIENT)
Dept: PERINATAL CARE | Facility: OTHER | Age: 32
End: 2022-03-29

## 2022-03-29 VITALS
BODY MASS INDEX: 29.69 KG/M2 | WEIGHT: 178.2 LBS | HEIGHT: 65 IN | DIASTOLIC BLOOD PRESSURE: 65 MMHG | HEART RATE: 90 BPM | SYSTOLIC BLOOD PRESSURE: 131 MMHG

## 2022-03-29 DIAGNOSIS — Z3A.12 12 WEEKS GESTATION OF PREGNANCY: ICD-10-CM

## 2022-03-29 DIAGNOSIS — O09.291 HX OF PREECLAMPSIA, PRIOR PREGNANCY, CURRENTLY PREGNANT, FIRST TRIMESTER: Primary | ICD-10-CM

## 2022-03-29 DIAGNOSIS — O99.019 SICKLE CELL TRAIT IN MOTHER AFFECTING PREGNANCY (HCC): ICD-10-CM

## 2022-03-29 DIAGNOSIS — Z98.891 HISTORY OF 2 CESAREAN SECTIONS: ICD-10-CM

## 2022-03-29 DIAGNOSIS — D57.3 SICKLE CELL TRAIT IN MOTHER AFFECTING PREGNANCY (HCC): ICD-10-CM

## 2022-03-29 DIAGNOSIS — O99.211 MATERNAL OBESITY, ANTEPARTUM, FIRST TRIMESTER: ICD-10-CM

## 2022-03-29 DIAGNOSIS — Z36.82 ENCOUNTER FOR ANTENATAL SCREENING FOR NUCHAL TRANSLUCENCY: ICD-10-CM

## 2022-03-29 PROCEDURE — 99242 OFF/OP CONSLTJ NEW/EST SF 20: CPT | Performed by: OBSTETRICS & GYNECOLOGY

## 2022-03-29 PROCEDURE — 76813 OB US NUCHAL MEAS 1 GEST: CPT | Performed by: OBSTETRICS & GYNECOLOGY

## 2022-03-29 NOTE — PROGRESS NOTES
Patient chose to use [facility] lab and was given lab slip for the Noninvasive Prenatal Screen -[facility] Qnatal Advanced  Blood sample is drawn at [facility] and online appointment scheduling and lab locations can be found @ www  [employer]     Qnatal  card given, patient instructed how to check her out- of -pocket responsibility @ [employer]  Patient aware that  is provided by third party and is only an estimate of cost ,not a guarantee  For definitive cost, patient encouraged to call her insurance provider- insurance phone # located on the back of her ID card  Some Insurance plans may require prior authorization before blood is drawn  Patient instructed to check with her plan before she goes to lab and notify [facility]  Patient made aware she may be responsible for full cost of test if lab drawn before prior authorization obtained  Insurance Authorization may take up to 14 business days, patient made aware insurance authorization does not mean test is covered 100%  Information on how to check OOP NIPT coverage/ check if a prior authorization needed for NIPT was also provided to patient during the appointment scheduling of her [facility] NT appt  Patient made aware Qnatal results typically are available in 7-10 business days after blood draw and to call [facility] if she does not receive notification of her results  Patient made aware that viewing Qnatal results online will reveal gender  Patient verbalized understanding of all instructions and no questions at this time

## 2022-03-29 NOTE — LETTER
March 29, 2022     [address] PROVIDER    Patient: Kenneth Fraga   YOB: 1990   Date of Visit: 3/29/2022       Dear   Provider: Thank you for referring Kenneth Fraga to me for evaluation  Below are my notes for this consultation  If you have questions, please do not hesitate to call me  I look forward to following your patient along with you  Sincerely,        Rebeka Silva MD        CC: No Recipients  Rebeka Silva MD  3/27/2022  3:54 PM  Sign when Signing Visit  Please refer to the [facility] ultrasound report in Ob Procedures for additional information regarding today's visit

## 2022-03-29 NOTE — PROGRESS NOTES
Request Prior Authorization for NIPT [id number], faxed along Ultrasound reports to [facility] [phone]  Confirmation receipt received on 3/29/22 @3125

## 2022-03-31 ENCOUNTER — TELEPHONE (OUTPATIENT)
Dept: PERINATAL CARE | Facility: CLINIC | Age: 32
End: 2022-03-31

## 2022-03-31 NOTE — TELEPHONE ENCOUNTER
Spoke with pt and informed insurance Authorization for her NIPT genetic screening has been approved  Auth # is [id number] with dates of service 3/29/2022-6/29/2022  Insurance Authorization is not a guarantee of payment and final determination is based on your member benefits, appropriateness of service provided and eligibility at time of services rendered and claim received  Please check if you have any OOP lab co-pay or deductible prior to completing screening  Pt receptive to information and declines questions at this time

## 2022-04-12 ENCOUNTER — ROUTINE PRENATAL (OUTPATIENT)
Dept: OBGYN CLINIC | Facility: CLINIC | Age: 32
End: 2022-04-12

## 2022-04-12 ENCOUNTER — PATIENT OUTREACH (OUTPATIENT)
Dept: OBGYN CLINIC | Facility: CLINIC | Age: 32
End: 2022-04-12

## 2022-04-12 VITALS
DIASTOLIC BLOOD PRESSURE: 70 MMHG | HEART RATE: 88 BPM | WEIGHT: 176 LBS | SYSTOLIC BLOOD PRESSURE: 103 MMHG | HEIGHT: 65 IN | BODY MASS INDEX: 29.32 KG/M2

## 2022-04-12 DIAGNOSIS — R51.9 PREGNANCY HEADACHE IN SECOND TRIMESTER: ICD-10-CM

## 2022-04-12 DIAGNOSIS — O09.291 HX OF PREECLAMPSIA, PRIOR PREGNANCY, CURRENTLY PREGNANT, FIRST TRIMESTER: ICD-10-CM

## 2022-04-12 DIAGNOSIS — O99.211 MATERNAL OBESITY, ANTEPARTUM, FIRST TRIMESTER: ICD-10-CM

## 2022-04-12 DIAGNOSIS — N83.201 RIGHT OVARIAN CYST: ICD-10-CM

## 2022-04-12 DIAGNOSIS — Z98.891 HISTORY OF 2 CESAREAN SECTIONS: ICD-10-CM

## 2022-04-12 DIAGNOSIS — O26.892 PREGNANCY HEADACHE IN SECOND TRIMESTER: ICD-10-CM

## 2022-04-12 DIAGNOSIS — Z34.92 PRENATAL CARE IN SECOND TRIMESTER: Primary | ICD-10-CM

## 2022-04-12 PROCEDURE — 99213 OFFICE O/P EST LOW 20 MIN: CPT | Performed by: OBSTETRICS & GYNECOLOGY

## 2022-04-12 NOTE — PROGRESS NOTES
NURA MCDANIEL met with 37 y/o-Engaged- G3:P2- AA English speaking woman for pre goldy assessment  Pt resides with her jeannette and their 2 kids  Pt reported pregnancy was not intended but welcome  Pt denies any usage of drug, alcohol or smoking  Pt denies any Mental Health or Domestic Violence History  Pt works part time as a Medication technician  Pt has MA, WIC and Food stamps  Pt denies transportation issues  Pt claimed has a lot of support form Jeannette and extended family  Pt denies any questions or concerns at this time  NURA MCDANIEL explained her role and gave contact information  Pt was encouraged to call at any time needed

## 2022-04-12 NOTE — PROGRESS NOTES
OB/GYN prenatal visit    S: 36 y o  [id number] 14w5d here for PN visit  She has no obstetric complaints, including pelvic pain, contractions, vaginal bleeding, or loss of fluid  Has not started feeling baby move yet  She reports that for the past few weeks, she has been having daily headaches  They are usually relieved or improved with tylenol, or with drinking ice water  O:  Vitals:    22 1000   BP: 103/70   Pulse: 88       Gen: no acute distress, nonlabored breathing     Fetal Heart Rate: 152    A/P:      IUP at 14w5d  No obstetric complaints today  TWG: + 0 (recommended weight gain 15-25)  Contraception: Undecided, discussed options  She is uncertain on desire for future fertility  She reports that at baseline she has heavy and painful periods  She reports that while on Depo, she gained weight, had irregular bleeding, and had significant headaches  Discussed with patient that based on her history and preferences, would recommend Mirena IUD  Discussed that would not recommend Paragard IUD due to potential increase in bleeding and risk for anemia  Discussed that Depo and Nexplanon may both have irregular bleeding and headaches for her  She is uncertain on future fertility so sterilization would have high risk of regret  Discussed that headaches may be due to sensitivity to higher levels of Progesterone, given timing with Depo and with pregnancy      Discussed  labor precautions and fetal kick counts    Return to clinic in 4 weeks    COVID 19 precautions were discussed with patient at length, reviewed symptoms, hygiene, social distancing, patient to call office  with questions/concerns    Discussed with Dr Bandar Guevara MD  2022  10:49 AM    Problem List        Endocrine    Subclinical hyperthyroidism    Overview     Repeat TSH with 28w labs         Right ovarian cyst       Other    Hx of sickle cell trait    Overview     FOB has been unable to be tested for carrier status         H/O pre-eclampsia    Overview     History of pre-eclampsia in both prior pregnancies  Low dose aspirin ordered, plan to continue until 36w         Prenatal care in second trimester    Overview     O pos  Weight gain recommendation 15-25lbs  History of 2 prior  sections, plan RLTCS  Last pap smear 10/8/21 NILM, completed 2/3 Gardasil vaccinations, will be due for third dose postpartum  Undecided on contraception - considering Mirena IUD         Sickle cell trait in mother affecting pregnancy ([facility] [address] )    Maternal obesity, antepartum, first trimester    Hx of preeclampsia, prior pregnancy, currently pregnant, first trimester    History of 2  sections    Pregnancy headache in second trimester    Overview     Reports daily headaches starting at the end of her first trimester  Also had frequent headaches on Depo  Start Magnesium and Riboflavin for prophylaxis

## 2022-04-17 LAB
# FETUSES US: 1
CFDNA.FET/TOTAL PLAS.CFDNA: NORMAL
FET CHR 13 TS PLAS.CFDNA QL: NEGATIVE
FET CHR 18 TS PLAS.CFDNA QL: NEGATIVE
FET CHR 21 TS PLAS.CFDNA QL: NEGATIVE
FET CHR X + Y ANEUP PLAS.CFDNA QL: NORMAL
FET CHROM X + Y ANEUP CFDNA IMP: NORMAL
FET Y CHROM PLAS.CFDNA QL: DETECTED
FET Y CHROM PLAS.CFDNA: NORMAL
GA (DAYS): 1 D
GA (WEEKS): 14 WK
MICRODELETION SYND BLD/T FISH: NORMAL
REF LAB TEST METHOD: NORMAL
SERVICE CMNT-IMP: NORMAL
SERVICE CMNT-IMP: NORMAL
SL AMB ABNORMAL MSS?: NORMAL
SL AMB ABNORMAL US?: NORMAL
SL AMB ADVANCED MATERNAL AGE?: NO
SL AMB MICRODELETION INTERP: NORMAL
SL AMB MICRODELETION: NORMAL
SL AMB PERSONAL/FAM HISTORY?: NORMAL
SL AMB SPECIFICATIONS: NORMAL

## 2022-05-10 ENCOUNTER — ROUTINE PRENATAL (OUTPATIENT)
Dept: OBGYN CLINIC | Facility: CLINIC | Age: 32
End: 2022-05-10

## 2022-05-10 VITALS
DIASTOLIC BLOOD PRESSURE: 75 MMHG | SYSTOLIC BLOOD PRESSURE: 112 MMHG | WEIGHT: 182 LBS | BODY MASS INDEX: 30.32 KG/M2 | HEIGHT: 65 IN | HEART RATE: 91 BPM

## 2022-05-10 DIAGNOSIS — R51.9 PREGNANCY HEADACHE IN SECOND TRIMESTER: ICD-10-CM

## 2022-05-10 DIAGNOSIS — Z86.2 HX OF SICKLE CELL TRAIT: ICD-10-CM

## 2022-05-10 DIAGNOSIS — O26.892 PREGNANCY HEADACHE IN SECOND TRIMESTER: ICD-10-CM

## 2022-05-10 DIAGNOSIS — Z34.92 PRENATAL CARE IN SECOND TRIMESTER: Primary | ICD-10-CM

## 2022-05-10 DIAGNOSIS — Z87.59 H/O PRE-ECLAMPSIA: ICD-10-CM

## 2022-05-10 PROBLEM — K21.00 GASTROESOPHAGEAL REFLUX DISEASE WITH ESOPHAGITIS: Status: ACTIVE | Noted: 2022-05-10

## 2022-05-10 PROCEDURE — 99213 OFFICE O/P EST LOW 20 MIN: CPT | Performed by: OBSTETRICS & GYNECOLOGY

## 2022-05-10 RX ORDER — FAMOTIDINE 10 MG
10 TABLET ORAL 2 TIMES DAILY
Qty: 60 TABLET | Refills: 3 | Status: SHIPPED | OUTPATIENT
Start: 2022-05-10

## 2022-05-10 NOTE — PATIENT INSTRUCTIONS
GERD (Gastroesophageal Reflux Disease)   WHAT YOU NEED TO KNOW:   Gastroesophageal reflux disease (GERD) is reflux that happens more than 2 times a week for a few weeks  Reflux means acid and food in your stomach back up into your esophagus  GERD can cause other health problems over time if it is not treated  DISCHARGE INSTRUCTIONS:   Call your local emergency number (911 in the [address] [facility]) if:   · You have severe chest pain and sudden trouble breathing  Return to the emergency department if:   · You have trouble breathing after you vomit  · You have trouble swallowing, or pain with swallowing  · Your bowel movements are black, bloody, or tarry-looking  · Your vomit looks like coffee grounds or has blood in it  Call your doctor or gastroenterologist if:   · You feel full and cannot burp or vomit  · You vomit large amounts, or you vomit often  · You are losing weight without trying  · Your symptoms get worse or do not improve with treatment  · You have questions or concerns about your condition or care  Medicines:   · Medicines  are used to decrease stomach acid  Medicine may also be used to help your lower esophageal sphincter and stomach contract (tighten) more  · Take your medicine as directed  Contact your healthcare provider if you think your medicine is not helping or if you have side effects  Tell him of her if you are allergic to any medicine  Keep a list of the medicines, vitamins, and herbs you take  Include the amounts, and when and why you take them  Bring the list or the pill bottles to follow-up visits  Carry your medicine list with you in case of an emergency  Manage GERD:       · Do not have foods or drinks that may increase heartburn  These include chocolate, peppermint, fried or fatty foods, drinks that contain caffeine, or carbonated drinks (soda)  Other foods include spicy foods, onions, tomatoes, and tomato-based foods   Do not have foods or drinks that can irritate your esophagus, such as citrus fruits, juices, and alcohol  · Do not eat large meals  When you eat a lot of food at one time, your stomach needs more acid to digest it  Eat 6 small meals each day instead of 3 large ones, and eat slowly  Do not eat meals 2 to 3 hours before bedtime  · Elevate the head of your bed  Place 6-inch blocks under the head of your bed frame  You may also use more than one pillow under your head and shoulders while you sleep  · Maintain a healthy weight  If you are overweight, weight loss may help relieve symptoms of GERD  · Do not smoke  Smoking weakens the lower esophageal sphincter and increases the risk of GERD  Ask your healthcare provider for information if you currently smoke and need help to quit  E-cigarettes or smokeless tobacco still contain nicotine  Talk to your healthcare provider before you use these products  · Do not put pressure on your abdomen  Pressure pushes acid up into your esophagus  Do not wear clothing that is tight around your waist  Do not bend over  Bend at the knees if you need to pick something up  Follow up with your doctor or gastroenterologist as directed:  Write down your questions so you remember to ask them during your visits  © Copyright [employer] 2022 Information is for End User's use only and may not be sold, redistributed or otherwise used for commercial purposes  All illustrations and images included in CareNotes® are the copyrighted property of [employer]  or Lizbeth Cheng  The above information is an  only  It is not intended as medical advice for individual conditions or treatments  Talk to your doctor, nurse or pharmacist before following any medical regimen to see if it is safe and effective for you

## 2022-05-10 NOTE — PROGRESS NOTES
OB/GYN prenatal visit    S: 36 y o  [id number] 18w5d here for PN visit  She has no obstetric complaints, including pelvic pain, contractions, vaginal bleeding, or loss of fluid  Has not started feeling baby move yet  She reports that her daily headaches are better, not as often as they were before  She has started having heartburn, which kept her up the other night until 3am, poorly relieved by Tums  O:  Vitals:    05/10/22 1042   BP: 112/75   Pulse: 91       Gen: no acute distress, nonlabored breathing     Fetal Heart Rate: 150   Movement noted on TAUS    A/P:      IUP at 18w5d  No obstetric complaints today  TWG: + 4 (recommended weight gain 15-25)  Contraception: Undecided, considering Mirena IUD, is concerned about the potential for ovarian cysts  Discussed patient's prior history of ovarian cysts  On review of ultrasound imaging, patient has had a few simple or complex cysts that measure 1-2cm and resolve on their own  Discussed natural ovulatory cycle and follicular cysts  Discussed that while cyst rupture can cause discomfort and pain, may not notice any potential ovarian cysts with Mirena  Discussed reversible nature of contraception and that removal is possible whenever the patient determines she would like it removed  Discussed that should she have pain or side effects from the Mirena, can discuss management options including potential removal     GERD- discussed frequency of GERD symptoms in pregnancy  Prescribed patient famotidine 10mg BID  Discussed that can escalate therapy as well as needed  Discussed dietary triggers      Discussed  labor precautions and fetal kick counts    Return to clinic in 4 weeks    COVID 19 precautions were discussed with patient at length, reviewed symptoms, hygiene, social distancing, patient to call office  with questions/concerns    Discussed with Dr Michelle Mann MD  5/10/2022  11:02 AM    Problem List        Digestive    Gastroesophageal reflux disease with esophagitis    Overview     Reports onset of GERD  Famotidine prescribed            Endocrine    Subclinical hyperthyroidism    Overview     Noted in prior pregnancy in 2018   Can consider repeat TSH in this pregnancy         Right ovarian cyst       Other    Hx of sickle cell trait    Overview     FOB has been unable to be tested for carrier status         H/O pre-eclampsia    Overview     History of pre-eclampsia in both prior pregnancies  Low dose aspirin ordered, plan to continue until 36w         Prenatal care in second trimester    Overview     O pos  Weight gain recommendation 15-25lbs  History of 2 prior  sections, plan RLTCS  Last pap smear 10/8/21 NILM, completed 2/3 Gardasil vaccinations, will be due for third dose postpartum  Undecided on contraception - considering Mirena IUD         Sickle cell trait in mother affecting pregnancy ([facility] [address] )    Maternal obesity, antepartum, first trimester    Hx of preeclampsia, prior pregnancy, currently pregnant, first trimester    History of 2  sections    Pregnancy headache in second trimester    Overview     Reports daily headaches starting at the end of her first trimester  Also had frequent headaches on Depo  Continue Magnesium and Riboflavin for prophylaxis, symptoms improving

## 2022-05-19 ENCOUNTER — ROUTINE PRENATAL (OUTPATIENT)
Dept: PERINATAL CARE | Facility: CLINIC | Age: 32
End: 2022-05-19
Payer: COMMERCIAL

## 2022-05-19 VITALS
BODY MASS INDEX: 31.69 KG/M2 | HEIGHT: 64 IN | WEIGHT: 185.6 LBS | HEART RATE: 87 BPM | SYSTOLIC BLOOD PRESSURE: 128 MMHG | DIASTOLIC BLOOD PRESSURE: 66 MMHG

## 2022-05-19 DIAGNOSIS — Z36.86 ENCOUNTER FOR ANTENATAL SCREENING FOR CERVICAL LENGTH: ICD-10-CM

## 2022-05-19 DIAGNOSIS — Z3A.20 20 WEEKS GESTATION OF PREGNANCY: ICD-10-CM

## 2022-05-19 DIAGNOSIS — O99.212 MATERNAL OBESITY, ANTEPARTUM, SECOND TRIMESTER: Primary | ICD-10-CM

## 2022-05-19 PROCEDURE — 76811 OB US DETAILED SNGL FETUS: CPT | Performed by: OBSTETRICS & GYNECOLOGY

## 2022-05-19 PROCEDURE — 76817 TRANSVAGINAL US OBSTETRIC: CPT | Performed by: OBSTETRICS & GYNECOLOGY

## 2022-05-19 PROCEDURE — 99213 OFFICE O/P EST LOW 20 MIN: CPT | Performed by: OBSTETRICS & GYNECOLOGY

## 2022-05-19 NOTE — PROGRESS NOTES
The patient was seen today for an ultrasound  Please see ultrasound report (located under Ob Procedures) for additional details  Thank you very much for allowing us to participate in the care of this very nice patient  Should you have any questions, please do not hesitate to contact me  Ephraim Ace MD [address] [facility]  Attending Physician, Mayra

## 2022-06-07 ENCOUNTER — ROUTINE PRENATAL (OUTPATIENT)
Dept: OBGYN CLINIC | Facility: CLINIC | Age: 32
End: 2022-06-07

## 2022-06-07 VITALS
RESPIRATION RATE: 18 BRPM | DIASTOLIC BLOOD PRESSURE: 69 MMHG | HEIGHT: 64 IN | BODY MASS INDEX: 32.27 KG/M2 | WEIGHT: 189 LBS | SYSTOLIC BLOOD PRESSURE: 107 MMHG | HEART RATE: 91 BPM

## 2022-06-07 DIAGNOSIS — Z86.2 HX OF SICKLE CELL TRAIT: ICD-10-CM

## 2022-06-07 DIAGNOSIS — Z34.92 PRENATAL CARE IN SECOND TRIMESTER: Primary | ICD-10-CM

## 2022-06-07 DIAGNOSIS — O09.291 HX OF PREECLAMPSIA, PRIOR PREGNANCY, CURRENTLY PREGNANT, FIRST TRIMESTER: ICD-10-CM

## 2022-06-07 DIAGNOSIS — O99.212 MATERNAL OBESITY, ANTEPARTUM, SECOND TRIMESTER: ICD-10-CM

## 2022-06-07 DIAGNOSIS — Z98.891 HISTORY OF 2 CESAREAN SECTIONS: ICD-10-CM

## 2022-06-07 PROCEDURE — 99213 OFFICE O/P EST LOW 20 MIN: CPT | Performed by: OBSTETRICS & GYNECOLOGY

## 2022-06-07 NOTE — PROGRESS NOTES
OB/GYN prenatal visit    S: 36 y o  [id number] 22w5d here for PN visit  She has no obstetric complaints, including pelvic pain, contractions, vaginal bleeding, or loss of fluid  She denies decreased fetal movement    She has been having some left sided gum swelling and pain for the past two weeks  Has a dentist's appointment for the end of July, will try to schedule sooner    O:  Vitals:    22 1101   BP: 107/69   Pulse: 91   Resp: 18       Gen: no acute distress, nonlabored breathing  Fundal Height (cm): 24 cm  Fetal Heart Rate: 150     A/P:      IUP at 22w5d  No obstetric complaints today  TWG: + 11lbs (recommended weight gain 15-25)  Contraception:  planning on vasectomy, declines bridge due to prior issues with hormonal birth control    GERD- famotidine PRN    Discussed  labor precautions and fetal kick counts    Return to clinic in 4 weeks    COVID 19 precautions were discussed with patient at length, reviewed symptoms, hygiene, social distancing, patient to call office  with questions/concerns    Discussed with Dr Tracy Dorantes MD  2022  11:05 AM    Problem List        Digestive    Gastroesophageal reflux disease with esophagitis    Overview     Pepsid prescribed              Endocrine    Subclinical hyperthyroidism    Overview     Noted in prior pregnancy in 2018   Can consider repeat TSH in this pregnancy           Right ovarian cyst       Other    Hx of sickle cell trait    Overview     FOB has been unable to be tested for carrier status           H/O pre-eclampsia    Overview     History of pre-eclampsia in both prior pregnancies  Low dose aspirin ordered, plan to continue until 36w           Prenatal care in second trimester    Overview     O pos  Weight gain recommendation 15-25lbs  History of 2 prior  sections, plan RLTCS  Last pap smear 10/8/21 NILM, completed 2/3 Gardasil vaccinations, will be due for third dose postpartum  Planning partner vasectomy for contraception, bridge with condoms           Sickle cell trait in mother affecting pregnancy ([facility] [address] )    Maternal obesity, antepartum, second trimester    Hx of preeclampsia, prior pregnancy, currently pregnant, first trimester    History of 2  sections    Pregnancy headache in second trimester    Overview     Reported daily headaches, now resolved with magnesium and riboflavin  Also had frequent headaches on Depo

## 2022-07-05 ENCOUNTER — ROUTINE PRENATAL (OUTPATIENT)
Dept: OBGYN CLINIC | Facility: CLINIC | Age: 32
End: 2022-07-05

## 2022-07-05 VITALS
WEIGHT: 191 LBS | DIASTOLIC BLOOD PRESSURE: 60 MMHG | BODY MASS INDEX: 32.61 KG/M2 | HEART RATE: 90 BPM | HEIGHT: 64 IN | SYSTOLIC BLOOD PRESSURE: 125 MMHG

## 2022-07-05 DIAGNOSIS — Z87.59 H/O PRE-ECLAMPSIA: ICD-10-CM

## 2022-07-05 DIAGNOSIS — Z34.92 PRENATAL CARE IN SECOND TRIMESTER: Primary | ICD-10-CM

## 2022-07-05 DIAGNOSIS — E05.90 SUBCLINICAL HYPERTHYROIDISM: ICD-10-CM

## 2022-07-05 DIAGNOSIS — Z3A.26 26 WEEKS GESTATION OF PREGNANCY: ICD-10-CM

## 2022-07-05 PROCEDURE — 99213 OFFICE O/P EST LOW 20 MIN: CPT | Performed by: OBSTETRICS & GYNECOLOGY

## 2022-07-06 PROBLEM — Z3A.26 26 WEEKS GESTATION OF PREGNANCY: Status: ACTIVE | Noted: 2022-07-06

## 2022-07-06 NOTE — PROGRESS NOTES
OB/GYN prenatal visit    S: 36 y o  [id number] 26w6d here for PN visit  She has no obstetric complaints, including pelvic pain, contractions, vaginal bleeding, loss of fluid, or decreased fetal movement  O:  Vitals:    22 1000   BP: 125/60   Pulse: 90       Blood Pressure: 125/60  Wt=86 6 kg (191 lb);  Body mass index is 32 79 kg/m² ; TWG=5 937 kg (13 lb 1 4 oz)  Fetal Heart Rate: 148; Fundal Height (cm): 28 cm    Gen: no acute distress, nonlabored breathing  Abdomen: gravid, soft, nontender      A/P:    Problem List Items Addressed This Visit        Endocrine    Subclinical hyperthyroidism     Repeat TSH ordered with 28w labs    Lab Results   Component Value Date    [id number] 0 472 2018                Relevant Orders    TSH, 3rd generation with Free T4 reflex       Other    H/O pre-eclampsia     Continue low dose aspirin until 36w  /60  Asymptomatic            Prenatal care in second trimester - Primary    26 weeks gestation of pregnancy     No obstetric complaints today  28 week labs ordered  [facility] US - interval growth scheduled 22  TWG: + 5 937 kg  TDaP at next visit  Contraception: partner vasectomy  Breastfeeding: yes  Birth plan: RLTCS  Discussed  labor precautions and fetal kick counts    Return to clinic in 2 weeks             Relevant Orders    RPR    Glucose, 1H PG    CBC and Platelet    TSH, 3rd generation with Free T4 reflex          24-28 weeks: 28 week labs (CBC, RPR, 1hr GTT), Rh status/rhogam, FKC    Maris Rico MD  22  10:45 AM

## 2022-07-06 NOTE — ASSESSMENT & PLAN NOTE
No obstetric complaints today  28 week labs ordered  [facility] US - interval growth scheduled 22  TWG: + 5 937 kg  TDaP at next visit  Contraception: partner vasectomy  Breastfeeding: yes  Birth plan: RLTCS  Discussed  labor precautions and fetal kick counts    Return to clinic in 2 weeks

## 2022-07-06 NOTE — ASSESSMENT & PLAN NOTE
Repeat TSH ordered with 28w labs    Lab Results   Component Value Date    [id number] 0 472 07/25/2018

## 2022-07-21 ENCOUNTER — APPOINTMENT (OUTPATIENT)
Dept: LAB | Facility: HOSPITAL | Age: 32
End: 2022-07-21
Payer: COMMERCIAL

## 2022-07-21 ENCOUNTER — TELEPHONE (OUTPATIENT)
Dept: OBGYN CLINIC | Facility: CLINIC | Age: 32
End: 2022-07-21

## 2022-07-21 DIAGNOSIS — Z3A.26 26 WEEKS GESTATION OF PREGNANCY: ICD-10-CM

## 2022-07-21 DIAGNOSIS — E05.90 SUBCLINICAL HYPERTHYROIDISM: ICD-10-CM

## 2022-07-21 DIAGNOSIS — Z3A.26 26 WEEKS GESTATION OF PREGNANCY: Primary | ICD-10-CM

## 2022-07-21 LAB
ERYTHROCYTE [DISTWIDTH] IN BLOOD BY AUTOMATED COUNT: 12.7 % (ref 11.6–15.1)
GLUCOSE 1H P 50 G GLC PO SERPL-MCNC: 220 MG/DL (ref 40–134)
HCT VFR BLD AUTO: 32.4 % (ref 34.8–46.1)
HGB BLD-MCNC: 10.6 G/DL (ref 11.5–15.4)
MCH RBC QN AUTO: 27.5 PG (ref 26.8–34.3)
MCHC RBC AUTO-ENTMCNC: 32.7 G/DL (ref 31.4–37.4)
MCV RBC AUTO: 84 FL (ref 82–98)
PLATELET # BLD AUTO: 244 THOUSANDS/UL (ref 149–390)
PMV BLD AUTO: 9.6 FL (ref 8.9–12.7)
RBC # BLD AUTO: 3.86 MILLION/UL (ref 3.81–5.12)
T4 FREE SERPL-MCNC: 0.95 NG/DL (ref 0.76–1.46)
TSH SERPL DL<=0.05 MIU/L-ACNC: 0.2 UIU/ML (ref 0.45–4.5)
WBC # BLD AUTO: 7.01 THOUSAND/UL (ref 4.31–10.16)

## 2022-07-21 PROCEDURE — 86592 SYPHILIS TEST NON-TREP QUAL: CPT

## 2022-07-21 PROCEDURE — 36415 COLL VENOUS BLD VENIPUNCTURE: CPT

## 2022-07-21 PROCEDURE — 82950 GLUCOSE TEST: CPT

## 2022-07-21 PROCEDURE — 84439 ASSAY OF FREE THYROXINE: CPT

## 2022-07-21 PROCEDURE — 85027 COMPLETE CBC AUTOMATED: CPT

## 2022-07-21 PROCEDURE — 84443 ASSAY THYROID STIM HORMONE: CPT

## 2022-07-21 NOTE — TELEPHONE ENCOUNTER
Attempted to call and left a message, ok per communication consent form  Advised that due to the recent test results, which showed she has gestational diabetes, Dr Jose Snow has sent over information on diabetic education, as well as a referral to [facility] to her [employer] account  Encouraged her to call the office in case she had any questions  Office number provided in the message

## 2022-07-21 NOTE — TELEPHONE ENCOUNTER
----- Message from Lisa Duncan MD sent at 7/21/2022  4:30 PM EDT -----  Failed 1h gtt - diabetic education/MFM referral sent   [facility]

## 2022-07-22 LAB — RPR SER QL: NORMAL

## 2022-07-26 ENCOUNTER — ROUTINE PRENATAL (OUTPATIENT)
Dept: OBGYN CLINIC | Facility: CLINIC | Age: 32
End: 2022-07-26

## 2022-07-26 VITALS
SYSTOLIC BLOOD PRESSURE: 125 MMHG | BODY MASS INDEX: 32.95 KG/M2 | HEIGHT: 64 IN | HEART RATE: 94 BPM | DIASTOLIC BLOOD PRESSURE: 80 MMHG | RESPIRATION RATE: 18 BRPM | WEIGHT: 193 LBS

## 2022-07-26 DIAGNOSIS — O99.212 MATERNAL OBESITY, ANTEPARTUM, SECOND TRIMESTER: ICD-10-CM

## 2022-07-26 DIAGNOSIS — O24.410 DIET CONTROLLED GESTATIONAL DIABETES MELLITUS (GDM) IN THIRD TRIMESTER: ICD-10-CM

## 2022-07-26 DIAGNOSIS — Z3A.29 29 WEEKS GESTATION OF PREGNANCY: ICD-10-CM

## 2022-07-26 DIAGNOSIS — Z98.891 HISTORY OF 2 CESAREAN SECTIONS: ICD-10-CM

## 2022-07-26 DIAGNOSIS — Z34.93 PRENATAL CARE IN THIRD TRIMESTER: Primary | ICD-10-CM

## 2022-07-26 DIAGNOSIS — Z87.59 H/O PRE-ECLAMPSIA: ICD-10-CM

## 2022-07-26 PROCEDURE — 99213 OFFICE O/P EST LOW 20 MIN: CPT | Performed by: OBSTETRICS & GYNECOLOGY

## 2022-07-26 PROCEDURE — 90715 TDAP VACCINE 7 YRS/> IM: CPT

## 2022-07-26 PROCEDURE — 90471 IMMUNIZATION ADMIN: CPT

## 2022-07-26 NOTE — PROGRESS NOTES
OB/GYN prenatal visit    S: 36 y o  [id number] 29w5d here for PN visit  She has no obstetric complaints, including pelvic pain, contractions, vaginal bleeding, loss of fluid, or decreased fetal movement  O:  Vitals:    22 1055   BP: 125/80   Pulse: 94   Resp: 18       Blood Pressure: 125/80  Wt=87 5 kg (193 lb);  Body mass index is 33 13 kg/m² ; TWG=6 844 kg (15 lb 1 4 oz)  Fetal Heart Rate: 150; Fundal Height (cm): 30 cm    Gen: no acute distress, nonlabored breathing  Abdomen: gravid, soft nontender    A/P:    Problem List Items Addressed This Visit        Endocrine    Diet controlled gestational diabetes mellitus (GDM) in third trimester     Answered questions regarding A1GDM diagnosis  Diabetic education appointment scheduled  Glucometer/test strips ordered   Reviewed importance of glucose control, need for insulin if diet-control insufficient     No results found for: HGBA1C         Relevant Medications    Lancets (freestyle) lancets    glucose monitoring kit (FREESTYLE) monitoring kit    glucose blood (FREESTYLE TEST STRIPS) test strip       Other    H/O pre-eclampsia     /80  Continue LDASA until 36w         Prenatal care in third trimester - Primary    Maternal obesity, antepartum, second trimester    History of 2  sections    29 weeks gestation of pregnancy     No obstetric complaints  28 week labs: Hgb 10 4, abnormal 1h 220 --> appointment scheduled with diabetic education  [facility] US - interval growth scheduled 22  TWG 6 844 kg  S/p TDaP today  Delivery consent signed   Contraception: partner vasectomy  Birth plan: RLTCS  Discussed  labor precautions and FKCs  RTC in 2 weeks         Relevant Orders    Tdap Vaccine greater than or equal to 6yo (Completed)          28-32 weeks: tdap, flu vaccine, sign consent form, check in card, Kevin Castellon MD  2022  11:00 AM

## 2022-07-26 NOTE — PROGRESS NOTES
28 week education packet provided to patient on 07/26/22  Included in packet: Third Trimester paperwork  Delivery consent   Birthing room support person rules and acknowledgment  Birth Plan   Welcome information  Birth certificate worksheet   Consent for Photographers  Perineal/ Vaginal massage   Pediatric practices and locations     Patient declined the breast pump  She stated she will bottle feed       TDAP administered on Left Deltoid

## 2022-07-27 ENCOUNTER — TELEPHONE (OUTPATIENT)
Dept: OBGYN CLINIC | Facility: CLINIC | Age: 32
End: 2022-07-27

## 2022-07-27 DIAGNOSIS — O24.410 DIET CONTROLLED GESTATIONAL DIABETES MELLITUS (GDM) IN THIRD TRIMESTER: ICD-10-CM

## 2022-07-27 DIAGNOSIS — O24.410 DIET CONTROLLED GESTATIONAL DIABETES MELLITUS (GDM) IN THIRD TRIMESTER: Primary | ICD-10-CM

## 2022-07-27 RX ORDER — BLOOD-GLUCOSE METER
KIT MISCELLANEOUS
Qty: 100 STRIP | Refills: 1 | Status: SHIPPED | OUTPATIENT
Start: 2022-07-27 | End: 2022-07-27 | Stop reason: CLARIF

## 2022-07-27 RX ORDER — LANCETS 28 GAUGE
EACH MISCELLANEOUS
Qty: 100 EACH | Refills: 1 | Status: ON HOLD | OUTPATIENT
Start: 2022-07-27 | End: 2022-09-12

## 2022-07-27 RX ORDER — BLOOD-GLUCOSE METER
1 KIT MISCELLANEOUS AS NEEDED
Qty: 1 EACH | Refills: 0 | Status: SHIPPED | OUTPATIENT
Start: 2022-07-27

## 2022-07-27 RX ORDER — BLOOD SUGAR DIAGNOSTIC
STRIP MISCELLANEOUS
Qty: 100 EACH | Refills: 1 | Status: SHIPPED | OUTPATIENT
Start: 2022-07-27 | End: 2022-07-27

## 2022-07-27 NOTE — ASSESSMENT & PLAN NOTE
Normal T4  No indications for medications at this time    Lab Results   Component Value Date    [id number] 0 195 (L) 07/21/2022

## 2022-07-27 NOTE — ASSESSMENT & PLAN NOTE
No obstetric complaints  28 week labs: Hgb 10 4, abnormal 1h 220 --> appointment scheduled with diabetic education  [facility] US - interval growth scheduled 22  TWG 6 844 kg  S/p TDaP today  Delivery consent signed   Contraception: partner vasectomy  Birth plan: RLTCS  Discussed  labor precautions and FKCs  RTC in 2 weeks

## 2022-07-27 NOTE — TELEPHONE ENCOUNTER
Received a call from the pharmacist, stating the lifestyle products for the glucose checks are not covered by [employer]  OneTouch and Contour are covered

## 2022-07-27 NOTE — ASSESSMENT & PLAN NOTE
Answered questions regarding A1GDM diagnosis  Diabetic education appointment scheduled  Glucometer/test strips ordered   Reviewed importance of glucose control, need for insulin if diet-control insufficient     No results found for: HGBA1C

## 2022-08-03 ENCOUNTER — TELEPHONE (OUTPATIENT)
Dept: PERINATAL CARE | Facility: CLINIC | Age: 32
End: 2022-08-03

## 2022-08-03 NOTE — TELEPHONE ENCOUNTER
----- Message from Carlos Maillard sent at 8/3/2022  8:59 AM EDT -----  Regarding: Reschedule Class 2  Naveen Arroyo,  This patient is scheduled for Class 2 in 2 days after Class 1  Please call this patient to reschedule Class 2 for either next week or the following week  Thanks!   Renae Gagnon

## 2022-08-04 ENCOUNTER — TELEPHONE (OUTPATIENT)
Dept: PERINATAL CARE | Facility: CLINIC | Age: 32
End: 2022-08-04

## 2022-08-08 ENCOUNTER — OFFICE VISIT (OUTPATIENT)
Dept: PERINATAL CARE | Facility: CLINIC | Age: 32
End: 2022-08-08
Payer: COMMERCIAL

## 2022-08-08 VITALS
DIASTOLIC BLOOD PRESSURE: 71 MMHG | SYSTOLIC BLOOD PRESSURE: 113 MMHG | WEIGHT: 194.8 LBS | HEART RATE: 93 BPM | BODY MASS INDEX: 33.26 KG/M2 | HEIGHT: 64 IN

## 2022-08-08 DIAGNOSIS — O99.213 OBESITY COMPLICATING PREGNANCY, THIRD TRIMESTER: ICD-10-CM

## 2022-08-08 DIAGNOSIS — O24.410 DIET CONTROLLED GESTATIONAL DIABETES MELLITUS (GDM) IN THIRD TRIMESTER: Primary | ICD-10-CM

## 2022-08-08 DIAGNOSIS — Z3A.31 31 WEEKS GESTATION OF PREGNANCY: ICD-10-CM

## 2022-08-08 PROCEDURE — G0108 DIAB MANAGE TRN  PER INDIV: HCPCS | Performed by: DIETITIAN, REGISTERED

## 2022-08-08 NOTE — PROGRESS NOTES
Thank you for referring your patient to [facility] Maternal Fetal Medicine Diabetes in Pregnancy Program      Luma Birch is a  36 y o  female who presents today for Individual  Class 1  Patient is at 31w4d gestation, Estimated Date of Delivery: 10/6/22  Had attempted to complete Class 1 twice before but unable to stay connected via telemedicine  Needs to come in person for any education  Reviewed and updated the following from patients medical record: PMH, Problem List, Allergies, and Current Medications  Visit Diagnosis:  Diet controlled GDM    Discussed with patient pathophysiology of GDM, untreated hyperglycemia in pregnancy and maternal fetal complications including fetal macrosomia,  hypoglycemia, polyhydramnios, increased incidence of  section,  labor, and in severe cases fetal demise and still birth   Discussed importance of blood glucose monitoring, nutrition, and medication if necessary in achieving BG goals  Additional Pregnancy Complications:  Obesity    Labs:    Lab Results   Component Value Date    [id number] 220 (H) 2022       Lab Results   Component Value Date    GLUF 91 2018    OTHVZNQ8PA 106 2018    UWQCWBG3GJ 87 2018    PIHLNHH6UC 56 (L) 2018        No components found for: HGA1C    Medications:  No diabetes related medications    Anthropometrics:  Ht Readings from Last 3 Encounters:   22 5' 4" (1 626 m)   22 5' 4" (1 626 m)   22 5' 4" (1 626 m)     Wt Readings from Last 3 Encounters:   22 88 4 kg (194 lb 12 8 oz)   22 87 5 kg (193 lb)   22 86 6 kg (191 lb)     Pre-gravid weight: 80 7 kg (177 lb 14 6 oz)  Pre-gravid BMI: 30 52  Weight Change: 6 844 kg (15 lb 1 4 oz)  Weight gain recommendations: BMI (> 30) 11-20 lbs  Comments: Patient may gain 4 more pounds for the remainder of the pregnancy       Recent Ultra Sound Results:  Date: 22  Fetal Growth: Normal  SURJIT: Normal  Next US date: 22    Blood Glucose Monitoring:   Glucose Meter: One-Touch Ultra 2  Instructed on testing blood sugars: 4 x per day (Fasting, 2 hour after start of each meal)  Is already testing her BG, but did not bring her results with her  Stated her FBS is always greater than 100 ; today FBS was 136  But all BG p meals are fine  May needs to begin insulin  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  Gave instruction on site selection, skin preparation, loading strips and lancet device, meter activation, obtaining blood sample, test strip and lancet disposal and storage, and recording log book entries  Patient has good understanding of material covered and was able to test their own blood sugar in office today  Instruction for reporting blood sugar results weekly via:  Phone: [phone]   OR  My Chart (Message with image attachment, or Glucose Flowsheet)    Goal Blood Sugar Ranges:   Fastin-90 mg/dL  1 hour after the start of each meal: 140 mg/dL or less  2 hours after start of each meal: 120 mg/dL or less    Meal Plan (daily calorie and protein needs):  Calories: 2100 calorie    Type of Diet:Regular  Additional Nutrition Concerns: Reports she drinks lots of Gatorade; advised to change to G2  Enjoys vegetables ethan green peppers & cucumbers  Dislikes-Greek yogurt  Gets up at 8 AM will test her FBS at that time  1st meal is 10 AM, lunch at 3:30 PM when gets to work (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 as as a med tech at a nursing home), 3rd meal is 7:30-8 PM  1st snack at 12 noon, 2nd snack at 5:30 PM & bedtime snack while at work at [employer]       [employer] Tips:  1  Patient was provided with a meal plan including 3 meals and 3 snacks  2  Discussed appropriate amounts of CHO, PRO, and Fat at each meal and snack  3  Reviewed CHO exchange list, and portion sizes for both CHO and PRO via food models  4  Instruction on how to read a food label  5   Provided suggested meal/snack options to increase nutrition and maintain consistent meal and snack intakes  6  Instructed on how to keep a 3-day food diary to be brought to follow- up appointment  7  Encouraged  patient to eat every 2 0-3 5 hours while awake  8  Encouraged patient to go no longer than 8-10 hours fasting overnight until first meal of the day  Physical Activity:  Discussed benefits of physical activity to optimize blood glucose control, encouraged activity at patient is physically able  Always consult a physician prior to starting an exercise program  Recommend 20-30 minutes daily  Patient Stated Goal: "I will involve my family in my diabetes care"    Diabetes Self Management Support Plan outside of ongoing care: Spouse/Family    Learner/s Present:Learners Present: Patient   Barriers to Learning/Change: Financial  Expected Compliance: good    Date to report blood sugars: Monday, 8/15/22  Class 2 (date): Tuesday, 8/16/22    Begin Time: 10 AM  End Time: 10:58 AM    It was a pleasure working with them today  Please feel free to call with any questions or concerns      Marcela Winston  Diabetes Educator  [facility] Maternal Fetal Medicine  Diabetes in Pregnancy Program  [address] [place] [address],[address]  [address]

## 2022-08-09 ENCOUNTER — ROUTINE PRENATAL (OUTPATIENT)
Dept: OBGYN CLINIC | Facility: CLINIC | Age: 32
End: 2022-08-09

## 2022-08-09 VITALS
HEIGHT: 64 IN | WEIGHT: 195 LBS | BODY MASS INDEX: 33.29 KG/M2 | SYSTOLIC BLOOD PRESSURE: 118 MMHG | DIASTOLIC BLOOD PRESSURE: 86 MMHG | HEART RATE: 98 BPM

## 2022-08-09 DIAGNOSIS — Z3A.32 32 WEEKS GESTATION OF PREGNANCY: Primary | ICD-10-CM

## 2022-08-09 PROCEDURE — 99213 OFFICE O/P EST LOW 20 MIN: CPT | Performed by: OBSTETRICS & GYNECOLOGY

## 2022-08-09 NOTE — ASSESSMENT & PLAN NOTE
Newly diagnosed  Fasting glucose 130s  Follows with MFM  First appt yesterday, 8/8  Pt willing to start medication if cannot control sugars on her own     No results found for: HGBA1C

## 2022-08-09 NOTE — PROGRESS NOTES
OB/GYN prenatal visit    S: 36 y o  [id number] 31w5d here for PN visit  She has no obstetric complaints, including pelvic pain, contractions, vaginal bleeding, loss of fluid, or decreased fetal movement  O:  Vitals:    22 1044   BP: 118/86   Pulse: 98       Gen: no acute distress, nonlabored breathing  Cardio: RRR, S1, S2 present, no r/g/m  Resp: CTABL  Abdomen: +BS, non tender, Gravid            A/P:  H/O pre-eclampsia  /86  Continue LD ASA until 39 w    Gestational Diabetes  Newly diagnosed  Fasting glucose 130s  Saw MFM first time yesterday  Pt willing to start medication if cannot control sugars on her own         IUP at 31w5d  No obstetric complaints today  S/p TDAP vaccine     Birth plan: interested in epidural  Discussed  labor precautions and fetal kick counts    Return to clinic in 2 weeks        28-32 weeks: tdap, flu vaccine, sign consent form, check in al, Niko Hawthorne DO  2022  11:14 AM

## 2022-08-09 NOTE — ASSESSMENT & PLAN NOTE
No obstetric complaints  28 week labs: Hgb 10 4- takes PO iron; Gestational diabetes- follows MFM, not currently on medications  Fasting glucose 130s  [facility] - interval growth scheduled 22  TDaP UTD, given     Delivery consent signed   Contraception: partner vasectomy  Birth plan: RLTCS  Discussed  labor precautions and FKCs  RTC in 2 weeks

## 2022-08-12 ENCOUNTER — OFFICE VISIT (OUTPATIENT)
Dept: PERINATAL CARE | Facility: CLINIC | Age: 32
End: 2022-08-12
Payer: COMMERCIAL

## 2022-08-12 ENCOUNTER — ULTRASOUND (OUTPATIENT)
Dept: PERINATAL CARE | Facility: CLINIC | Age: 32
End: 2022-08-12
Payer: COMMERCIAL

## 2022-08-12 VITALS
HEART RATE: 104 BPM | SYSTOLIC BLOOD PRESSURE: 117 MMHG | BODY MASS INDEX: 33.16 KG/M2 | HEIGHT: 64 IN | WEIGHT: 194.2 LBS | DIASTOLIC BLOOD PRESSURE: 71 MMHG

## 2022-08-12 DIAGNOSIS — Z36.89 ENCOUNTER FOR ULTRASOUND TO ASSESS FETAL GROWTH: ICD-10-CM

## 2022-08-12 DIAGNOSIS — Z36.2 ENCOUNTER FOR FOLLOW-UP ULTRASOUND OF FETAL ANATOMY: ICD-10-CM

## 2022-08-12 DIAGNOSIS — O99.213 OBESITY AFFECTING PREGNANCY IN THIRD TRIMESTER: ICD-10-CM

## 2022-08-12 DIAGNOSIS — O24.419 GESTATIONAL DIABETES MELLITUS (GDM) IN THIRD TRIMESTER, GESTATIONAL DIABETES METHOD OF CONTROL UNSPECIFIED: Primary | ICD-10-CM

## 2022-08-12 DIAGNOSIS — Z98.891 HISTORY OF 2 CESAREAN SECTIONS: ICD-10-CM

## 2022-08-12 DIAGNOSIS — O24.414 INSULIN CONTROLLED GESTATIONAL DIABETES MELLITUS (GDM) IN THIRD TRIMESTER: Primary | ICD-10-CM

## 2022-08-12 DIAGNOSIS — Z3A.26 26 WEEKS GESTATION OF PREGNANCY: ICD-10-CM

## 2022-08-12 DIAGNOSIS — Z3A.32 32 WEEKS GESTATION OF PREGNANCY: ICD-10-CM

## 2022-08-12 DIAGNOSIS — D57.3 SICKLE CELL TRAIT IN MOTHER AFFECTING PREGNANCY (HCC): ICD-10-CM

## 2022-08-12 DIAGNOSIS — O09.293 HX OF PREECLAMPSIA, PRIOR PREGNANCY, CURRENTLY PREGNANT, THIRD TRIMESTER: ICD-10-CM

## 2022-08-12 DIAGNOSIS — O99.019 SICKLE CELL TRAIT IN MOTHER AFFECTING PREGNANCY (HCC): ICD-10-CM

## 2022-08-12 PROCEDURE — 99213 OFFICE O/P EST LOW 20 MIN: CPT | Performed by: OBSTETRICS & GYNECOLOGY

## 2022-08-12 PROCEDURE — 76816 OB US FOLLOW-UP PER FETUS: CPT | Performed by: OBSTETRICS & GYNECOLOGY

## 2022-08-12 PROCEDURE — G0108 DIAB MANAGE TRN  PER INDIV: HCPCS

## 2022-08-12 RX ORDER — INSULIN GLARGINE 100 [IU]/ML
INJECTION, SOLUTION SUBCUTANEOUS
Qty: 15 ML | Refills: 0 | Status: SHIPPED | OUTPATIENT
Start: 2022-08-12 | End: 2022-09-11

## 2022-08-12 NOTE — PROGRESS NOTES
Please refer to the [facility] ultrasound report in Ob Procedures for additional information regarding the visit to the [facility]  today  Recommend a follow-up growth scan in 4 weeks and starting her on twice weekly NST and weekly SURJIT secondary to GDMa2 with elevated fasting blood sugars requiring us to start insulin       Brenda Goins MD

## 2022-08-12 NOTE — PATIENT INSTRUCTIONS
Thank you for choosing us for your  care today  If you have any questions about your ultrasound or care, please do not hesitate to contact us or your primary obstetrician  Some general instructions for your pregnancy are:    Protect against coronavirus: get vaccinated - pregnant women are increased risk of severe COVID  Notify your primary care doctor if you have any symptoms  Exercise: Aim for 22 minutes per day (150 minutes per week) of regular exercise  Walking is great! Nutrition: aim for calcium-rich and iron-rich foods as well as healthy sources of protein  Learn about Preeclampsia: preeclampsia is a common, serious high blood pressure complication in pregnancy  A blood pressure of 025XFWU (systolic or top number) or 45WPEL (diastolic or bottom number) is not normal and needs evaluation by your doctor  Aspirin is sometimes prescribed in early pregnancy to prevent preeclampsia in women with risk factors - ask your obstetrician if you should be on this medication  If you smoke, try to reduce how many cigarettes you smoke or try to quit completely  Do not vape  Other warning signs to watch out for in pregnancy or postpartum: chest pain, obstructed breathing or shortness of breath, seizures, thoughts of hurting yourself or your baby, bleeding, a painful or swollen leg, fever, or headache (see AWHONN POST-BIRTH Warning Signs campaign)  If these happen call 911  Itching is also not normal in pregnancy and if you experience this, especially over your hands and feet, potentially worse at night, notify your doctors

## 2022-08-12 NOTE — PROGRESS NOTES
Thank you for referring your patient to [facility] Maternal Fetal Medicine Diabetes in Pregnancy Program      Zaida Jain is a  36 y o  female who presents today for Class 2 and Insulin Teaching  Patient cancelled previous class 2 office visit  Dr Selena Hercules requested patient be seen today due to large baby on ultrasound and elevated FBG measurements  Dr Selena Hercules requested insulin therapy be started  Patient is at 32w1d gestation, Estimated Date of Delivery: 10/6/22  Reviewed and updated the following from patients medical record: PMH, Problem List, Allergies, and Current Medications  Visit Diagnosis:  GDM in pregnancy method of control unspecified    Additional Pregnancy Complications:  Obesity    Labs:    Lab Results   Component Value Date    [id number] 220 (H) 07/21/2022       Lab Results   Component Value Date    GLUF 91 06/18/2018    IPQEIKU7WJ 106 06/18/2018    RVABIVI4RD 87 06/18/2018    FFETGYR0ZS 56 (L) 06/18/2018        Prescriptions provided at today's visit for HGA1c and CMP  Patient reported she will complete blood work today  Medications:  Lantus start 20 units at 11:30 PM daily  Patient is agreeable to start Metformin or a quick acting insulin if needed based on future reports       Anthropometrics:  Ht Readings from Last 3 Encounters:   08/12/22 5' 4" (1 626 m)   08/09/22 5' 4" (1 626 m)   08/08/22 5' 4" (1 626 m)     Wt Readings from Last 3 Encounters:   08/12/22 88 1 kg (194 lb 3 2 oz)   08/09/22 88 5 kg (195 lb)   08/08/22 88 4 kg (194 lb 12 8 oz)     Pre-gravid weight: 80 7 kg (177 lb 14 6 oz)  Pre-gravid BMI: 30 52  Weight Change: 7 389 kg (16 lb 4 6 oz)  Weight gain recommendations: BMI (> 30) 11-20 lbs  Comments: Patient may gain up to 4 pounds for duration of her pregnancy    Recent Ultra Sound Results:  Date:8/12/22 Macrosomia  Fetal Growth:AC:>97% and EFW>97%  SURJIT: Normal  Next US date: 9/7/22  To start twice weekly antepartum testing scheduled today based on starting medication     Blood Glucose Monitoring:  Reinforcement of blood glucose goals and reporting guidelines  Glucose Meter: Free Style [place] Lite  Testing blood sugars: 4 x per day (Fasting, 2 hour after start of each meal)  Method of reporting blood sugars:Glucose Flowsheet    Report blood sugar results weekly via:  Phone: [phone]   OR  My Chart (Message with image attachment, or Glucose Flowsheet)    Goal Blood Sugar Ranges:   Fastin-90 mg/dL  1 hour after the start of each meal: 140 mg/dL or less  2 hours after start of each meal: 120 mg/dL or less      BG Log:  Review of blood glucose log  Patient reported she is sometimes eating a snack right before testing lunch and dinner measurements  Timing of meals and snacks was reinforced today based on patient's schedule  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     Meal Plan:  Calories: 2100 calorie    Diet Type: Regular  Additional Nutrition concerns: Timing of snacks and 2 hr pp measurements  Patient has stopped drinking fruit juice and gatorade  She is typically drinking water and G2  Diet review: Trying to follow the meal plan  Patient has been craving fruit and sometimes portions are too high  She has included protein in every meal and snack  Diet Instruction:  The patient was instructed on the followin  Individualized meal plan  2  Importance of consistent carbohydrate intake via 3 meals and 3 snacks per day   3  Importance of protein as it relates to blood glucose control  4  Encouraged  patient to eat every 2 0-3 5 hours while awake  5  Encouraged patient to go no longer than 8-10 hours fasting overnight until first meal of the day  6  Provided suggested meal/snack options to increase nutrition and maintain consistent meal and snack intakes  Physical Activity:  Discussed benefits of physical activity to optimize blood glucose control, encouraged activity at patient is physically able  Always consult a physician prior to starting an exercise program  Recommend 20-30 minutes daily  Is patient physically active? trying to wak 20 minutes a day and has noticed on days working her blood sugars are better     Sick day Guidelines:   Patient advised that sickness will raise blood sugar and need to continue medication regimen as directed  If blood sugar is > 160 mg/dL twice in one day call doctor  Instructed on what to do when unable to consume normal meal plan  Hypoglycemia & Treatment Guidelines:  Reviewed what hypoglycemia is, signs and symptoms, and how to treat via the 15:15 rule  Post-Partum Guidelines:  Completion of 75 gm CHO 2 hr gtt at 6 weeks post-partum to check for Type 2 DM diagnosis    Breastfeeding Guidelines:  Continue GDM meal plan plus additional 350-500 calories daily  Stay hydrated by drinking 8-10 (8 oz ) fluids daily  Examples of protein and carbohydrate snacks provided  Dining Out & Travel Guidelines:  Patient advised to be prepared with extra diabetes supplies, medications, and snacks, as well as sticking to the same time schedule and portions eaten at home for meals and snacks  Maternal-Fetal Testing:    Ultrasounds- growth scans every 4 weeks  NST- twice weekly starting at 32nd week GA   SURJIT- weekly starting at 32 weeks GA      Insulin Education:    Lantus start 20 units at 11:30 PM daily    The patient was instructed on the following:   Insulin administration times, insulin action   Hypoglycemia signs, symptoms and treatment  Advised patient to test blood sugar at 3:00 am for first 3 mornings following insulin start to monitor for hypoglycemia   Increase in maternal-fetal surveillance with insulin initiation   Side effects of hyperglycemia in pregnancy including macrosomia,  hypoglycemia, polyhydramnios, pre-term labor and stillbirth     Continue to monitor blood glucoses via fingerstick fasting (goal 60 mg/dl to 90 mg/dl) and two hours post prandial (goal less than 120 mg/dl)   Non-stress testing two times weekly and SURJIT testing beginning at 32 weeks gestation   Ultrasounds every 4 weeks at the [facility] Maternal Fetal Medicine   HbA1c every 6 to 8 weeks    Patient Stated Goal: "I will involve my family in my diabetes care"  Goal Assessment: On track    Diabetes Self Management Support Plan outside of ongoing care: Spouse/Family    Learner/s Present:Learners Present: Patient   Barriers to Learning/Change: No Barriers and Financial  Expected Compliance: good    Date to report blood sugars: Requested patient report on Monday, 8/15/22 and twice weekly due to FBG measurements  Discussed flow sheet and requested patient log insulin dosage on flow sheet and check 3 am blood sugar for 3 days  Follow up date: 8/23/22 with GABO Gilliland    Begin Time: 11:30 am  End Time: 12:30 PM    It was a pleasure working with them today  Please feel free to call with any questions or concerns      Dannie Bond, DON,LDN,CDE  Diabetes Educator  [facility] Maternal Fetal [facility]  Diabetes in Pregnancy Program  [address] [place] [address],[address]  [address]

## 2022-08-12 NOTE — LETTER
August 13, 2022     Tari Estrada MD  [place] [address]    Patient: Leola Smiley   YOB: 1990   Date of Visit: 8/12/2022       Dear Dr Fitzpatrick Sep: Thank you for referring Leola Smiley to me for evaluation  Below are my notes for this consultation  If you have questions, please do not hesitate to call me  I look forward to following your patient along with you  Sincerely,        João Flannery MD        CC: No Recipients  João Flannery MD  8/13/2022 12:59 PM  Sign when Signing Visit    Please refer to the [facility] ultrasound report in Ob Procedures for additional information regarding the visit to the [facility]  today  Recommend a follow-up growth scan in 4 weeks and starting her on twice weekly NST and weekly SURJIT secondary to GDMa2 with elevated fasting blood sugars requiring us to start insulin       João Flannery MD

## 2022-08-16 ENCOUNTER — ROUTINE PRENATAL (OUTPATIENT)
Dept: PERINATAL CARE | Facility: CLINIC | Age: 32
End: 2022-08-16
Payer: COMMERCIAL

## 2022-08-16 VITALS
DIASTOLIC BLOOD PRESSURE: 66 MMHG | BODY MASS INDEX: 33.36 KG/M2 | HEIGHT: 64 IN | SYSTOLIC BLOOD PRESSURE: 114 MMHG | WEIGHT: 195.4 LBS | HEART RATE: 90 BPM

## 2022-08-16 DIAGNOSIS — Z3A.32 32 WEEKS GESTATION OF PREGNANCY: ICD-10-CM

## 2022-08-16 DIAGNOSIS — O24.414 INSULIN CONTROLLED GESTATIONAL DIABETES MELLITUS (GDM) IN THIRD TRIMESTER: Primary | ICD-10-CM

## 2022-08-16 DIAGNOSIS — O36.60X0 FETAL MACROSOMIA AFFECTING MANAGEMENT OF MOTHER, ANTEPARTUM: ICD-10-CM

## 2022-08-16 PROCEDURE — 59025 FETAL NON-STRESS TEST: CPT | Performed by: NURSE PRACTITIONER

## 2022-08-16 PROCEDURE — 99024 POSTOP FOLLOW-UP VISIT: CPT | Performed by: NURSE PRACTITIONER

## 2022-08-16 PROCEDURE — 99213 OFFICE O/P EST LOW 20 MIN: CPT | Performed by: NURSE PRACTITIONER

## 2022-08-16 NOTE — PROGRESS NOTES
[address]: Ms Solange Shen was seen today for NST (found under the pregnancy episode) which I reviewed the RN assessment and agree  NST is reactive  Started insulin 8/12 and her fastings are improving  Reminded to upload numbers to determine if insulin needs to be adjusted  Please don't hesitate to contact our office with any concerns or questions   -GABO Ennis      I spent 10 minutes devoted to patient care (3 min chart preparation, 4 minutes face to face and 3 minutes documenting)

## 2022-08-16 NOTE — LETTER
August 16, 2022     Wale Han MD  [address] [address]    Patient: Tg Rivers   YOB: 1990   Date of Visit: 8/16/2022       Dear Dr Yahaira Benjamin:    Tg Rivers was seen today for NST which was reactive  If you have questions, please do not hesitate to call me  I look forward to following your patient along with you  Sincerely,        GABO Chadwick        CC: No Recipients  GABO Chadwick  8/16/2022 10:00 AM  Incomplete  [address] [place] [address] Road: Ms Stacy Vargas was seen today for NST (found under the pregnancy episode) which I reviewed the RN assessment and agree  NST is reactive  Started insulin 8/12 and her fastings are improving  Reminded to upload numbers to determine if insulin needs to be adjusted  Please don't hesitate to contact our office with any concerns or questions   -GABO Chadwick      I spent 10 minutes devoted to patient care (3 min chart preparation, 4 minutes face to face and 3 minutes documenting)  GABO Chadwick  8/16/2022  9:43 AM  Sign when Signing Visit  [address] [place] [address] Road: Ms Stacy Vargas was seen today for NST (found under the pregnancy episode) which I reviewed the RN assessment and agree  NST is reactive  Started insulin 8/12    Please don't hesitate to contact our office with any concerns or questions   -GABO Chadwick

## 2022-08-16 NOTE — PROGRESS NOTES
Non-Stress Testing:    Non-Stress test, equipment, procedure, and expected outcomes reviewed  Reviewed fetal kick counts and when to call OB  Karrie Nissen Verified patient understanding of fetal kick counts with teach back method  Patient reports feeling daily fetal movements  Patient has no questions or concerns

## 2022-08-16 NOTE — LETTER
[address] Corky Lax  MRN: [id number] MRN: [id number] MRN: [id number]  : [birth year]  Date of Delivery: 10/6/22 JOY:  JOY:           [address] [place]y Lax  MRN: [id number] MRN: [id number] MRN: [id number]  : [birth year]  Date of Delivery: 10/6/22 JOY: JOY:       [address] [place]y Lax  MRN: [id number] MRN: [id number] MRN: [id number]  : [birth year]  Date of Delivery: 10/6/22 JOY: JOY:        [address] Corky Lax  MRN: [id number] MRN: [id number] MRN: [id number]  : [birth year] : 1990  Date of Delivery: 10/6/22 JOY: JOY:       [address] Corky Lax  MRN: [id number] MRN: [id number] MRN: [id number]  : [birth year] : 1990  Date of Delivery: 10/6/22 JOY: JOY:      [address] [place]y Lax  MRN: [id number] MRN: [id number] MRN: [id number]  : [birth year] : 1990  Date of Delivery: 10/6/22 JOY: JOY:      Dominic Hadley  MRN: [id number] MRN: [id number] MRN: [id number]  : [birth year] : 1990  Date of Delivery: 10/6/22 JOY: JOY:

## 2022-08-16 NOTE — LETTER
NST sleeve cover sheet    Patient name: Kt Rangel  : 1990  MRN: [id number]    JOY: Estimated Date of Delivery: 10/6/22    Obstetrician: Renan      Reason(s) for testing: GEST DM     Testing frequency:    __X_ 2x/wk  ___ 1x/wk  ___ Dopplers  ___ BPP?       Last growth scan: __________________________________________

## 2022-08-16 NOTE — PATIENT INSTRUCTIONS
Thank you for choosing us for your  care today  If you have any questions about your ultrasound or care, please do not hesitate to contact us or your primary obstetrician  Some general instructions for your pregnancy are:    Protect against coronavirus: get vaccinated - pregnant women are increased risk of severe COVID  Notify your primary care doctor if you have any symptoms  Exercise: Aim for 22 minutes per day (150 minutes per week) of regular exercise  Walking is great! Nutrition: aim for calcium-rich and iron-rich foods as well as healthy sources of protein  Learn about Preeclampsia: preeclampsia is a common, serious high blood pressure complication in pregnancy  A blood pressure of 532OGBO (systolic or top number) or 08OTJF (diastolic or bottom number) is not normal and needs evaluation by your doctor  Aspirin is sometimes prescribed in early pregnancy to prevent preeclampsia in women with risk factors - ask your obstetrician if you should be on this medication  If you smoke, try to reduce how many cigarettes you smoke or try to quit completely  Do not vape  Other warning signs to watch out for in pregnancy or postpartum: chest pain, obstructed breathing or shortness of breath, seizures, thoughts of hurting yourself or your baby, bleeding, a painful or swollen leg, fever, or headache (see AWHONN POST-BIRTH Warning Signs campaign)  If these happen call 911  Itching is also not normal in pregnancy and if you experience this, especially over your hands and feet, potentially worse at night, notify your doctors

## 2022-08-18 ENCOUNTER — ULTRASOUND (OUTPATIENT)
Dept: PERINATAL CARE | Facility: OTHER | Age: 32
End: 2022-08-18
Payer: COMMERCIAL

## 2022-08-18 VITALS
HEART RATE: 101 BPM | SYSTOLIC BLOOD PRESSURE: 110 MMHG | DIASTOLIC BLOOD PRESSURE: 60 MMHG | BODY MASS INDEX: 33.46 KG/M2 | WEIGHT: 195.99 LBS | HEIGHT: 64 IN

## 2022-08-18 DIAGNOSIS — Z3A.33 33 WEEKS GESTATION OF PREGNANCY: ICD-10-CM

## 2022-08-18 DIAGNOSIS — O24.414 INSULIN CONTROLLED GESTATIONAL DIABETES MELLITUS (GDM) IN THIRD TRIMESTER: Primary | ICD-10-CM

## 2022-08-18 PROCEDURE — 59025 FETAL NON-STRESS TEST: CPT | Performed by: OBSTETRICS & GYNECOLOGY

## 2022-08-18 PROCEDURE — 76815 OB US LIMITED FETUS(S): CPT | Performed by: OBSTETRICS & GYNECOLOGY

## 2022-08-18 NOTE — LETTER
Baby: Male   /   Female   /   [place]                     Baby Name: ___________________            IOL: _____________________              C/S: _____________________    NST sleeve cover sheet    Patient name: Darell Daiz  : 1990  MRN: [id number]    JOY: Estimated Date of Delivery: 10/6/22    Obstetrician: [facility]     Reason(s) for testing: A2GDM    Testing frequency:    _X_  2x/wk  ___  1x/wk  ___  Dopplers  ___  BPP?       Last growth scan: ___________

## 2022-08-18 NOTE — PROGRESS NOTES
Repeat Non-Stress Testing:    Patient verbalizes +FM  Pt denies ALL:               Leaking of fluid   Contractions   Vaginal bleeding   Decreased fetal movement    Patient is performing daily kick counts  Patient has no questions or concerns     NST strip reviewed by Dr Russ Chou

## 2022-08-22 ENCOUNTER — ROUTINE PRENATAL (OUTPATIENT)
Dept: PERINATAL CARE | Facility: CLINIC | Age: 32
End: 2022-08-22
Payer: COMMERCIAL

## 2022-08-22 ENCOUNTER — APPOINTMENT (OUTPATIENT)
Dept: LAB | Facility: CLINIC | Age: 32
End: 2022-08-22
Payer: COMMERCIAL

## 2022-08-22 VITALS
HEART RATE: 88 BPM | WEIGHT: 196.4 LBS | SYSTOLIC BLOOD PRESSURE: 106 MMHG | HEIGHT: 64 IN | DIASTOLIC BLOOD PRESSURE: 65 MMHG | BODY MASS INDEX: 33.53 KG/M2

## 2022-08-22 DIAGNOSIS — O24.414 INSULIN CONTROLLED GESTATIONAL DIABETES MELLITUS (GDM) IN THIRD TRIMESTER: Primary | ICD-10-CM

## 2022-08-22 DIAGNOSIS — Z3A.33 33 WEEKS GESTATION OF PREGNANCY: ICD-10-CM

## 2022-08-22 LAB
ALBUMIN SERPL BCP-MCNC: 2.5 G/DL (ref 3.5–5)
ALP SERPL-CCNC: 103 U/L (ref 46–116)
ALT SERPL W P-5'-P-CCNC: 13 U/L (ref 12–78)
ANION GAP SERPL CALCULATED.3IONS-SCNC: 6 MMOL/L (ref 4–13)
AST SERPL W P-5'-P-CCNC: 11 U/L (ref 5–45)
BASOPHILS # BLD AUTO: 0.02 THOUSANDS/ΜL (ref 0–0.1)
BASOPHILS NFR BLD AUTO: 0 % (ref 0–1)
BILIRUB SERPL-MCNC: 0.47 MG/DL (ref 0.2–1)
BUN SERPL-MCNC: 5 MG/DL (ref 5–25)
CALCIUM ALBUM COR SERPL-MCNC: 10 MG/DL (ref 8.3–10.1)
CALCIUM SERPL-MCNC: 8.8 MG/DL (ref 8.3–10.1)
CHLORIDE SERPL-SCNC: 106 MMOL/L (ref 96–108)
CHOLEST SERPL-MCNC: 199 MG/DL
CO2 SERPL-SCNC: 22 MMOL/L (ref 21–32)
CREAT SERPL-MCNC: 0.55 MG/DL (ref 0.6–1.3)
EOSINOPHIL # BLD AUTO: 0.04 THOUSAND/ΜL (ref 0–0.61)
EOSINOPHIL NFR BLD AUTO: 1 % (ref 0–6)
ERYTHROCYTE [DISTWIDTH] IN BLOOD BY AUTOMATED COUNT: 13.7 % (ref 11.6–15.1)
EST. AVERAGE GLUCOSE BLD GHB EST-MCNC: 134 MG/DL
GFR SERPL CREATININE-BSD FRML MDRD: 127 ML/MIN/1.73SQ M
GLUCOSE P FAST SERPL-MCNC: 93 MG/DL (ref 65–99)
HBA1C MFR BLD: 6.3 %
HCT VFR BLD AUTO: 32 % (ref 34.8–46.1)
HDLC SERPL-MCNC: 80 MG/DL
HGB BLD-MCNC: 10.4 G/DL (ref 11.5–15.4)
IMM GRANULOCYTES # BLD AUTO: 0.02 THOUSAND/UL (ref 0–0.2)
IMM GRANULOCYTES NFR BLD AUTO: 0 % (ref 0–2)
LDLC SERPL CALC-MCNC: 83 MG/DL (ref 0–100)
LYMPHOCYTES # BLD AUTO: 1.59 THOUSANDS/ΜL (ref 0.6–4.47)
LYMPHOCYTES NFR BLD AUTO: 25 % (ref 14–44)
MCH RBC QN AUTO: 25.7 PG (ref 26.8–34.3)
MCHC RBC AUTO-ENTMCNC: 32.5 G/DL (ref 31.4–37.4)
MCV RBC AUTO: 79 FL (ref 82–98)
MONOCYTES # BLD AUTO: 0.45 THOUSAND/ΜL (ref 0.17–1.22)
MONOCYTES NFR BLD AUTO: 7 % (ref 4–12)
NEUTROPHILS # BLD AUTO: 4.35 THOUSANDS/ΜL (ref 1.85–7.62)
NEUTS SEG NFR BLD AUTO: 67 % (ref 43–75)
NRBC BLD AUTO-RTO: 0 /100 WBCS
PLATELET # BLD AUTO: 240 THOUSANDS/UL (ref 149–390)
PMV BLD AUTO: 9.7 FL (ref 8.9–12.7)
POTASSIUM SERPL-SCNC: 3.9 MMOL/L (ref 3.5–5.3)
PROT SERPL-MCNC: 6.7 G/DL (ref 6.4–8.4)
RBC # BLD AUTO: 4.04 MILLION/UL (ref 3.81–5.12)
SODIUM SERPL-SCNC: 134 MMOL/L (ref 135–147)
TRIGL SERPL-MCNC: 180 MG/DL
WBC # BLD AUTO: 6.47 THOUSAND/UL (ref 4.31–10.16)

## 2022-08-22 PROCEDURE — 59025 FETAL NON-STRESS TEST: CPT | Performed by: OBSTETRICS & GYNECOLOGY

## 2022-08-22 PROCEDURE — 83036 HEMOGLOBIN GLYCOSYLATED A1C: CPT

## 2022-08-22 NOTE — PATIENT INSTRUCTIONS
Kick Counts in Pregnancy   WHAT YOU NEED TO KNOW:   What do I need to know about kick counts? Kick counts measure how much your baby is moving in your womb  A kick from your baby can be felt as a twist, turn, swish, roll, or jab  It is common to feel your baby kicking at 26 to 28 weeks of pregnancy  You may feel your baby kick as early as 20 weeks of pregnancy  You may want to start counting at 28 weeks  Why should I measure kick counts? Your baby's movement may provide information about your baby's health  He or she may move less, or not at all, if there are problems  Your baby may move less if he or she is not getting enough oxygen or nutrition from the placenta  Do not smoke while you are pregnant  Smoking decreases the amount of oxygen that gets to your baby  Talk to your healthcare provider if you need help to quit smoking  Tell your healthcare provider as soon as you feel a change in your baby's movements  When do I measure kick counts? Measure kick counts at the same time every day  Measure kick counts when your baby is awake and most active  Your baby may be most active in the evening  How do I measure kick counts? Check that your baby is awake before you measure kick counts  You can wake up your baby by lightly pushing on your belly, walking, or drinking something cold  Your healthcare provider may tell you different ways to measure kick counts  You may be told to do the following:  Use a chart or clock to keep track of the time you start and finish counting  Sit in a chair or lie on your left side  Place your hands on the largest part of your belly  Count until you reach 10 kicks  Write down how much time it takes to count 10 kicks  It may take 30 minutes to 2 hours to count 10 kicks  It should not take more than 2 hours to count 10 kicks  When should I contact my doctor? You feel a change in the number of kicks or movements of your baby       You feel fewer than 10 kicks within 2 hours  You have questions or concerns about your baby's movements  CARE AGREEMENT:   You have the right to help plan your care  Learn about your health condition and how it may be treated  Discuss treatment options with your healthcare providers to decide what care you want to receive  You always have the right to refuse treatment  The above information is an  only  It is not intended as medical advice for individual conditions or treatments  Talk to your doctor, nurse or pharmacist before following any medical regimen to see if it is safe and effective for you  © Copyright [employer] 2022 Information is for End User's use only and may not be sold, redistributed or otherwise used for commercial purposes   All illustrations and images included in CareNotes® are the copyrighted property of A D A M , Inc  or [address] [place], [place] [address]pe

## 2022-08-23 ENCOUNTER — TELEMEDICINE (OUTPATIENT)
Dept: PERINATAL CARE | Facility: CLINIC | Age: 32
End: 2022-08-23
Payer: COMMERCIAL

## 2022-08-23 ENCOUNTER — ROUTINE PRENATAL (OUTPATIENT)
Dept: OBGYN CLINIC | Facility: CLINIC | Age: 32
End: 2022-08-23

## 2022-08-23 VITALS
SYSTOLIC BLOOD PRESSURE: 117 MMHG | HEART RATE: 104 BPM | BODY MASS INDEX: 33.63 KG/M2 | HEIGHT: 64 IN | DIASTOLIC BLOOD PRESSURE: 64 MMHG | WEIGHT: 197 LBS

## 2022-08-23 VITALS — BODY MASS INDEX: 33.63 KG/M2 | WEIGHT: 197 LBS | HEIGHT: 64 IN

## 2022-08-23 DIAGNOSIS — O24.414 INSULIN CONTROLLED GESTATIONAL DIABETES MELLITUS (GDM) IN THIRD TRIMESTER: ICD-10-CM

## 2022-08-23 DIAGNOSIS — Z87.59 H/O PRE-ECLAMPSIA: ICD-10-CM

## 2022-08-23 DIAGNOSIS — O99.213 MATERNAL OBESITY, ANTEPARTUM, THIRD TRIMESTER: ICD-10-CM

## 2022-08-23 DIAGNOSIS — Z3A.33 33 WEEKS GESTATION OF PREGNANCY: Primary | ICD-10-CM

## 2022-08-23 DIAGNOSIS — O24.414 INSULIN CONTROLLED GESTATIONAL DIABETES MELLITUS (GDM) IN THIRD TRIMESTER: Primary | ICD-10-CM

## 2022-08-23 DIAGNOSIS — Z3A.33 33 WEEKS GESTATION OF PREGNANCY: ICD-10-CM

## 2022-08-23 PROCEDURE — 99213 OFFICE O/P EST LOW 20 MIN: CPT | Performed by: OBSTETRICS & GYNECOLOGY

## 2022-08-23 PROCEDURE — G2012 BRIEF CHECK IN BY MD/QHP: HCPCS | Performed by: NURSE PRACTITIONER

## 2022-08-23 NOTE — ASSESSMENT & PLAN NOTE
-A1c 6 3% in pre-diabetes range; goal is 5 6% or less   -6 to 12 weeks post delivery complete 2 hour glucose tolerance test for diabetes screening   -Due to hyperglycemia; increase Lantus to 30 units daily at 11:30 PM   -Be sure to have a bedtime snack that is a combination of carbohydrates and protein  -Try to eat every 2 to 3 5 hours during the day with combination of carbohydrates and protein per meal/snack    -No more than 8 to 10 hours of fasting overnight   -Continue SMBG fasting; 2 hours post start of meal and with hypoglycemia   -Send a message every Monday and Thursday for glucose readings to be review   -Glucose goals fasting 60-90; 2 hours post meal 120 or less   -Always have glucose available for hypoglycemia; use 15 by 15 rule  -Continue follow up with OB and MFM as recommended  -NST twice a week and SURJIT weekly    -Fetal growth ultrasound every 4 to 6 weeks as recommended   -Continue close contact with diabetes team    Lab Results   Component Value Date    HGBA1C 6 3 (H) 08/22/2022

## 2022-08-23 NOTE — ASSESSMENT & PLAN NOTE
No obstetric complaints  28 week labs: Hgb 10 4- takes PO iron; Gestational diabetes- follows MFM, currently Lantus 20 units qhs  MFM recommends increasing to 30 units qhs  Pt has bi-weekly NSTs  TDaP UTD, given   Delivery consent signed   Contraception: partner vasectomy- likely to get vasectomy prior to pt delivery  Discussed back up contraception until one month vasectomy f/u  Birth plan: RLTCS   Scheduled  at 8am  Discussed  labor precautions and FKCs  RTC in 2 weeks

## 2022-08-23 NOTE — PROGRESS NOTES
Virtual Regular Visit    Verification of patient location:[place]    Patient is located in the following state in which I hold an active license [place]      Assessment/Plan:    Problem List Items Addressed This Visit        Endocrine    Insulin controlled gestational diabetes mellitus (GDM) in third trimester - Primary     -A1c 6 3% in pre-diabetes range; goal is 5 6% or less   -6 to 12 weeks post delivery complete 2 hour glucose tolerance test for diabetes screening   -Due to hyperglycemia; increase Lantus to 30 units daily at 11:30 PM   -Be sure to have a bedtime snack that is a combination of carbohydrates and protein  -Try to eat every 2 to 3 5 hours during the day with combination of carbohydrates and protein per meal/snack    -No more than 8 to 10 hours of fasting overnight   -Continue SMBG fasting; 2 hours post start of meal and with hypoglycemia   -Send a message every Monday and Thursday for glucose readings to be review   -Glucose goals fasting 60-90; 2 hours post meal 120 or less   -Always have glucose available for hypoglycemia; use 15 by 15 rule  -Continue follow up with OB and MFM as recommended  -NST twice a week and SURJIT weekly  -Fetal growth ultrasound every 4 to 6 weeks as recommended   -Continue close contact with diabetes team    Lab Results   Component Value Date    HGBA1C 6 3 (H) 08/22/2022               Other    Maternal obesity, antepartum, third trimester     -Pre-pregnancy weight 177 lbs  -Current weight 197 lbs  -TWG 20 lbs  -Recommended weight gain 11 to 20 lbs  -Continue GDM diet             33 weeks gestation of pregnancy    BMI 33 0-33 9,adult               Reason for visit is   Chief Complaint   Patient presents with    Virtual Regular Visit        Encounter provider Willi Funez [place]    Provider located at [address] [place] [id number] [phone]      Recent Visits  No visits were found meeting these conditions  Showing recent visits within past 7 days and meeting all other requirements  Today's Visits  Date Type Provider Dept   22 Telemedicine [facility], [facility] today's visits and meeting all other requirements  Future Appointments  No visits were found meeting these conditions  Showing future appointments within next 150 days and meeting all other requirements       The patient was identified by name and date of birth  Ronal Sutherland was informed that this is a telemedicine visit and that the visit is being conducted through Telephone  My office door was closed  No one else was in the room  She acknowledged consent and understanding of privacy and security of the video platform  The patient has agreed to participate and understands they can discontinue the visit at any time  Patient is aware this is a billable service  Subjective  Ronal Sutherland is a 36 y o  female          HPI     Past Medical History:   Diagnosis Date    Abscess of buttock, right 2019     delivery delivered     RESOLVED: [id number]    Diet controlled gestational diabetes mellitus (GDM) in third trimester 2022    Disease of thyroid gland     hyperthyroid    Hidradenitis suppurativa     LAST ASSESSED: [id number]    History of early menarche     FIRST PERIOD AT 8YEARS OLD    Hyperemesis gravidarum     RESOLVED: [id number]    Hypertension in pregnancy, pre-eclampsia     prior preg    Migraine     Sickle cell trait ([facility] [address] )        Past Surgical History:   Procedure Laterality Date    ABDOMINAL SURGERY      ABSCESS DRAINAGE  2016    INCISION AND DRAINAGE OF SKIN ABSCESS; LABIAL CYST REMOVAL - [facility]     SECTION      CHOLECYSTECTOMY      INCISION AND DRAINAGE OF WOUND Right 2019    Procedure: INCISION AND DRAINAGE (I&D) BUTTOCK;  Surgeon: Nguyễn Rivas MD;  Location: [facility];  Service: [place] [id number] N/A 2018    Procedure:  SECTION () REPEAT;  Surgeon: Telma Levin MD;  Location: [facility];  Service: Obstetrics    TX EXC SWEAT GLAND Brionna Preston Right 2016    Procedure: EXCISION GROIN CYST HIDRADENITIS;  Surgeon: Elida Kaye DO;  [facility];  Service: General    TONSILLECTOMY      VULVA BIOPSY N/A 2016    Procedure: INCISION AND DRAINAGE, EXCISION OF LABIAL CYST ;  Surgeon: Madelaine Coronel DO;  [facility];  Service:        Current Outpatient Medications   Medication Sig Dispense Refill    aspirin 81 mg chewable tablet Chew 2 tablets (162 mg total) daily 60 tablet 6    bimatoprost (LATISSE) 0 03 % ophthalmic solution PLACE ONE DROP ON APPLICATOR AND APPLY EVENLY ALONG THE SKIN OF THE UPPER EYELID AT BASE OF EYELASHES ONCE DAILY AT BEDTIME REPEAT PROCEDURE FOR SECOND EYE (USE A CLEAN APPLICATOR)   (Patient not taking: No sig reported) 5 mL 3    doxylamine (UNISON) 25 MG tablet Take 1 tablet (25 mg total) by mouth daily at bedtime as needed for sleep 30 tablet 1    famotidine (PEPCID) 10 mg tablet Take 1 tablet (10 mg total) by mouth 2 (two) times a day 60 tablet 3    glucose blood test strip Use as instructed 100 each 1    glucose monitoring kit (FREESTYLE) monitoring kit Use 1 each as needed (glucose monitoring) 1 each 0    Insulin Pen Needle 31G X 5 MM MISC Use with Lantus solostar insulin pen daily 100 each 4    Lancets (freestyle) lancets Use 4 (four) times daily (after meals and at bedtime) Use as instructed 100 each 1    Lantus SoloStar 100 units/mL SOPN Inject under the skin 20 units at bedtime daily 11:30 PM 15 mL 0    magnesium oxide (MAG-OX) 400 mg Take 1 tablet (400 mg total) by mouth daily 60 tablet 3    Prenatal Vit-Fe Fumarate-FA (prenatal multivitamin) 28-0 8 MG TABS Take 1 tablet by mouth daily      Pyridoxine HCl (vitamin B-6) 25 MG tablet Take 1 tablet (25 mg total) by mouth daily 30 tablet 1    Riboflavin 400 MG CAPS Take 1 capsule (400 mg total) by mouth in the morning 60 capsule 3     No current facility-administered medications for this visit  No Known Allergies    Review of Systems   Constitutional: Negative for fatigue and fever  Eyes: Negative for visual disturbance  Respiratory: Positive for shortness of breath  Negative for cough  Cardiovascular: Negative for chest pain, palpitations and leg swelling  Gastrointestinal: Negative for constipation, nausea and vomiting  Endocrine: Positive for polyuria  Negative for polydipsia and polyphagia  Genitourinary: Negative for difficulty urinating and vaginal bleeding  Musculoskeletal:        Leg cramps  Neurological: Negative for headaches  Psychiatric/Behavioral: Positive for sleep disturbance (nocturia)  Video Exam  Refer to glucose flowsheet; hyperglycemia noted overall  Vitals:    08/23/22 1504   Weight: 89 4 kg (197 lb)   Height: 5' 4" (1 626 m)       Physical Exam   It was my intent to perform this visit via video technology but the patient was not able to do a video connection so the visit was completed via audio telephone only  I spent 30 minutes directly with the patient during this visit

## 2022-08-23 NOTE — PROGRESS NOTES
OB/GYN prenatal visit    S: 36 y o  [id number] 33w5d here for PN visit  She has no obstetric complaints, including pelvic pain, contractions, vaginal bleeding, loss of fluid, or decreased fetal movement  O:  Vitals:    22 1000   BP: 117/64   Pulse: 104       Gen: no acute distress, nonlabored breathing   Cardio: RRR, S1, S2 present, no r/g/m  Resp: CTABL, non labored  Abdomen: +BS, non tender, Gravid   Fundus measures 34cm        A/P:  H/O pre-eclampsia  - /64  - Continue LD ASA until 39 w    Gestational Diabetes  - Follows with MFM  - Currently on Lantus 20 units qhs  MFM recommends increasing to 30 units qhs    - Pt's A1c is 6 3%  Recommended to completed 2 hour glucose tolerance test for diabetes screening 6-12 weeks post delivery  IUP at 33w5d  · No obstetric complaints  · 28 week labs: Hgb 10 4- takes PO iron; Gestational diabetes- follows MFM, Pt has bi-weekly NSTs  · TDaP UTD, given   · Delivery consent signed   · Contraception: partner vasectomy- likely to get vasectomy prior to pt delivery  Discussed back up contraception until one month vasectomy f/u  · Birth plan: RLTCS   Scheduled  at 8am  · Discussed  labor precautions and FKCs  · RTC in 2 weeks          Donnelly Cooks, DO  2022  11:19 AM

## 2022-08-23 NOTE — PATIENT INSTRUCTIONS
-A1c 6 3% in pre-diabetes range; goal is 5 6% or less   -6 to 12 weeks post delivery complete 2 hour glucose tolerance test for diabetes screening   -Due to hyperglycemia; increase Lantus to 30 units daily at 11:30 PM   -Be sure to have a bedtime snack that is a combination of carbohydrates and protein  -Try to eat every 2 to 3 5 hours during the day with combination of carbohydrates and protein per meal/snack    -No more than 8 to 10 hours of fasting overnight   -Continue SMBG fasting; 2 hours post start of meal and with hypoglycemia   -Send a message every Monday and Thursday for glucose readings to be review   -Glucose goals fasting 60-90; 2 hours post meal 120 or less   -Always have glucose available for hypoglycemia; use 15 by 15 rule  -Continue follow up with OB and MFM as recommended  -NST twice a week and SURJIT weekly    -Fetal growth ultrasound every 4 to 6 weeks as recommended   -Continue close contact with diabetes team

## 2022-08-23 NOTE — ASSESSMENT & PLAN NOTE
-Pre-pregnancy weight 177 lbs  -Current weight 197 lbs  -TWG 20 lbs  -Recommended weight gain 11 to 20 lbs  -Continue GDM diet

## 2022-08-23 NOTE — ASSESSMENT & PLAN NOTE
Lab Results   Component Value Date    HGBA1C 6 3 (H) 08/22/2022   - Follows with MFM  - Currently on Lantus 20 units qhs  MFM recommends increasing to 30 units qhs    - Pt's A1c is 6 3%  Recommended to completed 2 hour glucose tolerance test for diabetes screening 6-12 weeks post delivery

## 2022-08-24 PROBLEM — Z87.59 H/O PRE-ECLAMPSIA: Status: RESOLVED | Noted: 2022-03-15 | Resolved: 2022-08-24

## 2022-08-29 ENCOUNTER — DOCUMENTATION (OUTPATIENT)
Dept: PERINATAL CARE | Facility: CLINIC | Age: 32
End: 2022-08-29

## 2022-08-29 ENCOUNTER — ROUTINE PRENATAL (OUTPATIENT)
Dept: PERINATAL CARE | Facility: CLINIC | Age: 32
End: 2022-08-29
Payer: COMMERCIAL

## 2022-08-29 VITALS
DIASTOLIC BLOOD PRESSURE: 60 MMHG | HEIGHT: 64 IN | HEART RATE: 97 BPM | SYSTOLIC BLOOD PRESSURE: 110 MMHG | WEIGHT: 197.4 LBS | BODY MASS INDEX: 33.7 KG/M2

## 2022-08-29 DIAGNOSIS — O24.414 INSULIN CONTROLLED GESTATIONAL DIABETES MELLITUS (GDM) IN THIRD TRIMESTER: ICD-10-CM

## 2022-08-29 DIAGNOSIS — Z3A.34 34 WEEKS GESTATION OF PREGNANCY: Primary | ICD-10-CM

## 2022-08-29 PROCEDURE — 59025 FETAL NON-STRESS TEST: CPT | Performed by: OBSTETRICS & GYNECOLOGY

## 2022-08-29 NOTE — PROGRESS NOTES
Blood Sugar Log ( - )  Method of Reporting: [facility] Glucose Flowsheet   Date: 22    Patient: Fredi Yoon  : 1990    JOY: Estimated Date of Delivery: 10/6/22  GA: 34w4d    Reason for Documentation: Blood Sugar Log ( - ) and Gestational Diabetes (Insulin Controlled)     ASSESSMENT - REVIEW OF BG LOG     BG Log:  *Testing Fasting, 2hrs After the START of each meal, and Night-time Glucose  *30 Units Lantus        Assessment:  FBG: Not Well Controlled  PPBG: Not Well Controlled    PLAN     MEDICATION: Insulin (Lantus) daily at 11:30pm    () Increased to 40 units per [address]        () Increased to 30 units     () Dr Jaswinder Agarwal requested insulin therapy be started; Pt started on 20 units of Lantus daily at 11:30pm    DIET:   Continue Assigned Meal Plan (including 3 meals and 3 snacks): 2000 calorie (CHO:45-15-60-15-60-30) (PRO: 2/3-1-3/4-1-3/4-2)    BLOOD SUGAR MONITORING: (Glucometer: Free Style Freedom Lite)  Continue Testing B x per day (Fasting, 2 hour after start of each meal)     () [address] - requested pt to report every Monday and Thursday (send a message) for blood sugars to be reviewed so recommendations can be provided for insulin adjustments    PHYSICAL ACTIVITY:  Continue walking (or other preferred physical activity) daily - recommend at least 20-30 minutes daily, preferably after dinner if able (unless otherwise instructed per OB)     MONITORING     EDUCATION:  [x] Class 1   [x] Class 2 (included insulin education) *Dr Jaswinder Agarwal requested patient be seen due to large baby on ultrasound elevated FBG measurements  -  Dr Jaswinder Agarwal requested insulin therapy be started  - Patient Goal: "I will involve my family in my diabetes care"     WEIGHT:     Pre-Gravid Wt Pre-Gravid BMI TWG   80 7 kg (177 lb 14 6 oz) 30 52 8 84 kg (19 lb 7 8 oz)     FETAL MONITORITNG - ULTRASOUNDS  Reviewed Recent Ultrasounds Findings on 22:  Indicates Fetal Macrosomia per OB, AC: >97%  EFW: >97%    Next Scheduled US: 9/2/22    *Further Fetal Surveillance: Beginning at 32 weeks, NST / SURJIT twice a week    LABS  Lab Results   Component Value Date/Time    [id number] 220 (H) 07/21/2022 12:41 PM    [id number] 103 01/30/2015 11:39 AM    GLUF 93 08/22/2022 02:01 PM    GLUF 91 06/18/2018 10:50 AM    [id number] 106 06/18/2018 10:50 AM    TOBUZAT4AD 87 06/18/2018 01:23 PM    [id number] 56 (L) 06/18/2018 10:50 AM    HGBA1C 6 3 (H) 08/22/2022 02:01 PM     (8/23) [facility] informed pt of A1c 6 3% in pre-diabetes range and requested pt complete 2hrGTT 6 to 12wks post delivery for diabetes screening    Malorie Lira RD  Diabetes Educator  Diabetes in Pregnancy Program   Maternal Fetal Medicine  [facility]

## 2022-08-29 NOTE — PROGRESS NOTES
Repeat Non-Stress Testing:    Patient verbalizes +FM  Pt denies ALL:               Leaking of fluid   Contractions   Vaginal bleeding   Decreased fetal movement    Patient is performing daily kick counts  Patient has no questions or concerns  NST strip reviewed by Dr Melissa Verdugo

## 2022-08-29 NOTE — LETTER
August 29, 2022     [place], [address] [address]    Patient: Adrian Reyes   YOB: 1990   Date of Visit: 8/29/2022       Dear Abbey Fernandez: Thank you for referring Adrian Reyes to me for evaluation  Below are my notes for this consultation  If you have questions, please do not hesitate to call me  I look forward to following your patient along with you           Sincerely,        Carolynn Elliott MD        CC: No Recipients

## 2022-09-06 ENCOUNTER — ROUTINE PRENATAL (OUTPATIENT)
Dept: OBGYN CLINIC | Facility: CLINIC | Age: 32
End: 2022-09-06

## 2022-09-06 VITALS
WEIGHT: 197 LBS | SYSTOLIC BLOOD PRESSURE: 108 MMHG | BODY MASS INDEX: 33.63 KG/M2 | HEART RATE: 101 BPM | HEIGHT: 64 IN | DIASTOLIC BLOOD PRESSURE: 74 MMHG

## 2022-09-06 DIAGNOSIS — Z3A.35 35 WEEKS GESTATION OF PREGNANCY: Primary | ICD-10-CM

## 2022-09-06 DIAGNOSIS — O24.414 INSULIN CONTROLLED GESTATIONAL DIABETES MELLITUS (GDM) IN THIRD TRIMESTER: ICD-10-CM

## 2022-09-06 PROCEDURE — 87150 DNA/RNA AMPLIFIED PROBE: CPT

## 2022-09-06 PROCEDURE — 99213 OFFICE O/P EST LOW 20 MIN: CPT | Performed by: OBSTETRICS & GYNECOLOGY

## 2022-09-06 NOTE — PROGRESS NOTES
OB/GYN prenatal visit    S: 29 y o  [id number] 35w5d here for PN visit  She has no obstetric complaints, including pelvic pain, contractions, vaginal bleeding, loss of fluid, or decreased fetal movement  Stopped the ASA yesterday  Currently on Lantus 40u qhs - fasting sugar this morning was 103  Last NST was Friday - wnl      O:  Vitals:    22 1051   BP: 108/74   Pulse: 101       General: AAOX3  No acute distress  Cardiovascular: Regular, Rate and Rhythm, no murmur, gallop or rub   Lungs: Clear to Auscultation Bilaterally, no wheezing, rhonchi or rales    Abdominal: Soft  NT/ND  Gravid    Fundal height: 36 cm  FHT: 140-145      Plan discussed with Dr Darian Keane          A/P:  1  35 weeks gestation of pregnancy  Assessment & Plan:  IUP at 35w5d  No obstetric complaints today  Hx: GDM on Lantus 40u qhs with biweekly NSTs, weekly AFIs - followed by MFLUIS, diabetes team  Hx of preeclampsia in prior pregnancy - finishing ASA at 36 weeks  TWG: + 8 6kg (recommended weight gain 11-20lbs)  TDAP vaccine given  Contraception:  getting vasectomy   Breastfeeding: Formula feed  Birth plan: C/S on    Discussed  labor precautions and fetal kick counts    Return to clinic in 1 weeks    COVID 19 precautions were discussed with patient at length, reviewed symptoms, hygiene, social distancing, patient to call office  with questions/concerns    Orders:  -     Strep B DNA probe, amplification    2   Insulin controlled gestational diabetes mellitus (GDM) in third trimester  Assessment & Plan:    Lab Results   Component Value Date    HGBA1C 6 3 (H) 2022     Currently on Lantus 40u qhs, managed by MFM  Getting NSTs biweekly, SURJIT weekly  Fasting sugar this morning 103, improving glucose levels          36 weeks: collect GBS/PCN allergy, check fetal position, contraception and birth plan, Guera Roberson MD  2022  11:23 AM

## 2022-09-06 NOTE — ASSESSMENT & PLAN NOTE
IUP at 35w5d  No obstetric complaints today  Hx: GDM on Lantus 40u qhs with biweekly NSTs, weekly AFIs - followed by MFLUIS, diabetes team  Hx of preeclampsia in prior pregnancy - finishing ASA at 36 weeks     TWG: + 8 6kg (recommended weight gain 11-20lbs)  TDAP vaccine given  Contraception:  getting vasectomy   Breastfeeding: Formula feed  Birth plan: C/S on    Discussed  labor precautions and fetal kick counts    Return to clinic in 1 weeks    COVID 19 precautions were discussed with patient at length, reviewed symptoms, hygiene, social distancing, patient to call office  with questions/concerns

## 2022-09-06 NOTE — ASSESSMENT & PLAN NOTE
Lab Results   Component Value Date    HGBA1C 6 3 (H) 08/22/2022     Currently on Lantus 40u qhs, managed by MFLUIS  Getting NSTs biweekly, SURJIT weekly  Fasting sugar this morning 103, improving glucose levels

## 2022-09-07 ENCOUNTER — DOCUMENTATION (OUTPATIENT)
Dept: PERINATAL CARE | Facility: CLINIC | Age: 32
End: 2022-09-07

## 2022-09-07 ENCOUNTER — ULTRASOUND (OUTPATIENT)
Dept: PERINATAL CARE | Facility: CLINIC | Age: 32
End: 2022-09-07
Payer: COMMERCIAL

## 2022-09-07 VITALS
DIASTOLIC BLOOD PRESSURE: 66 MMHG | WEIGHT: 197.6 LBS | SYSTOLIC BLOOD PRESSURE: 118 MMHG | HEIGHT: 64 IN | HEART RATE: 100 BPM | BODY MASS INDEX: 33.73 KG/M2

## 2022-09-07 DIAGNOSIS — O24.414 INSULIN CONTROLLED GESTATIONAL DIABETES MELLITUS (GDM) IN THIRD TRIMESTER: ICD-10-CM

## 2022-09-07 DIAGNOSIS — Z3A.35 35 WEEKS GESTATION OF PREGNANCY: ICD-10-CM

## 2022-09-07 PROCEDURE — 76816 OB US FOLLOW-UP PER FETUS: CPT | Performed by: OBSTETRICS & GYNECOLOGY

## 2022-09-07 PROCEDURE — 59025 FETAL NON-STRESS TEST: CPT | Performed by: OBSTETRICS & GYNECOLOGY

## 2022-09-07 PROCEDURE — 99214 OFFICE O/P EST MOD 30 MIN: CPT | Performed by: OBSTETRICS & GYNECOLOGY

## 2022-09-07 NOTE — PROGRESS NOTES
Blood Sugar Log (2022 - 2022)  Method of Reporting: MyChart Glucose Flowsheet   Date: 22    Patient: Adrian Reyes  : 1990    JOY: Estimated Date of Delivery: 10/6/22  GA: 35w6d     ` Repeat  scheduled for 2022    Reason for Documentation: Blood sugar log     ASSESSMENT - REVIEW OF BG LOG     BG Log:  *Testing Fasting, 2hrs After the START of each meal, and Night-time Glucose  *40 Units Lantus      PLAN     MEDICATION: Insulin (Lantus) daily at 11:30pm    Per Dr Coburn Scot: Increase Lantus from 40 units up to 50 units, split into 25 units each injection  Patient confirmed splitting into two injections in the same body part at least 1 inch from each other  Request patient to report every Monday and Thursday  Reviewed to add after dinner readings more consistently  Mealtime insulin may need to be added  DIET:    Continue Assigned Meal Plan (including 3 meals and 3 snacks): 2000 calorie (ULN:87-93-17-55-46-02) (PRO: 2/3-1-3/4-1-3/4-2)    BLOOD SUGAR MONITORING: (Glucometer: Free Style Freedom Lite)   Continue Testing B x per day (Fasting, 2 hour after start of each meal)     () Dylan Meeks - requested pt to report every Monday and Thursday (send a message) for blood sugars to be reviewed so recommendations can be provided for insulin adjustments    PHYSICAL ACTIVITY:   Continue walking (or other preferred physical activity) daily - recommend at least 20-30 minutes daily, preferably after dinner if able (unless otherwise instructed per OB)     MONITORING     EDUCATION:  [x] Class 1   [x] Class 2 (included insulin education) *Dr Wilian Fermin requested patient be seen due to large baby on ultrasound elevated FBG measurements  -  Dr Wilian Fermin requested insulin therapy be started      - Patient Goal: "I will involve my family in my diabetes care"     WEIGHT:     Pre-Gravid Wt Pre-Gravid BMI TWG   80 7 kg (177 lb 14 6 oz) 30 52 8 931 kg (19 lb 11 oz)     FETAL [address]   Reviewed Recent Ultrasounds Findings on 8/12/22: Indicates Fetal Macrosomia per OB  o EFW: >97%          AC >97%        HC 72%   09/07/2022:  Fetal Macrosomia, normal SURJIT  o  EFW >97% %      AC >97% % HC 81%     *Further Fetal Surveillance: NST / SURJIT twice a week    LABS  Lab Results   Component Value Date/Time    [id number] 220 (H) 07/21/2022 12:41 PM    [id number] 103 01/30/2015 11:39 AM    GLUF 93 08/22/2022 02:01 PM    GLUF 91 06/18/2018 10:50 AM    RRRPUEY8VX 106 06/18/2018 10:50 AM    AVBOVPY5BI 87 06/18/2018 01:23 PM    FMHUVTG7FQ 56 (L) 06/18/2018 10:50 AM    HGBA1C 6 3 (H) 08/22/2022 02:01 PM     (8/23) [facility] informed pt of A1c 6 3% in pre-diabetes range and requested pt complete 2hrGTT 6 to 12wks post delivery for diabetes screening    Athena Mcardle, RN  Diabetes in Pregnancy Program   Maternal Fetal Medicine  [address]

## 2022-09-07 NOTE — PROGRESS NOTES
Repeat Non-Stress Testing:    Pt verbalizes +FM  Pt denies ALL:               Leaking of fluid   Contractions   Vaginal bleeding   Decreased fetal movement    Patient has no questions or concerns  Dr Nessa Lopez viewed NST prior to completion of appointment

## 2022-09-08 LAB — GP B STREP DNA SPEC QL NAA+PROBE: NEGATIVE

## 2022-09-09 ENCOUNTER — ROUTINE PRENATAL (OUTPATIENT)
Dept: PERINATAL CARE | Facility: CLINIC | Age: 32
End: 2022-09-09
Payer: COMMERCIAL

## 2022-09-09 VITALS
SYSTOLIC BLOOD PRESSURE: 122 MMHG | DIASTOLIC BLOOD PRESSURE: 74 MMHG | BODY MASS INDEX: 33.9 KG/M2 | WEIGHT: 198.6 LBS | HEIGHT: 64 IN | HEART RATE: 96 BPM

## 2022-09-09 DIAGNOSIS — O24.414 INSULIN CONTROLLED GESTATIONAL DIABETES MELLITUS (GDM) IN THIRD TRIMESTER: Primary | ICD-10-CM

## 2022-09-09 DIAGNOSIS — Z3A.36 36 WEEKS GESTATION OF PREGNANCY: ICD-10-CM

## 2022-09-09 PROCEDURE — 99212 OFFICE O/P EST SF 10 MIN: CPT | Performed by: NURSE PRACTITIONER

## 2022-09-09 PROCEDURE — 59025 FETAL NON-STRESS TEST: CPT | Performed by: NURSE PRACTITIONER

## 2022-09-09 NOTE — PROGRESS NOTES
[address]: Ms Debbi Curtis was seen today for NST (found under the pregnancy episode) which I reviewed the RN assessment and agree  NST is reactive without deceleraton  She increased her lantus from 40 to 50 units (split dose) on 9/7/22   Please don't hesitate to contact our office with any concerns or questions   -GABO Zavala      I spent 12 minutes devoted to patient care (4 min chart preparation, 4 minutes face to face and 4 minutes documenting)

## 2022-09-09 NOTE — PROGRESS NOTES
Repeat Non-Stress Testing:    Pt verbalizes +FM  Pt denies ALL:               Leaking of fluid   Contractions   Vaginal bleeding   Decreased fetal movement    Patient has no questions or concerns  Yuniel Offer viewed NST prior to completion of appointment

## 2022-09-09 NOTE — PATIENT INSTRUCTIONS
Thank you for choosing us for your  care today  If you have any questions about your ultrasound or care, please do not hesitate to contact us or your primary obstetrician  Some general instructions for your pregnancy are:    Protect against coronavirus: get vaccinated - pregnant women are increased risk of severe COVID  Notify your primary care doctor if you have any symptoms  Exercise: Aim for 22 minutes per day (150 minutes per week) of regular exercise  Walking is great! Nutrition: aim for calcium-rich and iron-rich foods as well as healthy sources of protein  Learn about Preeclampsia: preeclampsia is a common, serious high blood pressure complication in pregnancy  A blood pressure of 540LQVJ (systolic or top number) or 37EUXK (diastolic or bottom number) is not normal and needs evaluation by your doctor  Aspirin is sometimes prescribed in early pregnancy to prevent preeclampsia in women with risk factors - ask your obstetrician if you should be on this medication  If you smoke, try to reduce how many cigarettes you smoke or try to quit completely  Do not vape  Other warning signs to watch out for in pregnancy or postpartum: chest pain, obstructed breathing or shortness of breath, seizures, thoughts of hurting yourself or your baby, bleeding, a painful or swollen leg, fever, or headache (see [facility] POST-BIRTH Warning Signs campaign)  If these happen call 911  Itching is also not normal in pregnancy and if you experience this, especially over your hands and feet, potentially worse at night, notify your doctors

## 2022-09-10 DIAGNOSIS — O24.410 DIET CONTROLLED GESTATIONAL DIABETES MELLITUS (GDM) IN THIRD TRIMESTER: ICD-10-CM

## 2022-09-11 ENCOUNTER — HOSPITAL ENCOUNTER (EMERGENCY)
Facility: HOSPITAL | Age: 32
End: 2022-09-11
Attending: EMERGENCY MEDICINE
Payer: COMMERCIAL

## 2022-09-11 VITALS
DIASTOLIC BLOOD PRESSURE: 67 MMHG | TEMPERATURE: 97.9 F | SYSTOLIC BLOOD PRESSURE: 120 MMHG | OXYGEN SATURATION: 100 % | HEART RATE: 90 BPM | RESPIRATION RATE: 20 BRPM

## 2022-09-11 DIAGNOSIS — K21.9 GERD (GASTROESOPHAGEAL REFLUX DISEASE): ICD-10-CM

## 2022-09-11 DIAGNOSIS — R07.9 CHEST PAIN: Primary | ICD-10-CM

## 2022-09-11 DIAGNOSIS — Z3A.32 32 WEEKS GESTATION OF PREGNANCY: ICD-10-CM

## 2022-09-11 DIAGNOSIS — Z34.90 PREGNANCY: ICD-10-CM

## 2022-09-11 DIAGNOSIS — O24.414 INSULIN CONTROLLED GESTATIONAL DIABETES MELLITUS (GDM) IN THIRD TRIMESTER: ICD-10-CM

## 2022-09-11 DIAGNOSIS — O99.213 OBESITY AFFECTING PREGNANCY IN THIRD TRIMESTER: ICD-10-CM

## 2022-09-11 LAB
2HR DELTA HS TROPONIN: 0 NG/L
ALBUMIN SERPL BCP-MCNC: 2.4 G/DL (ref 3.5–5)
ALP SERPL-CCNC: 124 U/L (ref 46–116)
ALT SERPL W P-5'-P-CCNC: 13 U/L (ref 12–78)
ANION GAP SERPL CALCULATED.3IONS-SCNC: 9 MMOL/L (ref 4–13)
AST SERPL W P-5'-P-CCNC: 14 U/L (ref 5–45)
ATRIAL RATE: 79 BPM
ATRIAL RATE: 80 BPM
ATRIAL RATE: 81 BPM
ATRIAL RATE: 85 BPM
BASOPHILS # BLD AUTO: 0.01 THOUSANDS/ΜL (ref 0–0.1)
BASOPHILS NFR BLD AUTO: 0 % (ref 0–1)
BILIRUB SERPL-MCNC: 0.67 MG/DL (ref 0.2–1)
BUN SERPL-MCNC: 5 MG/DL (ref 5–25)
CALCIUM ALBUM COR SERPL-MCNC: 10.3 MG/DL (ref 8.3–10.1)
CALCIUM SERPL-MCNC: 9 MG/DL (ref 8.3–10.1)
CARDIAC TROPONIN I PNL SERPL HS: 3 NG/L
CARDIAC TROPONIN I PNL SERPL HS: 3 NG/L
CHLORIDE SERPL-SCNC: 105 MMOL/L (ref 96–108)
CO2 SERPL-SCNC: 21 MMOL/L (ref 21–32)
CREAT SERPL-MCNC: 0.58 MG/DL (ref 0.6–1.3)
D DIMER PPP FEU-MCNC: 3.84 UG/ML FEU
EOSINOPHIL # BLD AUTO: 0.01 THOUSAND/ΜL (ref 0–0.61)
EOSINOPHIL NFR BLD AUTO: 0 % (ref 0–6)
ERYTHROCYTE [DISTWIDTH] IN BLOOD BY AUTOMATED COUNT: 14.5 % (ref 11.6–15.1)
GFR SERPL CREATININE-BSD FRML MDRD: 124 ML/MIN/1.73SQ M
GLUCOSE SERPL-MCNC: 97 MG/DL (ref 65–140)
HCT VFR BLD AUTO: 32 % (ref 34.8–46.1)
HGB BLD-MCNC: 10.1 G/DL (ref 11.5–15.4)
IMM GRANULOCYTES # BLD AUTO: 0.06 THOUSAND/UL (ref 0–0.2)
IMM GRANULOCYTES NFR BLD AUTO: 1 % (ref 0–2)
LIPASE SERPL-CCNC: 88 U/L (ref 73–393)
LYMPHOCYTES # BLD AUTO: 1.35 THOUSANDS/ΜL (ref 0.6–4.47)
LYMPHOCYTES NFR BLD AUTO: 15 % (ref 14–44)
MCH RBC QN AUTO: 23.8 PG (ref 26.8–34.3)
MCHC RBC AUTO-ENTMCNC: 31.6 G/DL (ref 31.4–37.4)
MCV RBC AUTO: 76 FL (ref 82–98)
MONOCYTES # BLD AUTO: 0.45 THOUSAND/ΜL (ref 0.17–1.22)
MONOCYTES NFR BLD AUTO: 5 % (ref 4–12)
NEUTROPHILS # BLD AUTO: 6.86 THOUSANDS/ΜL (ref 1.85–7.62)
NEUTS SEG NFR BLD AUTO: 79 % (ref 43–75)
NRBC BLD AUTO-RTO: 0 /100 WBCS
P AXIS: 37 DEGREES
P AXIS: 39 DEGREES
P AXIS: 50 DEGREES
P AXIS: 52 DEGREES
PLATELET # BLD AUTO: 233 THOUSANDS/UL (ref 149–390)
PMV BLD AUTO: 9.3 FL (ref 8.9–12.7)
POTASSIUM SERPL-SCNC: 3.9 MMOL/L (ref 3.5–5.3)
PR INTERVAL: 132 MS
PR INTERVAL: 132 MS
PR INTERVAL: 134 MS
PR INTERVAL: 134 MS
PROT SERPL-MCNC: 7 G/DL (ref 6.4–8.4)
QRS AXIS: 65 DEGREES
QRS AXIS: 66 DEGREES
QRS AXIS: 74 DEGREES
QRS AXIS: 82 DEGREES
QRSD INTERVAL: 86 MS
QRSD INTERVAL: 88 MS
QRSD INTERVAL: 88 MS
QRSD INTERVAL: 90 MS
QT INTERVAL: 380 MS
QT INTERVAL: 380 MS
QT INTERVAL: 394 MS
QT INTERVAL: 400 MS
QTC INTERVAL: 438 MS
QTC INTERVAL: 452 MS
QTC INTERVAL: 457 MS
QTC INTERVAL: 458 MS
RBC # BLD AUTO: 4.24 MILLION/UL (ref 3.81–5.12)
SODIUM SERPL-SCNC: 135 MMOL/L (ref 135–147)
T WAVE AXIS: -1 DEGREES
T WAVE AXIS: -3 DEGREES
T WAVE AXIS: 14 DEGREES
T WAVE AXIS: 14 DEGREES
VENTRICULAR RATE: 79 BPM
VENTRICULAR RATE: 80 BPM
VENTRICULAR RATE: 81 BPM
VENTRICULAR RATE: 85 BPM
WBC # BLD AUTO: 8.74 THOUSAND/UL (ref 4.31–10.16)

## 2022-09-11 PROCEDURE — 83690 ASSAY OF LIPASE: CPT | Performed by: EMERGENCY MEDICINE

## 2022-09-11 PROCEDURE — 93308 TTE F-UP OR LMTD: CPT | Performed by: EMERGENCY MEDICINE

## 2022-09-11 PROCEDURE — 93010 ELECTROCARDIOGRAM REPORT: CPT | Performed by: INTERNAL MEDICINE

## 2022-09-11 PROCEDURE — 84484 ASSAY OF TROPONIN QUANT: CPT | Performed by: EMERGENCY MEDICINE

## 2022-09-11 PROCEDURE — 80053 COMPREHEN METABOLIC PANEL: CPT | Performed by: EMERGENCY MEDICINE

## 2022-09-11 PROCEDURE — 85025 COMPLETE CBC W/AUTO DIFF WBC: CPT | Performed by: EMERGENCY MEDICINE

## 2022-09-11 PROCEDURE — 96374 THER/PROPH/DIAG INJ IV PUSH: CPT

## 2022-09-11 PROCEDURE — 99285 EMERGENCY DEPT VISIT HI MDM: CPT | Performed by: EMERGENCY MEDICINE

## 2022-09-11 PROCEDURE — 93005 ELECTROCARDIOGRAM TRACING: CPT

## 2022-09-11 PROCEDURE — 99285 EMERGENCY DEPT VISIT HI MDM: CPT

## 2022-09-11 PROCEDURE — C9113 INJ PANTOPRAZOLE SODIUM, VIA: HCPCS | Performed by: EMERGENCY MEDICINE

## 2022-09-11 PROCEDURE — 85379 FIBRIN DEGRADATION QUANT: CPT | Performed by: EMERGENCY MEDICINE

## 2022-09-11 PROCEDURE — 36415 COLL VENOUS BLD VENIPUNCTURE: CPT | Performed by: EMERGENCY MEDICINE

## 2022-09-11 PROCEDURE — 99284 EMERGENCY DEPT VISIT MOD MDM: CPT | Performed by: EMERGENCY MEDICINE

## 2022-09-11 PROCEDURE — 96361 HYDRATE IV INFUSION ADD-ON: CPT

## 2022-09-11 PROCEDURE — 96375 TX/PRO/DX INJ NEW DRUG ADDON: CPT

## 2022-09-11 RX ORDER — SUCRALFATE 1 G/1
1 TABLET ORAL ONCE
Status: COMPLETED | OUTPATIENT
Start: 2022-09-11 | End: 2022-09-11

## 2022-09-11 RX ORDER — PANTOPRAZOLE SODIUM 40 MG/10ML
40 INJECTION, POWDER, LYOPHILIZED, FOR SOLUTION INTRAVENOUS ONCE
Status: COMPLETED | OUTPATIENT
Start: 2022-09-11 | End: 2022-09-11

## 2022-09-11 RX ORDER — MAGNESIUM HYDROXIDE/ALUMINUM HYDROXICE/SIMETHICONE 120; 1200; 1200 MG/30ML; MG/30ML; MG/30ML
30 SUSPENSION ORAL ONCE
Status: COMPLETED | OUTPATIENT
Start: 2022-09-11 | End: 2022-09-11

## 2022-09-11 RX ORDER — LIDOCAINE HYDROCHLORIDE 20 MG/ML
15 SOLUTION OROPHARYNGEAL ONCE
Status: COMPLETED | OUTPATIENT
Start: 2022-09-11 | End: 2022-09-11

## 2022-09-11 RX ORDER — CALCIUM CARBONATE 200(500)MG
500 TABLET,CHEWABLE ORAL DAILY PRN
Status: DISCONTINUED | OUTPATIENT
Start: 2022-09-11 | End: 2022-09-12 | Stop reason: HOSPADM

## 2022-09-11 RX ORDER — FAMOTIDINE 20 MG/1
20 TABLET, FILM COATED ORAL ONCE
Status: COMPLETED | OUTPATIENT
Start: 2022-09-11 | End: 2022-09-11

## 2022-09-11 RX ORDER — INSULIN GLARGINE 100 [IU]/ML
INJECTION, SOLUTION SUBCUTANEOUS
Qty: 5 ML | Refills: 0 | Status: SHIPPED | OUTPATIENT
Start: 2022-09-11 | End: 2022-09-21 | Stop reason: SDUPTHER

## 2022-09-11 RX ORDER — FENTANYL CITRATE 50 UG/ML
50 INJECTION, SOLUTION INTRAMUSCULAR; INTRAVENOUS ONCE
Status: COMPLETED | OUTPATIENT
Start: 2022-09-11 | End: 2022-09-11

## 2022-09-11 RX ADMIN — FENTANYL CITRATE 50 MCG: 50 INJECTION INTRAMUSCULAR; INTRAVENOUS at 20:22

## 2022-09-11 RX ADMIN — ALUMINUM HYDROXIDE, MAGNESIUM HYDROXIDE, AND SIMETHICONE 30 ML: 200; 200; 20 SUSPENSION ORAL at 18:19

## 2022-09-11 RX ADMIN — SUCRALFATE 1 G: 1 TABLET ORAL at 19:23

## 2022-09-11 RX ADMIN — SODIUM CHLORIDE 1000 ML: 0.9 INJECTION, SOLUTION INTRAVENOUS at 19:02

## 2022-09-11 RX ADMIN — LIDOCAINE HYDROCHLORIDE 15 ML: 20 SOLUTION ORAL; TOPICAL at 18:19

## 2022-09-11 RX ADMIN — PANTOPRAZOLE SODIUM 40 MG: 40 INJECTION, POWDER, FOR SOLUTION INTRAVENOUS at 18:59

## 2022-09-11 RX ADMIN — FAMOTIDINE 20 MG: 20 TABLET, FILM COATED ORAL at 21:29

## 2022-09-11 NOTE — ED PROVIDER NOTES
History  Chief Complaint   Patient presents with    Heartburn     Pregnant due , heart burn and sob since 1000 today  29 Y O F  36 weeks pregnant presents with epigastric discomfort with some associated SOB since this morning  She describes it as "heart burn"  She has had heartburn before but states that never this bad  Denies radiation of the pain to the back, not pleuritic in nature  Denies fever, chills, nausea, vomiting, palpitations, vaginal bleeding, cramping, LE swelling and pain  Prior to Admission Medications   Prescriptions Last Dose Informant Patient Reported? Taking? Insulin Pen Needle 31G X 5 MM MISC  Self No No   Sig: Use with Lantus solostar insulin pen daily   Lancets (freestyle) lancets  Self No No   Sig: Use 4 (four) times daily (after meals and at bedtime) Use as instructed   Lantus SoloStar 100 units/mL SOPN   No No   Sig: Inject under the skin 50 units at bedtime daily 11:30 PM   Prenatal Vit-Fe Fumarate-FA (prenatal multivitamin) 28-0 8 MG TABS  Self Yes No   Sig: Take 1 tablet by mouth daily   Pyridoxine HCl (vitamin B-6) 25 MG tablet   No No   Sig: Take 1 tablet (25 mg total) by mouth daily   Patient not taking: No sig reported   Riboflavin 400 MG CAPS   No No   Sig: Take 1 capsule (400 mg total) by mouth in the morning   Patient not taking: No sig reported   aspirin 81 mg chewable tablet   No No   Sig: Chew 2 tablets (162 mg total) daily   bimatoprost (LATISSE) 0 03 % ophthalmic solution  Self No No   Sig: PLACE ONE DROP ON APPLICATOR AND APPLY EVENLY ALONG THE SKIN OF THE UPPER EYELID AT BASE OF EYELASHES ONCE DAILY AT BEDTIME REPEAT PROCEDURE FOR SECOND EYE (USE A CLEAN APPLICATOR)     Patient not taking: No sig reported   doxylamine (UNISON) 25 MG tablet   No No   Sig: Take 1 tablet (25 mg total) by mouth daily at bedtime as needed for sleep   Patient not taking: No sig reported   glucose monitoring kit (FREESTYLE) monitoring kit  Self No No   Sig: Use 1 each as needed (glucose monitoring)   magnesium oxide (MAG-OX) 400 mg   No No   Sig: Take 1 tablet (400 mg total) by mouth daily   Patient not taking: No sig reported      Facility-Administered Medications: None       Past Medical History:   Diagnosis Date    Abscess of buttock, right 2019     delivery delivered     RESOLVED: [id number]    Diet controlled gestational diabetes mellitus (GDM) in third trimester 2022    Disease of thyroid gland     hyperthyroid    Hidradenitis suppurativa     LAST ASSESSED: [id number]    History of early menarche     FIRST PERIOD AT 8YEARS OLD    Hyperemesis gravidarum     RESOLVED: [id number]    Hypertension in pregnancy, pre-eclampsia     prior preg    Migraine     Sickle cell trait ([facility] [address] )        Past Surgical History:   Procedure Laterality Date    ABDOMINAL SURGERY      ABSCESS DRAINAGE  2016    INCISION AND DRAINAGE OF SKIN ABSCESS; LABIAL CYST REMOVAL - [facility]     SECTION      CHOLECYSTECTOMY      INCISION AND DRAINAGE OF WOUND Right 2019    Procedure: INCISION AND DRAINAGE (I&D) BUTTOCK;  Surgeon: Sary Mar MD;  Location: [facility];  Service: General    RI  DELIVERY ONLY N/A 2018    Procedure: Marcy Proper () REPEAT;  Surgeon: Fito Haque MD;  Location: [facility] LD;  Service: Obstetrics    RI EXC SWEAT GLAND [place] Right 2016    Procedure: EXCISION GROIN CYST HIDRADENITIS;  Surgeon: Davin Heaton DO;  Location: [facility];  Service: General    TONSILLECTOMY      VULVA BIOPSY N/A 2016    Procedure: INCISION AND DRAINAGE, EXCISION OF LABIAL CYST ;  Surgeon: David Martinez DO;  [facility];  Service:        Family History   Problem Relation Age of Onset    Hyperlipidemia Mother         PURE    Cancer Father         lung    Diabetes Maternal Grandmother     No Known Problems Brother     Ovarian cancer Neg Hx     Colon cancer Neg Hx     Breast cancer Neg Hx      I have reviewed and agree with the history as documented  E-Cigarette/Vaping    E-Cigarette Use Never User      E-Cigarette/Vaping Substances    Nicotine No     THC No     CBD No     Flavoring No     Other No     Unknown No      Social History     Tobacco Use    Smoking status: Never Smoker    Smokeless tobacco: Never Used   Vaping Use    Vaping Use: Never used   Substance Use Topics    Alcohol use: Not Currently     Comment: social    Drug use: No        Review of Systems   Constitutional: Negative for chills and fever  HENT: Negative for ear pain and sore throat  Eyes: Negative for pain and visual disturbance  Respiratory: Positive for shortness of breath  Negative for cough  Cardiovascular: Positive for chest pain  Negative for palpitations  Gastrointestinal: Negative for abdominal pain and vomiting  Genitourinary: Negative for dysuria and hematuria  Musculoskeletal: Negative for arthralgias and back pain  Skin: Negative for color change and rash  Neurological: Negative for seizures and syncope  All other systems reviewed and are negative  Physical Exam  ED Triage Vitals   Temperature Pulse Respirations Blood Pressure SpO2   09/11/22 1808 09/11/22 1802 09/11/22 1802 09/11/22 1802 09/11/22 1802   97 9 °F (36 6 °C) 98 18 133/84 100 %      Temp Source Heart Rate Source Patient Position - Orthostatic VS BP Location FiO2 (%)   09/11/22 1808 09/11/22 1802 -- -- --   Oral Monitor         Pain Score       09/11/22 1802       10 - Worst Possible Pain             Orthostatic Vital Signs  Vitals:    09/11/22 1814 09/11/22 1830 09/11/22 1915 09/11/22 2111   BP: 113/61 110/58 120/67    Pulse: 86 91 76 90       Physical Exam  Vitals and nursing note reviewed  Constitutional:       General: She is not in acute distress  Appearance: She is well-developed  HENT:      Head: Normocephalic and atraumatic     Eyes:      Conjunctiva/sclera: Conjunctivae normal  Cardiovascular:      Rate and Rhythm: Normal rate and regular rhythm  Heart sounds: No murmur heard  Pulmonary:      Effort: Pulmonary effort is normal  No respiratory distress  Breath sounds: Normal breath sounds  No stridor  No wheezing  Abdominal:      Palpations: Abdomen is soft  Tenderness: There is no abdominal tenderness  There is no right CVA tenderness, left CVA tenderness or guarding  Comments: Gravid abdomen with no tenderness or signs of peritonitis   Musculoskeletal:         General: No swelling or deformity  Normal range of motion  Cervical back: Neck supple  No rigidity  Right lower leg: No edema  Left lower leg: No edema  Skin:     General: Skin is warm and dry  Neurological:      General: No focal deficit present  Mental Status: She is alert and oriented to person, place, and time     Psychiatric:         Mood and Affect: Mood normal          Behavior: Behavior normal          ED Medications  Medications   Lidocaine Viscous HCl (XYLOCAINE) 2 % mucosal solution 15 mL (15 mL Swish & Spit Given 9/11/22 1819)   aluminum-magnesium hydroxide-simethicone (MYLANTA) oral suspension 30 mL (30 mL Oral Given 9/11/22 1819)   sodium chloride 0 9 % bolus 1,000 mL (0 mL Intravenous Stopped 9/11/22 2130)   pantoprazole (PROTONIX) injection 40 mg (40 mg Intravenous Given 9/11/22 1859)   sucralfate (CARAFATE) tablet 1 g (1 g Oral Given 9/11/22 1923)   fentanyl citrate (PF) 100 MCG/2ML 50 mcg (50 mcg Intravenous Given 9/11/22 2022)   famotidine (PEPCID) tablet 20 mg (20 mg Oral Given 9/11/22 2129)       Diagnostic Studies  Results Reviewed     Procedure Component Value Units Date/Time    HS Troponin I 2hr [[id number]]  (Normal) Collected: 09/11/22 2102    Lab Status: Final result Specimen: Blood from Arm, Right Updated: 09/11/22 2147     hs TnI 2hr 3 ng/L      Delta 2hr hsTnI 0 ng/L     HS Troponin 0hr (reflex protocol) [[id number]]  (Normal) Collected: 09/11/22 1902    Lab Status: Final result Specimen: Blood from Arm, Right Updated: 09/11/22 1950     hs TnI 0hr 3 ng/L     D-Dimer [[id number]]  (Abnormal) Collected: 09/11/22 1902    Lab Status: Final result Specimen: Blood from Arm, Right Updated: 09/11/22 1942     D-Dimer, Quant 3 84 ug/ml FEU     Comprehensive metabolic panel [[id number]]  (Abnormal) Collected: 09/11/22 1902    Lab Status: Final result Specimen: Blood from Arm, Right Updated: 09/11/22 1935     Sodium 135 mmol/L      Potassium 3 9 mmol/L      Chloride 105 mmol/L      CO2 21 mmol/L      ANION GAP 9 mmol/L      BUN 5 mg/dL      Creatinine 0 58 mg/dL      Glucose 97 mg/dL      Calcium 9 0 mg/dL      Corrected Calcium 10 3 mg/dL      AST 14 U/L      ALT 13 U/L      Alkaline Phosphatase 124 U/L      Total Protein 7 0 g/dL      Albumin 2 4 g/dL      Total Bilirubin 0 67 mg/dL      eGFR 124 ml/min/1 73sq m     Narrative:      [facility] guidelines for Chronic Kidney Disease (CKD):     Stage 1 with normal or high GFR (GFR > 90 mL/min/1 73 square meters)    Stage 2 Mild CKD (GFR = 60-89 mL/min/1 73 square meters)    Stage 3A Moderate CKD (GFR = 45-59 mL/min/1 73 square meters)    Stage 3B Moderate CKD (GFR = 30-44 mL/min/1 73 square meters)    Stage 4 Severe CKD (GFR = 15-29 mL/min/1 73 square meters)    Stage 5 End Stage CKD (GFR <15 mL/min/1 73 square meters)  Note: GFR calculation is accurate only with a steady state creatinine    Lipase [[id number]]  (Normal) Collected: 09/11/22 1902    Lab Status: Final result Specimen: Blood from Arm, Right Updated: 09/11/22 1935     Lipase 88 u/L     CBC and differential [[id number]]  (Abnormal) Collected: 09/11/22 1902    Lab Status: Final result Specimen: Blood from Arm, Right Updated: 09/11/22 1912     WBC 8 74 Thousand/uL      RBC 4 24 Million/uL      Hemoglobin 10 1 g/dL      Hematocrit 32 0 %      MCV 76 fL      MCH 23 8 pg      MCHC 31 6 g/dL      RDW 14 5 %      MPV 9 3 fL      Platelets 946 Thousands/uL      nRBC 0 /100 WBCs      Neutrophils Relative 79 %      Immat GRANS % 1 %      Lymphocytes Relative 15 %      Monocytes Relative 5 %      Eosinophils Relative 0 %      Basophils Relative 0 %      Neutrophils Absolute 6 86 Thousands/µL      Immature Grans Absolute 0 06 Thousand/uL      Lymphocytes Absolute 1 35 Thousands/µL      Monocytes Absolute 0 45 Thousand/µL      Eosinophils Absolute 0 01 Thousand/µL      Basophils Absolute 0 01 Thousands/µL                  No orders to display         Procedures  POC Cardiac US    Date/Time: 9/13/2022 2:45 AM  Performed by: Margarita Ledbetter DO  Authorized by: Margarita Ledbetter DO     Patient location:  ED  Procedure details:     Exam Type:  Educational and diagnostic    Indications: chest pain      Assessment / Evaluation for: cardiac function      Exam Type: initial exam      Image quality: non-diagnostic      Image availability:  Images available in PACS  Patient Details:     Cardiac Rhythm:  Regular  Cardiac findings:     Echo technique: limited 2D      Views obtained: parasternal long axis, parasternal short axis and apical      Pericardial effusion: absent      Tamponade physiology: absent      Wall motion: normal      LV systolic function: normal      RV dilation: none            ED Course                             SBIRT 20yo+    Flowsheet Row Most Recent Value   SBIRT (23 yo +)    In order to provide better care to our patients, we are screening all of our patients for alcohol and drug use  Would it be okay to ask you these screening questions? Yes Filed at: 09/11/2022 1812   Initial Alcohol Screen: US AUDIT-C     1  How often do you have a drink containing alcohol? 0 Filed at: 09/11/2022 1812   2  How many drinks containing alcohol do you have on a typical day you are drinking? 0 Filed at: 09/11/2022 1812   3a  Male UNDER 65: How often do you have five or more drinks on one occasion? 0 Filed at: 09/11/2022 1812   3b   FEMALE Any Age, or MALE 65+: How often do you have 4 or more drinks on one occassion? 0 Filed at: 2022   Audit-C Score 0 Filed at: 2022   LIANG: How many times in the past year have you    Used an illegal drug or used a prescription medication for non-medical reasons? Never Filed at: 2022                MDM  Number of Diagnoses or Management Options  Diagnosis management comments: 29 Y O F  36 weeks pregnant presents with epigastric discomfort with some associated SOB since this morning  Cardiac workup unremarkable  D-dimer positive but given the pregnancy and low pre test probability holding off on CT  Non hypertensive and no evidence of end organ damage  Bedside cardiac ultrasound was unremarkable  Fetal heartbeat normal  Gave multiple rounds of different heartburn medications without relief  Discussed with OB-GYN in [place] who suggested admission to [address] [place] [address] for observation  Pt  Transferred in stable condition  Disposition  Final diagnoses:   None     ED Disposition     ED Disposition   Transfer to Another Facility-In Network    Condition   --    Date/Time   Sun Sep 11, 2022 10:16 PM    Comment   Aaron Ennis should be transferred out to [facility] L&D  Follow-up Information    None         Discharge Medication List as of 2022 11:20 PM      CONTINUE these medications which have NOT CHANGED    Details   aspirin 81 mg chewable tablet Chew 2 tablets (162 mg total) daily, Starting Tue 3/15/2022, Normal      bimatoprost (LATISSE) 0 03 % ophthalmic solution PLACE ONE DROP ON APPLICATOR AND APPLY EVENLY ALONG THE SKIN OF THE UPPER EYELID AT BASE OF EYELASHES ONCE DAILY AT BEDTIME REPEAT PROCEDURE FOR SECOND EYE (USE A CLEAN APPLICATOR)  , Normal      doxylamine (UNISON) 25 MG tablet Take 1 tablet (25 mg total) by mouth daily at bedtime as needed for sleep, Starting Mon 2022, Normal      glucose monitoring kit (FREESTYLE) monitoring kit Use 1 each as needed (glucose monitoring), Starting Wed 7/27/2022, Normal      Insulin Pen Needle 31G X 5 MM MISC Use with Lantus solostar insulin pen daily, Normal      Lantus SoloStar 100 units/mL SOPN Inject under the skin 50 units at bedtime daily 11:30 PM, Normal      magnesium oxide (MAG-OX) 400 mg Take 1 tablet (400 mg total) by mouth daily, Starting Tue 4/12/2022, Normal      Prenatal Vit-Fe Fumarate-FA (prenatal multivitamin) 28-0 8 MG TABS Take 1 tablet by mouth daily, Historical Med      Pyridoxine HCl (vitamin B-6) 25 MG tablet Take 1 tablet (25 mg total) by mouth daily, Starting Mon 2/14/2022, Normal      Riboflavin 400 MG CAPS Take 1 capsule (400 mg total) by mouth in the morning, Starting Tue 4/12/2022, Normal      famotidine (PEPCID) 10 mg tablet Take 1 tablet (10 mg total) by mouth 2 (two) times a day, Starting Tue 5/10/2022, Normal      glucose blood test strip Use as instructed, Normal      Lancets (freestyle) lancets Use 4 (four) times daily (after meals and at bedtime) Use as instructed, Starting Wed 7/27/2022, Normal           No discharge procedures on file  PDMP Review     None           ED Provider  Attending physically available and evaluated Perry Melo I managed the patient along with the ED Attending      Electronically Signed by         Clementine Sykes DO  09/13/22 9206

## 2022-09-12 ENCOUNTER — HOSPITAL ENCOUNTER (OUTPATIENT)
Facility: HOSPITAL | Age: 32
Setting detail: OBSERVATION
Discharge: HOME | End: 2022-09-12
Attending: OBSTETRICS & GYNECOLOGY | Admitting: OBSTETRICS & GYNECOLOGY
Payer: COMMERCIAL

## 2022-09-12 VITALS
OXYGEN SATURATION: 100 % | RESPIRATION RATE: 18 BRPM | TEMPERATURE: 97.8 F | HEART RATE: 99 BPM | SYSTOLIC BLOOD PRESSURE: 103 MMHG | DIASTOLIC BLOOD PRESSURE: 63 MMHG

## 2022-09-12 DIAGNOSIS — K21.00 GASTROESOPHAGEAL REFLUX DISEASE WITH ESOPHAGITIS WITHOUT HEMORRHAGE: Primary | ICD-10-CM

## 2022-09-12 DIAGNOSIS — O24.414 INSULIN CONTROLLED GESTATIONAL DIABETES MELLITUS (GDM) IN THIRD TRIMESTER: Primary | ICD-10-CM

## 2022-09-12 PROBLEM — Z3A.36 36 WEEKS GESTATION OF PREGNANCY: Status: ACTIVE | Noted: 2022-07-06

## 2022-09-12 LAB
BACTERIA UR QL AUTO: ABNORMAL /HPF
BILIRUB UR QL STRIP: NEGATIVE
CLARITY UR: CLEAR
COLOR UR: ABNORMAL
GLUCOSE SERPL-MCNC: 114 MG/DL (ref 65–140)
GLUCOSE SERPL-MCNC: 85 MG/DL (ref 65–140)
GLUCOSE UR STRIP-MCNC: NEGATIVE MG/DL
HGB UR QL STRIP.AUTO: NEGATIVE
KETONES UR STRIP-MCNC: ABNORMAL MG/DL
LEUKOCYTE ESTERASE UR QL STRIP: NEGATIVE
MUCOUS THREADS UR QL AUTO: ABNORMAL
NITRITE UR QL STRIP: NEGATIVE
NON-SQ EPI CELLS URNS QL MICRO: ABNORMAL /HPF
PH UR STRIP.AUTO: 5.5 [PH]
PROT UR STRIP-MCNC: NEGATIVE MG/DL
RBC #/AREA URNS AUTO: ABNORMAL /HPF
SP GR UR STRIP.AUTO: 1.01 (ref 1–1.03)
TRANS CELLS #/AREA URNS HPF: PRESENT /[HPF]
UROBILINOGEN UR STRIP-ACNC: <2 MG/DL
WBC #/AREA URNS AUTO: ABNORMAL /HPF

## 2022-09-12 PROCEDURE — NC001 PR NO CHARGE: Performed by: OBSTETRICS & GYNECOLOGY

## 2022-09-12 PROCEDURE — 82948 REAGENT STRIP/BLOOD GLUCOSE: CPT

## 2022-09-12 PROCEDURE — 99244 OFF/OP CNSLTJ NEW/EST MOD 40: CPT | Performed by: INTERNAL MEDICINE

## 2022-09-12 PROCEDURE — 81001 URINALYSIS AUTO W/SCOPE: CPT | Performed by: OBSTETRICS & GYNECOLOGY

## 2022-09-12 PROCEDURE — 99213 OFFICE O/P EST LOW 20 MIN: CPT

## 2022-09-12 RX ORDER — SUCRALFATE 1 G/1
1 TABLET ORAL ONCE
Status: COMPLETED | OUTPATIENT
Start: 2022-09-12 | End: 2022-09-12

## 2022-09-12 RX ORDER — FAMOTIDINE 20 MG/1
20 TABLET, FILM COATED ORAL 2 TIMES DAILY
Status: DISCONTINUED | OUTPATIENT
Start: 2022-09-12 | End: 2022-09-12 | Stop reason: HOSPADM

## 2022-09-12 RX ORDER — PROMETHAZINE HYDROCHLORIDE 25 MG/ML
25 INJECTION, SOLUTION INTRAMUSCULAR; INTRAVENOUS ONCE
Status: COMPLETED | OUTPATIENT
Start: 2022-09-12 | End: 2022-09-12

## 2022-09-12 RX ORDER — FAMOTIDINE 20 MG/1
20 TABLET, FILM COATED ORAL 2 TIMES DAILY
Qty: 60 TABLET | Refills: 0 | Status: SHIPPED | OUTPATIENT
Start: 2022-09-12 | End: 2022-10-01

## 2022-09-12 RX ORDER — LANCETS 33 GAUGE
EACH MISCELLANEOUS
Qty: 100 EACH | Refills: 1 | Status: SHIPPED | OUTPATIENT
Start: 2022-09-12

## 2022-09-12 RX ORDER — SUCRALFATE 1 G/1
1 TABLET ORAL 4 TIMES DAILY
Qty: 56 TABLET | Refills: 0 | Status: SHIPPED | OUTPATIENT
Start: 2022-09-12 | End: 2022-10-01

## 2022-09-12 RX ORDER — METOCLOPRAMIDE 5 MG/1
5 TABLET ORAL
Qty: 28 TABLET | Refills: 0 | Status: SHIPPED | OUTPATIENT
Start: 2022-09-12 | End: 2022-09-26

## 2022-09-12 RX ORDER — CALCIUM CARBONATE 200(500)MG
1000 TABLET,CHEWABLE ORAL 3 TIMES DAILY PRN
Status: DISCONTINUED | OUTPATIENT
Start: 2022-09-12 | End: 2022-09-12 | Stop reason: HOSPADM

## 2022-09-12 RX ORDER — CYCLOBENZAPRINE HCL 10 MG
10 TABLET ORAL 3 TIMES DAILY PRN
Status: DISCONTINUED | OUTPATIENT
Start: 2022-09-12 | End: 2022-09-12 | Stop reason: HOSPADM

## 2022-09-12 RX ORDER — BLOOD SUGAR DIAGNOSTIC
STRIP MISCELLANEOUS
Qty: 100 EACH | Refills: 3 | Status: SHIPPED | OUTPATIENT
Start: 2022-09-12 | End: 2022-09-13

## 2022-09-12 RX ORDER — PANTOPRAZOLE SODIUM 40 MG/1
40 TABLET, DELAYED RELEASE ORAL
Qty: 30 TABLET | Refills: 0 | Status: SHIPPED | OUTPATIENT
Start: 2022-09-13 | End: 2022-10-11

## 2022-09-12 RX ORDER — PANTOPRAZOLE SODIUM 40 MG/1
40 TABLET, DELAYED RELEASE ORAL
Status: DISCONTINUED | OUTPATIENT
Start: 2022-09-12 | End: 2022-09-12 | Stop reason: HOSPADM

## 2022-09-12 RX ORDER — TRISODIUM CITRATE DIHYDRATE AND CITRIC ACID MONOHYDRATE 500; 334 MG/5ML; MG/5ML
30 SOLUTION ORAL ONCE
Status: COMPLETED | OUTPATIENT
Start: 2022-09-12 | End: 2022-09-12

## 2022-09-12 RX ORDER — PROMETHAZINE HYDROCHLORIDE 25 MG/ML
12.5 INJECTION, SOLUTION INTRAMUSCULAR; INTRAVENOUS ONCE
Status: DISCONTINUED | OUTPATIENT
Start: 2022-09-12 | End: 2022-09-12

## 2022-09-12 RX ADMIN — SODIUM CITRATE AND CITRIC ACID MONOHYDRATE 30 ML: 500; 334 SOLUTION ORAL at 01:52

## 2022-09-12 RX ADMIN — PROMETHAZINE HYDROCHLORIDE 25 MG: 25 INJECTION INTRAMUSCULAR; INTRAVENOUS at 04:09

## 2022-09-12 RX ADMIN — SODIUM CHLORIDE, SODIUM LACTATE, POTASSIUM CHLORIDE, AND CALCIUM CHLORIDE 1000 ML: .6; .31; .03; .02 INJECTION, SOLUTION INTRAVENOUS at 02:14

## 2022-09-12 RX ADMIN — FAMOTIDINE 20 MG: 20 TABLET ORAL at 09:29

## 2022-09-12 RX ADMIN — SUCRALFATE 1 G: 1 TABLET ORAL at 13:37

## 2022-09-12 RX ADMIN — PANTOPRAZOLE SODIUM 40 MG: 40 TABLET, DELAYED RELEASE ORAL at 08:50

## 2022-09-12 RX ADMIN — CYCLOBENZAPRINE HYDROCHLORIDE 10 MG: 10 TABLET, FILM COATED ORAL at 04:06

## 2022-09-12 NOTE — QUICK NOTE
Patient seen evaluated after GI consultation  Per GI no recommendations are as follows:    -Continue PPI  - Continue pepcid BID   - Continue Cerafate   - consider reglan prior to meals   - recommend non-ulcerogenic diet/small meals    Discussed recommendations with  Patient feels agreeable with discharge at this time is okay with recommendations  Rx will be sent to pharmacy  Follow-up tomorrow with OBGYN office          Shanae Coleman MD  Obstetrics & Gynecology PGY-2  9/12/2022  2:19 PM

## 2022-09-12 NOTE — QUICK NOTE
Patient seen and re-evaluated  Reports that pain has shoulder resolved prior to eating however now after having breakfast her pain is back as a 3/10  Reports that pain is a small burning sensation located in her epigastric area  Will try another 1 gram dose of Carafate and plan to consult MARK Reddy MD  Obstetrics & Gynecology PGY-2  9/12/2022  11:21 AM

## 2022-09-12 NOTE — CONSULTS
Consultation -  [facility]  Leola Smiley 29 y o  female MRN: [id number]  Unit/Bed#: -01 Encounter: [id number]        Inpatient consult to gastroenterology  Consult performed by: Carrie Crespo PA-C  Consult ordered by: Andreina Benavides MD          Reason for Consult / Principal Problem:  GERD, epigastric pain    ASSESSMENT and PLAN:      1  Epigastric pain  2  GERD  Patient currently 36 weeks pregnant who presented to the emergency room yesterday for severe epigastric pain and heartburn  This was not relieved with several medicines and she was transferred over from [place]  Her symptoms were improved substantially overnight and she ate breakfast this morning including eggs and sausage with recurrence  Denies any significant heartburn throughout pregnancy  Reports most improvement with Phenergan  History of cholecystectomy  Likely in the setting of significant reflux associated pregnancy versus possible component of gastroparesis worsening symptoms     -discussed with OBGYN team   -recommend continuing PPI daily and Pepcid b i d    -continue Carafate   -recommend daily bowel regimen including MiraLax daily    -would recommend slowly advancing diet with a nonulcerogenic modification as well as low-fiber/low residue   -would recommend starting with full liquids at this time  -patient may also benefit from Reglan  Consider starting this prior to meals     -no indication for EGD at this time  If symptoms persist after pregnancy could consider at that time    -------------------------------------------------------------------------------------------------------------------    HPI:    Leola Smiley is a 29-year-old female currently 36 weeks pregnant who presented to the emergency room yesterday due to epigastric pain  Due to severity of symptoms she was transferred over from [place] to [place]  Lab work including lipase was normal   White count normal at 8  Hemoglobin low but at baseline of 10 1  Platelets 447  Electrolytes and kidney function normal   Mild elevation of alkaline phosphatase at 124 with other liver tests normal     Patient reports yesterday at 10:00 a m  She had small piece of pizza  She began to then have a what she considered heartburn symptoms with epigastric discomfort  Symptoms worsened throughout the day  She reports trying medicine such as Tums and Mylanta as well as Pepcid with no improvement  Symptoms became excruciating and she went to the emergency room as noted above  She reports being given several medicines at that time however no improvement with these  She reports being unable to fall asleep until 4:00 a m  She reports she believes the medicine Phenergan helped the most   She denies any significant reflux symptoms throughout her pregnancy so far  She has no nausea or vomiting  She has a poor appetite at this time  She denies any NSAID use  She reports her bowel movements are every other day  Denies any significant constipation or diarrhea  Abdominal surgeries consistent for cholecystectomy and C-sections x2  No significant GI family history  No prior EGD or colonoscopy  REVIEW OF SYSTEMS:    CONSTITUTIONAL: Denies any fever, chills, or rigors  Good appetite, and no recent weight loss  HEENT: No earache or tinnitus  Denies hearing loss or visual disturbances  CARDIOVASCULAR: No chest pain or palpitations  RESPIRATORY: Denies any cough, hemoptysis, shortness of breath or dyspnea on exertion  GASTROINTESTINAL: As noted in the History of Present Illness  GENITOURINARY: No problems with urination  Denies any hematuria or dysuria  NEUROLOGIC: No dizziness or vertigo, denies headaches  MUSCULOSKELETAL: Denies any muscle or joint pain  SKIN: Denies skin rashes or itching  ENDOCRINE: Denies excessive thirst  Denies intolerance to heat or cold  PSYCHOSOCIAL: Denies depression or anxiety   Denies any recent memory loss         Historical Information   Past Medical History:   Diagnosis Date    Abscess of buttock, right 2019     delivery delivered     RESOLVED: [id number]    Diet controlled gestational diabetes mellitus (GDM) in third trimester 2022    Disease of thyroid gland     hyperthyroid    Hidradenitis suppurativa     LAST ASSESSED: [id number]    History of early menarche     FIRST PERIOD AT 8YEARS OLD    Hyperemesis gravidarum     RESOLVED: [id number]    Hypertension in pregnancy, pre-eclampsia     prior preg    Migraine     Sickle cell trait ([facility] [address] )      Past Surgical History:   Procedure Laterality Date    ABDOMINAL SURGERY      ABSCESS DRAINAGE  2016    INCISION AND DRAINAGE OF SKIN ABSCESS; LABIAL CYST REMOVAL - [facility]     SECTION      CHOLECYSTECTOMY  2009    INCISION AND DRAINAGE OF WOUND Right 2019    Procedure: INCISION AND DRAINAGE (I&D) BUTTOCK;  Surgeon: Teena Ortega MD;  [facility];  Service: General    MT  DELIVERY ONLY N/A 2018    Procedure:  SECTION () REPEAT;  Surgeon: Ivon Arriaga MD;  [facility]: [facility] LD;  Service: Obstetrics    MT EXC SWEAT GLAND LESN INGUIN,SIMPL Right 2016    Procedure: EXCISION GROIN CYST HIDRADENITIS;  Surgeon: Nadia Britt DO;  [facility] MA[facility];  Service: General    TONSILLECTOMY      VULVA BIOPSY N/A 2016    Procedure: INCISION AND DRAINAGE, EXCISION OF LABIAL CYST ;  Surgeon: Estelle Hoover DO;  [facility];  Service:      Social History   Social History     Substance and Sexual Activity   Alcohol Use Not Currently    Comment: social     Social History     Substance and Sexual Activity   Drug Use No     Social History     Tobacco Use   Smoking Status Never Smoker   Smokeless Tobacco Never Used     Family History   Problem Relation Age of Onset    Hyperlipidemia Mother         PURE    Cancer Father         lung    Diabetes Maternal Grandmother     No Known Problems Brother     Ovarian cancer Neg Hx     Colon cancer Neg Hx     Breast cancer Neg Hx        Meds/Allergies     Medications Prior to Admission   Medication    aspirin 81 mg chewable tablet    bimatoprost (LATISSE) 0 03 % ophthalmic solution    doxylamine (UNISON) 25 MG tablet    famotidine (PEPCID) 10 mg tablet    glucose blood test strip    glucose monitoring kit (FREESTYLE) monitoring kit    Insulin Pen Needle 31G X 5 MM MISC    Lantus SoloStar 100 units/mL SOPN    magnesium oxide (MAG-OX) 400 mg    Prenatal Vit-Fe Fumarate-FA (prenatal multivitamin) 28-0 8 MG TABS    Pyridoxine HCl (vitamin B-6) 25 MG tablet    Riboflavin 400 MG CAPS     Current Facility-Administered Medications   Medication Dose Route Frequency    calcium carbonate (TUMS) chewable tablet 1,000 mg  1,000 mg Oral TID PRN    cyclobenzaprine (FLEXERIL) tablet 10 mg  10 mg Oral TID PRN    famotidine (PEPCID) tablet 20 mg  20 mg Oral BID    pantoprazole (PROTONIX) EC tablet 40 mg  40 mg Oral Early Morning       No Known Allergies        Objective     Blood pressure 103/63, pulse 99, temperature 97 8 °F (36 6 °C), temperature source Oral, resp  rate 18, last menstrual period 11/30/2021, SpO2 100 %, not currently breastfeeding  No intake or output data in the 24 hours ending 09/12/22 1342      PHYSICAL EXAM:      General Appearance:   Alert, cooperative, no distress, appears stated age    HEENT:   Normocephalic, atraumatic, anicteric  Neck:  Supple, symmetrical, trachea midline, no adenopathy  Lungs:   Clear to auscultation bilaterally; no rales, rhonchi or wheezing; respirations unlabored    Heart[de-identified]   S1 and S2 normal; regular rate and rhythm; no murmur, rub, or gallop  Abdomen:   + gravid abdomen  Minimal tenderness in the epigastric area with palpation  Bowel sounds present     Genitalia:   Deferred    Rectal:   Deferred    Extremities:  No cyanosis, clubbing or edema    Pulses:  2+ and symmetric all extremities    Skin:  Skin color, texture, turgor normal, no rashes or lesions    Lymph nodes:  No palpable cervical, axillary or inguinal lymphadenopathy        Lab Results:   Results from last 7 days   Lab Units 09/11/22  1902   WBC Thousand/uL 8 74   HEMOGLOBIN g/dL 10 1*   HEMATOCRIT % 32 0*   PLATELETS Thousands/uL 233   NEUTROS PCT % 79*   LYMPHS PCT % 15   MONOS PCT % 5   EOS PCT % 0     Results from last 7 days   Lab Units 09/11/22  1902   POTASSIUM mmol/L 3 9   CHLORIDE mmol/L 105   CO2 mmol/L 21   BUN mg/dL 5   CREATININE mg/dL 0 58*   CALCIUM mg/dL 9 0   ALK PHOS U/L 124*   ALT U/L 13   AST U/L 14         Results from last 7 days   Lab Units 09/11/22  1902   LIPASE u/L 88       Imaging Studies: I have personally reviewed pertinent imaging studies  No results found  Patient was seen and examined by Dr Maritza Vargas  All turner medical decisions were made by Dr Maritza Vargas  Thank you for allowing us to participate in the care of this present patient  We will follow-up with you closely

## 2022-09-12 NOTE — ASSESSMENT & PLAN NOTE
S/p ED eval (see their note for further details); given fentanyl, sulcrufate, Protonix, Tums, and Mylanta in ED  Bicitra given in triage, as well as flexeril and Phenergan  Lipase normal, AST/ALT normal, Bili normal  Hgb 10 1, WBC 8  1L bolus of LR-pt carlita although does not endorse feeling them  SVE: 0 5/50/-4  Consider GI consult if she continues to have no improvement

## 2022-09-12 NOTE — ED ATTENDING ATTESTATION
9/11/2022  IBrooke MD, saw and evaluated the patient  I have discussed the patient with the resident/non-physician practitioner and agree with the resident's/non-physician practitioner's findings, Plan of Care, and MDM as documented in the resident's/non-physician practitioner's note, except where noted  All available labs and Radiology studies were reviewed  I was present for key portions of any procedure(s) performed by the resident/non-physician practitioner and I was immediately available to provide assistance  At this point I agree with the current assessment done in the Emergency Department  I have conducted an independent evaluation of this patient a history and physical is as follows:    ED Course     Patient is a 61-year-old female proximally 39 pregnant presents with a 1 day history of severe heartburn epigastric pain  Patient states pain began early in the morning has escalated to the point is become intolerable in unrelieved with outpatient medications including famotidine and Tums  Patient also endorses mild shortness of breath  Patient states pain is nonradiating to neck or back  Pain is burning in nature not tearing  Patient denies any abdominal pain, contractions, vaginal bleeding or discharge leakage of fluid  Vital signs reviewed  Patient appears visibly uncomfortable secondary to pain  ECG reviewed no acute ischemic changes  Heart regular rate rhythm without murmurs  Lungs clear bilaterally  Abdomen soft nontender gravid with uterus above the umbilicus  normal bowel sounds  Fetal cardiac activity present  Extremities no edema  Impression:  Dyspepsia heartburn patient given IV fluids a Protonix, Maalox, Tums, famotidine fentanyl without improvement of pain symptoms    Labs reviewed unremarkable with exception of D-dimer    Patient had persistent pain uncontrolled in emergency department case discussed with OBGYN Service patient be transferred over to Labor and delivery for further evaluation and management of her symptoms    Plan of care discussed with patient bedside she is amenable with plan  Critical Care Time  Procedures

## 2022-09-12 NOTE — EMTALA/ACUTE CARE TRANSFER
[address] [facility] [address]  Dept: [phone]      EMTALA TRANSFER CONSENT    NAME Adrian Reyes                                         1990                              MRN [id number]    I have been informed of my rights regarding examination, treatment, and transfer   by Dr Martin Smith MD    Benefits:      Risks:        Transfer Request   I acknowledge that my medical condition has been evaluated and explained to me by the emergency department physician or other qualified medical person and/or my attending physician who has recommended and offered to me further medical examination and treatment  I understand the Hospital's obligation with respect to the treatment and stabilization of my emergency medical condition  I nevertheless request to be transferred  I release the Hospital, the doctor, and any other persons caring for me from all responsibility or liability for any injury or ill effects that may result from my transfer and agree to accept all responsibility for the consequences of my choice to transfer, rather than receive stabilizing treatment at the Hospital  I understand that because the transfer is my request, my insurance may not provide reimbursement for the services  The Hospital will assist and direct me and my family in how to make arrangements for transfer, but the hospital is not liable for any fees charged by the transport service  In spite of this understanding, I refuse to consent to further medical examination and treatment which has been offered to me, and request transfer to    I authorize the performance of emergency medical procedures and treatments upon me in both transit and upon arrival at the receiving facility  Additionally, I authorize the release of any and all medical records to the receiving facility and request they be transported with me, if possible       I authorize the performance of emergency medical procedures and treatments upon me in both transit and upon arrival at the receiving facility  Additionally, I authorize the release of any and all medical records to the receiving facility and request they be transported with me, if possible  I understand that the safest mode of transportation during a medical emergency is an ambulance and that the Hospital advocates the use of this mode of transport  Risks of traveling to the receiving facility by car, including absence of medical control, life sustaining equipment, such as oxygen, and medical personnel has been explained to me and I fully understand them  (JUAN CORRECT BOX BELOW)  [  ]  I consent to the stated transfer and to be transported by ambulance/helicopter  [  ]  I consent to the stated transfer, but refuse transportation by ambulance and accept full responsibility for my transportation by car  I understand the risks of non-ambulance transfers and I exonerate the Hospital and its staff from any deterioration in my condition that results from this refusal     X___________________________________________    DATE  22  TIME________  Signature of patient or legally responsible individual signing on patient behalf           RELATIONSHIP TO PATIENT_________________________          Provider Certification    NAME Cassie JAQUEZ 1990                              MRN [id number]    A medical screening exam was performed on the above named patient  Based on the examination:    Condition Necessitating Transfer There were no encounter diagnoses      Patient Condition:      Reason for Transfer:      Transfer Requirements: Facility     · Space available and qualified personnel available for treatment as acknowledged by    · Agreed to accept transfer and to provide appropriate medical treatment as acknowledged by          · Appropriate medical records of the examination and treatment of the patient are provided at the time of transfer   [facility], [address] _______  · Transfer will be performed by qualified personnel from    and appropriate transfer equipment as required, including the use of necessary and appropriate life support measures  Provider Certification: I have examined the patient and explained the following risks and benefits of being transferred/refusing transfer to the patient/family:         Based on these reasonable risks and benefits to the patient and/or the unborn child(lay), and based upon the information available at the time of the patients examination, I certify that the medical benefits reasonably to be expected from the provision of appropriate medical treatments at another medical facility outweigh the increasing risks, if any, to the individuals medical condition, and in the case of labor to the unborn child, from effecting the transfer      X____________________________________________ DATE 09/11/22        TIME_______      ORIGINAL - SEND TO MEDICAL RECORDS   COPY - SEND WITH PATIENT DURING TRANSFER

## 2022-09-12 NOTE — H&P
[address] 29 y o  female MRN: [id number]  Unit/Bed#: LD TRIAGE 2 Encounter: [id number]    Assessment: 29 y o  [id number] at 36w4d admitted for observation  SVE: 0 5/50/-4  FHT: 135bpm        Plan:   * 36 weeks gestation of pregnancy  Assessment & Plan  S/p ED eval (see their note for further details); given fentanyl, sulcrufate, Protonix, Tums, and Mylanta in ED  Bicitra given in triage, as well as flexeril and Phenergan  Lipase normal, AST/ALT normal, Bili normal  Hgb 10 1, WBC 8  1L bolus of LR-pt carlita although does not endorse feeling them  SVE: 0 5/50/-4  Consider GI consult if she continues to have no improvement        Dr Roldan Pak aware      Chief Complaint: heart burn    HPI: Hakan Carrington 29 y o  [id number] at 36w4d with an Estimated Date of Delivery: 10/6/22 presenting with heart burn  She states she has gotten heart burn in this pregnancy before but never to this extent  She states this is the most severe she has ever felt it and it has been happening all night  She went to [place] ED for evaluation  She states she did not get relief with what they gave her which included Protonix, Tums, and Fentanyl  She denies any abdominal or back pain or anything similar to contractions  She denies leakage of fluid, vaginal bleeding, or decreased fetal movement  This pregnancy is complicated by [id number] for which she takes Lantus nightly   She has a history of 2 prior C-Sections in  and 2018        Patient Active Problem List   Diagnosis    Subclinical hyperthyroidism    Hx of sickle cell trait    Right ovarian cyst    Prenatal care in third trimester    Sickle cell trait in mother affecting pregnancy ([facility] [address]    Maternal obesity, antepartum, third trimester    Hx of preeclampsia, prior pregnancy, currently pregnant, first trimester    History of 2  sections    Pregnancy headache in second trimester    Gastroesophageal reflux disease with esophagitis    36 weeks gestation of pregnancy    Insulin controlled gestational diabetes mellitus (GDM) in third trimester    BMI 33 0-33 9,adult    35 weeks gestation of pregnancy       Baby complications/comments: none    Review of Systems   Constitutional: Negative for chills and fever  Respiratory: Negative for cough, shortness of breath and wheezing  Cardiovascular: Negative for chest pain  Gastrointestinal: Negative for abdominal pain, nausea and vomiting  Heart burn   Genitourinary: Negative for dysuria, hematuria, urgency, vaginal bleeding and vaginal discharge  Neurological: Negative for dizziness, weakness, light-headedness and headaches         OB History    Para Term  AB Living   3 2 2     2   SAB IAB Ectopic Multiple Live Births         0 2      # Outcome Date GA Lbr [place]/2nd Weight Sex Delivery Anes PTL Lv   3 Current            2 Term 18 39w0d  4451 g (9 lb 13 oz) M CS-LTranv Spinal N SONNY   1 Term 04/25/15   4309 g (9 lb 8 oz) F CS-LTranv EPI  SONNY      Complications: Failure to Progress in Second Stage      Obstetric Comments   PRE-ECLAMPSIA       Past Medical History:   Diagnosis Date    Abscess of buttock, right 2019     delivery delivered     RESOLVED: [id number]    Diet controlled gestational diabetes mellitus (GDM) in third trimester 2022    Disease of thyroid gland     hyperthyroid    Hidradenitis suppurativa     LAST ASSESSED: [id number]    History of early menarche     FIRST PERIOD AT 8YEARS OLD    Hyperemesis gravidarum     RESOLVED: [id number]    Hypertension in pregnancy, pre-eclampsia     prior preg    Migraine     Sickle cell trait ([facility] [address] )        Past Surgical History:   Procedure Laterality Date    ABDOMINAL SURGERY      ABSCESS DRAINAGE  2016    INCISION AND DRAINAGE OF SKIN ABSCESS; LABIAL CYST REMOVAL - [facility]     SECTION      CHOLECYSTECTOMY  2009    INCISION AND DRAINAGE OF WOUND Right 2019    Procedure: INCISION AND DRAINAGE (I&D) BUTTOCK;  Surgeon: Devendra Wise MD;  [facility];  Service: General    VT  DELIVERY ONLY N/A 2018    Procedure:  SECTION () REPEAT;  Surgeon: Serina Carrillo MD;  Location: [facility] LD;  Service: Obstetrics    VT EXC SWEAT GLAND [place] Right 2016    Procedure: EXCISION GROIN CYST HIDRADENITIS;  Surgeon: Carmel Parra DO;  [facility]: [facility];  Service: General    TONSILLECTOMY      VULVA BIOPSY N/A 2016    Procedure: INCISION AND DRAINAGE, EXCISION OF LABIAL CYST ;  Surgeon: Sandhya Vyas DO;  [facility];  Service:        Social History     Tobacco Use    Smoking status: Never Smoker    Smokeless tobacco: Never Used   Substance Use Topics    Alcohol use: Not Currently     Comment: social       No Known Allergies    Medications Prior to Admission   Medication    aspirin 81 mg chewable tablet    bimatoprost (LATISSE) 0 03 % ophthalmic solution    doxylamine (UNISON) 25 MG tablet    famotidine (PEPCID) 10 mg tablet    glucose blood test strip    glucose monitoring kit (FREESTYLE) monitoring kit    Insulin Pen Needle 31G X 5 MM MISC    Lancets (freestyle) lancets    Lantus SoloStar 100 units/mL SOPN    magnesium oxide (MAG-OX) 400 mg    Prenatal Vit-Fe Fumarate-FA (prenatal multivitamin) 28-0 8 MG TABS    Pyridoxine HCl (vitamin B-6) 25 MG tablet    Riboflavin 400 MG CAPS       Objective:  Temp:  [97 8 °F (36 6 °C)] 97 8 °F (36 6 °C)  HR:  [88] 88  Resp:  [18] 18  BP: (134)/(77) 134/77  There is no height or weight on file to calculate BMI  Physical Exam:  Physical Exam  Constitutional:       Appearance: She is well-developed  Cardiovascular:      Rate and Rhythm: Normal rate and regular rhythm  Heart sounds: Normal heart sounds  No murmur heard  No friction rub  No gallop  Pulmonary:      Effort: Pulmonary effort is normal  No respiratory distress  Breath sounds: No wheezing  Abdominal:      Palpations: Abdomen is soft  Tenderness: There is no abdominal tenderness  Musculoskeletal:         General: No tenderness  Neurological:      Mental Status: She is alert and oriented to person, place, and time  Vitals reviewed  SVE:   0 5/50/-4    FHT:  Baseline Rate: 135 bpm  Variability: Moderate 6-25 bpm  Accelerations: 15 x 15 or greater  Decelerations: None    TOCO:   Contraction Frequency (minutes): 3-10    Lab Results   Component Value Date    WBC 8 74 09/11/2022    HGB 10 1 (L) 09/11/2022    HCT 32 0 (L) 09/11/2022     09/11/2022     Lab Results   Component Value Date     (L) 04/25/2015    K 3 9 09/11/2022     09/11/2022    CO2 21 09/11/2022    BUN 5 09/11/2022    CREATININE 0 58 (L) 09/11/2022    GLUCOSE 100 04/25/2015    AST 14 09/11/2022    ALT 13 09/11/2022     Prenatal Labs: Reviewed      Blood type: O Positive  Antibody: Negative  GBS: Negative  HIV:Negative  Rubella: Immune  VDRL/RPR: Non reactive  HBsAg: Negative  Chlamydia: Negative  Gonorrhea: Negative  Diabetes 1 hour screen: 220  Platelets: 59 6  Hgb: 233  <2 Midnights  OBSERVATION    Signature/Title:  Ellyn Roy MD  Date: 9/12/2022  Time: 3:21 AM

## 2022-09-12 NOTE — QUICK NOTE
Patient was reevaluated for epigastric pain after treatment with Flexeril and Phenergen  Patient reports that the pain has decreased substantially and that she would like to eat breakfast  Plan is to reevaluate patient after she eats         MD YANDEL Jennings

## 2022-09-13 ENCOUNTER — ROUTINE PRENATAL (OUTPATIENT)
Dept: OBGYN CLINIC | Facility: CLINIC | Age: 32
End: 2022-09-13

## 2022-09-13 VITALS
DIASTOLIC BLOOD PRESSURE: 72 MMHG | HEART RATE: 97 BPM | BODY MASS INDEX: 33.87 KG/M2 | HEIGHT: 64 IN | WEIGHT: 198.4 LBS | SYSTOLIC BLOOD PRESSURE: 106 MMHG

## 2022-09-13 DIAGNOSIS — O24.414 INSULIN CONTROLLED GESTATIONAL DIABETES MELLITUS (GDM) IN THIRD TRIMESTER: ICD-10-CM

## 2022-09-13 DIAGNOSIS — K21.00 GASTROESOPHAGEAL REFLUX DISEASE WITH ESOPHAGITIS WITHOUT HEMORRHAGE: ICD-10-CM

## 2022-09-13 DIAGNOSIS — O09.291 HX OF PREECLAMPSIA, PRIOR PREGNANCY, CURRENTLY PREGNANT, FIRST TRIMESTER: ICD-10-CM

## 2022-09-13 DIAGNOSIS — Z3A.36 36 WEEKS GESTATION OF PREGNANCY: Primary | ICD-10-CM

## 2022-09-13 DIAGNOSIS — O24.414 INSULIN CONTROLLED GESTATIONAL DIABETES MELLITUS (GDM) IN THIRD TRIMESTER: Primary | ICD-10-CM

## 2022-09-13 PROBLEM — Z3A.35 35 WEEKS GESTATION OF PREGNANCY: Status: RESOLVED | Noted: 2022-09-06 | Resolved: 2022-09-13

## 2022-09-13 LAB
SL AMB  POCT GLUCOSE, UA: NEGATIVE
SL AMB LEUKOCYTE ESTERASE,UA: NEGATIVE
SL AMB POCT BILIRUBIN,UA: NEGATIVE
SL AMB POCT BLOOD,UA: NEGATIVE
SL AMB POCT CLARITY,UA: NORMAL
SL AMB POCT COLOR,UA: YELLOW
SL AMB POCT KETONES,UA: NEGATIVE
SL AMB POCT NITRITE,UA: NEGATIVE
SL AMB POCT PH,UA: 6.5
SL AMB POCT SPECIFIC GRAVITY,UA: 1.01
SL AMB POCT URINE PROTEIN: NEGATIVE
SL AMB POCT UROBILINOGEN: 0.2

## 2022-09-13 PROCEDURE — 81002 URINALYSIS NONAUTO W/O SCOPE: CPT | Performed by: OBSTETRICS & GYNECOLOGY

## 2022-09-13 PROCEDURE — 99213 OFFICE O/P EST LOW 20 MIN: CPT | Performed by: OBSTETRICS & GYNECOLOGY

## 2022-09-13 RX ORDER — BLOOD SUGAR DIAGNOSTIC
STRIP MISCELLANEOUS
Qty: 100 EACH | Refills: 0 | Status: SHIPPED | OUTPATIENT
Start: 2022-09-13 | End: 2022-09-14 | Stop reason: SDUPTHER

## 2022-09-13 NOTE — ASSESSMENT & PLAN NOTE
Recently hospitalized for severe reflux  Evaluated by GI and discharged with PPI, Carafate, Pepcid, Pepcid  Also checked preeclampsia labs - negative CBC, CMP, POCT urine dip

## 2022-09-13 NOTE — PROGRESS NOTES
OB/GYN prenatal visit    S: 29 y o  [id number] 36w5d here for PN visit  She has no obstetric complaints, including pelvic pain, contractions, vaginal bleeding, loss of fluid, or decreased fetal movement  Patient was recently in the hospital at [facility] for severe reflux  She initially presented to [facility] and was given fentanyl, sulcrufate, Protonix, Tums, and Mylanta without relief  She was then seen in L&D triage at [facility] - normal workup and evaluated by GI  Patient was discharged with PPI, Pepcid, Carafate, Reglan  She has been taking the medications and reports symptom relief  Patient is now on 50u Lantus qhs with fasting sugars 85-88  Patient no longer has test strips - ordered at pharmacy and will try to pick them up  O:  Vitals:    22 1051   BP: 106/72   Pulse: 97       General: AAOX3  No acute distress  Cardiovascular: Regular, Rate and Rhythm, no murmur, gallop or rub   Lungs: Clear to Auscultation Bilaterally, no wheezing, rhonchi or rales    Abdominal: Soft  NT/ND  Gravid    Fundal height: 41 cm  FHT: 130-145      Plan discussed with Dr Silvana Martínez        A/P:  1  36 weeks gestation of pregnancy  Assessment & Plan:  IUP at 36w5d  No obstetric complaints today  TWG: + 8 9kg (recommended weight gain 89-24RLQ)  Complications: GDM on Lantus 50u qhs with biweekly NSTs, weekly AFIs - followed by MFM, diabetes team  Hx of preeclampsia in prior pregnancy - completed ASA at 36 weeks  Laboratory workup: initial OB labs (WNL); 28 week labs (GDM); 36 week cultures (GBS negative)  Ultrasounds: -possible macrosomia  Tdap given  [facility] indicated: No (O+)  Contraception:  getting vasectomy   Breastfeeding: Formula feed  Birth plan: C/S on    Discussed  labor precautions and fetal kick counts    Return to clinic in 1 weeks      2   Hx of preeclampsia, prior pregnancy, currently pregnant, first trimester  Assessment & Plan:  Completed ASA until 36 weeks  BP well controlled at this visit 106/72  POCT Urine dip - negative for proteins    Orders:  -     POCT urine dip    3  Gastroesophageal reflux disease with esophagitis without hemorrhage  Assessment & Plan:  Recently hospitalized for severe reflux  Evaluated by GI and discharged with PPI, Carafate, Pepcid, Pepcid  Also checked preeclampsia labs - negative CBC, CMP, POCT urine dip     Orders:  -     POCT urine dip    4   Insulin controlled gestational diabetes mellitus (GDM) in third trimester          COVID 19 precautions were discussed with patient at length, reviewed symptoms, hygiene, social distancing, patient to call office 24/7 with questions/concerns      Mikaela Hernandez MD  9/13/2022  11:48 AM

## 2022-09-13 NOTE — ASSESSMENT & PLAN NOTE
IUP at 36w5d  No obstetric complaints today  TWG: + 8 9kg (recommended weight gain 01-42GID)  Complications: GDM on Lantus 50u qhs with biweekly NSTs, weekly AFIs - followed by ALANNA, diabetes team  Hx of preeclampsia in prior pregnancy - completed ASA at 36 weeks    Laboratory workup: initial OB labs (WNL); 28 week labs (GDM); 36 week cultures (GBS negative)  Ultrasounds: -possible macrosomia  Tdap given  [facility] indicated: No (O+)  Contraception:  getting vasectomy   Breastfeeding: Formula feed  Birth plan: C/S on    Discussed  labor precautions and fetal kick counts    Return to clinic in 1 weeks

## 2022-09-13 NOTE — ASSESSMENT & PLAN NOTE
Completed ASA until 36 weeks  BP well controlled at this visit 106/72  POCT Urine dip - negative for proteins

## 2022-09-14 ENCOUNTER — TELEPHONE (OUTPATIENT)
Dept: OBGYN CLINIC | Facility: CLINIC | Age: 32
End: 2022-09-14

## 2022-09-14 ENCOUNTER — ROUTINE PRENATAL (OUTPATIENT)
Dept: PERINATAL CARE | Facility: CLINIC | Age: 32
End: 2022-09-14
Payer: COMMERCIAL

## 2022-09-14 VITALS
HEART RATE: 95 BPM | WEIGHT: 200.6 LBS | HEIGHT: 64 IN | BODY MASS INDEX: 34.25 KG/M2 | DIASTOLIC BLOOD PRESSURE: 74 MMHG | SYSTOLIC BLOOD PRESSURE: 118 MMHG

## 2022-09-14 DIAGNOSIS — Z3A.36 36 WEEKS GESTATION OF PREGNANCY: ICD-10-CM

## 2022-09-14 DIAGNOSIS — O24.414 INSULIN CONTROLLED GESTATIONAL DIABETES MELLITUS (GDM) IN THIRD TRIMESTER: Primary | ICD-10-CM

## 2022-09-14 PROCEDURE — 59025 FETAL NON-STRESS TEST: CPT | Performed by: NURSE PRACTITIONER

## 2022-09-14 NOTE — PATIENT INSTRUCTIONS
Thank you for choosing us for your  care today  If you have any questions about your ultrasound or care, please do not hesitate to contact us or your primary obstetrician  Some general instructions for your pregnancy are:    Protect against coronavirus: get vaccinated - pregnant women are increased risk of severe COVID  Notify your primary care doctor if you have any symptoms  Exercise: Aim for 22 minutes per day (150 minutes per week) of regular exercise  Walking is great! Nutrition: aim for calcium-rich and iron-rich foods as well as healthy sources of protein  Learn about Preeclampsia: preeclampsia is a common, serious high blood pressure complication in pregnancy  A blood pressure of 524IDNC (systolic or top number) or 30UQIU (diastolic or bottom number) is not normal and needs evaluation by your doctor  Aspirin is sometimes prescribed in early pregnancy to prevent preeclampsia in women with risk factors - ask your obstetrician if you should be on this medication  If you smoke, try to reduce how many cigarettes you smoke or try to quit completely  Do not vape  Other warning signs to watch out for in pregnancy or postpartum: chest pain, obstructed breathing or shortness of breath, seizures, thoughts of hurting yourself or your baby, bleeding, a painful or swollen leg, fever, or headache (see AWHONN POST-BIRTH Warning Signs campaign)  If these happen call 911  Itching is also not normal in pregnancy and if you experience this, especially over your hands and feet, potentially worse at night, notify your doctors

## 2022-09-14 NOTE — TELEPHONE ENCOUNTER
Called pt to notify that [facility] paperwork is complete and has been faxed to number provided  Originals can be picked up at pt's earliest convenience

## 2022-09-14 NOTE — PROGRESS NOTES
[id number] [facility]: Ms Lorna Crowder was seen today for NST (found under the pregnancy episode) which I reviewed the RN assessment and agree  NST is reactive without decelerations     Please don't hesitate to contact our office with any concerns or questions   -GABO Kennedy

## 2022-09-15 NOTE — PROGRESS NOTES
Please refer to the [facility] ultrasound report in Ob Procedures for additional information regarding today's visit

## 2022-09-16 ENCOUNTER — ULTRASOUND (OUTPATIENT)
Dept: PERINATAL CARE | Facility: CLINIC | Age: 32
End: 2022-09-16
Payer: COMMERCIAL

## 2022-09-16 VITALS
SYSTOLIC BLOOD PRESSURE: 118 MMHG | HEART RATE: 98 BPM | BODY MASS INDEX: 34.28 KG/M2 | WEIGHT: 200.8 LBS | DIASTOLIC BLOOD PRESSURE: 64 MMHG | HEIGHT: 64 IN

## 2022-09-16 DIAGNOSIS — O24.414 INSULIN CONTROLLED GESTATIONAL DIABETES MELLITUS (GDM) IN THIRD TRIMESTER: Primary | ICD-10-CM

## 2022-09-16 DIAGNOSIS — Z3A.37 37 WEEKS GESTATION OF PREGNANCY: ICD-10-CM

## 2022-09-16 PROCEDURE — 76815 OB US LIMITED FETUS(S): CPT | Performed by: OBSTETRICS & GYNECOLOGY

## 2022-09-16 PROCEDURE — 59025 FETAL NON-STRESS TEST: CPT | Performed by: OBSTETRICS & GYNECOLOGY

## 2022-09-16 NOTE — LETTER
September 16, 2022     [id number] [facility] PROVIDER    Patient: Melida Jackson   YOB: 1990   Date of Visit: 9/16/2022       Dear   Provider: Thank you for referring Melida Jackson to me for evaluation  Below are my notes for this consultation  If you have questions, please do not hesitate to call me  I look forward to following your patient along with you  Sincerely,        Florida Mckay MD        CC: No Recipients  Florida Mckay MD  9/15/2022  7:48 PM  Sign when Signing Visit  Please refer to the [facility] ultrasound report in Ob Procedures for additional information regarding today's visit

## 2022-09-16 NOTE — PROGRESS NOTES
Repeat Non-Stress Testing:    Patient verbalizes +FM  Pt denies ALL:               Leaking of fluid   Contractions   Vaginal bleeding   Decreased fetal movement    Patient is performing daily kick counts  Patient has no questions or concerns  NST strip reviewed by Dr Grayson Voss

## 2022-09-20 ENCOUNTER — ROUTINE PRENATAL (OUTPATIENT)
Dept: OBGYN CLINIC | Facility: CLINIC | Age: 32
End: 2022-09-20

## 2022-09-20 VITALS
HEART RATE: 97 BPM | WEIGHT: 199.2 LBS | HEIGHT: 64 IN | DIASTOLIC BLOOD PRESSURE: 76 MMHG | BODY MASS INDEX: 34.01 KG/M2 | SYSTOLIC BLOOD PRESSURE: 117 MMHG

## 2022-09-20 DIAGNOSIS — L02.215 PERINEAL ABSCESS: ICD-10-CM

## 2022-09-20 DIAGNOSIS — Z3A.37 37 WEEKS GESTATION OF PREGNANCY: Primary | ICD-10-CM

## 2022-09-20 DIAGNOSIS — O24.414 INSULIN CONTROLLED GESTATIONAL DIABETES MELLITUS (GDM) IN THIRD TRIMESTER: ICD-10-CM

## 2022-09-20 PROBLEM — Z3A.36 36 WEEKS GESTATION OF PREGNANCY: Status: RESOLVED | Noted: 2022-09-13 | Resolved: 2022-09-20

## 2022-09-20 PROCEDURE — 99213 OFFICE O/P EST LOW 20 MIN: CPT | Performed by: OBSTETRICS & GYNECOLOGY

## 2022-09-20 NOTE — ASSESSMENT & PLAN NOTE
2 x 2 5 inch perineal abscess on the left - fluctuant, tender to palpation  Recurrent history of abscesses requiring I&D 2/2 hidradenitis suppurativa  Swabbed with betadine and drained in office  Patient to expect continued drainage, may use Tylenol PRN and sitz baths

## 2022-09-20 NOTE — PROGRESS NOTES
OB/GYN prenatal visit    S: 29 y o  [id number] 37w5d here for PN visit  She denies obstetric complaints, including pelvic pain, vaginal bleeding, loss of fluid, or decreased fetal movement  Reports intermittent contractions and rectal pain starting this weekend  Similar to rectal pain she experienced with her first child  History of abscesses requiring multiple I&Ds 2/2 hidradenitis suppurativa    O:  Vitals:    22 1044   BP: 117/76   Pulse: 97       General: AAOX3  No acute distress  Cardiovascular: Regular, Rate and Rhythm, no murmur, gallop or rub   Lungs: Clear to Auscultation Bilaterally, no wheezing, rhonchi or rales    Abdominal: Soft  NT/ND  Gravid  : Fluctuant 2 x 2 5inch perineal abscess, very tender to palpation  No drainage        Fundal height: 41 cm  FHT: 145-150      Plan discussed with Dr Mtz          A/P:  1  37 weeks gestation of pregnancy  Assessment & Plan:  IUP at 37w5d  TWG: + 9 66kg (recommended weight gain 12-43PAP)  Complications: GDM on Lantus 50u qhs with biweekly NSTs, weekly AFIs - followed by MFLUIS, diabetes team  Hx of preeclampsia in prior pregnancy - completed ASA at 36 weeks  Laboratory workup: initial OB labs (WNL); 28 week labs (GDM); 36 week cultures (GBS negative)  Ultrasounds: -reactive NST, normal SURJIT  Tdap given  [place] indicated: No (O+)  Contraception:  getting vasectomy   Breastfeeding: Formula feed  Birth plan: C/S on    Discussed  labor precautions and fetal kick counts    Return to clinic in 1 weeks      2  Perineal abscess  Assessment & Plan:  2 x 2 5 inch perineal abscess on the left - fluctuant, tender to palpation  Recurrent history of abscesses requiring I&D 2/2 hidradenitis suppurativa  Swabbed with betadine and drained in office  Patient to expect continued drainage, may use Tylenol PRN and sitz baths      3   Insulin controlled gestational diabetes mellitus (GDM) in third trimester  Assessment & Plan:    Lab Results   Component Value Date    HGBA1C 6 3 (H) 08/22/2022         Lab Results   Component Value Date    HGBA1C 6 3 (H) 08/22/2022     Currently on Lantus 50u qhs, managed by ALANNA, EDMUND education   Getting NSTs biweekly, SURJIT weekly        COVID 19 precautions were discussed with patient at length, reviewed symptoms, hygiene, social distancing, patient to call office 24/7 with questions/concerns      Anjana Rasmussen MD  9/20/2022  12:18 PM

## 2022-09-20 NOTE — ASSESSMENT & PLAN NOTE
Lab Results   Component Value Date    HGBA1C 6 3 (H) 08/22/2022         Lab Results   Component Value Date    HGBA1C 6 3 (H) 08/22/2022     Currently on Lantus 50u qhs, managed by ALANNA, EDMUND education   Getting NSTs biweekly, SURJIT weekly

## 2022-09-20 NOTE — ASSESSMENT & PLAN NOTE
IUP at 37w5d  TWG: + 9 66kg (recommended weight gain 47-68FKM)  Complications: GDM on Lantus 50u qhs with biweekly NSTs, weekly AFIs - followed by ALANNA, diabetes team  Hx of preeclampsia in prior pregnancy - completed ASA at 36 weeks    Laboratory workup: initial OB labs (WNL); 28 week labs (GDM); 36 week cultures (GBS negative)  Ultrasounds: -reactive NST, normal SURJIT  Tdap given  [facility]  Contraception:  getting vasectomy   Breastfeeding: Formula feed  Birth plan: C/S on    Discussed  labor precautions and fetal kick counts    Return to clinic in 1 weeks

## 2022-09-21 ENCOUNTER — TELEPHONE (OUTPATIENT)
Dept: PERINATAL CARE | Facility: CLINIC | Age: 32
End: 2022-09-21

## 2022-09-21 ENCOUNTER — ROUTINE PRENATAL (OUTPATIENT)
Dept: PERINATAL CARE | Facility: CLINIC | Age: 32
End: 2022-09-21
Payer: COMMERCIAL

## 2022-09-21 VITALS
WEIGHT: 200 LBS | HEART RATE: 98 BPM | HEIGHT: 64 IN | DIASTOLIC BLOOD PRESSURE: 64 MMHG | BODY MASS INDEX: 34.15 KG/M2 | SYSTOLIC BLOOD PRESSURE: 122 MMHG

## 2022-09-21 DIAGNOSIS — O99.213 OBESITY AFFECTING PREGNANCY IN THIRD TRIMESTER: ICD-10-CM

## 2022-09-21 DIAGNOSIS — Z3A.37 37 WEEKS GESTATION OF PREGNANCY: ICD-10-CM

## 2022-09-21 DIAGNOSIS — O24.414 INSULIN CONTROLLED GESTATIONAL DIABETES MELLITUS (GDM) IN THIRD TRIMESTER: Primary | ICD-10-CM

## 2022-09-21 DIAGNOSIS — Z3A.32 32 WEEKS GESTATION OF PREGNANCY: ICD-10-CM

## 2022-09-21 PROCEDURE — 59025 FETAL NON-STRESS TEST: CPT | Performed by: NURSE PRACTITIONER

## 2022-09-21 RX ORDER — INSULIN GLARGINE 100 [IU]/ML
INJECTION, SOLUTION SUBCUTANEOUS
Qty: 5 ML | Refills: 0 | Status: SHIPPED | OUTPATIENT
Start: 2022-09-21 | End: 2022-10-01

## 2022-09-21 RX ORDER — INSULIN GLARGINE 100 [IU]/ML
INJECTION, SOLUTION SUBCUTANEOUS
Qty: 5 ML | Refills: 0 | Status: SHIPPED | OUTPATIENT
Start: 2022-09-21 | End: 2022-09-21 | Stop reason: SDUPTHER

## 2022-09-21 NOTE — PROGRESS NOTES
[address] [facility]: Ms Terence Vallejo was seen today for NST (found under the pregnancy episode) which I reviewed the RN assessment and agree  She requested refill of insulin which was sent today   Please don't hesitate to contact our office with any concerns or questions   -Marletta Lanes, CRNP

## 2022-09-21 NOTE — PATIENT INSTRUCTIONS
Thank you for choosing us for your  care today  If you have any questions about your ultrasound or care, please do not hesitate to contact us or your primary obstetrician  Some general instructions for your pregnancy are:    Protect against coronavirus: get vaccinated - pregnant women are increased risk of severe COVID  Notify your primary care doctor if you have any symptoms  Exercise: Aim for 22 minutes per day (150 minutes per week) of regular exercise  Walking is great! Nutrition: aim for calcium-rich and iron-rich foods as well as healthy sources of protein  Learn about Preeclampsia: preeclampsia is a common, serious high blood pressure complication in pregnancy  A blood pressure of 797ADOR (systolic or top number) or 20BNOX (diastolic or bottom number) is not normal and needs evaluation by your doctor  Aspirin is sometimes prescribed in early pregnancy to prevent preeclampsia in women with risk factors - ask your obstetrician if you should be on this medication  If you smoke, try to reduce how many cigarettes you smoke or try to quit completely  Do not vape  Other warning signs to watch out for in pregnancy or postpartum: chest pain, obstructed breathing or shortness of breath, seizures, thoughts of hurting yourself or your baby, bleeding, a painful or swollen leg, fever, or headache (see AWHONN POST-BIRTH Warning Signs campaign)  If these happen call 911  Itching is also not normal in pregnancy and if you experience this, especially over your hands and feet, potentially worse at night, notify your doctors

## 2022-09-21 NOTE — PROGRESS NOTES
Repeat Non-Stress Testing:    Patient verbalizes +FM  Pt denies ALL:               Leaking of fluid   Contractions   Vaginal bleeding   Decreased fetal movement    Patient is performing daily kick counts  Patient has no questions or concerns  NST strip reviewed by Vivi Calvillo

## 2022-09-23 ENCOUNTER — ROUTINE PRENATAL (OUTPATIENT)
Dept: PERINATAL CARE | Facility: CLINIC | Age: 32
End: 2022-09-23
Payer: COMMERCIAL

## 2022-09-23 VITALS
WEIGHT: 201.2 LBS | SYSTOLIC BLOOD PRESSURE: 112 MMHG | DIASTOLIC BLOOD PRESSURE: 72 MMHG | HEIGHT: 64 IN | BODY MASS INDEX: 34.35 KG/M2 | HEART RATE: 78 BPM

## 2022-09-23 DIAGNOSIS — O24.414 INSULIN CONTROLLED GESTATIONAL DIABETES MELLITUS (GDM) IN THIRD TRIMESTER: ICD-10-CM

## 2022-09-23 DIAGNOSIS — Z3A.38 38 WEEKS GESTATION OF PREGNANCY: Primary | ICD-10-CM

## 2022-09-23 PROCEDURE — 76815 OB US LIMITED FETUS(S): CPT | Performed by: STUDENT IN AN ORGANIZED HEALTH CARE EDUCATION/TRAINING PROGRAM

## 2022-09-23 PROCEDURE — 59025 FETAL NON-STRESS TEST: CPT | Performed by: STUDENT IN AN ORGANIZED HEALTH CARE EDUCATION/TRAINING PROGRAM

## 2022-09-23 NOTE — PROGRESS NOTES
Repeat Non-Stress Testing:    Patient verbalizes +FM  Pt denies ALL:               Leaking of fluid   Contractions   Vaginal bleeding   Decreased fetal movement    Patient is performing daily kick counts  Patient has no questions or concerns  NST strip reviewed by Dr Lev Bertrand

## 2022-09-23 NOTE — PROGRESS NOTES
[address] [facility]: Ms Gabi Blanchard was seen today for NST (found under the pregnancy episode) which I reviewed the RN assessment and agree, and SURJIT (see ultrasound report under OB procedures tab)  Please don't hesitate to contact our office with any concerns or questions    -Liv Paz MD

## 2022-09-27 ENCOUNTER — ROUTINE PRENATAL (OUTPATIENT)
Dept: OBGYN CLINIC | Facility: CLINIC | Age: 32
End: 2022-09-27

## 2022-09-27 ENCOUNTER — TELEPHONE (OUTPATIENT)
Dept: PERINATAL CARE | Facility: CLINIC | Age: 32
End: 2022-09-27

## 2022-09-27 VITALS
DIASTOLIC BLOOD PRESSURE: 68 MMHG | WEIGHT: 200 LBS | SYSTOLIC BLOOD PRESSURE: 125 MMHG | BODY MASS INDEX: 34.15 KG/M2 | HEART RATE: 92 BPM | HEIGHT: 64 IN

## 2022-09-27 DIAGNOSIS — Z3A.38 38 WEEKS GESTATION OF PREGNANCY: Primary | ICD-10-CM

## 2022-09-27 PROCEDURE — 99213 OFFICE O/P EST LOW 20 MIN: CPT | Performed by: OBSTETRICS & GYNECOLOGY

## 2022-09-27 NOTE — PROGRESS NOTES
OB/GYN prenatal visit    S: 29 y o  [id number] 38w5d here for PN visit  She has no obstetric complaints, including pelvic pain, contractions, vaginal bleeding, loss of fluid, or decreased fetal movement  O:  Vitals:    22 1000   BP: 125/68   Pulse: 92       General: AAOX3  No acute distress  Cardiovascular: Regular, Rate and Rhythm, no murmur, gallop or rub   Lungs: Clear to Auscultation Bilaterally, no wheezing, rhonchi or rales    Abdominal: Soft  NT/ND   Gravid    Fundal height: 39 cm  FHT: 141      Plan discussed with Dr Nathalie Anguiano           A/P:      IUP at 38w5d  No obstetric complaints today  TWG: + 10 (recommended weight gain 9-30)  Complications: GDM on 60 u Lantus, MFM following   Laboratory workup: initial OB labs (wnl); 28 week labs (wnl); 36 week cultures (negative)  Ultrasounds: Macrosomia in August, didn't complete US scheduled in September  Vaccines:  TDAP vaccine given, COVID vaccine taken and verified  [facility] indicated: no  Contraception: Fiance vasectomy  Breastfeeding: Formula   Birth plan:   Discussed  labor precautions and fetal kick counts    Return to clinic in 1 weeks after        COVID 19 precautions were discussed with patient at length, reviewed symptoms, hygiene, social distancing, patient to call office  with questions/concerns        Jas Suggs DO  2022  11:04 AM

## 2022-09-27 NOTE — TELEPHONE ENCOUNTER
Called patient to reschedule NO SHOW appointment:  NST    Spoke with patient and recheduled appointment - 9/28/22  11:00  [place]

## 2022-09-28 ENCOUNTER — ANESTHESIA EVENT (OUTPATIENT)
Dept: LABOR AND DELIVERY | Facility: HOSPITAL | Age: 32
End: 2022-09-28
Payer: COMMERCIAL

## 2022-09-28 ENCOUNTER — ROUTINE PRENATAL (OUTPATIENT)
Dept: PERINATAL CARE | Facility: CLINIC | Age: 32
End: 2022-09-28
Payer: COMMERCIAL

## 2022-09-28 VITALS
HEIGHT: 64 IN | BODY MASS INDEX: 34.31 KG/M2 | HEART RATE: 87 BPM | SYSTOLIC BLOOD PRESSURE: 100 MMHG | DIASTOLIC BLOOD PRESSURE: 68 MMHG | WEIGHT: 201 LBS

## 2022-09-28 DIAGNOSIS — Z3A.38 38 WEEKS GESTATION OF PREGNANCY: Primary | ICD-10-CM

## 2022-09-28 DIAGNOSIS — O24.414 INSULIN CONTROLLED GESTATIONAL DIABETES MELLITUS (GDM) IN THIRD TRIMESTER: ICD-10-CM

## 2022-09-28 PROBLEM — Z3A.36 36 WEEKS GESTATION OF PREGNANCY: Status: RESOLVED | Noted: 2022-07-06 | Resolved: 2022-09-28

## 2022-09-28 PROCEDURE — 59025 FETAL NON-STRESS TEST: CPT | Performed by: OBSTETRICS & GYNECOLOGY

## 2022-09-28 NOTE — LETTER
September 28, 2022     Saulo Jones MD  [address] [address]    Patient: Thelma Padilla   YOB: 1990   Date of Visit: 9/28/2022       Dear Dr Carlos Bello: Thank you for referring Thelma Padilla to me for evaluation  Below are my notes for this consultation  If you have questions, please do not hesitate to call me  I look forward to following your patient along with you           Sincerely,        Melissa Temple MD        CC: No Recipients

## 2022-09-28 NOTE — PROGRESS NOTES
Repeat Non-Stress Testing:    Patient verbalizes +FM  Pt denies ALL:               Leaking of fluid   Contractions   Vaginal bleeding   Decreased fetal movement    Patient is performing daily kick counts  Patient has no questions or concerns  NST strip reviewed by Dr Lama

## 2022-09-29 ENCOUNTER — ANESTHESIA (OUTPATIENT)
Dept: LABOR AND DELIVERY | Facility: HOSPITAL | Age: 32
End: 2022-09-29
Payer: COMMERCIAL

## 2022-09-29 ENCOUNTER — HOSPITAL ENCOUNTER (INPATIENT)
Facility: HOSPITAL | Age: 32
LOS: 2 days | Discharge: HOME | DRG: 540 | End: 2022-10-01
Attending: OBSTETRICS & GYNECOLOGY | Admitting: STUDENT IN AN ORGANIZED HEALTH CARE EDUCATION/TRAINING PROGRAM
Payer: COMMERCIAL

## 2022-09-29 DIAGNOSIS — Z3A.39 39 WEEKS GESTATION OF PREGNANCY: Primary | ICD-10-CM

## 2022-09-29 DIAGNOSIS — O34.219 PREVIOUS CESAREAN DELIVERY AFFECTING PREGNANCY: ICD-10-CM

## 2022-09-29 DIAGNOSIS — Z98.891 S/P PRIMARY LOW TRANSVERSE C-SECTION: ICD-10-CM

## 2022-09-29 DIAGNOSIS — O24.419 GESTATIONAL DIABETES MELLITUS (GDM) IN THIRD TRIMESTER, GESTATIONAL DIABETES METHOD OF CONTROL UNSPECIFIED: ICD-10-CM

## 2022-09-29 PROBLEM — O47.9 UTERINE CONTRACTIONS: Status: ACTIVE | Noted: 2022-09-29

## 2022-09-29 LAB
ABO GROUP BLD: NORMAL
BASE EXCESS BLDCOA CALC-SCNC: -4.1 MMOL/L (ref 3–11)
BASE EXCESS BLDCOV CALC-SCNC: -3.9 MMOL/L (ref 1–9)
BASOPHILS # BLD AUTO: 0.02 THOUSANDS/ΜL (ref 0–0.1)
BASOPHILS NFR BLD AUTO: 0 % (ref 0–1)
BLD GP AB SCN SERPL QL: NEGATIVE
EOSINOPHIL # BLD AUTO: 0.02 THOUSAND/ΜL (ref 0–0.61)
EOSINOPHIL NFR BLD AUTO: 0 % (ref 0–6)
ERYTHROCYTE [DISTWIDTH] IN BLOOD BY AUTOMATED COUNT: 15 % (ref 11.6–15.1)
HCO3 BLDCOA-SCNC: 26.4 MMOL/L (ref 17.3–27.3)
HCO3 BLDCOV-SCNC: 25.7 MMOL/L (ref 12.2–28.6)
HCT VFR BLD AUTO: 34.9 % (ref 34.8–46.1)
HGB BLD-MCNC: 11.2 G/DL (ref 11.5–15.4)
IMM GRANULOCYTES # BLD AUTO: 0.08 THOUSAND/UL (ref 0–0.2)
IMM GRANULOCYTES NFR BLD AUTO: 1 % (ref 0–2)
LYMPHOCYTES # BLD AUTO: 1.86 THOUSANDS/ΜL (ref 0.6–4.47)
LYMPHOCYTES NFR BLD AUTO: 17 % (ref 14–44)
MCH RBC QN AUTO: 23.4 PG (ref 26.8–34.3)
MCHC RBC AUTO-ENTMCNC: 32.1 G/DL (ref 31.4–37.4)
MCV RBC AUTO: 73 FL (ref 82–98)
MONOCYTES # BLD AUTO: 0.54 THOUSAND/ΜL (ref 0.17–1.22)
MONOCYTES NFR BLD AUTO: 5 % (ref 4–12)
NEUTROPHILS # BLD AUTO: 8.53 THOUSANDS/ΜL (ref 1.85–7.62)
NEUTS SEG NFR BLD AUTO: 77 % (ref 43–75)
NRBC BLD AUTO-RTO: 0 /100 WBCS
O2 CT VFR BLDCOA CALC: 5.8 ML/DL
OXYHGB MFR BLDCOA: 24.9 %
OXYHGB MFR BLDCOV: 22.4 %
PCO2 BLDCOA: 72.6 MM[HG] (ref 30–60)
PCO2 BLDCOV: 65.5 MM HG (ref 27–43)
PH BLDCOA: 7.18 [PH] (ref 7.23–7.43)
PH BLDCOV: 7.21 [PH] (ref 7.19–7.49)
PLATELET # BLD AUTO: 261 THOUSANDS/UL (ref 149–390)
PMV BLD AUTO: 9.4 FL (ref 8.9–12.7)
PO2 BLDCOA: 16.6 MM HG (ref 5–25)
PO2 BLDCOV: 14 MM HG (ref 15–45)
RBC # BLD AUTO: 4.78 MILLION/UL (ref 3.81–5.12)
RH BLD: POSITIVE
RPR SER QL: NORMAL
SAO2 % BLDCOV: 5.2 ML/DL
SPECIMEN EXPIRATION DATE: NORMAL
WBC # BLD AUTO: 11.05 THOUSAND/UL (ref 4.31–10.16)

## 2022-09-29 PROCEDURE — 59514 CESAREAN DELIVERY ONLY: CPT | Performed by: STUDENT IN AN ORGANIZED HEALTH CARE EDUCATION/TRAINING PROGRAM

## 2022-09-29 PROCEDURE — 85025 COMPLETE CBC W/AUTO DIFF WBC: CPT | Performed by: STUDENT IN AN ORGANIZED HEALTH CARE EDUCATION/TRAINING PROGRAM

## 2022-09-29 PROCEDURE — 86901 BLOOD TYPING SEROLOGIC RH(D): CPT | Performed by: STUDENT IN AN ORGANIZED HEALTH CARE EDUCATION/TRAINING PROGRAM

## 2022-09-29 PROCEDURE — 82948 REAGENT STRIP/BLOOD GLUCOSE: CPT

## 2022-09-29 PROCEDURE — 86920 COMPATIBILITY TEST SPIN: CPT

## 2022-09-29 PROCEDURE — 4A1HXCZ MONITORING OF PRODUCTS OF CONCEPTION, CARDIAC RATE, EXTERNAL APPROACH: ICD-10-PCS | Performed by: STUDENT IN AN ORGANIZED HEALTH CARE EDUCATION/TRAINING PROGRAM

## 2022-09-29 PROCEDURE — NC001 PR NO CHARGE: Performed by: OBSTETRICS & GYNECOLOGY

## 2022-09-29 PROCEDURE — 86900 BLOOD TYPING SEROLOGIC ABO: CPT | Performed by: STUDENT IN AN ORGANIZED HEALTH CARE EDUCATION/TRAINING PROGRAM

## 2022-09-29 PROCEDURE — 82805 BLOOD GASES W/O2 SATURATION: CPT | Performed by: STUDENT IN AN ORGANIZED HEALTH CARE EDUCATION/TRAINING PROGRAM

## 2022-09-29 PROCEDURE — NC001 PR NO CHARGE: Performed by: STUDENT IN AN ORGANIZED HEALTH CARE EDUCATION/TRAINING PROGRAM

## 2022-09-29 PROCEDURE — 94760 N-INVAS EAR/PLS OXIMETRY 1: CPT

## 2022-09-29 PROCEDURE — 86592 SYPHILIS TEST NON-TREP QUAL: CPT | Performed by: STUDENT IN AN ORGANIZED HEALTH CARE EDUCATION/TRAINING PROGRAM

## 2022-09-29 PROCEDURE — 86850 RBC ANTIBODY SCREEN: CPT | Performed by: STUDENT IN AN ORGANIZED HEALTH CARE EDUCATION/TRAINING PROGRAM

## 2022-09-29 PROCEDURE — 94762 N-INVAS EAR/PLS OXIMTRY CONT: CPT

## 2022-09-29 RX ORDER — DIAPER,BRIEF,INFANT-TODD,DISP
1 EACH MISCELLANEOUS DAILY PRN
Status: DISCONTINUED | OUTPATIENT
Start: 2022-09-29 | End: 2022-10-01 | Stop reason: HOSPADM

## 2022-09-29 RX ORDER — HYDROMORPHONE HCL/PF 1 MG/ML
0.5 SYRINGE (ML) INJECTION
Status: DISCONTINUED | OUTPATIENT
Start: 2022-09-29 | End: 2022-09-29

## 2022-09-29 RX ORDER — FAMOTIDINE 20 MG/1
20 TABLET, FILM COATED ORAL 2 TIMES DAILY
Status: DISCONTINUED | OUTPATIENT
Start: 2022-09-29 | End: 2022-10-01 | Stop reason: HOSPADM

## 2022-09-29 RX ORDER — KETOROLAC TROMETHAMINE 30 MG/ML
15 INJECTION, SOLUTION INTRAMUSCULAR; INTRAVENOUS EVERY 6 HOURS
Status: DISCONTINUED | OUTPATIENT
Start: 2022-09-29 | End: 2022-09-29

## 2022-09-29 RX ORDER — ONDANSETRON 2 MG/ML
4 INJECTION INTRAMUSCULAR; INTRAVENOUS EVERY 4 HOURS PRN
Status: ACTIVE | OUTPATIENT
Start: 2022-09-29 | End: 2022-09-30

## 2022-09-29 RX ORDER — MORPHINE SULFATE 1 MG/ML
INJECTION, SOLUTION EPIDURAL; INTRATHECAL; INTRAVENOUS AS NEEDED
Status: DISCONTINUED | OUTPATIENT
Start: 2022-09-29 | End: 2022-09-29

## 2022-09-29 RX ORDER — SIMETHICONE 80 MG
80 TABLET,CHEWABLE ORAL 4 TIMES DAILY PRN
Status: DISCONTINUED | OUTPATIENT
Start: 2022-09-29 | End: 2022-10-01 | Stop reason: HOSPADM

## 2022-09-29 RX ORDER — PANTOPRAZOLE SODIUM 40 MG/1
40 TABLET, DELAYED RELEASE ORAL
Status: DISCONTINUED | OUTPATIENT
Start: 2022-09-29 | End: 2022-10-01 | Stop reason: HOSPADM

## 2022-09-29 RX ORDER — HYDROMORPHONE HCL/PF 1 MG/ML
0.5 SYRINGE (ML) INJECTION EVERY 2 HOUR PRN
Status: DISPENSED | OUTPATIENT
Start: 2022-09-29 | End: 2022-09-30

## 2022-09-29 RX ORDER — METOCLOPRAMIDE HYDROCHLORIDE 5 MG/ML
10 INJECTION INTRAMUSCULAR; INTRAVENOUS EVERY 4 HOURS PRN
Status: DISCONTINUED | OUTPATIENT
Start: 2022-09-29 | End: 2022-09-29

## 2022-09-29 RX ORDER — CEFAZOLIN SODIUM 2 G/50ML
2000 SOLUTION INTRAVENOUS ONCE
Status: COMPLETED | OUTPATIENT
Start: 2022-09-29 | End: 2022-09-29

## 2022-09-29 RX ORDER — METOCLOPRAMIDE HYDROCHLORIDE 5 MG/ML
5 INJECTION INTRAMUSCULAR; INTRAVENOUS EVERY 6 HOURS PRN
Status: ACTIVE | OUTPATIENT
Start: 2022-09-29 | End: 2022-09-30

## 2022-09-29 RX ORDER — SODIUM CHLORIDE, SODIUM LACTATE, POTASSIUM CHLORIDE, CALCIUM CHLORIDE 600; 310; 30; 20 MG/100ML; MG/100ML; MG/100ML; MG/100ML
125 INJECTION, SOLUTION INTRAVENOUS CONTINUOUS
Status: DISCONTINUED | OUTPATIENT
Start: 2022-09-29 | End: 2022-09-29

## 2022-09-29 RX ORDER — DIPHENHYDRAMINE HYDROCHLORIDE 50 MG/ML
25 INJECTION INTRAMUSCULAR; INTRAVENOUS EVERY 6 HOURS PRN
Status: ACTIVE | OUTPATIENT
Start: 2022-09-29 | End: 2022-09-30

## 2022-09-29 RX ORDER — IBUPROFEN 600 MG/1
600 TABLET ORAL EVERY 6 HOURS
Status: DISCONTINUED | OUTPATIENT
Start: 2022-09-30 | End: 2022-10-01 | Stop reason: HOSPADM

## 2022-09-29 RX ORDER — DOCUSATE SODIUM 100 MG/1
100 CAPSULE, LIQUID FILLED ORAL 2 TIMES DAILY
Status: DISCONTINUED | OUTPATIENT
Start: 2022-09-29 | End: 2022-10-01 | Stop reason: HOSPADM

## 2022-09-29 RX ORDER — ACETAMINOPHEN 325 MG/1
650 TABLET ORAL EVERY 6 HOURS
Status: ACTIVE | OUTPATIENT
Start: 2022-09-29 | End: 2022-09-30

## 2022-09-29 RX ORDER — ONDANSETRON 2 MG/ML
INJECTION INTRAMUSCULAR; INTRAVENOUS AS NEEDED
Status: DISCONTINUED | OUTPATIENT
Start: 2022-09-29 | End: 2022-09-29

## 2022-09-29 RX ORDER — SODIUM CHLORIDE, SODIUM LACTATE, POTASSIUM CHLORIDE, CALCIUM CHLORIDE 600; 310; 30; 20 MG/100ML; MG/100ML; MG/100ML; MG/100ML
125 INJECTION, SOLUTION INTRAVENOUS CONTINUOUS
Status: DISCONTINUED | OUTPATIENT
Start: 2022-09-29 | End: 2022-10-01 | Stop reason: HOSPADM

## 2022-09-29 RX ORDER — BUPIVACAINE HYDROCHLORIDE 7.5 MG/ML
INJECTION, SOLUTION INTRASPINAL AS NEEDED
Status: DISCONTINUED | OUTPATIENT
Start: 2022-09-29 | End: 2022-09-29

## 2022-09-29 RX ORDER — OXYTOCIN/RINGER'S LACTATE 30/500 ML
62.5 PLASTIC BAG, INJECTION (ML) INTRAVENOUS ONCE
Status: COMPLETED | OUTPATIENT
Start: 2022-09-29 | End: 2022-09-29

## 2022-09-29 RX ORDER — ONDANSETRON 2 MG/ML
4 INJECTION INTRAMUSCULAR; INTRAVENOUS EVERY 8 HOURS PRN
Status: DISCONTINUED | OUTPATIENT
Start: 2022-09-29 | End: 2022-10-01 | Stop reason: HOSPADM

## 2022-09-29 RX ORDER — OXYCODONE HYDROCHLORIDE 10 MG/1
10 TABLET ORAL EVERY 4 HOURS PRN
Status: DISCONTINUED | OUTPATIENT
Start: 2022-09-29 | End: 2022-10-01 | Stop reason: HOSPADM

## 2022-09-29 RX ORDER — KETOROLAC TROMETHAMINE 30 MG/ML
INJECTION, SOLUTION INTRAMUSCULAR; INTRAVENOUS AS NEEDED
Status: DISCONTINUED | OUTPATIENT
Start: 2022-09-29 | End: 2022-09-29

## 2022-09-29 RX ORDER — FENTANYL CITRATE/PF 50 MCG/ML
25 SYRINGE (ML) INJECTION
Status: DISCONTINUED | OUTPATIENT
Start: 2022-09-29 | End: 2022-09-29

## 2022-09-29 RX ORDER — METOCLOPRAMIDE HYDROCHLORIDE 5 MG/ML
INJECTION INTRAMUSCULAR; INTRAVENOUS AS NEEDED
Status: DISCONTINUED | OUTPATIENT
Start: 2022-09-29 | End: 2022-09-29

## 2022-09-29 RX ORDER — FENTANYL CITRATE 50 UG/ML
INJECTION, SOLUTION INTRAMUSCULAR; INTRAVENOUS AS NEEDED
Status: DISCONTINUED | OUTPATIENT
Start: 2022-09-29 | End: 2022-09-29

## 2022-09-29 RX ORDER — OXYTOCIN/RINGER'S LACTATE 30/500 ML
PLASTIC BAG, INJECTION (ML) INTRAVENOUS CONTINUOUS PRN
Status: DISCONTINUED | OUTPATIENT
Start: 2022-09-29 | End: 2022-09-29

## 2022-09-29 RX ORDER — FENTANYL CITRATE/PF 50 MCG/ML
25 SYRINGE (ML) INJECTION
Status: COMPLETED | OUTPATIENT
Start: 2022-09-29 | End: 2022-09-29

## 2022-09-29 RX ORDER — HYDROMORPHONE HCL/PF 1 MG/ML
0.5 SYRINGE (ML) INJECTION
Status: DISCONTINUED | OUTPATIENT
Start: 2022-09-29 | End: 2022-10-01 | Stop reason: HOSPADM

## 2022-09-29 RX ORDER — KETOROLAC TROMETHAMINE 30 MG/ML
30 INJECTION, SOLUTION INTRAMUSCULAR; INTRAVENOUS EVERY 6 HOURS SCHEDULED
Status: COMPLETED | OUTPATIENT
Start: 2022-09-29 | End: 2022-09-30

## 2022-09-29 RX ORDER — DIPHENHYDRAMINE HCL 25 MG
25 TABLET ORAL EVERY 6 HOURS PRN
Status: DISCONTINUED | OUTPATIENT
Start: 2022-09-29 | End: 2022-10-01 | Stop reason: HOSPADM

## 2022-09-29 RX ORDER — ONDANSETRON 2 MG/ML
4 INJECTION INTRAMUSCULAR; INTRAVENOUS EVERY 6 HOURS PRN
Status: DISCONTINUED | OUTPATIENT
Start: 2022-09-30 | End: 2022-10-01 | Stop reason: HOSPADM

## 2022-09-29 RX ORDER — ONDANSETRON 2 MG/ML
4 INJECTION INTRAMUSCULAR; INTRAVENOUS EVERY 4 HOURS PRN
Status: DISCONTINUED | OUTPATIENT
Start: 2022-09-29 | End: 2022-09-29

## 2022-09-29 RX ORDER — ACETAMINOPHEN 325 MG/1
650 TABLET ORAL EVERY 6 HOURS SCHEDULED
Status: DISCONTINUED | OUTPATIENT
Start: 2022-09-29 | End: 2022-10-01 | Stop reason: HOSPADM

## 2022-09-29 RX ORDER — SODIUM CHLORIDE, SODIUM LACTATE, POTASSIUM CHLORIDE, CALCIUM CHLORIDE 600; 310; 30; 20 MG/100ML; MG/100ML; MG/100ML; MG/100ML
INJECTION, SOLUTION INTRAVENOUS CONTINUOUS PRN
Status: DISCONTINUED | OUTPATIENT
Start: 2022-09-29 | End: 2022-09-29

## 2022-09-29 RX ORDER — DEXAMETHASONE SODIUM PHOSPHATE 10 MG/ML
8 INJECTION, SOLUTION INTRAMUSCULAR; INTRAVENOUS ONCE AS NEEDED
Status: ACTIVE | OUTPATIENT
Start: 2022-09-29 | End: 2022-09-30

## 2022-09-29 RX ORDER — NALOXONE HYDROCHLORIDE 0.4 MG/ML
0.1 INJECTION, SOLUTION INTRAMUSCULAR; INTRAVENOUS; SUBCUTANEOUS
Status: ACTIVE | OUTPATIENT
Start: 2022-09-29 | End: 2022-09-30

## 2022-09-29 RX ORDER — CALCIUM CARBONATE 200(500)MG
1000 TABLET,CHEWABLE ORAL DAILY PRN
Status: DISCONTINUED | OUTPATIENT
Start: 2022-09-29 | End: 2022-10-01 | Stop reason: HOSPADM

## 2022-09-29 RX ORDER — SODIUM CHLORIDE FOR INHALATION 0.9 %
VIAL, NEBULIZER (ML) INHALATION
Status: DISPENSED
Start: 2022-09-29 | End: 2022-09-29

## 2022-09-29 RX ORDER — OXYCODONE HYDROCHLORIDE 5 MG/1
5 TABLET ORAL EVERY 4 HOURS PRN
Status: DISCONTINUED | OUTPATIENT
Start: 2022-09-30 | End: 2022-10-01 | Stop reason: HOSPADM

## 2022-09-29 RX ORDER — PROPOFOL 10 MG/ML
INJECTION, EMULSION INTRAVENOUS CONTINUOUS PRN
Status: DISCONTINUED | OUTPATIENT
Start: 2022-09-29 | End: 2022-09-29

## 2022-09-29 RX ORDER — PROPOFOL 10 MG/ML
INJECTION, EMULSION INTRAVENOUS AS NEEDED
Status: DISCONTINUED | OUTPATIENT
Start: 2022-09-29 | End: 2022-09-29

## 2022-09-29 RX ORDER — LIDOCAINE HYDROCHLORIDE 10 MG/ML
INJECTION, SOLUTION EPIDURAL; INFILTRATION; INTRACAUDAL; PERINEURAL AS NEEDED
Status: DISCONTINUED | OUTPATIENT
Start: 2022-09-29 | End: 2022-09-29

## 2022-09-29 RX ADMIN — BUPIVACAINE HYDROCHLORIDE IN DEXTROSE 1.6 ML: 7.5 INJECTION, SOLUTION SUBARACHNOID at 03:57

## 2022-09-29 RX ADMIN — PROPOFOL 20 MCG/KG/MIN: 10 INJECTION, EMULSION INTRAVENOUS at 04:55

## 2022-09-29 RX ADMIN — LIDOCAINE HYDROCHLORIDE 30 MG: 10 INJECTION, SOLUTION EPIDURAL; INFILTRATION; INTRACAUDAL; PERINEURAL at 04:49

## 2022-09-29 RX ADMIN — ACETAMINOPHEN 650 MG: 325 TABLET, FILM COATED ORAL at 12:09

## 2022-09-29 RX ADMIN — FENTANYL CITRATE 25 MCG: 50 INJECTION INTRAMUSCULAR; INTRAVENOUS at 06:21

## 2022-09-29 RX ADMIN — FENTANYL CITRATE 25 MCG: 50 INJECTION INTRAMUSCULAR; INTRAVENOUS at 06:39

## 2022-09-29 RX ADMIN — METOCLOPRAMIDE 10 MG: 5 INJECTION, SOLUTION INTRAMUSCULAR; INTRAVENOUS at 04:22

## 2022-09-29 RX ADMIN — HYDROMORPHONE HYDROCHLORIDE 0.5 MG: 1 INJECTION, SOLUTION INTRAMUSCULAR; INTRAVENOUS; SUBCUTANEOUS at 22:01

## 2022-09-29 RX ADMIN — KETOROLAC TROMETHAMINE 30 MG: 30 INJECTION, SOLUTION INTRAMUSCULAR at 12:09

## 2022-09-29 RX ADMIN — MORPHINE SULFATE 0.2 MG: 1 INJECTION, SOLUTION EPIDURAL; INTRATHECAL; INTRAVENOUS at 03:57

## 2022-09-29 RX ADMIN — ACETAMINOPHEN 650 MG: 325 TABLET, FILM COATED ORAL at 18:17

## 2022-09-29 RX ADMIN — SODIUM CHLORIDE, SODIUM LACTATE, POTASSIUM CHLORIDE, AND CALCIUM CHLORIDE 125 ML/HR: .6; .31; .03; .02 INJECTION, SOLUTION INTRAVENOUS at 14:05

## 2022-09-29 RX ADMIN — Medication 250 MILLI-UNITS/MIN: at 04:20

## 2022-09-29 RX ADMIN — FAMOTIDINE 20 MG: 20 TABLET ORAL at 18:17

## 2022-09-29 RX ADMIN — ONDANSETRON 4 MG: 2 INJECTION INTRAMUSCULAR; INTRAVENOUS at 04:22

## 2022-09-29 RX ADMIN — PHENYLEPHRINE HYDROCHLORIDE 50 MCG/MIN: 10 INJECTION INTRAVENOUS at 03:58

## 2022-09-29 RX ADMIN — KETOROLAC TROMETHAMINE 30 MG: 30 INJECTION, SOLUTION INTRAMUSCULAR at 05:17

## 2022-09-29 RX ADMIN — SODIUM CHLORIDE, SODIUM LACTATE, POTASSIUM CHLORIDE, AND CALCIUM CHLORIDE: .6; .31; .03; .02 INJECTION, SOLUTION INTRAVENOUS at 03:48

## 2022-09-29 RX ADMIN — CEFAZOLIN SODIUM 2000 MG: 2 SOLUTION INTRAVENOUS at 03:58

## 2022-09-29 RX ADMIN — SODIUM CHLORIDE, SODIUM LACTATE, POTASSIUM CHLORIDE, AND CALCIUM CHLORIDE 125 ML/HR: .6; .31; .03; .02 INJECTION, SOLUTION INTRAVENOUS at 06:45

## 2022-09-29 RX ADMIN — ACETAMINOPHEN 650 MG: 325 TABLET, FILM COATED ORAL at 06:20

## 2022-09-29 RX ADMIN — KETOROLAC TROMETHAMINE 30 MG: 30 INJECTION, SOLUTION INTRAMUSCULAR at 18:17

## 2022-09-29 RX ADMIN — PANTOPRAZOLE SODIUM 40 MG: 40 TABLET, DELAYED RELEASE ORAL at 11:29

## 2022-09-29 RX ADMIN — Medication 1 TABLET: at 10:08

## 2022-09-29 RX ADMIN — DOCUSATE SODIUM 100 MG: 100 CAPSULE, LIQUID FILLED ORAL at 10:08

## 2022-09-29 RX ADMIN — FAMOTIDINE 20 MG: 20 TABLET ORAL at 11:29

## 2022-09-29 RX ADMIN — SODIUM CHLORIDE, SODIUM LACTATE, POTASSIUM CHLORIDE, AND CALCIUM CHLORIDE 999.9 ML/HR: .6; .31; .03; .02 INJECTION, SOLUTION INTRAVENOUS at 03:05

## 2022-09-29 RX ADMIN — PROPOFOL 20 MG: 10 INJECTION, EMULSION INTRAVENOUS at 04:49

## 2022-09-29 RX ADMIN — DOCUSATE SODIUM 100 MG: 100 CAPSULE, LIQUID FILLED ORAL at 18:17

## 2022-09-29 RX ADMIN — FENTANYL CITRATE 25 MCG: 50 INJECTION INTRAMUSCULAR; INTRAVENOUS at 07:21

## 2022-09-29 RX ADMIN — Medication 62.5 MILLI-UNITS/MIN: at 06:44

## 2022-09-29 RX ADMIN — FENTANYL CITRATE 25 MCG: 50 INJECTION INTRAMUSCULAR; INTRAVENOUS at 07:26

## 2022-09-29 RX ADMIN — FENTANYL CITRATE 10 MCG: 50 INJECTION, SOLUTION INTRAMUSCULAR; INTRAVENOUS at 03:57

## 2022-09-29 NOTE — ASSESSMENT & PLAN NOTE
Has been normotensive this pregnancy   Normotensive thus far this admission   Continue routine BP monitoring

## 2022-09-29 NOTE — PLAN OF CARE
Problem: POSTPARTUM  Goal: Experiences normal postpartum course  Description: INTERVENTIONS:  - Monitor maternal vital signs  - Assess uterine involution and lochia  Outcome: Progressing  Goal: Appropriate maternal -  bonding  Description: INTERVENTIONS:  - Identify family support  - Assess for appropriate maternal/infant bonding   -Encourage maternal/infant bonding opportunities  - Referral to  or  as needed  Outcome: Progressing  Goal: Establishment of infant feeding pattern  Description: INTERVENTIONS:  - Assess breast/bottle feeding  - Refer to lactation as needed  Outcome: Progressing  Goal: Incision(s), wounds(s) or drain site(s) healing without S/S of infection  Description: INTERVENTIONS  - Assess and document dressing, incision, wound bed, drain sites and surrounding tissue  - Provide patient and family education  - Perform skin care/dressing changes every   Outcome: Progressing     Problem: PAIN - ADULT  Goal: Verbalizes/displays adequate comfort level or baseline comfort level  Description: Interventions:  - Encourage patient to monitor pain and request assistance  - Assess pain using appropriate pain scale  - Administer analgesics based on type and severity of pain and evaluate response  - Implement non-pharmacological measures as appropriate and evaluate response  - Consider cultural and social influences on pain and pain management  - Notify physician/advanced practitioner if interventions unsuccessful or patient reports new pain  Outcome: Progressing     Problem: INFECTION - ADULT  Goal: Absence or prevention of progression during hospitalization  Description: INTERVENTIONS:  - Assess and monitor for signs and symptoms of infection  - Monitor lab/diagnostic results  - Monitor all insertion sites, i e  indwelling lines, tubes, and drains  - Monitor endotracheal if appropriate and nasal secretions for changes in amount and color  - [place] appropriate cooling/warming therapies per order  - Administer medications as ordered  - Instruct and encourage patient and family to use good hand hygiene technique  - Identify and instruct in appropriate isolation precautions for identified infection/condition  Outcome: Progressing  Goal: Absence of fever/infection during neutropenic period  Description: INTERVENTIONS:  - Monitor WBC    Outcome: Progressing     Problem: SAFETY ADULT  Goal: Patient will remain free of falls  Description: INTERVENTIONS:  - Educate patient/family on patient safety including physical limitations  - Instruct patient to call for assistance with activity   - Consult OT/PT to assist with strengthening/mobility   - Keep Call bell within reach  - Keep bed low and locked with side rails adjusted as appropriate  - Keep care items and personal belongings within reach  - Initiate and maintain comfort rounds  - Make Fall Risk Sign visible to staff  - Offer Toileting every  Hours, in advance of need  - Initiate/Maintain alarm  - Obtain necessary fall risk management equipment:   - Apply yellow socks and bracelet for high fall risk patients  - Consider moving patient to room near nurses station  Outcome: Progressing  Goal: Maintain or return to baseline ADL function  Description: INTERVENTIONS:  -  Assess patient's ability to carry out ADLs; assess patient's baseline for ADL function and identify physical deficits which impact ability to perform ADLs (bathing, care of mouth/teeth, toileting, grooming, dressing, etc )  - Assess/evaluate cause of self-care deficits   - Assess range of motion  - Assess patient's mobility; develop plan if impaired  - Assess patient's need for assistive devices and provide as appropriate  - Encourage maximum independence but intervene and supervise when necessary  - Involve family in performance of ADLs  - Assess for home care needs following discharge   - Consider OT consult to assist with ADL evaluation and planning for discharge  - Provide patient education as appropriate  Outcome: Progressing  Goal: Maintains/Returns to pre admission functional level  Description: INTERVENTIONS:  - Perform BMAT or MOVE assessment daily    - Set and communicate daily mobility goal to care team and patient/family/caregiver  - Collaborate with rehabilitation services on mobility goals if consulted  - Perform Range of Motion  times a day  - Reposition patient every  hours  - Dangle patient  times a day  - Stand patient  times a day  - Ambulate patient  times a day  - Out of bed to chair  times a day   - Out of bed for meals times a day  - Out of bed for toileting  - Record patient progress and toleration of activity level   Outcome: Progressing     Problem: Knowledge Deficit  Goal: Patient/family/caregiver demonstrates understanding of disease process, treatment plan, medications, and discharge instructions  Description: Complete learning assessment and assess knowledge base    Interventions:  - Provide teaching at level of understanding  - Provide teaching via preferred learning methods  Outcome: Progressing     Problem: DISCHARGE PLANNING  Goal: Discharge to home or other facility with appropriate resources  Description: INTERVENTIONS:  - Identify barriers to discharge w/patient and caregiver  - Arrange for needed discharge resources and transportation as appropriate  - Identify discharge learning needs (meds, wound care, etc )  - Arrange for interpretive services to assist at discharge as needed  - Refer to Case Management Department for coordinating discharge planning if the patient needs post-hospital services based on physician/advanced practitioner order or complex needs related to functional status, cognitive ability, or social support system  Outcome: Progressing

## 2022-09-29 NOTE — ANESTHESIA PREPROCEDURE EVALUATION
Procedure:   SECTION () REPEAT (N/A Uterus)    Relevant Problems   ENDO   (+) Subclinical hyperthyroidism      GI/HEPATIC   (+) Gastroesophageal reflux disease with esophagitis      GYN   (+) 38 weeks gestation of pregnancy      NEURO/PSYCH   (+) Hx of preeclampsia, prior pregnancy, currently pregnant, first trimester   (+) Pregnancy headache in second trimester      Endocrine   (+) Insulin controlled gestational diabetes mellitus (GDM) in third trimester      Other   (+) History of 2  sections      Lab Results   Component Value Date    WBC 8 74 2022    HGB 10 1 (L) 2022    HCT 32 0 (L) 2022    MCV 76 (L) 2022     2022     Lab Results   Component Value Date     (L) 2015    K 3 9 2022    CO2 21 2022     2022    BUN 5 2022    CREATININE 0 58 (L) 2022       Lab Results   Component Value Date    GLUCOSE 100 2015    GLUCOSE 129 2015    GLUCOSE 84 2015       Lab Results   Component Value Date    HGBA1C 6 3 (H) 2022       Type and Screen:  O    Physical Exam    Airway    Mallampati score: II  TM Distance: >3 FB  Neck ROM: full     Dental   No notable dental hx     Cardiovascular      Pulmonary      Other Findings        Anesthesia Plan  ASA Score- 2     Anesthesia Type- spinal with ASA Monitors  Additional Monitors:   Airway Plan:           Plan Factors-Exercise tolerance (METS): >4 METS  Existing labs reviewed  Patient summary reviewed  Induction-     Postoperative Plan-     Informed Consent- Anesthetic plan and risks discussed with patient  I personally reviewed this patient with the CRNA  Discussed and agreed on the Anesthesia Plan with the CRNA  Carlos German

## 2022-09-29 NOTE — ANESTHESIA POSTPROCEDURE EVALUATION
Post-Op Assessment Note    CV Status:  Stable    Pain management: adequate     Mental Status:  Alert and awake   Hydration Status:  Euvolemic   PONV Controlled:  Controlled   Airway Patency:  Patent      Post Op Vitals Reviewed: Yes      Staff: Anesthesiologist         No complications documented      BP   135/97   Temp 97 5F   Pulse 89   Resp 24   SpO2 100% on RA

## 2022-09-29 NOTE — PLAN OF CARE

## 2022-09-29 NOTE — OP NOTE
OPERATIVE REPORT  PATIENT NAME: Catherine Shaver    :  1990  MRN: [id number]  Pt Location: [facility] OR ROOM 02    SURGERY DATE: 2022    Surgeon(s) and Role:     * Duke Sánchez MD - Primary     * Tio Deleon MD - Assisting    Preop Diagnosis:  Previous  delivery affecting pregnancy [O34 219]  Gestational diabetes mellitus (GDM) in third trimester, gestational diabetes method of control unspecified [O24 419]  Labor at term    Post-Op Diagnosis Codes:     * Previous  delivery affecting pregnancy [O34 219]     * Gestational diabetes mellitus (GDM) in third trimester, gestational diabetes method of control unspecified [O24 419]    Procedure(s) (LRB):   SECTION () REPEAT (N/A)    Specimen(s):  ID Type Source Tests Collected by Time Destination   A :  Cord Blood Cord BLOOD GAS, VENOUS, CORD, BLOOD GAS, ARTERIAL, CORD Duke Sánchez MD 2022 5746    B :  Tissue (Placenta on Hold) OB Only Placenta PLACENTA IN STORAGE Duke Sánchez MD 2022 0421        Estimated Blood Loss:   Minimal    Drains:  Urethral Catheter Non-latex 16 Fr  (Active)   Site Assessment Skin intact 22 0415   Collection Container Standard drainage bag 22 0415   Number of days: 0       Anesthesia Type:   * No anesthesia type entered *    Operative Indications:  Previous  delivery affecting pregnancy [O34 219]  Gestational diabetes mellitus (GDM) in third trimester, gestational diabetes method of control unspecified [O24 419]  Labor at term    Operative Findings:  Pre and perifascial scar tissue noted  Fascia tenuous at portions  Minimal intra-abdominal adhesive disease   Adhesions of the bladder to the lower uterine segment  Viable male infant in the cephalic presentation  [place] [address] arterial 7 178,base excess -4 1  PH venous 7 211, base excess -3 9  Normal bilateral tubes and ovaries    Complications:   None    Procedure and Technique:  Ms Catherine Shaver is a 29 y o  [id number] who presented with contractions in labor  She had a history of two prior cesareans with plan for repeat   All risks, benefits, indications and alternatives were discussed and appropriate consents were signed prior to the procedure  The fetal tracing was cat II with moderate variability and intermittent lates, pre op prep was expedited for urgent surgery  PROCEDURE: After informed consent was obtained, the patient was taken to the operating room where spinal anesthesia was administered without difficulty  She was placed in the dorsal position with a leftward tilt  A espinoza catheter was inserted  Her abdomen and vagina were then prepped and draped in the normal sterile fashion  A Pfannenstiel skin incision 2 cm above the pubic symphysis was made with a knife and carried down to the underlying layer of fascia with the Bovie and scalpel  The fascia was incised in the midline and extended laterally with curved Hu scissors  The superior aspect of the fascia was grasped with Kocher clamps and elevated so the underlying rectus muscles could be dissected off the fascia both bluntly and with the scalpel  Planes were not well delineated due to scar tissue  During dissection peritoneum was identified in the midline as thin and entered sharply  It was confirmed no intra-abdominal scar tissue was found beneath the scarred fascia  The peritoneal incision was extended superiorly and inferiorly with good visualization of the bladder  An Alvarez O retractor was placed with confirmation of no intra-abdominal contents between retractor and abdominal wall  A low transverse uterine incision was made and bluntly extended laterally  An amniotomy was performed for clear fluid  The operative hand was inserted into the uterus and the infant's head was flexed and elevated to the level of the hysterotomy  With appropriate fundal pressure, the shoulders and body were delivered atraumatically      The infant was stimulalted and dried on the field  After 30 second delay the cord was clamped and cut and the infant was handed off to the awaiting pediatricians  Cord gases were  sent  The placenta was removed with uterine massage  The uterus was exteriorized and cleared of all clots and debris  The hysterotomy incision was then closed with 2-0 stratafix in a running locked fashion  A second imbricating layer of the same was completed  Two figure of eights with 0 vicryl were placed in the midline for hemostasis  The uterine incision was found to be hemostatic  The ovaries and tubes were inspected and found to be normal in appearance  The posterior cul de sac was cleared of all clots and debris  The uterus was returned to the abdomen  The Alvarez O retractor was removed  The pericolic gutters were cleared of all clots and debris  The hysterotomy incision was inspected again and found to be hemostatic  The rectus fascial layers and the rectus muscles were examined  The left superior edge of the fascia was noted to have bleeding and 0 vicryl placed in both interrupted and running fashion to achieve hemostasis  The fascia was reapproximated with 1 stratafix in a running fashion  Care was taken to identify and incorporate substantial fascial tissue with each bite given the tenuous nature of the fascia in several locations  The subcutaneous tissue was irrigated and made hemostatic with the Bovie  The subcutaneous fat was re-approximated with 2-0 plain gut  The skin was then closed with 4-0 stratafix in a subcuticular stitch  All sponge, lap and needle counts were correct x 2 and at the end of the procedure  The patient tolerated the procedure well and was transferred to the recovery room in stable condition        I was present for the entire procedure    Patient Disposition:  PACU         SIGNATURE: Damaris Dhillon MD  DATE: September 29, 2022  TIME: 5:22 AM

## 2022-09-29 NOTE — ANESTHESIA PROCEDURE NOTES
Spinal Block    Patient location during procedure: OR  Start time: 9/29/2022 3:57 AM  Reason for block: procedure for pain and at surgeon's request  Staffing  Performed: Anesthesiologist   Anesthesiologist: Aleksey Wolfe MD  Preanesthetic Checklist  Completed: patient identified, IV checked, site marked, risks and benefits discussed, surgical consent, monitors and equipment checked, pre-op evaluation and timeout performed  Spinal Block  Patient position: sitting  Prep: ChloraPrep  Patient monitoring: cardiac monitor and frequent blood pressure checks  Approach: midline  Location: L3-4  Injection technique: single-shot  Needle  Needle type: pencil-tip   Needle gauge: 25 G  Needle length: 10 cm  Assessment  Sensory level: T4  Injection Assessment:  negative aspiration for heme, no paresthesia on injection and positive aspiration for clear CSF    Post-procedure:  adhesive bandage applied, pressure dressing applied, secured with tape, site cleaned and sterile dressing applied

## 2022-09-29 NOTE — DISCHARGE SUMMARY
Discharge Summary - Sydnee Huntley 29 y o  female MRN: [id number]    Unit/Bed#: LD PACU-01 Encounter: [id number]    Admission Date: 2022     Discharge Date: 10/1/2022    Admitting Attending: Dr Sylvia Mendez  Delivering Attending: Dr Sylvia Mendez  Discharge Attending: Dr Mona Hamilton    Diagnosis:  IUP @ 39w0d     Procedures: repeat low transverse  section     Complications: none apparent     Pt is a 29 y o   female who was admitted to L&D on 2022 at 39w0d for spontaneous labor  Given history of two prior  sections, she was admitted for delivery via repeat  section  She underwent an uncomplicated repeat low transverse  section on 22, resulting in a viable male  delivered at 0418, Weight was 9lbs 15 6oz with APGARs of 8 and 9 at 1 and 5 minutes  Her Hgb went from 11 2 pre-op to 7 5 post-op  Her postoperative course was complicated by decreased Hgb  Pt received Venofer on PPD#1 Repeat CBC stable at 7 6 and pt was asymptomatic  Her espinoza was removed POD#1 and she was able to void spontaneously after  She was breastfeeding  She was discharged on POD#2, by which point her pain was controlled and she was tolerating PO  She had appropriate bowel function  She was discharged with standard post partum/ post operative instructions to follow up with her physician in 1 week for an incision check and in 3-6 weeks for a postpartum appointment  Condition at discharge: good     Discharge instructions/Information to patient and family:   -Do not place anything (no partner, tampons or douche) in your vagina for 6 weeks  -You may gradually return to your usual activities as tolerated  Avoid heavy lifting for the first few weeks    -Please do not drive for 1 week if you have no stitches and for 2 weeks if you have stitches or underwent a  delivery     -You may take baths or shower per your preference     -Examine your breasts in the mirror daily and call for redness or tenderness or increased warmth    -Please call us for temperature > 100 4*F or 38* C, worsening pain or a foul discharge  Discharge Medications:   Prenatal vitamin daily for 6 months or the duration of nursing, whichever is longer  Motrin 600 mg orally every 6 hours as needed for pain  Tylenol (over the counter) per bottle directions as needed for pain: do NOT use with percocet  Hydrocortisone cream 1% (over the counter) applied 1-2x daily to hemorrhoids as needed  Roxicodone 5mg every 6 hours as needed for severe pain     Provisions for Follow-Up Care: Follow up with your doctor in 1 week for incision site check      Planned Readmission: no

## 2022-09-29 NOTE — H&P
History and Physical - Obstetrics  Colette Correa 29 y o  female MRN: [id number]  Unit/Bed#: -01 Encounter: [id number]    Ob[facility]yn Provider:  [facility]     ASSESSMENT AND PLAN:  Colette Correa is a 29y o  year-old [facility] at [facility] Day: 1, admitted for delivery via repeat  section in the setting of prior c-sections and presenting in spontaneous labor  By issue:    * History of 2  sections  Assessment & Plan  Presenting in spontaneous labor   Plan for delivery via repeat  section   Type and cross for 2U pRBCs  Does not desire tubal ligation  Contraception plan is vasectomy     Insulin controlled gestational diabetes mellitus (GDM) in third trimester  Assessment & Plan  Lab Results   Component Value Date    HGBA1C 6 3 (H) 2022       No results for input(s): POCGLU in the last 72 hours  Blood Sugar Average: Last 72 hrs:     Was taking Lantus 60 U qHS during pregnancy   Suspected macrosomia     Hx of preeclampsia, prior pregnancy, currently pregnant, first trimester  Assessment & Plan  Has been normotensive this pregnancy   Normotensive thus far this admission   Continue routine BP monitoring     Hx of sickle cell trait  Assessment & Plan  Partner unsure if he has sickle cell trait     Subclinical hyperthyroidism  Assessment & Plan  Low TSH, normal T4   No current meds  Repeat labs postpartum       The above assessment and plan was discussed with the admitting provider, Dr Nathaly Perales    Expected LOS: >2 Midnights  Admission: INPATIENT     SUBJECTIVE:  Chief Complaint:  contractions    History of Present Illness:  Colette Correa is a 29 y o  [facility] female at 36w0d, JOY 10/6/2022, by Ultrasound, Hospital Day: 1, who presents with regular, painful contractions  Patient also noted leakage of fluid and some vaginal spotting  Baby is moving  Review of Systems   All other systems reviewed and are negative        Current Pregnancy Problems:  No problems updated  Past Obstetric History:   Patient's last menstrual period was 2021 (exact date)  OB History    Para Term  AB Living   3 2 2 0 0 2   SAB IAB Ectopic Multiple Live Births   0 0 0 0 2      # Outcome Date GA Lbr Brandon/2nd Weight Sex Delivery Anes PTL Lv   3 Current            2 Term 18 39w0d  4451 g (9 lb 13 oz) M CS-LTranv Spinal N SONNY      Name: Velma Leyden  ([address])      Apgar1: 8  Apgar5: 9   1 Term 04/25/15   4309 g (9 lb 8 oz) F CS-LTranv EPI  SONNY      Complications: Failure to Progress in Second Stage      Name: [facility]      Obstetric Comments   PRE-ECLAMPSIA       Pregnancy Plan:  Pregnancy: Sheikh     Delivery Plans  Planned delivery method:   Planned delivery location: [facility]  Planned anesthesia: Epidural  Acceptable blood products:  All     Post-Delivery Plans  Feeding intentions: Breast Milk    HISTORICAL INFORMATION:  Past Medical History:   Diagnosis Date    Abscess of buttock, right 2019     delivery delivered     RESOLVED: [id number]    Diet controlled gestational diabetes mellitus (GDM) in third trimester 2022    Disease of thyroid gland     hyperthyroid    Hidradenitis suppurativa     LAST ASSESSED: [id number]    History of early menarche     FIRST PERIOD AT 8YEARS OLD    Hyperemesis gravidarum     RESOLVED: [id number]    Hypertension in pregnancy, pre-eclampsia     prior preg    Migraine     Sickle cell trait ([facility] [address] )      Past Surgical History:   Procedure Laterality Date    ABDOMINAL SURGERY      ABSCESS DRAINAGE  2016    INCISION AND DRAINAGE OF SKIN ABSCESS; LABIAL CYST REMOVAL - [facility]     SECTION      CHOLECYSTECTOMY      INCISION AND DRAINAGE OF WOUND Right 2019    Procedure: INCISION AND DRAINAGE (I&D) BUTTOCK;  Surgeon: Sary Mar MD;  Location: [facility];  Service: [place] [id number] N/A 2018    Procedure:  SECTION () REPEAT;  Surgeon: Josefina Montgomery MD;  Location: [facility] LD;  Service: Obstetrics    TX EXC SWEAT GLAND Gissell Hem Right 2016    Procedure: EXCISION GROIN CYST HIDRADENITIS;  Surgeon: Mitch Chacko DO;  Location: [facility] MA[facility] OR;  Service: General    TONSILLECTOMY      VULVA BIOPSY N/A 2016    Procedure: INCISION AND DRAINAGE, EXCISION OF LABIAL CYST ;  Surgeon: Venkatesh Brand DO;  Location: [facility];  Service:      Social History   Alcohol use: no  Tobacco use: no  Other substance use: no    Other: no    Family History   Problem Relation Age of Onset    Hyperlipidemia Mother         PURE    Cancer Father         lung    Diabetes Maternal Grandmother     No Known Problems Brother     Ovarian cancer Neg Hx     Colon cancer Neg Hx     Breast cancer Neg Hx        Allergies:   No Known Allergies    Current Medications:  Current Outpatient Medications   Medication Instructions    bimatoprost (LATISSE) 0 03 % ophthalmic solution PLACE ONE DROP ON APPLICATOR AND APPLY EVENLY ALONG THE SKIN OF THE UPPER EYELID AT BASE OF EYELASHES ONCE DAILY AT BEDTIME REPEAT PROCEDURE FOR SECOND EYE (USE A CLEAN APPLICATOR)      doxylamine (UNISOM) 25 mg, Oral, Daily at bedtime PRN    famotidine (PEPCID) 20 mg, Oral, 2 times daily    glucose monitoring kit (FREESTYLE) monitoring kit 1 each, Does not apply, As needed    Insulin Pen Needle 31G X 5 MM MISC Use with Lantus solostar insulin pen daily    Lancets (OneTouch Delica Plus [id number]) MISC USE 4 (FOUR) TIMES DAILY (AFTER MEALS AND AT BEDTIME) USE AS INSTRUCTED    Lantus SoloStar 100 units/mL SOPN Inject under the skin 60 units at bedtime daily 11:30 PM    magnesium oxide (MAG-OX) 400 mg, Oral, Daily    pantoprazole (PROTONIX) 40 mg, Oral, Daily (early morning)    Prenatal Vit-Fe Fumarate-FA (prenatal multivitamin) 28-0 8 MG TABS 1 tablet, Oral, Daily    Riboflavin 400 mg, Oral, Daily    sucralfate (CARAFATE) 1 g, Oral, 4 times daily    vitamin B-6 25 mg, Oral, Daily       OBJECTIVE:  Vitals:  Patient Vitals for the past 24 hrs:   BP Temp Temp src Pulse Resp SpO2 Weight   09/29/22 0755 131/61 98 6 °F (37 °C) Oral 85 20 99 % --   09/29/22 0710 117/58 -- -- 82 -- -- --   09/29/22 0700 124/58 -- -- 88 -- -- --   09/29/22 0655 -- -- -- -- -- 98 % --   09/29/22 0650 123/57 -- -- 87 -- -- --   09/29/22 0641 128/60 -- -- 81 -- -- --   09/29/22 0640 -- -- -- 81 -- 99 % --   09/29/22 0631 131/76 -- -- 82 -- -- --   09/29/22 0625 143/67 -- -- 80 -- 100 % --   09/29/22 0624 -- -- -- 81 -- -- --   09/29/22 0610 116/74 98 7 °F (37 1 °C) Temporal 81 18 100 % --   09/29/22 0609 -- -- -- 80 -- -- --   09/29/22 0600 119/67 -- -- 91 -- -- --   09/29/22 0557 -- -- -- 86 -- -- --   09/29/22 0556 121/58 -- -- 80 -- -- --   09/29/22 0555 -- -- -- 82 -- 99 % --   09/29/22 0548 111/78 -- -- 88 -- -- --   09/29/22 0540 135/97 97 5 °F (36 4 °C) Oral 92 20 100 % --   09/29/22 0311 118/71 98 3 °F (36 8 °C) Oral 82 18 100 % 91 2 kg (201 lb)   09/29/22 0214 132/66 97 6 °F (36 4 °C) Oral 101 18 100 % --     Body mass index is 34 5 kg/m²  Invasive Devices  Timeline    Peripheral Intravenous Line  Duration           Peripheral IV 09/29/22 Left Wrist <1 day    Peripheral IV 09/29/22 Right Hand <1 day          Drain  Duration           Urethral Catheter Non-latex 16 Fr  <1 day                Physical Exam  Constitutional:       Appearance: She is obese  She is not ill-appearing or diaphoretic  Comments: Patient appears to have pain with contractions, appears uncomfortable   HENT:      Head: Normocephalic and atraumatic  Eyes:      Conjunctiva/sclera: Conjunctivae normal       Pupils: Pupils are equal, round, and reactive to light  Cardiovascular:      Rate and Rhythm: Normal rate  Pulmonary:      Effort: Pulmonary effort is normal  No respiratory distress  Abdominal:      General: There is no distension  Palpations: Abdomen is soft  Tenderness:  There is abdominal tenderness  Comments: Evidence of prior  scar, healed well, minimal keloid formation noted    Genitourinary:     Comments: Normal appearing external genitalia, vaginal mucosa, and cervix  No evidence of active vaginal bleeding, pooling on speculum exam    Nitrazine negative  Musculoskeletal:         General: No swelling or tenderness  Normal range of motion  Cervical back: Normal range of motion  Skin:     General: Skin is warm and dry  Coloration: Skin is not jaundiced or pale  Neurological:      General: No focal deficit present  Mental Status: She is alert and oriented to person, place, and time  Mental status is at baseline  Psychiatric:         Mood and Affect: Mood normal          Behavior: Behavior normal          Thought Content:  Thought content normal          Cervical Exam:    Cervical Dilation: 5  Cervical Effacement: 50  Cervical Consistency: Soft  Fetal Station: -2  Position: Unknown  Method: Manual  OB Examiner: Yadiel Trujillo    Estimated fetal weight: Fetus # 1 of 1  Vertex presentation  Possible macrosomia  Placenta Location = Anterior  Placenta Grade = II     The NST was reactive with no decelerations      MEASUREMENTS (* Included In Average GA)     AC              36 9 cm        40 weeks 6 days* (>97%)  BPD              9 1 cm        37 weeks 1 day * (86%)  HC              33 6 cm        38 weeks 4 days* (81%)  Femur            7 2 cm        36 weeks 5 days* (67%)     Cerebellum       4 8 cm        35 weeks 6 days     HC/AC           0 91 [0 93 - 1 11]                 (5%)  FL/AC             19 [20 - 24]  FL/BPD            78 [71 - 87]  EFW [place] 4   3719 grams - 8 lbs 3 oz                 (>97%)    Presentation: vertex, confirmed by exam    Membranes: intact  Placenta: anterior     Fetal Heart Tracing:  FHT:   Baseline Rate: 140 bpm  Variability: Moderate 6-25 bpm  Accelerations: 15 x 15 or greater  Decelerations: Intermittent late decelerations noted [place]:  Contraction Frequency (minutes): 2-5  Contraction Duration (seconds):   Contraction Quality: Strong    Prenatal Labs:  Blood type: O+  Antibody: negative  HIV: negative   Hepatitis B: negative  RPR: negative  Rubella: immune  Varicella: unknown  Gonorrhea/Chlamydia: negative/negative  1 hour Glucose: 220  3 hour Glucose: abnormal   Group B strep: negative    Other pertinent admission labs:  n/a    Imaging, EKG, Pathology, and Other Studies: I have personally reviewed pertinent reports        Myranda Meadows  OB/GYN, PGY-4  9/29/2022  8:48 AM

## 2022-09-29 NOTE — ASSESSMENT & PLAN NOTE
Presenting in spontaneous labor   Plan for delivery via repeat  section   Type and cross for 2U pRBCs  Does not desire tubal ligation  Contraception plan is vasectomy

## 2022-09-29 NOTE — ADDENDUM NOTE
Addendum  created 09/29/22 1312 by Yohana Darden MD    Order list changed, Order sets accessed, Pharmacy for encounter modified

## 2022-09-29 NOTE — ASSESSMENT & PLAN NOTE
Lab Results   Component Value Date    HGBA1C 6 3 (H) 08/22/2022       No results for input(s): POCGLU in the last 72 hours      Blood Sugar Average: Last 72 hrs:     Was taking Lantus 60 U qHS during pregnancy   Suspected macrosomia

## 2022-09-30 PROBLEM — Z98.891 S/P PRIMARY LOW TRANSVERSE C-SECTION: Status: ACTIVE | Noted: 2022-09-20

## 2022-09-30 LAB
ANION GAP SERPL CALCULATED.3IONS-SCNC: 5 MMOL/L (ref 4–13)
BUN SERPL-MCNC: 5 MG/DL (ref 5–25)
CALCIUM SERPL-MCNC: 8 MG/DL (ref 8.4–10.2)
CHLORIDE SERPL-SCNC: 105 MMOL/L (ref 96–108)
CO2 SERPL-SCNC: 25 MMOL/L (ref 21–32)
CREAT SERPL-MCNC: 0.66 MG/DL (ref 0.6–1.3)
ERYTHROCYTE [DISTWIDTH] IN BLOOD BY AUTOMATED COUNT: 15.1 % (ref 11.6–15.1)
GFR SERPL CREATININE-BSD FRML MDRD: 118 ML/MIN/1.73SQ M
GLUCOSE SERPL-MCNC: 73 MG/DL (ref 65–140)
HCT VFR BLD AUTO: 23.5 % (ref 34.8–46.1)
HGB BLD-MCNC: 7.5 G/DL (ref 11.5–15.4)
MCH RBC QN AUTO: 23.4 PG (ref 26.8–34.3)
MCHC RBC AUTO-ENTMCNC: 31.9 G/DL (ref 31.4–37.4)
MCV RBC AUTO: 73 FL (ref 82–98)
PLATELET # BLD AUTO: 223 THOUSANDS/UL (ref 149–390)
PMV BLD AUTO: 9.1 FL (ref 8.9–12.7)
POTASSIUM SERPL-SCNC: 3.7 MMOL/L (ref 3.5–5.3)
RBC # BLD AUTO: 3.2 MILLION/UL (ref 3.81–5.12)
SODIUM SERPL-SCNC: 135 MMOL/L (ref 135–147)
WBC # BLD AUTO: 9.15 THOUSAND/UL (ref 4.31–10.16)

## 2022-09-30 PROCEDURE — 99024 POSTOP FOLLOW-UP VISIT: CPT | Performed by: OBSTETRICS & GYNECOLOGY

## 2022-09-30 PROCEDURE — 80048 BASIC METABOLIC PNL TOTAL CA: CPT | Performed by: OBSTETRICS & GYNECOLOGY

## 2022-09-30 PROCEDURE — 85027 COMPLETE CBC AUTOMATED: CPT | Performed by: OBSTETRICS & GYNECOLOGY

## 2022-09-30 RX ADMIN — ACETAMINOPHEN 650 MG: 325 TABLET, FILM COATED ORAL at 18:02

## 2022-09-30 RX ADMIN — PANTOPRAZOLE SODIUM 40 MG: 40 TABLET, DELAYED RELEASE ORAL at 07:38

## 2022-09-30 RX ADMIN — KETOROLAC TROMETHAMINE 30 MG: 30 INJECTION, SOLUTION INTRAMUSCULAR at 06:35

## 2022-09-30 RX ADMIN — HYDROMORPHONE HYDROCHLORIDE 0.5 MG: 1 INJECTION, SOLUTION INTRAMUSCULAR; INTRAVENOUS; SUBCUTANEOUS at 03:31

## 2022-09-30 RX ADMIN — Medication 1 TABLET: at 10:52

## 2022-09-30 RX ADMIN — IBUPROFEN 600 MG: 600 TABLET ORAL at 18:02

## 2022-09-30 RX ADMIN — IRON SUCROSE 300 MG: 20 INJECTION, SOLUTION INTRAVENOUS at 14:11

## 2022-09-30 RX ADMIN — FAMOTIDINE 20 MG: 20 TABLET ORAL at 10:52

## 2022-09-30 RX ADMIN — KETOROLAC TROMETHAMINE 30 MG: 30 INJECTION, SOLUTION INTRAMUSCULAR at 00:02

## 2022-09-30 RX ADMIN — ACETAMINOPHEN 650 MG: 325 TABLET, FILM COATED ORAL at 06:32

## 2022-09-30 RX ADMIN — DOCUSATE SODIUM 100 MG: 100 CAPSULE, LIQUID FILLED ORAL at 10:52

## 2022-09-30 RX ADMIN — FAMOTIDINE 20 MG: 20 TABLET ORAL at 18:02

## 2022-09-30 RX ADMIN — OXYCODONE HYDROCHLORIDE 5 MG: 5 TABLET ORAL at 21:14

## 2022-09-30 RX ADMIN — DOCUSATE SODIUM 100 MG: 100 CAPSULE, LIQUID FILLED ORAL at 18:02

## 2022-09-30 RX ADMIN — IBUPROFEN 600 MG: 600 TABLET ORAL at 12:07

## 2022-09-30 RX ADMIN — ACETAMINOPHEN 650 MG: 325 TABLET, FILM COATED ORAL at 00:03

## 2022-09-30 RX ADMIN — OXYCODONE HYDROCHLORIDE 10 MG: 10 TABLET ORAL at 07:37

## 2022-09-30 RX ADMIN — ACETAMINOPHEN 650 MG: 325 TABLET, FILM COATED ORAL at 23:48

## 2022-09-30 RX ADMIN — ACETAMINOPHEN 650 MG: 325 TABLET, FILM COATED ORAL at 12:07

## 2022-09-30 RX ADMIN — OXYCODONE HYDROCHLORIDE 10 MG: 10 TABLET ORAL at 14:16

## 2022-09-30 NOTE — PLAN OF CARE
Problem: POSTPARTUM  Goal: Experiences normal postpartum course  Description: INTERVENTIONS:  - Monitor maternal vital signs  - Assess uterine involution and lochia  Outcome: Progressing  Goal: Appropriate maternal -  bonding  Description: INTERVENTIONS:  - Identify family support  - Assess for appropriate maternal/infant bonding   -Encourage maternal/infant bonding opportunities  - Referral to  or  as needed  Outcome: Progressing  Goal: Establishment of infant feeding pattern  Description: INTERVENTIONS:  - Assess breast/bottle feeding  - Refer to lactation as needed  Outcome: Progressing  Goal: Incision(s), wounds(s) or drain site(s) healing without S/S of infection  Description: INTERVENTIONS  - Assess and document dressing, incision, wound bed, drain sites and surrounding tissue  - Provide patient and family education  - Perform skin care/dressing changes every   Outcome: Progressing     Problem: PAIN - ADULT  Goal: Verbalizes/displays adequate comfort level or baseline comfort level  Description: Interventions:  - Encourage patient to monitor pain and request assistance  - Assess pain using appropriate pain scale  - Administer analgesics based on type and severity of pain and evaluate response  - Implement non-pharmacological measures as appropriate and evaluate response  - Consider cultural and social influences on pain and pain management  - Notify physician/advanced practitioner if interventions unsuccessful or patient reports new pain  Outcome: Progressing     Problem: INFECTION - ADULT  Goal: Absence or prevention of progression during hospitalization  Description: INTERVENTIONS:  - Assess and monitor for signs and symptoms of infection  - Monitor lab/diagnostic results  - Monitor all insertion sites, i e  indwelling lines, tubes, and drains  - Monitor endotracheal if appropriate and nasal secretions for changes in amount and color  - [place] appropriate cooling/warming therapies per order  - Administer medications as ordered  - Instruct and encourage patient and family to use good hand hygiene technique  - Identify and instruct in appropriate isolation precautions for identified infection/condition  Outcome: Progressing  Goal: Absence of fever/infection during neutropenic period  Description: INTERVENTIONS:  - Monitor WBC    Outcome: Progressing     Problem: SAFETY ADULT  Goal: Patient will remain free of falls  Description: INTERVENTIONS:  - Educate patient/family on patient safety including physical limitations  - Instruct patient to call for assistance with activity   - Consult OT/PT to assist with strengthening/mobility   - Keep Call bell within reach  - Keep bed low and locked with side rails adjusted as appropriate  - Keep care items and personal belongings within reach  - Initiate and maintain comfort rounds  - Make Fall Risk Sign visible to staff  - Offer Toileting every  Hours, in advance of need  - Initiate/Maintain alarm  - Obtain necessary fall risk management equipment:   - Apply yellow socks and bracelet for high fall risk patients  - Consider moving patient to room near nurses station  Outcome: Progressing  Goal: Maintain or return to baseline ADL function  Description: INTERVENTIONS:  -  Assess patient's ability to carry out ADLs; assess patient's baseline for ADL function and identify physical deficits which impact ability to perform ADLs (bathing, care of mouth/teeth, toileting, grooming, dressing, etc )  - Assess/evaluate cause of self-care deficits   - Assess range of motion  - Assess patient's mobility; develop plan if impaired  - Assess patient's need for assistive devices and provide as appropriate  - Encourage maximum independence but intervene and supervise when necessary  - Involve family in performance of ADLs  - Assess for home care needs following discharge   - Consider OT consult to assist with ADL evaluation and planning for discharge  - Provide patient education as appropriate  Outcome: Progressing  Goal: Maintains/Returns to pre admission functional level  Description: INTERVENTIONS:  - Perform BMAT or MOVE assessment daily    - Set and communicate daily mobility goal to care team and patient/family/caregiver  - Collaborate with rehabilitation services on mobility goals if consulted  - Perform Range of Motion  times a day  - Reposition patient every  hours  - Dangle patient  times a day  - Stand patient  times a day  - Ambulate patient  times a day  - Out of bed to chair  times a day   - Out of bed for meals times a day  - Out of bed for toileting  - Record patient progress and toleration of activity level   Outcome: Progressing     Problem: Knowledge Deficit  Goal: Patient/family/caregiver demonstrates understanding of disease process, treatment plan, medications, and discharge instructions  Description: Complete learning assessment and assess knowledge base    Interventions:  - Provide teaching at level of understanding  - Provide teaching via preferred learning methods  Outcome: Progressing     Problem: DISCHARGE PLANNING  Goal: Discharge to home or other facility with appropriate resources  Description: INTERVENTIONS:  - Identify barriers to discharge w/patient and caregiver  - Arrange for needed discharge resources and transportation as appropriate  - Identify discharge learning needs (meds, wound care, etc )  - Arrange for interpretive services to assist at discharge as needed  - Refer to Case Management Department for coordinating discharge planning if the patient needs post-hospital services based on physician/advanced practitioner order or complex needs related to functional status, cognitive ability, or social support system  Outcome: Progressing

## 2022-09-30 NOTE — PLAN OF CARE
Problem: POSTPARTUM  Goal: Experiences normal postpartum course  Description: INTERVENTIONS:  - Monitor maternal vital signs  - Assess uterine involution and lochia  Outcome: Progressing  Goal: Appropriate maternal -  bonding  Description: INTERVENTIONS:  - Identify family support  - Assess for appropriate maternal/infant bonding   -Encourage maternal/infant bonding opportunities  - Referral to  or  as needed  Outcome: Progressing  Goal: Establishment of infant feeding pattern  Description: INTERVENTIONS:  - Assess breast/bottle feeding  - Refer to lactation as needed  Outcome: Progressing  Goal: Incision(s), wounds(s) or drain site(s) healing without S/S of infection  Description: INTERVENTIONS  - Assess and document dressing, incision, wound bed, drain sites and surrounding tissue  - Provide patient and family education  - Perform skin care/dressing changes every   Outcome: Progressing     Problem: PAIN - ADULT  Goal: Verbalizes/displays adequate comfort level or baseline comfort level  Description: Interventions:  - Encourage patient to monitor pain and request assistance  - Assess pain using appropriate pain scale  - Administer analgesics based on type and severity of pain and evaluate response  - Implement non-pharmacological measures as appropriate and evaluate response  - Consider cultural and social influences on pain and pain management  - Notify physician/advanced practitioner if interventions unsuccessful or patient reports new pain  Outcome: Progressing     Problem: INFECTION - ADULT  Goal: Absence or prevention of progression during hospitalization  Description: INTERVENTIONS:  - Assess and monitor for signs and symptoms of infection  - Monitor lab/diagnostic results  - Monitor all insertion sites, i e  indwelling lines, tubes, and drains  - Monitor endotracheal if appropriate and nasal secretions for changes in amount and color  - [place] appropriate cooling/warming therapies per order  - Administer medications as ordered  - Instruct and encourage patient and family to use good hand hygiene technique  - Identify and instruct in appropriate isolation precautions for identified infection/condition  Outcome: Progressing  Goal: Absence of fever/infection during neutropenic period  Description: INTERVENTIONS:  - Monitor WBC    Outcome: Progressing     Problem: SAFETY ADULT  Goal: Patient will remain free of falls  Description: INTERVENTIONS:  - Educate patient/family on patient safety including physical limitations  - Instruct patient to call for assistance with activity   - Consult OT/PT to assist with strengthening/mobility   - Keep Call bell within reach  - Keep bed low and locked with side rails adjusted as appropriate  - Keep care items and personal belongings within reach  - Initiate and maintain comfort rounds  - Make Fall Risk Sign visible to staff  - Offer Toileting every  Hours, in advance of need  - Initiate/Maintain alarm  - Obtain necessary fall risk management equipment:   - Apply yellow socks and bracelet for high fall risk patients  - Consider moving patient to room near nurses station  Outcome: Progressing  Goal: Maintain or return to baseline ADL function  Description: INTERVENTIONS:  -  Assess patient's ability to carry out ADLs; assess patient's baseline for ADL function and identify physical deficits which impact ability to perform ADLs (bathing, care of mouth/teeth, toileting, grooming, dressing, etc )  - Assess/evaluate cause of self-care deficits   - Assess range of motion  - Assess patient's mobility; develop plan if impaired  - Assess patient's need for assistive devices and provide as appropriate  - Encourage maximum independence but intervene and supervise when necessary  - Involve family in performance of ADLs  - Assess for home care needs following discharge   - Consider OT consult to assist with ADL evaluation and planning for discharge  - Provide patient education as appropriate  Outcome: Progressing  Goal: Maintains/Returns to pre admission functional level  Description: INTERVENTIONS:  - Perform BMAT or MOVE assessment daily    - Set and communicate daily mobility goal to care team and patient/family/caregiver  - Collaborate with rehabilitation services on mobility goals if consulted  - Perform Range of Motion  times a day  - Reposition patient every  hours  - Dangle patient  times a day  - Stand patient  times a day  - Ambulate patient  times a day  - Out of bed to chair  times a day   - Out of bed for meals times a day  - Out of bed for toileting  - Record patient progress and toleration of activity level   Outcome: Progressing     Problem: Knowledge Deficit  Goal: Patient/family/caregiver demonstrates understanding of disease process, treatment plan, medications, and discharge instructions  Description: Complete learning assessment and assess knowledge base    Interventions:  - Provide teaching at level of understanding  - Provide teaching via preferred learning methods  Outcome: Progressing     Problem: DISCHARGE PLANNING  Goal: Discharge to home or other facility with appropriate resources  Description: INTERVENTIONS:  - Identify barriers to discharge w/patient and caregiver  - Arrange for needed discharge resources and transportation as appropriate  - Identify discharge learning needs (meds, wound care, etc )  - Arrange for interpretive services to assist at discharge as needed  - Refer to Case Management Department for coordinating discharge planning if the patient needs post-hospital services based on physician/advanced practitioner order or complex needs related to functional status, cognitive ability, or social support system  Outcome: Progressing

## 2022-09-30 NOTE — ASSESSMENT & PLAN NOTE
, Hgb 11 4 --> 7 5 > venofer > 7 6  Lines: espinoza out, void trial passed  Pain: Tylenol and motrin scheduled, seble 5/10 PRN    FEN: Tolerating regular diet  DVT ppx: SCDs and Lovenox 40mg qD  Passing flatus   Incision C/D/I

## 2022-09-30 NOTE — PROGRESS NOTES
Progress Note - OB/GYN  Dillan Morales 29 y o  female MRN: [id number]  Unit/Bed#: -01 Encounter: [id number]    Assessment and Plan     Dillan Morales is a patient of: [address] [place] [address]  She is POD# 1 s/p RLTCS  Recovering well and is stable       Hx of preeclampsia, prior pregnancy, currently pregnant, first trimester  Assessment & Plan  Has been normotensive this pregnancy   Normotensive thus far this admission   Continue routine BP monitoring     Hx of sickle cell trait  Assessment & Plan  Partner unsure if he has sickle cell trait     Subclinical hyperthyroidism  Assessment & Plan  Low TSH, normal T4   No current meds  Repeat labs postpartum     * S/P primary low transverse   Assessment & Plan  , Hgb 11 4 --> post op Hgb   Lines: espinoza out, for void trial today  Pain: Tylenol and toradol scheduled, seble 5/10 PRN    FEN: Tolerating regular diet  DVT ppx: SCDs and Lovenox 40mg qD  Passing flatus   Incision C/D/I           Disposition    - Anticipate discharge home on POD# 2 vs 3      Subjective/Objective     Chief Complaint: Postoperative State     Subjective:    Dillan Morales is s/p RLTCS  She is POD# 1  She complains of pain this morning  Patient is not currently voiding, for void trial today  She is ambulating  Patient is currently passing flatus and has had no bowel movement  She is tolerating PO, and denies nausea or vomitting  Patient denies fever, chills, chest pain, shortness of breath, or calf tenderness  Lochia is normal  Her incision is C/D/I  She is recovering well and is stable         Vitals:   /66 (BP Location: Right arm)   Pulse 88   Temp 98 2 °F (36 8 °C) (Oral)   Resp 18   Wt 91 2 kg (201 lb)   LMP 2021 (Exact Date)   SpO2 100%   Breastfeeding No   BMI 34 50 kg/m²       Intake/Output Summary (Last 24 hours) at 2022 0707  Last data filed at 2022 6585  Gross per 24 hour   Intake 916 67 ml   Output 1775 ml   Net -858 33 ml       Invasive Devices  Timeline    Peripheral Intravenous Line  Duration           Peripheral IV 09/29/22 Right Hand 1 day                Physical Exam:   GEN: Darell Diaz appears well, alert and oriented x 3, pleasant and cooperative   CARDIO: RRR, no murmurs or rubs  RESP:  CTAB, no wheezes or rales  ABDOMEN: soft, no tenderness, no distention, fundus firm, Incision C/D/I  EXTREMITIES: SCDs on, Negative Jannette's sign bilaterally      Labs:     Hemoglobin   Date Value Ref Range Status   09/30/2022 7 5 (L) 11 5 - 15 4 g/dL Final   09/29/2022 11 2 (L) 11 5 - 15 4 g/dL Final   04/26/2015 8 8 (L) 11 5 - 15 4 g/dL Final   04/25/2015 11 8 11 5 - 15 4 g/dL Final     WBC   Date Value Ref Range Status   09/30/2022 9 15 4 31 - 10 16 Thousand/uL Final   09/29/2022 11 05 (H) 4 31 - 10 16 Thousand/uL Final   04/26/2015 27 04 (H) 4 31 - 10 16 Thousand/uL Final   04/25/2015 10 00 4 31 - 10 16 Thousand/uL Final     Platelets   Date Value Ref Range Status   09/30/2022 223 149 - 390 Thousands/uL Final   09/29/2022 261 149 - 390 Thousands/uL Final   04/26/2015 251 149 - 390 Thousand/uL Final   04/25/2015 298 149 - 390 Thousand/uL Final     Creatinine   Date Value Ref Range Status   09/30/2022 0 66 0 60 - 1 30 mg/dL Final     Comment:     Standardized to IDMS reference method   09/11/2022 0 58 (L) 0 60 - 1 30 mg/dL Final     Comment:     Standardized to IDMS reference method   04/25/2015 0 54 (L) 0 60 - 1 30 mg/dL Final     Comment:     Standardized to IDMS reference method   04/01/2015 0 65 0 60 - 1 30 mg/dL Final     Comment:     Standardized to IDMS reference method     AST   Date Value Ref Range Status   09/11/2022 14 5 - 45 U/L Final     Comment:     Specimen collection should occur prior to Sulfasalazine administration due to the potential for falsely depressed results      08/22/2022 11 5 - 45 U/L Final     Comment:     Specimen collection should occur prior to Sulfasalazine administration due to the potential for falsely depressed results  04/25/2015 13 0 - 45 U/L Final   04/01/2015 14 0 - 45 U/L Final     ALT   Date Value Ref Range Status   09/11/2022 13 12 - 78 U/L Final     Comment:     Specimen collection should occur prior to Sulfasalazine and/or Sulfapyridine administration due to the potential for falsely depressed results  08/22/2022 13 12 - 78 U/L Final     Comment:     Specimen collection should occur prior to Sulfasalazine and/or Sulfapyridine administration due to the potential for falsely depressed results      04/25/2015 9 (L) 14 - 59 U/L Final   04/01/2015 12 (L) 14 - 59 U/L Final          Fatuma Delhi  9/30/2022  7:07 AM

## 2022-10-01 VITALS
RESPIRATION RATE: 16 BRPM | BODY MASS INDEX: 34.5 KG/M2 | HEART RATE: 87 BPM | DIASTOLIC BLOOD PRESSURE: 69 MMHG | SYSTOLIC BLOOD PRESSURE: 126 MMHG | OXYGEN SATURATION: 100 % | TEMPERATURE: 98.6 F | WEIGHT: 201 LBS

## 2022-10-01 LAB
ABO GROUP BLD BPU: NORMAL
ABO GROUP BLD BPU: NORMAL
BPU ID: NORMAL
BPU ID: NORMAL
CROSSMATCH: NORMAL
CROSSMATCH: NORMAL
ERYTHROCYTE [DISTWIDTH] IN BLOOD BY AUTOMATED COUNT: 15.3 % (ref 11.6–15.1)
HCT VFR BLD AUTO: 23.4 % (ref 34.8–46.1)
HGB BLD-MCNC: 7.6 G/DL (ref 11.5–15.4)
MCH RBC QN AUTO: 23.8 PG (ref 26.8–34.3)
MCHC RBC AUTO-ENTMCNC: 32.5 G/DL (ref 31.4–37.4)
MCV RBC AUTO: 73 FL (ref 82–98)
PLATELET # BLD AUTO: 220 THOUSANDS/UL (ref 149–390)
PMV BLD AUTO: 8.7 FL (ref 8.9–12.7)
RBC # BLD AUTO: 3.19 MILLION/UL (ref 3.81–5.12)
TSH SERPL DL<=0.05 MIU/L-ACNC: 0.9 UIU/ML (ref 0.45–4.5)
UNIT DISPENSE STATUS: NORMAL
UNIT DISPENSE STATUS: NORMAL
UNIT PRODUCT CODE: NORMAL
UNIT PRODUCT CODE: NORMAL
UNIT PRODUCT VOLUME: 350 ML
UNIT PRODUCT VOLUME: 350 ML
UNIT RH: NORMAL
UNIT RH: NORMAL
WBC # BLD AUTO: 9.76 THOUSAND/UL (ref 4.31–10.16)

## 2022-10-01 PROCEDURE — 85027 COMPLETE CBC AUTOMATED: CPT

## 2022-10-01 PROCEDURE — 99024 POSTOP FOLLOW-UP VISIT: CPT | Performed by: OBSTETRICS & GYNECOLOGY

## 2022-10-01 PROCEDURE — 84443 ASSAY THYROID STIM HORMONE: CPT

## 2022-10-01 RX ORDER — DOCUSATE SODIUM 100 MG/1
100 CAPSULE, LIQUID FILLED ORAL 2 TIMES DAILY
Refills: 0
Start: 2022-10-01

## 2022-10-01 RX ORDER — IBUPROFEN 600 MG/1
600 TABLET ORAL EVERY 6 HOURS
Qty: 30 TABLET | Refills: 0 | Status: SHIPPED | OUTPATIENT
Start: 2022-10-01

## 2022-10-01 RX ORDER — ACETAMINOPHEN 325 MG/1
650 TABLET ORAL EVERY 6 HOURS SCHEDULED
Refills: 0
Start: 2022-10-01

## 2022-10-01 RX ORDER — OXYCODONE HYDROCHLORIDE 5 MG/1
5 TABLET ORAL EVERY 4 HOURS PRN
Qty: 10 TABLET | Refills: 0 | Status: SHIPPED | OUTPATIENT
Start: 2022-10-01 | End: 2022-10-11

## 2022-10-01 RX ADMIN — CALCIUM CARBONATE (ANTACID) CHEW TAB 500 MG 1000 MG: 500 CHEW TAB at 12:29

## 2022-10-01 RX ADMIN — DOCUSATE SODIUM 100 MG: 100 CAPSULE, LIQUID FILLED ORAL at 08:15

## 2022-10-01 RX ADMIN — ACETAMINOPHEN 650 MG: 325 TABLET, FILM COATED ORAL at 06:12

## 2022-10-01 RX ADMIN — OXYCODONE HYDROCHLORIDE 5 MG: 5 TABLET ORAL at 08:15

## 2022-10-01 RX ADMIN — Medication 1 TABLET: at 08:16

## 2022-10-01 RX ADMIN — IBUPROFEN 600 MG: 600 TABLET ORAL at 08:15

## 2022-10-01 RX ADMIN — IBUPROFEN 600 MG: 600 TABLET ORAL at 15:55

## 2022-10-01 RX ADMIN — OXYCODONE HYDROCHLORIDE 10 MG: 10 TABLET ORAL at 01:50

## 2022-10-01 RX ADMIN — SIMETHICONE 80 MG: 80 TABLET, CHEWABLE ORAL at 06:15

## 2022-10-01 RX ADMIN — IBUPROFEN 600 MG: 600 TABLET ORAL at 02:59

## 2022-10-01 RX ADMIN — ACETAMINOPHEN 650 MG: 325 TABLET, FILM COATED ORAL at 12:14

## 2022-10-01 RX ADMIN — PANTOPRAZOLE SODIUM 40 MG: 40 TABLET, DELAYED RELEASE ORAL at 06:12

## 2022-10-01 NOTE — PLAN OF CARE
Problem: POSTPARTUM  Goal: Experiences normal postpartum course  Description: INTERVENTIONS:  - Monitor maternal vital signs  - Assess uterine involution and lochia  Outcome: Completed  Goal: Appropriate maternal -  bonding  Description: INTERVENTIONS:  - Identify family support  - Assess for appropriate maternal/infant bonding   -Encourage maternal/infant bonding opportunities  - Referral to  or  as needed  Outcome: Completed  Goal: Establishment of infant feeding pattern  Description: INTERVENTIONS:  - Assess breast/bottle feeding  - Refer to lactation as needed  Outcome: Completed  Goal: Incision(s), wounds(s) or drain site(s) healing without S/S of infection  Description: INTERVENTIONS  - Assess and document dressing, incision, wound bed, drain sites and surrounding tissue  - Provide patient and family education    Outcome: Completed     Problem: PAIN - ADULT  Goal: Verbalizes/displays adequate comfort level or baseline comfort level  Description: Interventions:  - Encourage patient to monitor pain and request assistance  - Assess pain using appropriate pain scale  - Administer analgesics based on type and severity of pain and evaluate response  - Implement non-pharmacological measures as appropriate and evaluate response  - Consider cultural and social influences on pain and pain management  - Notify physician/advanced practitioner if interventions unsuccessful or patient reports new pain  Outcome: Completed     Problem: INFECTION - ADULT  Goal: Absence or prevention of progression during hospitalization  Description: INTERVENTIONS:  - Assess and monitor for signs and symptoms of infection  - Monitor lab/diagnostic results  - Monitor all insertion sites, i e  indwelling lines, tubes, and drains  - Monitor endotracheal if appropriate and nasal secretions for changes in amount and color  - [place] appropriate cooling/warming therapies per order  - Administer medications as ordered  - Instruct and encourage patient and family to use good hand hygiene technique  - Identify and instruct in appropriate isolation precautions for identified infection/condition  Outcome: Completed  Goal: Absence of fever/infection during neutropenic period  Description: INTERVENTIONS:  - Monitor WBC    Outcome: Completed     Problem: SAFETY ADULT  Goal: Patient will remain free of falls  Description: INTERVENTIONS:  - Educate patient/family on patient safety including physical limitations  - Instruct patient to call for assistance with activity   - Consult OT/PT to assist with strengthening/mobility   - Keep Call bell within reach  - Keep bed low and locked with side rails adjusted as appropriate  - Keep care items and personal belongings within reach  - Initiate and maintain comfort rounds  - Make Fall Risk Sign visible to staff  - Apply yellow socks and bracelet for high fall risk patients  - Consider moving patient to room near nurses station  Outcome: Completed  Goal: Maintain or return to baseline ADL function  Description: INTERVENTIONS:  -  Assess patient's ability to carry out ADLs; assess patient's baseline for ADL function and identify physical deficits which impact ability to perform ADLs (bathing, care of mouth/teeth, toileting, grooming, dressing, etc )  - Assess/evaluate cause of self-care deficits   - Assess range of motion  - Assess patient's mobility; develop plan if impaired  - Assess patient's need for assistive devices and provide as appropriate  - Encourage maximum independence but intervene and supervise when necessary  - Involve family in performance of ADLs  - Assess for home care needs following discharge   - Consider OT consult to assist with ADL evaluation and planning for discharge  - Provide patient education as appropriate  Outcome: Completed  Goal: Maintains/Returns to pre admission functional level  Description: INTERVENTIONS:  - Perform BMAT or MOVE assessment daily    - Set and communicate daily mobility goal to care team and patient/family/caregiver  - Collaborate with rehabilitation services on mobility goals if consulted  - Out of bed for toileting  - Record patient progress and toleration of activity level   Outcome: Completed     Problem: Knowledge Deficit  Goal: Patient/family/caregiver demonstrates understanding of disease process, treatment plan, medications, and discharge instructions  Description: Complete learning assessment and assess knowledge base    Interventions:  - Provide teaching at level of understanding  - Provide teaching via preferred learning methods  Outcome: Completed     Problem: DISCHARGE PLANNING  Goal: Discharge to home or other facility with appropriate resources  Description: INTERVENTIONS:  - Identify barriers to discharge w/patient and caregiver  - Arrange for needed discharge resources and transportation as appropriate  - Identify discharge learning needs (meds, wound care, etc )  - Arrange for interpretive services to assist at discharge as needed  - Refer to Case Management Department for coordinating discharge planning if the patient needs post-hospital services based on physician/advanced practitioner order or complex needs related to functional status, cognitive ability, or social support system  Outcome: Completed

## 2022-10-01 NOTE — PLAN OF CARE
Problem: POSTPARTUM  Goal: Experiences normal postpartum course  Description: INTERVENTIONS:  - Monitor maternal vital signs  - Assess uterine involution and lochia  Outcome: Progressing  Goal: Appropriate maternal -  bonding  Description: INTERVENTIONS:  - Identify family support  - Assess for appropriate maternal/infant bonding   -Encourage maternal/infant bonding opportunities  - Referral to  or  as needed  Outcome: Progressing  Goal: Establishment of infant feeding pattern  Description: INTERVENTIONS:  - Assess breast/bottle feeding  - Refer to lactation as needed  Outcome: Progressing  Goal: Incision(s), wounds(s) or drain site(s) healing without S/S of infection  Description: INTERVENTIONS  - Assess and document dressing, incision, wound bed, drain sites and surrounding tissue  - Provide patient and family education  - Perform skin care/dressing changes every   Outcome: Progressing     Problem: PAIN - ADULT  Goal: Verbalizes/displays adequate comfort level or baseline comfort level  Description: Interventions:  - Encourage patient to monitor pain and request assistance  - Assess pain using appropriate pain scale  - Administer analgesics based on type and severity of pain and evaluate response  - Implement non-pharmacological measures as appropriate and evaluate response  - Consider cultural and social influences on pain and pain management  - Notify physician/advanced practitioner if interventions unsuccessful or patient reports new pain  Outcome: Progressing     Problem: INFECTION - ADULT  Goal: Absence or prevention of progression during hospitalization  Description: INTERVENTIONS:  - Assess and monitor for signs and symptoms of infection  - Monitor lab/diagnostic results  - Monitor all insertion sites, i e  indwelling lines, tubes, and drains  - Monitor endotracheal if appropriate and nasal secretions for changes in amount and color  - [place] appropriate cooling/warming therapies per order  - Administer medications as ordered  - Instruct and encourage patient and family to use good hand hygiene technique  - Identify and instruct in appropriate isolation precautions for identified infection/condition  Outcome: Progressing  Goal: Absence of fever/infection during neutropenic period  Description: INTERVENTIONS:  - Monitor WBC    Outcome: Progressing     Problem: SAFETY ADULT  Goal: Patient will remain free of falls  Description: INTERVENTIONS:  - Educate patient/family on patient safety including physical limitations  - Instruct patient to call for assistance with activity   - Consult OT/PT to assist with strengthening/mobility   - Keep Call bell within reach  - Keep bed low and locked with side rails adjusted as appropriate  - Keep care items and personal belongings within reach  - Initiate and maintain comfort rounds  - Make Fall Risk Sign visible to staff  - Offer Toileting every  Hours, in advance of need  - Initiate/Maintain alarm  - Obtain necessary fall risk management equipment:   - Apply yellow socks and bracelet for high fall risk patients  - Consider moving patient to room near nurses station  Outcome: Progressing  Goal: Maintain or return to baseline ADL function  Description: INTERVENTIONS:  -  Assess patient's ability to carry out ADLs; assess patient's baseline for ADL function and identify physical deficits which impact ability to perform ADLs (bathing, care of mouth/teeth, toileting, grooming, dressing, etc )  - Assess/evaluate cause of self-care deficits   - Assess range of motion  - Assess patient's mobility; develop plan if impaired  - Assess patient's need for assistive devices and provide as appropriate  - Encourage maximum independence but intervene and supervise when necessary  - Involve family in performance of ADLs  - Assess for home care needs following discharge   - Consider OT consult to assist with ADL evaluation and planning for discharge  - Provide patient education as appropriate  Outcome: Progressing  Goal: Maintains/Returns to pre admission functional level  Description: INTERVENTIONS:  - Perform BMAT or MOVE assessment daily    - Set and communicate daily mobility goal to care team and patient/family/caregiver  - Collaborate with rehabilitation services on mobility goals if consulted  - Perform Range of Motion  times a day  - Reposition patient every  hours  - Dangle patient  times a day  - Stand patient  times a day  - Ambulate patient  times a day  - Out of bed to chair  times a day   - Out of bed for meals times a day  - Out of bed for toileting  - Record patient progress and toleration of activity level   Outcome: Progressing     Problem: Knowledge Deficit  Goal: Patient/family/caregiver demonstrates understanding of disease process, treatment plan, medications, and discharge instructions  Description: Complete learning assessment and assess knowledge base    Interventions:  - Provide teaching at level of understanding  - Provide teaching via preferred learning methods  Outcome: Progressing     Problem: DISCHARGE PLANNING  Goal: Discharge to home or other facility with appropriate resources  Description: INTERVENTIONS:  - Identify barriers to discharge w/patient and caregiver  - Arrange for needed discharge resources and transportation as appropriate  - Identify discharge learning needs (meds, wound care, etc )  - Arrange for interpretive services to assist at discharge as needed  - Refer to Case Management Department for coordinating discharge planning if the patient needs post-hospital services based on physician/advanced practitioner order or complex needs related to functional status, cognitive ability, or social support system  Outcome: Progressing

## 2022-10-01 NOTE — PROGRESS NOTES
Progress Note - OB/GYN  Fredi Yoon 29 y o  female MRN: [id number]  Unit/Bed#: -01 Encounter: [id number]    Assessment and Plan     Fredi Yoon is a patient of: [address] [place] [address]  She is POD# 2 s/p RLTCS  Recovering well and is stable       Hx of sickle cell trait  Assessment & Plan  Partner unsure if he has sickle cell trait     Subclinical hyperthyroidism  Assessment & Plan  Low TSH, normal T4   No current meds  Repeat labs postpartum - pending    * S/P primary low transverse   Assessment & Plan  , Hgb 11 4 --> 7 5 > venofer > 7 6  Lines: espinoza out, void trial passed  Pain: Tylenol and motrin scheduled, seble /10 PRN    FEN: Tolerating regular diet  DVT ppx: SCDs and Lovenox 40mg qD  Passing flatus   Incision C/D/I           Disposition    - Anticipate discharge home on POD# 3 vs 4      Subjective/Objective     Chief Complaint: Postoperative State     Subjective:    Fredi Yoon is s/p RLTCS  She is POD# 2  She is still complaining of pain  Patient is currently voiding  She is ambulating  Patient is currently passing flatus and has had no bowel movement  She is tolerating PO, and denies nausea or vomitting  Patient denies fever, chills, chest pain, shortness of breath, or calf tenderness  Lochia is normal  Her incision is C/D/I  She is recovering well and is stable         Vitals:   /69 (BP Location: Left arm)   Pulse 87   Temp 98 6 °F (37 °C) (Oral)   Resp 16   Wt 91 2 kg (201 lb)   LMP 2021 (Exact Date)   SpO2 100%   Breastfeeding No   BMI 34 50 kg/m²       Intake/Output Summary (Last 24 hours) at 10/1/2022 0804  Last data filed at 2022 1245  Gross per 24 hour   Intake --   Output 1100 ml   Net -1100 ml       Invasive Devices  Timeline    None                 Physical Exam:   GEN: Fredi Yoon appears well, alert, pleasant and cooperative   CARDIO: RRR  RESP:  CTAB, no wheezes or rales  ABDOMEN: soft, no tenderness, no distention, fundus firm, Incision C/D/I  EXTREMITIES: SCDs on, Negative Jannette's sign bilaterally      Labs:     Hemoglobin   Date Value Ref Range Status   10/01/2022 7 6 (L) 11 5 - 15 4 g/dL Final   09/30/2022 7 5 (L) 11 5 - 15 4 g/dL Final   04/26/2015 8 8 (L) 11 5 - 15 4 g/dL Final   04/25/2015 11 8 11 5 - 15 4 g/dL Final     WBC   Date Value Ref Range Status   10/01/2022 9 76 4 31 - 10 16 Thousand/uL Final   09/30/2022 9 15 4 31 - 10 16 Thousand/uL Final   04/26/2015 27 04 (H) 4 31 - 10 16 Thousand/uL Final   04/25/2015 10 00 4 31 - 10 16 Thousand/uL Final     Platelets   Date Value Ref Range Status   10/01/2022 220 149 - 390 Thousands/uL Final   09/30/2022 223 149 - 390 Thousands/uL Final   04/26/2015 251 149 - 390 Thousand/uL Final   04/25/2015 298 149 - 390 Thousand/uL Final     Creatinine   Date Value Ref Range Status   09/30/2022 0 66 0 60 - 1 30 mg/dL Final     Comment:     Standardized to IDMS reference method   09/11/2022 0 58 (L) 0 60 - 1 30 mg/dL Final     Comment:     Standardized to IDMS reference method   04/25/2015 0 54 (L) 0 60 - 1 30 mg/dL Final     Comment:     Standardized to IDMS reference method   04/01/2015 0 65 0 60 - 1 30 mg/dL Final     Comment:     Standardized to IDMS reference method     AST   Date Value Ref Range Status   09/11/2022 14 5 - 45 U/L Final     Comment:     Specimen collection should occur prior to Sulfasalazine administration due to the potential for falsely depressed results  08/22/2022 11 5 - 45 U/L Final     Comment:     Specimen collection should occur prior to Sulfasalazine administration due to the potential for falsely depressed results  04/25/2015 13 0 - 45 U/L Final   04/01/2015 14 0 - 45 U/L Final     ALT   Date Value Ref Range Status   09/11/2022 13 12 - 78 U/L Final     Comment:     Specimen collection should occur prior to Sulfasalazine and/or Sulfapyridine administration due to the potential for falsely depressed results      08/22/2022 13 12 - 78 U/L Final     Comment:     Specimen collection should occur prior to Sulfasalazine and/or Sulfapyridine administration due to the potential for falsely depressed results      04/25/2015 9 (L) 14 - 59 U/L Final   04/01/2015 12 (L) 14 - 59 U/L Final          Latia Schulte  10/1/2022  8:04 AM

## 2022-10-03 LAB — GLUCOSE SERPL-MCNC: 122 MG/DL (ref 65–140)

## 2022-10-05 PROBLEM — Z86.32 HISTORY OF GESTATIONAL DIABETES: Status: ACTIVE | Noted: 2022-07-26

## 2022-10-05 PROBLEM — O47.9 UTERINE CONTRACTIONS: Status: RESOLVED | Noted: 2022-09-29 | Resolved: 2022-10-05

## 2022-10-05 PROBLEM — Z34.93 PRENATAL CARE IN THIRD TRIMESTER: Status: RESOLVED | Noted: 2022-03-16 | Resolved: 2022-10-05

## 2022-10-05 PROBLEM — R51.9 PREGNANCY HEADACHE IN SECOND TRIMESTER: Status: RESOLVED | Noted: 2022-04-12 | Resolved: 2022-10-05

## 2022-10-05 PROBLEM — O26.892 PREGNANCY HEADACHE IN SECOND TRIMESTER: Status: RESOLVED | Noted: 2022-04-12 | Resolved: 2022-10-05

## 2022-10-05 PROBLEM — Z87.59 HISTORY OF PRE-ECLAMPSIA: Status: ACTIVE | Noted: 2022-03-27

## 2022-10-05 LAB — PLACENTA IN STORAGE: NORMAL

## 2022-10-09 DIAGNOSIS — K21.00 GASTROESOPHAGEAL REFLUX DISEASE WITH ESOPHAGITIS WITHOUT HEMORRHAGE: ICD-10-CM

## 2022-10-11 RX ORDER — PANTOPRAZOLE SODIUM 40 MG/1
40 TABLET, DELAYED RELEASE ORAL
Qty: 30 TABLET | Refills: 0 | Status: SHIPPED | OUTPATIENT
Start: 2022-10-11 | End: 2022-11-10

## 2022-10-24 ENCOUNTER — POSTPARTUM VISIT (OUTPATIENT)
Dept: OBGYN CLINIC | Facility: CLINIC | Age: 32
End: 2022-10-24

## 2022-10-24 VITALS
SYSTOLIC BLOOD PRESSURE: 122 MMHG | BODY MASS INDEX: 33.39 KG/M2 | WEIGHT: 195.6 LBS | DIASTOLIC BLOOD PRESSURE: 82 MMHG | HEIGHT: 64 IN | HEART RATE: 75 BPM | TEMPERATURE: 97.4 F

## 2022-10-24 DIAGNOSIS — O24.419 GESTATIONAL DIABETES MELLITUS (GDM) IN THIRD TRIMESTER, GESTATIONAL DIABETES METHOD OF CONTROL UNSPECIFIED: ICD-10-CM

## 2022-10-24 PROCEDURE — 99213 OFFICE O/P EST LOW 20 MIN: CPT | Performed by: NURSE PRACTITIONER

## 2022-10-24 NOTE — PROGRESS NOTES
POSTPARTUM VISIT    Aleena Blackman presents today for postpartum visit  She had a repeat  delivery on 22  Complications included gestational diabetes  Her postoperative course was complicated by decreased Hgb  Pt received Venofer on PPD#1 Repeat CBC stable at 7 6 and pt was asymptomatic  She is bottle feeding her infant and reports no issues with such  She desires condoms for contraception  She was provided with Kristian Pacheco Depression Screening tool and her score was 0  Review of Systems:   -Constitutional: denies issues, denies pain   -Breasts: denies tenderness   -Gynecologic: lochia scant   -Urinary: denies issues urinating   -GI: stools WNL, denies issues    Physical Exam:   -Vitals:   Vitals:    10/24/22 1037   Weight: 88 7 kg (195 lb 9 6 oz)   Height: 5' 4" (1 626 m)      -General: A&Ox3, no acute distress noted   -Abdomen: soft, non-tender, incision appears clean/dry/intact and well-healed   -Extremities: nontender, no edema noted   -Breasts:    Deferred    -Pelvic exam:    Deferred     Assessment/Plan:  1  Normal postpartum exam   2  Depression screening negative  3  Last pap smear was done  and result was NILM  Advise return for next annual GYN exam in 6 months  4  Contraception: condoms  Has not yet returned to sexual activity     5  Has order form for post partum 2 hour GTT

## 2022-11-02 DIAGNOSIS — Z13.1 DIABETES MELLITUS SCREENING: Primary | ICD-10-CM

## 2022-11-29 ENCOUNTER — OFFICE VISIT (OUTPATIENT)
Dept: FAMILY MEDICINE CLINIC | Facility: CLINIC | Age: 32
End: 2022-11-29

## 2022-11-29 VITALS
HEART RATE: 76 BPM | BODY MASS INDEX: 32.92 KG/M2 | HEIGHT: 65 IN | SYSTOLIC BLOOD PRESSURE: 104 MMHG | TEMPERATURE: 97.8 F | OXYGEN SATURATION: 98 % | WEIGHT: 197.6 LBS | DIASTOLIC BLOOD PRESSURE: 74 MMHG | RESPIRATION RATE: 16 BRPM

## 2022-11-29 DIAGNOSIS — Z00.00 ANNUAL PHYSICAL EXAM: Primary | ICD-10-CM

## 2022-11-29 NOTE — PROGRESS NOTES
[address] [facility]    NAME: Fleeta Sacks  AGE: 29 y o  SEX: female  : 1990     DATE: 2022     Assessment and Plan:     Problem List Items Addressed This Visit        Other    Annual physical exam - Primary     Unremarkable exam of adult female  Vaccines up to date  Working on getting off pregnancy weight, continue regular exercise  Continue regular dental exams  Immunizations and preventive care screenings were discussed with patient today  Appropriate education was printed on patient's after visit summary  Counseling:  Dental Health: discussed importance of regular tooth brushing, flossing, and dental visits  Injury prevention: discussed safety/seat belts, safety helmets, smoke detectors, carbon dioxide detectors, and smoking near bedding or upholstery  Exercise: the importance of regular exercise/physical activity was discussed  Recommend exercise 3-5 times per week for at least 30 minutes  BMI Counseling: Body mass index is 32 88 kg/m²  The BMI is above normal  Nutrition recommendations include decreasing portion sizes, encouraging healthy choices of fruits and vegetables, consuming healthier snacks, moderation in carbohydrate intake, increasing intake of lean protein, reducing intake of saturated and trans fat and reducing intake of cholesterol  Exercise recommendations include moderate physical activity 150 minutes/week, exercising 3-5 times per week and strength training exercises  Rationale for BMI follow-up plan is due to patient being overweight or obese  Return for Annual physical      Chief Complaint:     Chief Complaint   Patient presents with   • Physical Exam     Patient is here for her annual wellness      History of Present Illness:     Adult Annual Physical   Patient here for a comprehensive physical exam  The patient reports no problems      Diet and Physical Activity  Diet/Nutrition: limited junk food and consuming 3-5 servings of fruits/vegetables daily  Exercise: walking, 5-7 times a week on average and 30-60 minutes on average  Depression Screening  PHQ-2/9 Depression Screening         General Health  Sleep: sleeps well and gets 4-6 hours of sleep on average  Hearing: normal - bilateral   Vision: no vision problems and most recent eye exam >1 year ago  Dental: regular dental visits and brushes teeth three times daily  /GYN Health  Last menstrual period: prior to most recent pregnancy  Contraceptive method: none  History of STDs?: no      Review of Systems:     Review of Systems   Constitutional: Negative for activity change, appetite change, chills, diaphoresis, fatigue, fever and unexpected weight change  HENT: Negative for hearing loss  Eyes: Negative for visual disturbance  Respiratory: Negative for cough, chest tightness and shortness of breath  Cardiovascular: Negative for chest pain, palpitations and leg swelling  Gastrointestinal: Negative for abdominal pain, constipation, diarrhea, nausea and vomiting  Genitourinary: Negative for difficulty urinating, dysuria, frequency and menstrual problem  Musculoskeletal: Negative for arthralgias and myalgias  Skin: Negative  Allergic/Immunologic: Negative for environmental allergies  Neurological: Negative for dizziness, seizures, weakness, light-headedness, numbness and headaches  Psychiatric/Behavioral: Negative for dysphoric mood and sleep disturbance  The patient is not nervous/anxious         Past Medical History:     Past Medical History:   Diagnosis Date   • Abscess of buttock, right 2019   •  delivery delivered     RESOLVED: [id number]   • Diet controlled gestational diabetes mellitus (GDM) in third trimester 2022   • Disease of thyroid gland     hyperthyroid   • Hidradenitis suppurativa     LAST ASSESSED: [id number]   • History of early menarche FIRST PERIOD AT 8YEARS OLD   • Hyperemesis gravidarum     RESOLVED: [id number]   • Migraine    • Pregnancy headache in second trimester 2022    Reported daily headaches, now resolved with magnesium and riboflavin Also had frequent headaches on Depo   • Prenatal care in third trimester 3/16/2022    O pos Weight gain recommendation 15-25lbs History of 2 prior  sections, plan RLTCS Last pap smear 10/8/21 NILM, completed 2/3 Gardasil vaccinations, will be due for third dose postpartum Planning partner vasectomy for contraception, bridge with condoms   • Sickle cell trait ([facility] [address] )    • Uterine contractions 2022      Past Surgical History:     Past Surgical History:   Procedure Laterality Date   • ABDOMINAL SURGERY     • ABSCESS DRAINAGE  2016    INCISION AND DRAINAGE OF SKIN ABSCESS; LABIAL CYST REMOVAL - [address]   •  SECTION     • CHOLECYSTECTOMY     • INCISION AND DRAINAGE OF WOUND Right 2019    Procedure: INCISION AND DRAINAGE (I&D) BUTTOCK;  Surgeon: Jennifer Gamble MD;  Location: [facility];  Service: General   • [place]  DELIVERY ONLY N/A 2018    Procedure: True Spice () REPEAT;  Surgeon: Vickie West MD;  Location: [facility];  Service: Obstetrics   • [place]  DELIVERY ONLY N/A 2022    Procedure: True Spice () REPEAT;  Surgeon: Fito Ghosh MD;  Location: [facility];  Service: Obstetrics   • [place] EXC SWEAT GLAND Arriola Cutting Right 2016    Procedure: EXCISION GROIN CYST HIDRADENITIS;  Surgeon: Katrin Miranda DO;  Location: [facility];  Service: General   • TONSILLECTOMY     • VULVA BIOPSY N/A 2016    Procedure: INCISION AND DRAINAGE, EXCISION OF LABIAL CYST ;  Surgeon: Venita Mckeon DO;  [facility];  Service:       Social History:     Social History     Socioeconomic History   • Marital status: Single     Spouse name: None   • Number of children: None   • Years of education: 12   • Highest education level: None   Occupational History   • Occupation: unemployed   Tobacco Use   • Smoking status: Never   • Smokeless tobacco: Never   Vaping Use   • Vaping Use: Never used   Substance and Sexual Activity   • Alcohol use: Not Currently     Comment: social   • Drug use: No   • Sexual activity: Yes     Partners: Male     Birth control/protection: None   Other Topics Concern   • None   Social History Narrative   • None     Social Determinants of Health     Financial Resource Strain: Low Risk    • Difficulty of Paying Living Expenses: Not hard at all   Food Insecurity: No Food Insecurity   • Worried About Running Out of Food in the Last Year: Never true   • Ran Out of Food in the Last Year: Never true   Transportation Needs: No Transportation Needs   • Lack of Transportation (Medical): No   • Lack of Transportation (Non-Medical): No   Physical Activity: Inactive   • Days of Exercise per Week: 0 days   • Minutes of Exercise per Session: 0 min   Stress: No Stress Concern Present   • Feeling of Stress : Not at all   Social Connections:  Moderately Isolated   • Frequency of Communication with Friends and Family: More than three times a week   • Frequency of Social Gatherings with Friends and Family: More than three times a week   • Attends Yazidi Services: Never   • Active Member of Clubs or Organizations: No   • Attends Club or Organization Meetings: Never   • Marital Status: Living with partner   Intimate Partner Violence: Not At Risk   • Fear of Current or Ex-Partner: No   • Emotionally Abused: No   • Physically Abused: No   • Sexually Abused: No   Housing Stability: Low Risk    • Unable to Pay for Housing in the Last Year: No   • Number of Places Lived in the Last Year: 1   • Unstable Housing in the Last Year: No      Family History:     Family History   Problem Relation Age of Onset   • Hyperlipidemia Mother         PURE   • Cancer Father         lung   • Diabetes Maternal Grandmother    • No Known Problems Brother    • Ovarian cancer Neg Hx    • Colon cancer Neg Hx    • Breast cancer Neg Hx       Current Medications:     Current Outpatient Medications   Medication Sig Dispense Refill   • acetaminophen (TYLENOL) 325 mg tablet Take 2 tablets (650 mg total) by mouth every 6 (six) hours (Patient not taking: Reported on 10/24/2022)  0   • docusate sodium (COLACE) 100 mg capsule Take 1 capsule (100 mg total) by mouth 2 (two) times a day (Patient not taking: Reported on 10/24/2022)  0   • glucose monitoring kit (FREESTYLE) monitoring kit Use 1 each as needed (glucose monitoring) (Patient not taking: Reported on 10/24/2022) 1 each 0   • ibuprofen (MOTRIN) 600 mg tablet Take 1 tablet (600 mg total) by mouth every 6 (six) hours (Patient not taking: Reported on 10/24/2022) 30 tablet 0   • Insulin Pen Needle 31G X 5 MM MISC Use with Lantus solostar insulin pen daily (Patient not taking: Reported on 10/24/2022) 100 each 4   • Lancets (OneTouch Delica Plus [id number]) MISC USE 4 (FOUR) TIMES DAILY (AFTER MEALS AND AT BEDTIME) USE AS INSTRUCTED (Patient not taking: Reported on 10/24/2022) 100 each 1   • pantoprazole (PROTONIX) 40 mg tablet TAKE 1 TABLET (40 MG TOTAL) BY MOUTH DAILY IN THE EARLY MORNING (Patient not taking: Reported on 10/24/2022) 30 tablet 0   • Prenatal Vit-Fe Fumarate-FA (prenatal multivitamin) 28-0 8 MG TABS Take 1 tablet by mouth daily (Patient not taking: Reported on 11/29/2022)     • Riboflavin 400 MG CAPS Take 1 capsule (400 mg total) by mouth in the morning (Patient not taking: Reported on 10/24/2022) 60 capsule 3     No current facility-administered medications for this visit  Allergies:     No Known Allergies   Physical Exam:     /74   Pulse 76   Temp 97 8 °F (36 6 °C)   Resp 16   Ht 5' 5" (1 651 m)   Wt 89 6 kg (197 lb 9 6 oz)   SpO2 98%   BMI 32 88 kg/m²     Physical Exam  Vitals reviewed  Constitutional:       General: She is awake  She is not in acute distress  Vital signs are normal       Appearance: Normal appearance  She is well-developed, well-groomed and well-nourished  She is not ill-appearing  HENT:      Head: Normocephalic  Right Ear: Hearing, tympanic membrane, ear canal and external ear normal       Left Ear: Hearing, tympanic membrane, ear canal and external ear normal       Nose: Nose normal       Mouth/Throat:      Lips: Pink  Mouth: Oropharynx is clear and moist and mucous membranes are normal  Mucous membranes are moist       Pharynx: Oropharynx is clear  Uvula midline  Eyes:      General: Lids are normal       Conjunctiva/sclera: Conjunctivae normal       Pupils: Pupils are equal, round, and reactive to light  Comments: R eye 20/13, L eye 20/15 B eyes 20/13   Neck:      Thyroid: No thyroid mass or thyromegaly (full thyroid)  Vascular: Normal carotid pulses  No carotid bruit or JVD  Cardiovascular:      Rate and Rhythm: Normal rate and regular rhythm  Pulses: Normal pulses and intact distal pulses  Heart sounds: Normal heart sounds, S1 normal and S2 normal  No murmur heard  Pulmonary:      Effort: Pulmonary effort is normal       Breath sounds: Normal breath sounds  Abdominal:      General: Bowel sounds are normal       Palpations: Abdomen is soft  Tenderness: There is no abdominal tenderness  Musculoskeletal:         General: Normal range of motion  Cervical back: Full passive range of motion without pain  Right lower leg: No edema  Left lower leg: No edema  Lymphadenopathy:      Head:      Right side of head: No submental, submandibular, tonsillar, preauricular, posterior auricular or occipital adenopathy  Left side of head: No submental, submandibular, tonsillar, preauricular, posterior auricular or occipital adenopathy  Cervical: No cervical adenopathy  Skin:     General: Skin is warm, dry and intact  Neurological:      Mental Status: She is alert and oriented to person, place, and time  Sensory: No sensory deficit  Motor: Motor strength is normal  Motor function is intact  Psychiatric:         Attention and Perception: Attention normal          Mood and Affect: Mood and affect and mood normal          Speech: Speech normal          Behavior: Behavior normal  Behavior is cooperative  Thought Content:  Thought content normal          Cognition and Memory: Cognition and cognition and memory normal          Judgment: Judgment normal           Estefanía Munoz, [address]  [facility]

## 2022-11-29 NOTE — ASSESSMENT & PLAN NOTE
Unremarkable exam of adult female  Vaccines up to date  Working on getting off pregnancy weight, continue regular exercise  Continue regular dental exams

## 2022-11-29 NOTE — PATIENT INSTRUCTIONS

## 2023-05-02 NOTE — RESULT NOTES
Discussion/Summary   normal Thyroid ultrasound  thyroid level is still slightly high  I do want her to see an endocrinologist sooner than later     - Dr Taylor Tapia     Verified Results  (1) CBC/PLT/DIFF [id number] 05:45PM Ariadna Sherman Order Number: [id number]     Test Name Result Flag Reference   WBC COUNT 7 38 Thousand/uL  4 31-10 16   RBC COUNT 4 35 Million/uL  3 81-5 12   HEMOGLOBIN 13 0 g/dL  11 5-15 4   HEMATOCRIT 37 0 %  34 8-46  1   MCV 85 fL  82-98   MCH 29 9 pg  26 8-34 3   MCHC 35 1 g/dL  31 4-37 4   RDW 13 1 %  11 6-15 1   MPV 9 0 fL  8 9-12 7   PLATELET COUNT 301 Thousands/uL  149-390   nRBC AUTOMATED 0 /100 WBCs     NEUTROPHILS RELATIVE PERCENT 61 %  43-75   LYMPHOCYTES RELATIVE PERCENT 32 %  14-44   MONOCYTES RELATIVE PERCENT 7 %  4-12   EOSINOPHILS RELATIVE PERCENT 0 %  0-6   BASOPHILS RELATIVE PERCENT 0 %  0-1   NEUTROPHILS ABSOLUTE COUNT 4 45 Thousands/? ??L  1 85-7 62   LYMPHOCYTES ABSOLUTE COUNT 2 35 Thousands/? ??L  0 60-4 47   MONOCYTES ABSOLUTE COUNT 0 52 Thousand/? ??L  0 17-1 22   EOSINOPHILS ABSOLUTE COUNT 0 02 Thousand/? ??L  0 00-0 61   BASOPHILS ABSOLUTE COUNT 0 02 Thousands/? ??L  0 00-0 10     (1) COMPREHENSIVE METABOLIC PANEL [id number] 99:72WD Meagan Sherman Order Number: [id number]     Test Name Result Flag Reference   GLUCOSE,RANDM 85 mg/dL     If the patient is fasting, the ADA then defines impaired fasting glucose as > 100 mg/dL and diabetes as > or equal to 123 mg/dL  Specimen collection should occur prior to Sulfasalazine administration due to the potential for falsely depressed results  Specimen collection should occur prior to Sulfapyridine administration due to the potential for falsely elevated results     SODIUM 137 mmol/L  136-145   POTASSIUM 3 8 mmol/L  3 5-5 3   CHLORIDE 104 mmol/L  100-108   CARBON DIOXIDE 27 mmol/L  21-32   ANION GAP (CALC) 6 mmol/L  4-13   BLOOD UREA NITROGEN 7 mg/dL  5-25   CREATININE 0 58 mg/dL L 0 60-1 30   Standardized to IDMS reference method   CALCIUM 8 9 mg/dL  8 3-10 1   BILI, TOTAL 0 33 mg/dL  0 20-1 00   ALK PHOSPHATAS 69 U/L     ALT (SGPT) 20 U/L  12-78   Specimen collection should occur prior to Sulfasalazine and/or Sulfapyridine administration due to the potential for falsely depressed results  AST(SGOT) 8 U/L  5-45   Specimen collection should occur prior to Sulfasalazine administration due to the potential for falsely depressed results  ALBUMIN 3 5 g/dL  3 5-5 0   TOTAL PROTEIN 7 6 g/dL  6 4-8 2   eGFR 148 ml/min/1 73sq m     [facility] Disease Education Program recommendations are as follows:  GFR calculation is accurate only with a steady state creatinine  Chronic Kidney disease less than 60 ml/min/1 73 sq  meters  Kidney failure less than 15 ml/min/1 73 sq  meters  (1) TSH WITH FT4 REFLEX [id number] 05:45PM [place], Lisa Laser Order Number: [id number]     Test Name Result Flag Reference   TSH 0 183 uIU/mL L 0 358-3 740   Patients undergoing fluorescein dye angiography may retain small amounts of fluorescein in the body for 48-72 hours post procedure  Samples containing fluorescein can produce falsely depressed TSH values  If the patient had this procedure,a specimen should be resubmitted post fluorescein clearance  The recommended reference ranges for TSH during pregnancy are as follows:  First trimester 0 1 to 2 5 uIU/mL  Second trimester  0 2 to 3 0 uIU/mL  Third trimester 0 3 to 3 0 uIU/m     (1) FREE T3 [id number] 05:45PM [place], Lisa Laser Order Number: [id number]     Test Name Result Flag Reference   FREE T3 2 77 pg/mL  2 30-4 20     (1) T4, FREE [id number] 05:45PM [address], Lisa Laser Order Number: [id number]     Test Name Result Flag Reference   T4,FREE 1 12 ng/dL  0 76-1 46   Specimen collection should occur prior to Sulfasalazine administration due to the potential for falsely elevated results        THYROID [id number] 05:24PM Ariadna Sherman Order Number: [id number]    - Patient Instructions: To schedule this appointment, please contact Central Scheduling at [phone]  Test Name Result Flag Reference   US THYROID (Report)     THYROID ULTRASOUND     INDICATION: E05 90: Thyrotoxicosis, unspecified without thyrotoxic crisis or storm  History taken directly from the electronic ordering system  COMPARISON: None  TECHNIQUE:  Ultrasound of the thyroid was performed with a high frequency linear transducer in transverse and sagittal planes including volumetric imaging sweeps as well as traditional still imaging technique  FINDINGS: Normal homogeneous smooth echotexture  Right lobe: 5 1 x 1 7 x 1 8 cm  Left lobe: 4 x 1 2 x 1 4 cm  Isthmus: 0 47 cm  No nodules bilaterally of greater than 1cm  IMPRESSION:   Exam within normal limits  Reference: ACR Thyroid Imaging, Reporting and Data System (TI-RADS): White Paper of the [employer]   J AM Amado Radiol 0828;57:702-699  (additional recommendations based on American Thyroid Association 2015 guidelines )       Workstation performed: [id number]     Signed by:   Mihir Mackay MD   1/8/18       Signatures   Electronically signed by : Betsey Walsh MD; Jan 9 2018  8:14AM EST                       (Author) Resident

## 2023-05-03 PROBLEM — Z01.419 WELL WOMAN EXAM WITH ROUTINE GYNECOLOGICAL EXAM: Status: ACTIVE | Noted: 2023-05-03

## 2023-07-02 PROBLEM — Z01.419 WELL WOMAN EXAM WITH ROUTINE GYNECOLOGICAL EXAM: Status: RESOLVED | Noted: 2023-05-03 | Resolved: 2023-07-02

## 2023-08-17 ENCOUNTER — ANNUAL EXAM (OUTPATIENT)
Dept: OBGYN CLINIC | Facility: CLINIC | Age: 33
End: 2023-08-17

## 2023-08-17 ENCOUNTER — PATIENT OUTREACH (OUTPATIENT)
Dept: OBGYN CLINIC | Facility: CLINIC | Age: 33
End: 2023-08-17

## 2023-08-17 VITALS
HEART RATE: 88 BPM | BODY MASS INDEX: 31.11 KG/M2 | HEIGHT: 63 IN | DIASTOLIC BLOOD PRESSURE: 85 MMHG | RESPIRATION RATE: 18 BRPM | WEIGHT: 175.6 LBS | SYSTOLIC BLOOD PRESSURE: 125 MMHG

## 2023-08-17 DIAGNOSIS — Z11.3 SCREEN FOR STD (SEXUALLY TRANSMITTED DISEASE): ICD-10-CM

## 2023-08-17 DIAGNOSIS — B96.89 BV (BACTERIAL VAGINOSIS): ICD-10-CM

## 2023-08-17 DIAGNOSIS — Z12.4 CERVICAL CANCER SCREENING: ICD-10-CM

## 2023-08-17 DIAGNOSIS — Z12.39 ENCOUNTER FOR BREAST CANCER SCREENING USING NON-MAMMOGRAM MODALITY: ICD-10-CM

## 2023-08-17 DIAGNOSIS — N76.0 BV (BACTERIAL VAGINOSIS): ICD-10-CM

## 2023-08-17 DIAGNOSIS — Z59.9 FINANCIAL DIFFICULTIES: ICD-10-CM

## 2023-08-17 DIAGNOSIS — Z01.419 WOMEN'S ANNUAL ROUTINE GYNECOLOGICAL EXAMINATION: Primary | ICD-10-CM

## 2023-08-17 LAB
BV WHIFF TEST VAG QL: POSITIVE
CLUE CELLS SPEC QL WET PREP: POSITIVE
PH SMN: 5 [PH]
SL AMB POCT WET MOUNT: ABNORMAL
T VAGINALIS VAG QL WET PREP: NEGATIVE
YEAST VAG QL WET PREP: NEGATIVE

## 2023-08-17 PROCEDURE — 87491 CHLMYD TRACH DNA AMP PROBE: CPT | Performed by: NURSE PRACTITIONER

## 2023-08-17 PROCEDURE — 87210 SMEAR WET MOUNT SALINE/INK: CPT | Performed by: NURSE PRACTITIONER

## 2023-08-17 PROCEDURE — 87591 N.GONORRHOEAE DNA AMP PROB: CPT | Performed by: NURSE PRACTITIONER

## 2023-08-17 PROCEDURE — 99395 PREV VISIT EST AGE 18-39: CPT | Performed by: NURSE PRACTITIONER

## 2023-08-17 RX ORDER — METRONIDAZOLE 500 MG/1
500 TABLET ORAL 2 TIMES DAILY
Qty: 14 TABLET | Refills: 0 | Status: SHIPPED | OUTPATIENT
Start: 2023-08-17 | End: 2023-08-24

## 2023-08-17 SDOH — ECONOMIC STABILITY - INCOME SECURITY: PROBLEM RELATED TO HOUSING AND ECONOMIC CIRCUMSTANCES, UNSPECIFIED: Z59.9

## 2023-08-17 NOTE — PROGRESS NOTES
NURA MCDANIEL spoke with 28 y/o- S-P3- AA- English speaking woman to address her needs. Pt resides with her kids and SO. Pt is employed part time and was denied SNAP due to income. Pt is participating of local [employer] program. Pt denies other needs at this time and thanks NURA MCDANIEL for reaching out.

## 2023-08-17 NOTE — PATIENT INSTRUCTIONS
OBESITY     Obesity is defined as a body mass index (BMI) which is greater than 30. Your Body mass index is 30.86 kg/m². .    The risks of obesity include  many health problems, such as injuries or physical disability. You may need tests to check for the following:  Diabetes     High blood pressure or high cholesterol     Heart disease     Gallbladder or liver disease     Cancer of the colon, breast, prostate, liver, or kidney     Sleep apnea     Arthritis or gout    Seek care immediately if:   You have a severe headache, confusion, or difficulty speaking. You have weakness on one side of your body. You have chest pain, sweating, or shortness of breath. Contact your healthcare provider if:   You have symptoms of gallbladder or liver disease, such as pain in your upper abdomen. You have knee or hip pain and discomfort while walking. You have symptoms of diabetes, such as intense hunger and thirst, and frequent urination. You have symptoms of sleep apnea, such as snoring or daytime sleepiness. You have questions or concerns about your condition or care. Treatment for obesity  focuses on helping you lose weight to improve your health. Even a small decrease in BMI can reduce the risk for many health problems. Your healthcare provider will help you set a weight-loss goal.  Lifestyle changes  are the first step in treating obesity. These include making healthy food choices and getting regular physical activity. Your healthcare provider may suggest a weight-loss program that involves coaching, education, and therapy. Medicine  may help you lose weight when it is used with a healthy diet and physical activity. Surgery  can help you lose weight if you are very obese and have other health problems. There are several types of weight-loss surgery. Ask your healthcare provider for more information. Be successful losing weight:   Set small, realistic goals.   An example of a small goal is to walk for 20 minutes 5 days a week. Anther goal is to lose 5% of your body weight. Tell friends, family members, and coworkers about your goals  and ask for their support. Ask a friend to lose weight with you, or join a weight-loss support group. Identify foods or triggers that may cause you to overeat , and find ways to avoid them. Remove tempting high-calorie foods from your home and workplace. Place a bowl of fresh fruit on your kitchen counter. If stress causes you to eat, then find other ways to cope with stress. Keep a diary to track what you eat and drink. Also write down how many minutes of physical activity you do each day. Weigh yourself once a week and record it in your diary. Eating changes: You will need to eat 500 to 1,000 fewer calories each day than you currently eat to lose 1 to 2 pounds a week. The following changes will help you cut calories:  Eat smaller portions. Use small plates, no larger than 9 inches in diameter. Fill your plate half full of fruits and vegetables. Measure your food using measuring cups until you know what a serving size looks like. Eat 3 meals and 1 or 2 snacks each day. Plan your meals in advance. Veola Bolder and eat at home most of the time. Eat slowly. Eat fruits and vegetables at every meal.  They are low in calories and high in fiber, which makes you feel full. Do not add butter, margarine, or cream sauce to vegetables. Use herbs to season steamed vegetables. Eat less fat and fewer fried foods. Eat more baked or grilled chicken and fish. These protein sources are lower in calories and fat than red meat. Limit fast food. Dress your salads with olive oil and vinegar instead of bottled dressing. Limit the amount of sugar you eat. Do not drink sugary beverages. Limit alcohol. Activity changes:  Physical activity is good for your body in many ways. It helps you burn calories and build strong muscles.  It decreases stress and depression, and improves your mood. It can also help you sleep better. Talk to your healthcare provider before you begin an exercise program.  Exercise for at least 30 minutes 5 days a week. Start slowly. Set aside time each day for physical activity that you enjoy and that is convenient for you. It is best to do both weight training and an activity that increases your heart rate, such as walking, bicycling, or swimming. Find ways to be more active. Do yard work and housecleaning. Walk up the stairs instead of using elevators. Spend your leisure time going to events that require walking, such as outdoor festivals or fairs. This extra physical activity can help you lose weight and keep it off. Follow up with your primary healthcare provider as directed. You may need to meet with a dietitian. Write down your questions so you remember to ask them during your visits.

## 2023-08-17 NOTE — PROGRESS NOTES
ANNUAL GYNECOLOGICAL EXAMINATION    Ricci Fairchild is a 29 y.o. female who presents today for annual GYN exam.  Her last pap smear was performed 10/2021 and result was NILM. She reports no history of abnormal pap smears in her past. She received two doses of the HPV vaccine in . She reports menses as regular. Patient's last menstrual period was 2023 (exact date). She reports recently she has had a stronger vaginal odor, feels this is a pH imbalance possibly related to her ex-partner who was unfaithful.  Her general medical history has been reviewed and she reports it as follows:    Past Medical History:   Diagnosis Date   • Abscess of buttock, right 2019   •  delivery delivered     RESOLVED: [id number]   • Diet controlled gestational diabetes mellitus (GDM) in third trimester 2022   • Disease of thyroid gland     hyperthyroid   • Hidradenitis suppurativa     LAST ASSESSED: [id number]   • History of early menarche     FIRST PERIOD AT 8YEARS OLD   • Hyperemesis gravidarum     RESOLVED: [id number]   • Migraine    • Pregnancy headache in second trimester 2022    Reported daily headaches, now resolved with magnesium and riboflavin Also had frequent headaches on Depo   • Prenatal care in third trimester 3/16/2022    O pos Weight gain recommendation 15-25lbs History of 2 prior  sections, plan RLTCS Last pap smear 10/8/21 NILM, completed 2/3 Gardasil vaccinations, will be due for third dose postpartum Planning partner vasectomy for contraception, bridge with condoms   • Sickle cell trait ([address])    • Uterine contractions 2022     Past Surgical History:   Procedure Laterality Date   • ABDOMINAL SURGERY     • ABSCESS DRAINAGE  2016    INCISION AND DRAINAGE OF SKIN ABSCESS; LABIAL CYST REMOVAL - [place]   •  SECTION     • CHOLECYSTECTOMY     • INCISION AND DRAINAGE OF WOUND Right 2019    Procedure: INCISION AND DRAINAGE (I&D) BUTTOCK;  Surgeon: Sajan Leavitt MD;  Location: [facility];  Service: General   • IA  DELIVERY ONLY N/A 2018    Procedure:  SECTION () REPEAT;  Surgeon: Nicolasa Salazar MD;  Location: [facility];  Service: Obstetrics   • IA  DELIVERY ONLY N/A 2022    Procedure:  SECTION () REPEAT;  Surgeon: Sita Colón MD;  Location: [facility];  Service: Obstetrics   • IA EXCISION HIDRADENITIS INGUINAL SMPL/INTRM RPR Right 2016    Procedure: EXCISION GROIN CYST HIDRADENITIS;  Surgeon: Anayeli Disla DO;  Location: [facility];  Service: General   • TONSILLECTOMY     • VULVA BIOPSY N/A 2016    Procedure: INCISION AND DRAINAGE, EXCISION OF LABIAL CYST ;  Surgeon: Carl Todd DO;  [facility];  Service:      OB History        3    Para   3    Term   3            AB        Living   3       SAB        IAB        Ectopic        Multiple   0    Live Births   3           Obstetric Comments   PRE-ECLAMPSIA           Social History     Tobacco Use   • Smoking status: Never   • Smokeless tobacco: Never   Vaping Use   • Vaping Use: Never used   Substance Use Topics   • Alcohol use: Not Currently     Comment: social   • Drug use: No     Social History     Substance and Sexual Activity   Sexual Activity Yes   • Partners: Male   • Birth control/protection: None     Cancer-related family history includes Cancer in her father. There is no history of Ovarian cancer, Colon cancer, or Breast cancer. No current outpatient medications    Review of Systems:  Review of Systems   Constitutional: Negative. Gastrointestinal: Negative. Genitourinary: Negative for dysuria, flank pain, genital sores, menstrual problem, pelvic pain, urgency and vaginal discharge.         Vaginal odor        Physical Exam:  /85 (BP Location: Right arm, Patient Position: Sitting, Cuff Size: Large)   Pulse 88   Resp 18   Ht 5' 3.25" (1.607 m)   Wt 79.7 kg (175 lb 9.6 oz)   LMP 08/02/2023 (Exact Date)   BMI 30.86 kg/m²   Physical Exam  Constitutional:       General: She is not in acute distress. Appearance: Normal appearance. Genitourinary:      Vulva and bladder normal.      No lesions in the vagina. Vaginal discharge present. No vaginal erythema or ulceration. Right Adnexa: not tender and no mass present. Left Adnexa: not tender and no mass present. No cervical motion tenderness or lesion. Uterus is not enlarged or tender. No uterine mass detected. Breasts:     Right: No mass, nipple discharge or skin change. Left: No mass, nipple discharge or skin change. Cardiovascular:      Rate and Rhythm: Normal rate and regular rhythm. Pulmonary:      Effort: Pulmonary effort is normal.      Breath sounds: Normal breath sounds. Abdominal:      General: Abdomen is flat. Palpations: Abdomen is soft. Musculoskeletal:      Cervical back: Neck supple. Neurological:      Mental Status: She is alert. Skin:     General: Skin is warm and dry. Psychiatric:         Mood and Affect: Mood normal.         Behavior: Behavior normal.   Vitals reviewed. Point of Care Testing:   -Wet mount: +clue cells, -yeast   -KOH mount: -yeast   -Whiff: positive    -pH 5    Assessment/Plan:   1. Normal well-woman GYN exam.  2. Cervical cancer screening:  Normal cervical exam.  Up to date. 3. STD screening:  Orders placed for vaginal GC/CT cultures. 4. Breast cancer screening:  Normal breast exam.  Reviewed breast self-awareness. 5. Depression Screening: Patient's depression screening was assessed with a PHQ-2 score of 0. Their PHQ-9 score was 1. Clinically patient does not have depression. No treatment is required. 6. BMI Counseling: Body mass index is 30.86 kg/m². Discussed the patient's BMI with her.  The BMI is above normal. Exercise recommendations include moderate aerobic physical activity for 150 minutes/week and exercising 3-5 times per week.    7. Contraception:  Not currently sexually active. Uses condoms if needed. 8. Rx for flagyl. Reviewed skin care measures and vaginitis prevention. 9. Return to office in one year for annual exam or sooner if needed. Reviewed with patient that [facility], but that they will not necessarily be reviewed by me immediately. Explained that I will review results at my earliest opportunity and contact patient appropriately.

## 2023-08-18 LAB
C TRACH DNA SPEC QL NAA+PROBE: NEGATIVE
N GONORRHOEA DNA SPEC QL NAA+PROBE: NEGATIVE

## 2023-08-21 ENCOUNTER — TELEPHONE (OUTPATIENT)
Dept: OBGYN CLINIC | Facility: CLINIC | Age: 33
End: 2023-08-21

## 2023-08-21 NOTE — TELEPHONE ENCOUNTER
----- Message from [address] [place] [address] sent at 8/18/2023  2:30 PM EDT -----  Please let her know the sti culture is negative. Thank you!

## 2023-10-27 ENCOUNTER — OFFICE VISIT (OUTPATIENT)
Dept: FAMILY MEDICINE CLINIC | Facility: CLINIC | Age: 33
End: 2023-10-27
Payer: COMMERCIAL

## 2023-10-27 VITALS
BODY MASS INDEX: 29.48 KG/M2 | TEMPERATURE: 96.3 F | OXYGEN SATURATION: 99 % | SYSTOLIC BLOOD PRESSURE: 119 MMHG | HEART RATE: 78 BPM | DIASTOLIC BLOOD PRESSURE: 74 MMHG | HEIGHT: 63 IN | RESPIRATION RATE: 16 BRPM | WEIGHT: 166.4 LBS

## 2023-10-27 DIAGNOSIS — Z23 ENCOUNTER FOR IMMUNIZATION: ICD-10-CM

## 2023-10-27 DIAGNOSIS — H10.021 PINK EYE DISEASE OF RIGHT EYE: Primary | ICD-10-CM

## 2023-10-27 PROCEDURE — 90471 IMMUNIZATION ADMIN: CPT

## 2023-10-27 PROCEDURE — 99213 OFFICE O/P EST LOW 20 MIN: CPT | Performed by: NURSE PRACTITIONER

## 2023-10-27 PROCEDURE — 90686 IIV4 VACC NO PRSV 0.5 ML IM: CPT

## 2023-10-27 RX ORDER — POLYMYXIN B SULFATE AND TRIMETHOPRIM 1; 10000 MG/ML; [USP'U]/ML
1 SOLUTION OPHTHALMIC EVERY 4 HOURS
Qty: 10 ML | Refills: 0 | Status: SHIPPED | OUTPATIENT
Start: 2023-10-27

## 2023-10-27 NOTE — ASSESSMENT & PLAN NOTE
Pt has been using ocuflox and is not improving, though swelling has gone down. Discussed trying an alternative drop but that it would likely not be "better" than the ocuflox. Given the lack of improvement an evaluation by an eye doctor would be recommended. Continue warm compress.   Pt instructed to call for reevaluation if sx worsen or persist.

## 2023-10-27 NOTE — PROGRESS NOTES
Name: Otilia Castro      : 1990      MRN: [id number]  Encounter Provider: GABO Vásquez  Encounter Date: 10/27/2023   Encounter department: [facility]     1. Pink eye disease of right eye  Assessment & Plan:  Pt has been using ocuflox and is not improving, though swelling has gone down. Discussed trying an alternative drop but that it would likely not be "better" than the ocuflox. Given the lack of improvement an evaluation by an eye doctor would be recommended. Continue warm compress. Pt instructed to call for reevaluation if sx worsen or persist.      Orders:  -     polymyxin b-trimethoprim (POLYTRIM) ophthalmic solution; Administer 1 drop to both eyes every 4 (four) hours    2. Encounter for immunization  -     influenza vaccine, quadrivalent, 0.5 mL, preservative-free, for adult and pediatric patients 6 mos+ (AFLURIA, FLUARIX, FLULAVAL, FLUZONE)           Subjective      Pt is a 34 y.o. y/o female who is seen today for evaluation of pink eye. Past medical history of GERD, sickle cell trait, subclinical hypothyroidism. She started Saturday, now both eyes are symptomatic with redness, discharge, irritation. When she wakes, she has crusting on her eyes. She says her eyes seem to blur when she is in the sun. She does not wear glasses or contacts. She had a bottle of ocuflox and has been putting 2 drops in 4 times per day and it does not seem to be doing anything. She is using a warm compress as well every time she takes the drops. She does not have an eye doctor. Review of Systems   Constitutional:  Negative for chills and fever. HENT:  Negative for congestion. Eyes:  Positive for photophobia, discharge, redness, itching and visual disturbance. Neurological:  Negative for headaches. No current outpatient medications on file prior to visit.        Objective     /74   Pulse 78   Temp (!) 96.3 °F (35.7 °C)   Resp 16  5' 3.25" (1.607 m)   Wt 75.5 kg (166 lb 6.4 oz)   SpO2 99%   BMI 29.24 kg/m²     Physical Exam  Vitals reviewed. Constitutional:       General: She is awake. She is not in acute distress. Appearance: Normal appearance. She is well-developed and well-groomed. She is not ill-appearing or diaphoretic. HENT:      Head: Normocephalic. Eyes:      General: Lids are normal.         Right eye: No foreign body, discharge or hordeolum. Left eye: No foreign body, discharge or hordeolum. Extraocular Movements:      Right eye: Normal extraocular motion. Left eye: Normal extraocular motion. Conjunctiva/sclera:      Right eye: Right conjunctiva is injected. No exudate. Left eye: Left conjunctiva is injected. No exudate. Pupils: Pupils are equal, round, and reactive to light. Pulmonary:      Effort: Pulmonary effort is normal.   Skin:     General: Skin is dry. Neurological:      Mental Status: She is alert and oriented to person, place, and time. Psychiatric:         Attention and Perception: Attention normal.         Mood and Affect: Mood normal.         Speech: Speech normal.         Behavior: Behavior normal. Behavior is cooperative. Thought Content:  Thought content normal.         Cognition and Memory: Cognition normal.         Judgment: Judgment normal.       GABO Beasley

## 2023-12-26 PROBLEM — H10.021 PINK EYE DISEASE OF RIGHT EYE: Status: RESOLVED | Noted: 2023-10-27 | Resolved: 2023-12-26

## 2025-01-15 NOTE — TELEPHONE ENCOUNTER
Result letter was sent. Ramandeep Soto,   Spoke with patient, appt givern for post-op wound check  On 3/4  Feeling ok, denies any N/V/F/C, Eating and drinking ok when hungry  No BM yet, will use stool softener daily  Eat/drink something that helps her go naturally  Mineral oil/prune juice if needed  Instructed patient to keep wound dry and clean  Appt Monday for packing change  Path pending

## 2025-04-03 ENCOUNTER — VBI (OUTPATIENT)
Dept: ADMINISTRATIVE | Facility: OTHER | Age: 35
End: 2025-04-03

## 2025-04-03 NOTE — TELEPHONE ENCOUNTER
04/03/25 11:28 AM     Chart reviewed for Pap Smear (HPV) aka Cervical Cancer Screening ; nothing is submitted to the patient's insurance at this time.     Lynne Lopez   [facility]

## 2025-07-21 PROBLEM — Z01.419 ENCOUNTER FOR ANNUAL ROUTINE GYNECOLOGICAL EXAMINATION: Status: ACTIVE | Noted: 2025-07-21

## 2025-08-18 ENCOUNTER — TELEPHONE (OUTPATIENT)
Dept: EMERGENCY DEPT | Facility: HOSPITAL | Age: 35
End: 2025-08-18

## 2025-08-18 ENCOUNTER — HOSPITAL ENCOUNTER (EMERGENCY)
Facility: HOSPITAL | Age: 35
Discharge: HOME | End: 2025-08-18
Attending: EMERGENCY MEDICINE | Admitting: EMERGENCY MEDICINE
Payer: COMMERCIAL

## 2025-08-18 VITALS
DIASTOLIC BLOOD PRESSURE: 87 MMHG | TEMPERATURE: 98.5 F | SYSTOLIC BLOOD PRESSURE: 132 MMHG | HEART RATE: 81 BPM | RESPIRATION RATE: 18 BRPM | OXYGEN SATURATION: 98 %

## 2025-08-18 DIAGNOSIS — E87.6 HYPOKALEMIA: ICD-10-CM

## 2025-08-18 DIAGNOSIS — K85.90 PANCREATITIS: ICD-10-CM

## 2025-08-18 DIAGNOSIS — E83.42 HYPOMAGNESEMIA: ICD-10-CM

## 2025-08-18 DIAGNOSIS — K29.70 GASTRITIS: Primary | ICD-10-CM

## 2025-08-18 LAB
ALBUMIN SERPL BCG-MCNC: 4.2 G/DL (ref 3.5–5)
ALP SERPL-CCNC: 70 U/L (ref 34–104)
ALT SERPL W P-5'-P-CCNC: 20 U/L (ref 7–52)
ANION GAP SERPL CALCULATED.3IONS-SCNC: 9 MMOL/L (ref 4–13)
AST SERPL W P-5'-P-CCNC: 34 U/L (ref 13–39)
BASOPHILS # BLD AUTO: 0.02 THOUSANDS/ÂΜL (ref 0–0.1)
BASOPHILS NFR BLD AUTO: 0 % (ref 0–1)
BILIRUB SERPL-MCNC: 0.56 MG/DL (ref 0.2–1)
BUN SERPL-MCNC: 9 MG/DL (ref 5–25)
CALCIUM SERPL-MCNC: 8.6 MG/DL (ref 8.4–10.2)
CHLORIDE SERPL-SCNC: 101 MMOL/L (ref 96–108)
CO2 SERPL-SCNC: 25 MMOL/L (ref 21–32)
CREAT SERPL-MCNC: 0.81 MG/DL (ref 0.6–1.3)
EOSINOPHIL # BLD AUTO: 0.02 THOUSAND/ÂΜL (ref 0–0.61)
EOSINOPHIL NFR BLD AUTO: 0 % (ref 0–6)
ERYTHROCYTE [DISTWIDTH] IN BLOOD BY AUTOMATED COUNT: 16.1 % (ref 11.6–15.1)
EXT PREGNANCY TEST URINE: NEGATIVE
EXT. CONTROL: NORMAL
GFR SERPL CREATININE-BSD FRML MDRD: 96 ML/MIN/1.73SQ M
GLUCOSE SERPL-MCNC: 88 MG/DL (ref 65–140)
HCT VFR BLD AUTO: 42.4 % (ref 34.8–46.1)
HGB BLD-MCNC: 14.2 G/DL (ref 11.5–15.4)
IMM GRANULOCYTES # BLD AUTO: 0.01 THOUSAND/UL (ref 0–0.2)
IMM GRANULOCYTES NFR BLD AUTO: 0 % (ref 0–2)
LIPASE SERPL-CCNC: 501 U/L (ref 11–82)
LYMPHOCYTES # BLD AUTO: 1.59 THOUSANDS/ÂΜL (ref 0.6–4.47)
LYMPHOCYTES NFR BLD AUTO: 33 % (ref 14–44)
MAGNESIUM SERPL-MCNC: 1.7 MG/DL (ref 1.9–2.7)
MCH RBC QN AUTO: 24.9 PG (ref 26.8–34.3)
MCHC RBC AUTO-ENTMCNC: 33.5 G/DL (ref 31.4–37.4)
MCV RBC AUTO: 74 FL (ref 82–98)
MONOCYTES # BLD AUTO: 0.56 THOUSAND/ÂΜL (ref 0.17–1.22)
MONOCYTES NFR BLD AUTO: 12 % (ref 4–12)
NEUTROPHILS # BLD AUTO: 2.69 THOUSANDS/ÂΜL (ref 1.85–7.62)
NEUTS SEG NFR BLD AUTO: 55 % (ref 43–75)
NRBC BLD AUTO-RTO: 0 /100 WBCS
PLATELET # BLD AUTO: 165 THOUSANDS/UL (ref 149–390)
PMV BLD AUTO: 9.8 FL (ref 8.9–12.7)
POTASSIUM SERPL-SCNC: 3 MMOL/L (ref 3.5–5.3)
PROT SERPL-MCNC: 7.2 G/DL (ref 6.4–8.4)
RBC # BLD AUTO: 5.7 MILLION/UL (ref 3.81–5.12)
SODIUM SERPL-SCNC: 135 MMOL/L (ref 135–147)
WBC # BLD AUTO: 4.89 THOUSAND/UL (ref 4.31–10.16)

## 2025-08-18 PROCEDURE — 96361 HYDRATE IV INFUSION ADD-ON: CPT

## 2025-08-18 PROCEDURE — 96365 THER/PROPH/DIAG IV INF INIT: CPT

## 2025-08-18 PROCEDURE — 85025 COMPLETE CBC W/AUTO DIFF WBC: CPT

## 2025-08-18 PROCEDURE — 99284 EMERGENCY DEPT VISIT MOD MDM: CPT

## 2025-08-18 PROCEDURE — 96368 THER/DIAG CONCURRENT INF: CPT

## 2025-08-18 PROCEDURE — 96375 TX/PRO/DX INJ NEW DRUG ADDON: CPT

## 2025-08-18 PROCEDURE — 96366 THER/PROPH/DIAG IV INF ADDON: CPT

## 2025-08-18 PROCEDURE — 99284 EMERGENCY DEPT VISIT MOD MDM: CPT | Performed by: EMERGENCY MEDICINE

## 2025-08-18 PROCEDURE — 83690 ASSAY OF LIPASE: CPT

## 2025-08-18 PROCEDURE — 83735 ASSAY OF MAGNESIUM: CPT

## 2025-08-18 PROCEDURE — 36415 COLL VENOUS BLD VENIPUNCTURE: CPT

## 2025-08-18 PROCEDURE — 81025 URINE PREGNANCY TEST: CPT

## 2025-08-18 PROCEDURE — 80053 COMPREHEN METABOLIC PANEL: CPT

## 2025-08-18 RX ORDER — MAGNESIUM SULFATE HEPTAHYDRATE 40 MG/ML
2 INJECTION, SOLUTION INTRAVENOUS ONCE
Status: COMPLETED | OUTPATIENT
Start: 2025-08-18 | End: 2025-08-18

## 2025-08-18 RX ORDER — ONDANSETRON 2 MG/ML
4 INJECTION INTRAMUSCULAR; INTRAVENOUS ONCE
Status: COMPLETED | OUTPATIENT
Start: 2025-08-18 | End: 2025-08-18

## 2025-08-18 RX ORDER — POTASSIUM CHLORIDE 1500 MG/1
40 TABLET, EXTENDED RELEASE ORAL ONCE
Status: COMPLETED | OUTPATIENT
Start: 2025-08-18 | End: 2025-08-18

## 2025-08-18 RX ORDER — ONDANSETRON 4 MG/1
4 TABLET, FILM COATED ORAL EVERY 6 HOURS
Qty: 12 TABLET | Refills: 0 | Status: SHIPPED | OUTPATIENT
Start: 2025-08-18

## 2025-08-18 RX ORDER — POTASSIUM CHLORIDE 14.9 MG/ML
20 INJECTION INTRAVENOUS ONCE
Status: COMPLETED | OUTPATIENT
Start: 2025-08-18 | End: 2025-08-18

## 2025-08-18 RX ORDER — MAGNESIUM HYDROXIDE/ALUMINUM HYDROXICE/SIMETHICONE 120; 1200; 1200 MG/30ML; MG/30ML; MG/30ML
30 SUSPENSION ORAL ONCE
Status: COMPLETED | OUTPATIENT
Start: 2025-08-18 | End: 2025-08-18

## 2025-08-18 RX ADMIN — POTASSIUM CHLORIDE 40 MEQ: 1500 TABLET, EXTENDED RELEASE ORAL at 18:37

## 2025-08-18 RX ADMIN — SODIUM CHLORIDE 1000 ML: 0.9 INJECTION, SOLUTION INTRAVENOUS at 16:49

## 2025-08-18 RX ADMIN — SODIUM CHLORIDE 500 ML: 0.9 INJECTION, SOLUTION INTRAVENOUS at 20:57

## 2025-08-18 RX ADMIN — ONDANSETRON 4 MG: 2 INJECTION INTRAMUSCULAR; INTRAVENOUS at 16:49

## 2025-08-18 RX ADMIN — POTASSIUM CHLORIDE 20 MEQ: 14.9 INJECTION, SOLUTION INTRAVENOUS at 18:37

## 2025-08-18 RX ADMIN — ALUMINUM HYDROXIDE, MAGNESIUM HYDROXIDE, AND SIMETHICONE 30 ML: 200; 200; 20 SUSPENSION ORAL at 17:47

## 2025-08-18 RX ADMIN — MAGNESIUM SULFATE HEPTAHYDRATE 2 G: 40 INJECTION, SOLUTION INTRAVENOUS at 18:37

## 2025-08-19 ENCOUNTER — ANNUAL EXAM (OUTPATIENT)
Dept: OBGYN CLINIC | Facility: CLINIC | Age: 35
End: 2025-08-19

## 2025-08-19 VITALS
DIASTOLIC BLOOD PRESSURE: 84 MMHG | HEART RATE: 85 BPM | SYSTOLIC BLOOD PRESSURE: 115 MMHG | BODY MASS INDEX: 26.29 KG/M2 | WEIGHT: 154 LBS | HEIGHT: 64 IN

## 2025-08-19 DIAGNOSIS — Z11.3 SCREENING FOR STDS (SEXUALLY TRANSMITTED DISEASES): ICD-10-CM

## 2025-08-19 DIAGNOSIS — Z01.419 ENCOUNTER FOR ANNUAL ROUTINE GYNECOLOGICAL EXAMINATION: Primary | ICD-10-CM

## 2025-08-19 DIAGNOSIS — B96.89 BACTERIAL VAGINOSIS: ICD-10-CM

## 2025-08-19 DIAGNOSIS — N76.0 BACTERIAL VAGINOSIS: ICD-10-CM

## 2025-08-19 PROCEDURE — G0476 HPV COMBO ASSAY CA SCREEN: HCPCS

## 2025-08-19 PROCEDURE — 99395 PREV VISIT EST AGE 18-39: CPT | Performed by: OBSTETRICS & GYNECOLOGY

## 2025-08-19 RX ORDER — METRONIDAZOLE 500 MG/1
500 TABLET ORAL EVERY 12 HOURS SCHEDULED
Qty: 14 TABLET | Refills: 0 | Status: SHIPPED | OUTPATIENT
Start: 2025-08-19 | End: 2025-08-26

## 2025-08-20 PROBLEM — Z01.419 ENCOUNTER FOR ANNUAL ROUTINE GYNECOLOGICAL EXAMINATION: Status: RESOLVED | Noted: 2025-07-21 | Resolved: 2025-08-20

## 2025-08-20 LAB
HPV HR 12 DNA CVX QL NAA+PROBE: NEGATIVE
HPV16 DNA CVX QL NAA+PROBE: NEGATIVE
HPV18 DNA CVX QL NAA+PROBE: NEGATIVE

## 2025-08-21 LAB
C TRACH DNA SPEC QL NAA+PROBE: NEGATIVE
N GONORRHOEA DNA SPEC QL NAA+PROBE: NEGATIVE

## 2025-08-25 LAB
LAB AP GYN PRIMARY INTERPRETATION: NORMAL
Lab: NORMAL

## (undated) DEVICE — MEDI-VAC YANKAUER SUCTION HANDLE W/STRAIGHT TIP & CONTROL VENT: Brand: CARDINAL HEALTH

## (undated) DEVICE — SUT VICRYL 4-0 KS 27 IN J662H

## (undated) DEVICE — PACK C-SECTION PBDS

## (undated) DEVICE — SKIN MARKER DUAL TIP WITH RULER CAP, FLEXIBLE RULER AND LABELS: Brand: DEVON

## (undated) DEVICE — Device

## (undated) DEVICE — DRAPE EQUIPMENT RF WAND

## (undated) DEVICE — SCD SEQUENTIAL COMPRESSION COMFORT SLEEVE MEDIUM KNEE LENGTH: Brand: KENDALL SCD

## (undated) DEVICE — CHLORAPREP HI-LITE 26ML ORANGE

## (undated) DEVICE — INTENDED FOR TISSUE SEPARATION, AND OTHER PROCEDURES THAT REQUIRE A SHARP SURGICAL BLADE TO PUNCTURE OR CUT.: Brand: BARD-PARKER SAFETY BLADES SIZE 15, STERILE

## (undated) DEVICE — ABDOMINAL PAD: Brand: DERMACEA

## (undated) DEVICE — GAUZE SPONGES,16 PLY: Brand: CURITY

## (undated) DEVICE — SUT MONOCRYL 0 CTX 36 IN Y398H

## (undated) DEVICE — PLUMEPEN PRO 10FT

## (undated) DEVICE — EXTRA LARGE, DISPOSABLE C-SECTION PROTECTOR - RETRACTOR: Brand: ALEXIS ® O C-SECTION PROTECTOR - RETRACTOR

## (undated) DEVICE — SUT VICRYL 0 CT-1 36 IN J946H

## (undated) DEVICE — GLOVE INDICATOR PI UNDERGLOVE SZ 6.5 BLUE

## (undated) DEVICE — SUT MONOCRYL 4-0 PS-2 27 IN Y426H

## (undated) DEVICE — GLOVE SRG BIOGEL 7

## (undated) DEVICE — TELFA NON-ADHERENT ABSORBENT DRESSING: Brand: TELFA

## (undated) DEVICE — 2000CC GUARDIAN II: Brand: GUARDIAN

## (undated) DEVICE — SUT VICRYL 0 CT-1 27 IN J260H

## (undated) DEVICE — 3M™ STERI-STRIP™ REINFORCED ADHESIVE SKIN CLOSURES, R1547, 1/2 IN X 4 IN (12 MM X 100 MM), 6 STRIPS/ENVELOPE: Brand: 3M™ STERI-STRIP™

## (undated) DEVICE — LARGE, DISPOSABLE ALEXIS O C-SECTION PROTECTOR - RETRACTOR: Brand: ALEXIS ® O C-SECTION PROTECTOR - RETRACTOR

## (undated) DEVICE — GLOVE INDICATOR PI UNDERGLOVE SZ 7 BLUE

## (undated) DEVICE — GLOVE SRG BIOGEL ECLIPSE 8

## (undated) DEVICE — GLOVE SRG BIOGEL 6.5

## (undated) DEVICE — BETHLEHEM UNIVERSAL MINOR GEN: Brand: CARDINAL HEALTH

## (undated) DEVICE — SUT VICRYL 0 CTX 36 IN J978H

## (undated) DEVICE — STRETCH BANDAGE: Brand: CURITY

## (undated) DEVICE — GLOVE INDICATOR PI UNDERGLOVE SZ 8 BLUE